# Patient Record
Sex: MALE | Race: WHITE | NOT HISPANIC OR LATINO | Employment: PART TIME | URBAN - METROPOLITAN AREA
[De-identification: names, ages, dates, MRNs, and addresses within clinical notes are randomized per-mention and may not be internally consistent; named-entity substitution may affect disease eponyms.]

---

## 2017-02-01 ENCOUNTER — HOSPITAL ENCOUNTER (OUTPATIENT)
Dept: CARDIOLOGY | Facility: HOSPITAL | Age: 32
Setting detail: RECURRING SERIES
Discharge: HOME | End: 2017-02-01

## 2017-02-01 PROCEDURE — 85610 PROTHROMBIN TIME: CPT | Performed by: INTERNAL MEDICINE

## 2017-02-01 PROCEDURE — 36416 COLLJ CAPILLARY BLOOD SPEC: CPT | Performed by: INTERNAL MEDICINE

## 2017-02-08 ENCOUNTER — HOSPITAL ENCOUNTER (OUTPATIENT)
Dept: CARDIOLOGY | Facility: HOSPITAL | Age: 32
Setting detail: RECURRING SERIES
Discharge: HOME | End: 2017-02-08

## 2017-02-08 PROCEDURE — 36416 COLLJ CAPILLARY BLOOD SPEC: CPT

## 2017-02-08 PROCEDURE — 85610 PROTHROMBIN TIME: CPT

## 2017-02-15 ENCOUNTER — HOSPITAL ENCOUNTER (OUTPATIENT)
Dept: CARDIOLOGY | Facility: HOSPITAL | Age: 32
Setting detail: RECURRING SERIES
Discharge: HOME | End: 2017-02-15

## 2017-02-15 PROCEDURE — 85610 PROTHROMBIN TIME: CPT

## 2017-02-15 PROCEDURE — 36416 COLLJ CAPILLARY BLOOD SPEC: CPT

## 2017-02-28 ENCOUNTER — HOSPITAL ENCOUNTER (OUTPATIENT)
Dept: CARDIOLOGY | Facility: HOSPITAL | Age: 32
Setting detail: RECURRING SERIES
Discharge: HOME | End: 2017-02-28

## 2017-02-28 PROCEDURE — 85610 PROTHROMBIN TIME: CPT

## 2017-02-28 PROCEDURE — 36416 COLLJ CAPILLARY BLOOD SPEC: CPT

## 2017-03-26 DIAGNOSIS — Z95.2 HX OF MECHANICAL AORTIC VALVE REPLACEMENT: ICD-10-CM

## 2017-03-27 RX ORDER — WARFARIN SODIUM 1 MG/1
TABLET ORAL
Qty: 30 TABLET | Refills: 2 | Status: SHIPPED | OUTPATIENT
Start: 2017-03-27 | End: 2017-07-21 | Stop reason: SDUPTHER

## 2017-03-27 RX ORDER — WARFARIN SODIUM 5 MG/1
TABLET ORAL
Qty: 45 TABLET | Refills: 2 | Status: SHIPPED | OUTPATIENT
Start: 2017-03-27 | End: 2017-07-21 | Stop reason: SDUPTHER

## 2017-04-26 ENCOUNTER — HOSPITAL ENCOUNTER (OUTPATIENT)
Dept: CARDIOLOGY | Facility: HOSPITAL | Age: 32
Setting detail: RECURRING SERIES
Discharge: HOME | End: 2017-04-26

## 2017-04-26 PROCEDURE — 36416 COLLJ CAPILLARY BLOOD SPEC: CPT

## 2017-04-26 PROCEDURE — 85610 PROTHROMBIN TIME: CPT

## 2017-05-01 ENCOUNTER — OFFICE VISIT (OUTPATIENT)
Dept: CARDIOLOGY | Facility: CLINIC | Age: 32
End: 2017-05-01

## 2017-05-01 VITALS
DIASTOLIC BLOOD PRESSURE: 92 MMHG | HEART RATE: 77 BPM | SYSTOLIC BLOOD PRESSURE: 150 MMHG | HEIGHT: 68 IN | BODY MASS INDEX: 29.25 KG/M2 | WEIGHT: 193 LBS

## 2017-05-01 DIAGNOSIS — I10 ESSENTIAL HYPERTENSION: ICD-10-CM

## 2017-05-01 DIAGNOSIS — Z95.2 S/P AVR: ICD-10-CM

## 2017-05-01 DIAGNOSIS — I20.8 STABLE ANGINA (HCC): Primary | ICD-10-CM

## 2017-05-01 DIAGNOSIS — I25.118 CORONARY ARTERY DISEASE OF NATIVE ARTERY OF NATIVE HEART WITH STABLE ANGINA PECTORIS (HCC): ICD-10-CM

## 2017-05-01 PROCEDURE — 99214 OFFICE O/P EST MOD 30 MIN: CPT | Performed by: INTERNAL MEDICINE

## 2017-05-01 PROCEDURE — 93000 ELECTROCARDIOGRAM COMPLETE: CPT | Performed by: INTERNAL MEDICINE

## 2017-05-01 RX ORDER — ISOSORBIDE MONONITRATE 30 MG/1
30 TABLET, EXTENDED RELEASE ORAL EVERY MORNING
Qty: 30 TABLET | Refills: 11 | Status: SHIPPED | OUTPATIENT
Start: 2017-05-01 | End: 2017-05-15 | Stop reason: SDUPTHER

## 2017-05-01 RX ORDER — LISINOPRIL 5 MG/1
5 TABLET ORAL DAILY
Qty: 30 TABLET | Refills: 11 | Status: SHIPPED | OUTPATIENT
Start: 2017-05-01 | End: 2017-08-24 | Stop reason: SDUPTHER

## 2017-05-08 ENCOUNTER — TELEPHONE (OUTPATIENT)
Dept: CARDIOLOGY | Facility: CLINIC | Age: 32
End: 2017-05-08

## 2017-05-10 ENCOUNTER — HOSPITAL ENCOUNTER (OUTPATIENT)
Dept: CARDIOLOGY | Facility: HOSPITAL | Age: 32
Setting detail: RECURRING SERIES
Discharge: HOME | End: 2017-05-10

## 2017-05-10 PROCEDURE — 85610 PROTHROMBIN TIME: CPT

## 2017-05-10 PROCEDURE — 36416 COLLJ CAPILLARY BLOOD SPEC: CPT

## 2017-05-11 RX ORDER — NITROGLYCERIN 0.4 MG/1
TABLET SUBLINGUAL
Qty: 75 TABLET | Refills: 1 | Status: SHIPPED | OUTPATIENT
Start: 2017-05-11 | End: 2017-07-14 | Stop reason: SDUPTHER

## 2017-05-15 RX ORDER — ISOSORBIDE MONONITRATE 30 MG/1
60 TABLET, EXTENDED RELEASE ORAL EVERY MORNING
Qty: 60 TABLET | Refills: 1 | Status: SHIPPED | OUTPATIENT
Start: 2017-05-15 | End: 2017-07-21 | Stop reason: SDUPTHER

## 2017-07-14 RX ORDER — NITROGLYCERIN 0.4 MG/1
TABLET SUBLINGUAL
Qty: 75 TABLET | Refills: 1 | Status: SHIPPED | OUTPATIENT
Start: 2017-07-14 | End: 2017-08-14 | Stop reason: SDUPTHER

## 2017-07-19 ENCOUNTER — HOSPITAL ENCOUNTER (OUTPATIENT)
Dept: CARDIOLOGY | Facility: HOSPITAL | Age: 32
Setting detail: RECURRING SERIES
Discharge: HOME | End: 2017-07-19

## 2017-07-19 PROCEDURE — 85610 PROTHROMBIN TIME: CPT

## 2017-07-19 PROCEDURE — 36416 COLLJ CAPILLARY BLOOD SPEC: CPT

## 2017-07-21 DIAGNOSIS — Z95.2 HX OF MECHANICAL AORTIC VALVE REPLACEMENT: ICD-10-CM

## 2017-07-21 RX ORDER — WARFARIN SODIUM 5 MG/1
TABLET ORAL
Qty: 45 TABLET | Refills: 2 | Status: SHIPPED | OUTPATIENT
Start: 2017-07-21 | End: 2017-11-02 | Stop reason: SDUPTHER

## 2017-07-21 RX ORDER — ISOSORBIDE MONONITRATE 30 MG/1
TABLET, EXTENDED RELEASE ORAL
Qty: 60 TABLET | Refills: 1 | Status: SHIPPED | OUTPATIENT
Start: 2017-07-21 | End: 2017-11-02 | Stop reason: SDUPTHER

## 2017-07-21 RX ORDER — WARFARIN SODIUM 1 MG/1
TABLET ORAL
Qty: 30 TABLET | Refills: 2 | Status: SHIPPED | OUTPATIENT
Start: 2017-07-21 | End: 2017-11-02 | Stop reason: SDUPTHER

## 2017-08-14 ENCOUNTER — TELEPHONE (OUTPATIENT)
Dept: CARDIOLOGY | Facility: CLINIC | Age: 32
End: 2017-08-14

## 2017-08-14 RX ORDER — NITROGLYCERIN 0.4 MG/1
0.4 TABLET SUBLINGUAL
Qty: 75 TABLET | Refills: 1 | Status: SHIPPED | OUTPATIENT
Start: 2017-08-14 | End: 2017-11-08 | Stop reason: SDUPTHER

## 2017-08-14 NOTE — TELEPHONE ENCOUNTER
I told her to take 20 mg of lisinopril a day: S in 4 days and let us know his blood pressure is if it's okay then we will send in a prescription

## 2017-08-14 NOTE — TELEPHONE ENCOUNTER
This is a CS pt and he is out this week, would you mind addressing this please? If you can't let me know and I will pass it on.    Pt called today to get a refill on his nitro. Also he has had increased BP recently.   8/2/17     178/115  8/10/17   200/112     At his last appt on 5/1/17, Dr. Banks had him d/c his carvedilol 6.25mg bid due to wheezing. So the only BP med he is on is the lisinopril 5mg qd. He wnts to know if there is anything additional he can take since his BP is so high. He can be reached at #[phone]. Please advise.    Thanks,  Michelle

## 2017-08-23 NOTE — TELEPHONE ENCOUNTER
See below. I haven't heard from him, but we will be seeing him in follow up on this Fri.    Michelle

## 2017-08-24 RX ORDER — LISINOPRIL 20 MG/1
20 TABLET ORAL DAILY
Qty: 30 TABLET | Refills: 1 | Status: SHIPPED | OUTPATIENT
Start: 2017-08-24 | End: 2017-11-06 | Stop reason: SDUPTHER

## 2017-08-24 NOTE — TELEPHONE ENCOUNTER
This patient calling said he is out of linisonpril   Wants to know if we can call this in for him  He is wanting lisinopril 20mg

## 2017-08-25 ENCOUNTER — OFFICE VISIT (OUTPATIENT)
Dept: CARDIOLOGY | Facility: CLINIC | Age: 32
End: 2017-08-25

## 2017-08-25 VITALS
SYSTOLIC BLOOD PRESSURE: 162 MMHG | WEIGHT: 190 LBS | HEART RATE: 89 BPM | DIASTOLIC BLOOD PRESSURE: 90 MMHG | HEIGHT: 68 IN | BODY MASS INDEX: 28.79 KG/M2

## 2017-08-25 DIAGNOSIS — Z95.2 S/P AVR: Primary | ICD-10-CM

## 2017-08-25 DIAGNOSIS — I20.8 STABLE ANGINA (HCC): ICD-10-CM

## 2017-08-25 DIAGNOSIS — I10 ESSENTIAL HYPERTENSION: ICD-10-CM

## 2017-08-25 PROCEDURE — 93000 ELECTROCARDIOGRAM COMPLETE: CPT | Performed by: INTERNAL MEDICINE

## 2017-08-25 PROCEDURE — 99214 OFFICE O/P EST MOD 30 MIN: CPT | Performed by: INTERNAL MEDICINE

## 2017-08-25 RX ORDER — CARVEDILOL 3.12 MG/1
3.12 TABLET ORAL 2 TIMES DAILY
Qty: 60 TABLET | Refills: 11 | Status: SHIPPED | OUTPATIENT
Start: 2017-08-25 | End: 2018-09-23 | Stop reason: SDUPTHER

## 2017-08-25 NOTE — PROGRESS NOTES
Galdino Dallas  1985  Date of Office Visit: 8/28/17  Encounter Provider: José Antonio Banks MD  Place of Service: [facility] CARDIOLOGY      CHIEF COMPLAINT:  Coronary artery disease.   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   Mechanical aortic valve replacement.      HISTORY OF PRESENT ILLNESS:  Dr. Merida,   I had the pleasure of seeing your patient in followup today. As you well know, he is a very pleasant 31-year-old gentleman with a medical history of bicuspid aortic valve with mechanical replacement, coronary artery disease, three stents (per my review) in RPL, OM and also a 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 He actually has very bad distal coronary artery disease.    Since our last visit, he states that his angina has significantly increased in frequency and in intensity. It is present with minimal levels of exertion, including walking 100 to 200 feet, requiring him to rest. This is much worse than it has been prior.  He denies any rest pain or pain with exertion less than that described above.  He was unclear what was leading to that and checked his blood pressure and stated his systolic was staying above 100 mmHg.  He called in and his lisinopril was increased to 20 mg daily. He states his blood pressure is improved, however it is still elevated and he continues to have discomfort. Nothing besides rest makes the discomfort better. He has no orthopnea PND or lower extremity edema.  No bleeding complications on the coumadin.             Review of Systems   Constitution: Negative for fever, weakness and malaise/fatigue.   HENT: Positive for headaches. Negative for nosebleeds and sore throat.    Eyes: Negative for blurred vision and double vision.   Cardiovascular: Positive for chest pain. Negative for claudication, dyspnea on exertion, palpitations and syncope.   Respiratory: Negative for cough, shortness of breath, snoring and wheezing.    Endocrine:  "Negative for cold intolerance, heat intolerance and polydipsia.   Skin: Negative for itching, poor wound healing and rash.   Musculoskeletal: Positive for myalgias. Negative for joint pain, joint swelling and muscle weakness.   Gastrointestinal: Negative for abdominal pain, melena, nausea and vomiting.   Neurological: Negative for light-headedness, loss of balance, seizures and vertigo.   Psychiatric/Behavioral: Positive for depression. Negative for altered mental status.       Past Medical History:   Diagnosis Date   • Angina pectoris    • CAD (coronary artery disease)    • Chest pain    • Hyperlipidemia    • Hypertension    • IBS (irritable bowel syndrome)    • DERRELL (obstructive sleep apnea)    • S/P AVR (aortic valve replacement)        The following portions of the patient's history were reviewed and updated as appropriate: Social history , Family history and Surgical history     Current Outpatient Prescriptions on File Prior to Visit   Medication Sig Dispense Refill   • isosorbide mononitrate (IMDUR) 30 MG 24 hr tablet TAKE 2 TABLETS BY MOUTH EVERY MORNING. 60 tablet 1   • lisinopril (PRINIVIL,ZESTRIL) 20 MG tablet Take 1 tablet by mouth Daily. 30 tablet 1   • nitroglycerin (NITROSTAT) 0.4 MG SL tablet Place 1 tablet under the tongue Every 5 (Five) Minutes As Needed for Chest Pain. MAY REPEAT FOR UP THREE DOSES 75 tablet 1   • warfarin (COUMADIN) 1 MG tablet TAKE 1 TAB FOUR DAYS A WEEK ALONG WITH A 5MG. TAB (TOTAL 6MG) AND 1 TAB THREE DAYS A WEEK. 30 tablet 2   • warfarin (COUMADIN) 5 MG tablet TAKE 1 TAB 4 DAYS A WEEK ALONG WITH 1MG TAB (TOTAL 6MG) AND 1 1/2 TABS 3 DAYS A WEEK. 45 tablet 2     No current facility-administered medications on file prior to visit.        Allergies   Allergen Reactions   • Penicillins        Vitals:    08/25/17 1522   BP: 162/90   Pulse: 89   Weight: 190 lb (86.2 kg)   Height: 68\" (172.7 cm)     Physical Exam   Constitutional: He is oriented to person, place, and time. He appears " well-developed and well-nourished.   HENT:   Head: Normocephalic and atraumatic.   Eyes: Conjunctivae and EOM are normal. No scleral icterus.   Neck: Normal range of motion. Neck supple. Normal carotid pulses, no hepatojugular reflux and no JVD present. Carotid bruit is not present. No tracheal deviation present. No thyromegaly present.   Cardiovascular: Normal rate and regular rhythm.  Exam reveals no gallop and no friction rub.    No murmur heard.  Pulses:       Carotid pulses are 2+ on the right side, and 2+ on the left side.       Radial pulses are 2+ on the right side, and 2+ on the left side.        Femoral pulses are 2+ on the right side, and 2+ on the left side.       Dorsalis pedis pulses are 2+ on the right side, and 2+ on the left side.        Posterior tibial pulses are 2+ on the right side, and 2+ on the left side.   Mechanical S2   Pulmonary/Chest: Breath sounds normal. No respiratory distress. He has no decreased breath sounds. He has no wheezes. He has no rhonchi. He has no rales. He exhibits no tenderness.   Abdominal: Soft. Bowel sounds are normal. He exhibits no distension. There is no tenderness. There is no rebound.   Musculoskeletal: He exhibits no edema or deformity.   Neurological: He is alert and oriented to person, place, and time. He has normal strength. No sensory deficit.   Skin: No rash noted. No erythema.   Sternotomy   Psychiatric: He has a normal mood and affect. His behavior is normal.       No components found for: CBC  No results found for: CMP  No components found for: LIPID  No results found for: BMP      ECG 12 Lead  Date/Time: 8/25/2017 3:55 PM  Performed by: KARINA RASHEED  Authorized by: KARINA RASHEED   Comparison: compared with previous ECG from 5/1/2017  Similar to previous ECG  Rhythm: sinus rhythm  Rate: normal  QRS axis: left  Clinical impression: abnormal ECG  Comments: LVH with repolarization abnormality               DISCUSSION/SUMMARY   31-year-old  gentleman with a medical history of bicuspid aortic valve with mechanical replacement, coronary artery disease, three stents (per my review) in RPL, OM and also a 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 He actually has very bad distal coronary artery disease.  He presents back for followup and his blood pressure is poorly controlled.  He feels that his angina has gotten worse and I think that is a possible secondary to his increase in blood pressure and discontinuation of his carvedilol which was DC'd on the last visit secondary to fatigue.     1. Aortic valve replacement.  He is to continue his current anticoagulant regimen.  Coumadin  2.   Hypertension.  Poorly controlled.   -He is agreeable to restarting a low-dose of carvedilol therapy.  I will restart that at carvedilol 3.125 mg by mouth twice a day.  He will check his blood pressure daily and call me in one week.   -If his blood pressure is still elevated, I will increase his lisinopril to 40 mg daily  3.  Stable angina: Appears to be slightly worse than prior.  May be driven by blood pressure.   -He is agreeable to restarting beta blockade.  I will start him on carvedilol 3.125 mg by mouth twice a day.  He will call back in one week with repeat blood pressure measurements.  He will also comment on the angina.    -If his blood pressure is still elevated I'll increase his lisinopril to 40 mg daily.  If he continues to have angina with a well-controlled blood pressure, I think coronary angiography will be our next step.  He is  aware.    Coronary Artery Disease  Assessment  • The patient has CCS class III - angina with activities of daily living  • The patient has stable angina     Plan  • Lifestyle modifications discussed include adhering to a heart healthy diet, avoidance of tobacco products, maintenance of a healthy weight, medication compliance, regular exercise and regular monitoring of cholesterol and blood pressure    Subjective - Objective  • Current  antiplatelet therapy includes aspirin 81 mg

## 2017-10-17 ENCOUNTER — HOSPITAL ENCOUNTER (OUTPATIENT)
Dept: CARDIOLOGY | Facility: HOSPITAL | Age: 32
Setting detail: RECURRING SERIES
Discharge: HOME | End: 2017-10-17

## 2017-10-17 PROCEDURE — 36416 COLLJ CAPILLARY BLOOD SPEC: CPT

## 2017-10-17 PROCEDURE — 85610 PROTHROMBIN TIME: CPT

## 2017-11-02 ENCOUNTER — APPOINTMENT (OUTPATIENT)
Dept: CT IMAGING | Facility: HOSPITAL | Age: 32
End: 2017-11-02

## 2017-11-02 ENCOUNTER — HOSPITAL ENCOUNTER (INPATIENT)
Facility: HOSPITAL | Age: 32
LOS: 2 days | Discharge: HOME | End: 2017-11-04
Attending: EMERGENCY MEDICINE | Admitting: INTERNAL MEDICINE

## 2017-11-02 DIAGNOSIS — D68.32 WARFARIN-INDUCED COAGULOPATHY (HCC): ICD-10-CM

## 2017-11-02 DIAGNOSIS — K57.92 DIVERTICULITIS OF INTESTINE WITHOUT PERFORATION OR ABSCESS WITHOUT BLEEDING, UNSPECIFIED PART OF INTESTINAL TRACT: Primary | ICD-10-CM

## 2017-11-02 DIAGNOSIS — T45.515A WARFARIN-INDUCED COAGULOPATHY (HCC): ICD-10-CM

## 2017-11-02 PROBLEM — Z79.01 ANTICOAGULANT LONG-TERM USE: Status: ACTIVE | Noted: 2017-11-02

## 2017-11-02 PROBLEM — G47.33 OSA (OBSTRUCTIVE SLEEP APNEA): Status: ACTIVE | Noted: 2017-11-02

## 2017-11-02 PROBLEM — T45.511A COUMADIN TOXICITY: Status: ACTIVE | Noted: 2017-11-02

## 2017-11-02 LAB
ABO GROUP BLD: NORMAL
ALBUMIN SERPL-MCNC: 4.6 G/DL (ref 3.5–5.2)
ALBUMIN/GLOB SERPL: 1.5 G/DL
ALP SERPL-CCNC: 87 U/L (ref 39–117)
ALT SERPL W P-5'-P-CCNC: 36 U/L (ref 1–41)
ANION GAP SERPL CALCULATED.3IONS-SCNC: 15.6 MMOL/L
AST SERPL-CCNC: 34 U/L (ref 1–40)
BASOPHILS # BLD AUTO: 0.02 10*3/MM3 (ref 0–0.2)
BASOPHILS NFR BLD AUTO: 0.1 % (ref 0–1.5)
BILIRUB SERPL-MCNC: 1.2 MG/DL (ref 0.1–1.2)
BILIRUB UR QL STRIP: NEGATIVE
BLD GP AB SCN SERPL QL: NEGATIVE
BUN BLD-MCNC: 15 MG/DL (ref 6–20)
BUN/CREAT SERPL: 17.6 (ref 7–25)
CALCIUM SPEC-SCNC: 9.2 MG/DL (ref 8.6–10.5)
CHLORIDE SERPL-SCNC: 99 MMOL/L (ref 98–107)
CLARITY UR: CLEAR
CO2 SERPL-SCNC: 20.4 MMOL/L (ref 22–29)
COLOR UR: ABNORMAL
CREAT BLD-MCNC: 0.85 MG/DL (ref 0.76–1.27)
DEPRECATED RDW RBC AUTO: 43.7 FL (ref 37–54)
EOSINOPHIL # BLD AUTO: 0.04 10*3/MM3 (ref 0–0.7)
EOSINOPHIL NFR BLD AUTO: 0.2 % (ref 0.3–6.2)
ERYTHROCYTE [DISTWIDTH] IN BLOOD BY AUTOMATED COUNT: 13.1 % (ref 11.5–14.5)
GFR SERPL CREATININE-BSD FRML MDRD: 104 ML/MIN/1.73
GLOBULIN UR ELPH-MCNC: 3 GM/DL
GLUCOSE BLD-MCNC: 84 MG/DL (ref 65–99)
GLUCOSE UR STRIP-MCNC: NEGATIVE MG/DL
HCT VFR BLD AUTO: 49.5 % (ref 40.4–52.2)
HGB BLD-MCNC: 17.2 G/DL (ref 13.7–17.6)
HGB UR QL STRIP.AUTO: NEGATIVE
HOLD SPECIMEN: NORMAL
HOLD SPECIMEN: NORMAL
IMM GRANULOCYTES # BLD: 0.06 10*3/MM3 (ref 0–0.03)
IMM GRANULOCYTES NFR BLD: 0.3 % (ref 0–0.5)
INR PPP: 3.3 (ref 0.9–1.1)
INR PPP: 7.5 (ref 0.9–1.1)
KETONES UR QL STRIP: ABNORMAL
LEUKOCYTE ESTERASE UR QL STRIP.AUTO: NEGATIVE
LIPASE SERPL-CCNC: 17 U/L (ref 13–60)
LYMPHOCYTES # BLD AUTO: 1.71 10*3/MM3 (ref 0.9–4.8)
LYMPHOCYTES NFR BLD AUTO: 9.4 % (ref 19.6–45.3)
MCH RBC QN AUTO: 32 PG (ref 27–32.7)
MCHC RBC AUTO-ENTMCNC: 34.7 G/DL (ref 32.6–36.4)
MCV RBC AUTO: 92.2 FL (ref 79.8–96.2)
MONOCYTES # BLD AUTO: 1.41 10*3/MM3 (ref 0.2–1.2)
MONOCYTES NFR BLD AUTO: 7.8 % (ref 5–12)
NEUTROPHILS # BLD AUTO: 14.9 10*3/MM3 (ref 1.9–8.1)
NEUTROPHILS NFR BLD AUTO: 82.2 % (ref 42.7–76)
NITRITE UR QL STRIP: NEGATIVE
PH UR STRIP.AUTO: 5.5 [PH] (ref 5–8)
PLATELET # BLD AUTO: 203 10*3/MM3 (ref 140–500)
PMV BLD AUTO: 10.4 FL (ref 6–12)
POTASSIUM BLD-SCNC: 4.6 MMOL/L (ref 3.5–5.2)
PROT SERPL-MCNC: 7.6 G/DL (ref 6–8.5)
PROT UR QL STRIP: ABNORMAL
PROTHROMBIN TIME: 32.5 SECONDS (ref 11.7–14.2)
PROTHROMBIN TIME: 61.8 SECONDS (ref 11.7–14.2)
RBC # BLD AUTO: 5.37 10*6/MM3 (ref 4.6–6)
RH BLD: POSITIVE
SODIUM BLD-SCNC: 135 MMOL/L (ref 136–145)
SP GR UR STRIP: >=1.03 (ref 1–1.03)
UROBILINOGEN UR QL STRIP: ABNORMAL
WBC NRBC COR # BLD: 18.14 10*3/MM3 (ref 4.5–10.7)
WHOLE BLOOD HOLD SPECIMEN: NORMAL
WHOLE BLOOD HOLD SPECIMEN: NORMAL

## 2017-11-02 PROCEDURE — 25010000002 HYDROMORPHONE PER 4 MG: Performed by: EMERGENCY MEDICINE

## 2017-11-02 PROCEDURE — 83690 ASSAY OF LIPASE: CPT | Performed by: EMERGENCY MEDICINE

## 2017-11-02 PROCEDURE — 74177 CT ABD & PELVIS W/CONTRAST: CPT

## 2017-11-02 PROCEDURE — 85610 PROTHROMBIN TIME: CPT | Performed by: EMERGENCY MEDICINE

## 2017-11-02 PROCEDURE — P9017 PLASMA 1 DONOR FRZ W/IN 8 HR: HCPCS

## 2017-11-02 PROCEDURE — 0 IOPAMIDOL 61 % SOLUTION: Performed by: EMERGENCY MEDICINE

## 2017-11-02 PROCEDURE — 99284 EMERGENCY DEPT VISIT MOD MDM: CPT

## 2017-11-02 PROCEDURE — 86850 RBC ANTIBODY SCREEN: CPT | Performed by: INTERNAL MEDICINE

## 2017-11-02 PROCEDURE — 86927 PLASMA FRESH FROZEN: CPT

## 2017-11-02 PROCEDURE — 86900 BLOOD TYPING SEROLOGIC ABO: CPT | Performed by: INTERNAL MEDICINE

## 2017-11-02 PROCEDURE — 36415 COLL VENOUS BLD VENIPUNCTURE: CPT | Performed by: EMERGENCY MEDICINE

## 2017-11-02 PROCEDURE — 86901 BLOOD TYPING SEROLOGIC RH(D): CPT | Performed by: INTERNAL MEDICINE

## 2017-11-02 PROCEDURE — 25010000002 ONDANSETRON PER 1 MG: Performed by: EMERGENCY MEDICINE

## 2017-11-02 PROCEDURE — 85025 COMPLETE CBC W/AUTO DIFF WBC: CPT | Performed by: EMERGENCY MEDICINE

## 2017-11-02 PROCEDURE — 25010000002 HYDROMORPHONE PER 4 MG: Performed by: INTERNAL MEDICINE

## 2017-11-02 PROCEDURE — 36430 TRANSFUSION BLD/BLD COMPNT: CPT

## 2017-11-02 PROCEDURE — 85610 PROTHROMBIN TIME: CPT | Performed by: INTERNAL MEDICINE

## 2017-11-02 PROCEDURE — 81003 URINALYSIS AUTO W/O SCOPE: CPT | Performed by: EMERGENCY MEDICINE

## 2017-11-02 PROCEDURE — 25010000002 CEFEPIME PER 500 MG: Performed by: INTERNAL MEDICINE

## 2017-11-02 PROCEDURE — 80053 COMPREHEN METABOLIC PANEL: CPT | Performed by: EMERGENCY MEDICINE

## 2017-11-02 PROCEDURE — 25010000002 PIPERACILLIN SOD-TAZOBACTAM PER 1 G: Performed by: EMERGENCY MEDICINE

## 2017-11-02 RX ORDER — SODIUM CHLORIDE 9 MG/ML
125 INJECTION, SOLUTION INTRAVENOUS CONTINUOUS
Status: DISCONTINUED | OUTPATIENT
Start: 2017-11-02 | End: 2017-11-04

## 2017-11-02 RX ORDER — HYDROMORPHONE HYDROCHLORIDE 1 MG/ML
0.5 INJECTION, SOLUTION INTRAMUSCULAR; INTRAVENOUS; SUBCUTANEOUS ONCE
Status: COMPLETED | OUTPATIENT
Start: 2017-11-02 | End: 2017-11-02

## 2017-11-02 RX ORDER — ONDANSETRON 2 MG/ML
4 INJECTION INTRAMUSCULAR; INTRAVENOUS EVERY 6 HOURS PRN
Status: DISCONTINUED | OUTPATIENT
Start: 2017-11-02 | End: 2017-11-04 | Stop reason: HOSPADM

## 2017-11-02 RX ORDER — ACETAMINOPHEN 325 MG/1
650 TABLET ORAL EVERY 4 HOURS PRN
Status: DISCONTINUED | OUTPATIENT
Start: 2017-11-02 | End: 2017-11-04 | Stop reason: HOSPADM

## 2017-11-02 RX ORDER — ONDANSETRON 4 MG/1
4 TABLET, ORALLY DISINTEGRATING ORAL EVERY 6 HOURS PRN
Status: DISCONTINUED | OUTPATIENT
Start: 2017-11-02 | End: 2017-11-04 | Stop reason: HOSPADM

## 2017-11-02 RX ORDER — SODIUM CHLORIDE 0.9 % (FLUSH) 0.9 %
10 SYRINGE (ML) INJECTION AS NEEDED
Status: DISCONTINUED | OUTPATIENT
Start: 2017-11-02 | End: 2017-11-04 | Stop reason: HOSPADM

## 2017-11-02 RX ORDER — WARFARIN SODIUM 6 MG/1
6 TABLET ORAL
COMMUNITY
End: 2017-11-04 | Stop reason: HOSPADM

## 2017-11-02 RX ORDER — HYDROMORPHONE HYDROCHLORIDE 1 MG/ML
0.5 INJECTION, SOLUTION INTRAMUSCULAR; INTRAVENOUS; SUBCUTANEOUS
Status: DISCONTINUED | OUTPATIENT
Start: 2017-11-02 | End: 2017-11-04 | Stop reason: HOSPADM

## 2017-11-02 RX ORDER — ONDANSETRON 2 MG/ML
4 INJECTION INTRAMUSCULAR; INTRAVENOUS ONCE
Status: COMPLETED | OUTPATIENT
Start: 2017-11-02 | End: 2017-11-02

## 2017-11-02 RX ORDER — HYDROCODONE BITARTRATE AND ACETAMINOPHEN 7.5; 325 MG/1; MG/1
1 TABLET ORAL EVERY 4 HOURS PRN
Status: DISCONTINUED | OUTPATIENT
Start: 2017-11-02 | End: 2017-11-03

## 2017-11-02 RX ORDER — ONDANSETRON 4 MG/1
4 TABLET, FILM COATED ORAL EVERY 6 HOURS PRN
Status: DISCONTINUED | OUTPATIENT
Start: 2017-11-02 | End: 2017-11-04 | Stop reason: HOSPADM

## 2017-11-02 RX ADMIN — SODIUM CHLORIDE 125 ML/HR: 9 INJECTION, SOLUTION INTRAVENOUS at 14:29

## 2017-11-02 RX ADMIN — HYDROMORPHONE HYDROCHLORIDE 0.5 MG: 1 INJECTION, SOLUTION INTRAMUSCULAR; INTRAVENOUS; SUBCUTANEOUS at 20:00

## 2017-11-02 RX ADMIN — ONDANSETRON 4 MG: 2 INJECTION INTRAMUSCULAR; INTRAVENOUS at 14:30

## 2017-11-02 RX ADMIN — IOPAMIDOL 85 ML: 612 INJECTION, SOLUTION INTRAVENOUS at 14:42

## 2017-11-02 RX ADMIN — TAZOBACTAM SODIUM AND PIPERACILLIN SODIUM 4.5 G: 500; 4 INJECTION, SOLUTION INTRAVENOUS at 15:18

## 2017-11-02 RX ADMIN — HYDROMORPHONE HYDROCHLORIDE 0.5 MG: 1 INJECTION, SOLUTION INTRAMUSCULAR; INTRAVENOUS; SUBCUTANEOUS at 17:45

## 2017-11-02 RX ADMIN — CEFEPIME HYDROCHLORIDE 2 G: 2 INJECTION, POWDER, FOR SOLUTION INTRAVENOUS at 19:22

## 2017-11-02 RX ADMIN — HYDROMORPHONE HYDROCHLORIDE 0.5 MG: 1 INJECTION, SOLUTION INTRAMUSCULAR; INTRAVENOUS; SUBCUTANEOUS at 23:12

## 2017-11-02 RX ADMIN — SODIUM CHLORIDE 500 ML: 9 INJECTION, SOLUTION INTRAVENOUS at 14:29

## 2017-11-02 RX ADMIN — HYDROMORPHONE HYDROCHLORIDE 0.5 MG: 1 INJECTION, SOLUTION INTRAMUSCULAR; INTRAVENOUS; SUBCUTANEOUS at 14:31

## 2017-11-02 RX ADMIN — SODIUM CHLORIDE 125 ML/HR: 9 INJECTION, SOLUTION INTRAVENOUS at 19:15

## 2017-11-02 NOTE — PROGRESS NOTES
Clinical Pharmacy Services: Medication History    Galdino Dallas is a 31 y.o. male presenting to [facility] Emergency Department with chief complaint of:       Chief Complaint   Patient presents with   • Abdominal Pain     He  has a past medical history of Angina pectoris; CAD (coronary artery disease); Chest pain; Hyperlipidemia; Hypertension; IBS (irritable bowel syndrome); DERRELL (obstructive sleep apnea); and S/P AVR (aortic valve replacement).    Allergies as of 11/02/2017 - Jose as Reviewed 11/02/2017   Allergen Reaction Noted   • Penicillins Rash 01/27/2016       Medication information was obtained from: patient  Pharmacy and Phone Number: [id number] - [place], [place] - [address] [facility] - [phone] [facility] [phone]     Prior to Admission Medications       Prescriptions Last Dose Informant Patient Reported? Taking?      carvedilol (COREG) 3.125 MG tablet 11/1/2017 Self No Yes    Take 1 tablet by mouth 2 (Two) Times a Day.    lisinopril (PRINIVIL,ZESTRIL) 20 MG tablet 11/1/2017 Self No Yes    Take 1 tablet by mouth Daily.    nitroglycerin (NITROSTAT) 0.4 MG SL tablet  Self No No    Place 1 tablet under the tongue Every 5 (Five) Minutes As Needed for Chest Pain. MAY REPEAT FOR UP THREE DOSES    warfarin (COUMADIN) 6 MG tablet  Self Yes Yes    Take 6 mg by mouth Daily.          Medication notes: Patient's Warfarin dosing regimen recently changed to 6 mg daily.     This medication list is complete to the best of my knowledge as of 11/2/2017    Please call pharmacy with questions.    Mayte Borja  Pharmacy Intern  11/2/2017 3:30 PM

## 2017-11-02 NOTE — ED NOTES
Pt c/o lower abdominal pain with nausea since Sunday but worse since today.Pt went to an urgent care today and was told to go to an ED for further evaluation     Alejandra Montenegro RN  11/02/17 9944

## 2017-11-02 NOTE — ED PROVIDER NOTES
" EMERGENCY DEPARTMENT ENCOUNTER    CHIEF COMPLAINT  Chief Complaint: Abdominal Pain  History given by: Patient, Mother  History limited by: N/A  Room Number: 15/15  PMD: Antonino Merida MD      HPI:  Pt presents to the ED c/o intermittent episodes of suprapubic abdominal pain onset about 4 days ago. Pt has also had nausea, dark urine, and decreased appetite, but denies vomiting, diarrhea, dyspnea, testicular pain, and chest pain. Pt [facility] earlier today and was referred to the ER for further evaluation. There are no other complaints at this time.     Pain Location: Suprapubic region   Radiation: None  Quality: \"aching\"  Intensity/Severity: Moderate  Duration: Onset about 4 days ago  Onset quality: Gradual  Timing: Intermittent  Progression: Waxing and waning  Aggravating Factors: Nothing  Alleviating Factors: Nothing  Previous Episodes: None  Treatment before arrival: None  Associated Symptoms: Nausea, decreased appetite, dark urine       PAST MEDICAL HISTORY  Active Ambulatory Problems     Diagnosis Date Noted   • Stable angina 05/01/2017   • Coronary artery disease of native artery of native heart with stable angina pectoris 05/01/2017   • Essential hypertension 05/01/2017   • S/P AVR 05/01/2017     Resolved Ambulatory Problems     Diagnosis Date Noted   • No Resolved Ambulatory Problems     Past Medical History:   Diagnosis Date   • Angina pectoris    • CAD (coronary artery disease)    • Chest pain    • Hyperlipidemia    • Hypertension    • IBS (irritable bowel syndrome)    • DERRELL (obstructive sleep apnea)    • S/P AVR (aortic valve replacement)          PAST SURGICAL HISTORY  Past Surgical History:   Procedure Laterality Date   • AORTIC VALVE REPAIR/REPLACEMENT     • CORONARY ANGIOPLASTY WITH STENT PLACEMENT  2010    x3   • TONSILLECTOMY           FAMILY HISTORY  Family History   Problem Relation Age of Onset   • Heart disease Mother    • Heart attack Paternal " Grandfather    • No Known Problems Father          SOCIAL HISTORY  Social History     Social History   • Marital status:      Spouse name: N/A   • Number of children: N/A   • Years of education: N/A     Occupational History   • Not on file.     Social History Main Topics   • Smoking status: Never Smoker   • Smokeless tobacco: Not on file   • Alcohol use Yes      Comment: occasional   • Drug use: No   • Sexual activity: Not on file     Other Topics Concern   • Not on file     Social History Narrative         ALLERGIES  Penicillins        REVIEW OF SYSTEMS  Review of Systems   Constitutional: Positive for appetite change (decreased appetite). Negative for chills.   HENT: Negative for congestion, rhinorrhea and sore throat.    Eyes: Negative for pain.   Respiratory: Negative for cough and shortness of breath.    Cardiovascular: Negative for chest pain, palpitations and leg swelling.   Gastrointestinal: Positive for abdominal pain and nausea. Negative for diarrhea and vomiting.   Genitourinary: Negative for flank pain and testicular pain.        Dark urine   Musculoskeletal: Negative for myalgias, neck pain and neck stiffness.   Skin: Negative for rash.   Neurological: Negative for dizziness, speech difficulty, weakness, light-headedness, numbness and headaches.   Psychiatric/Behavioral: Negative.    All other systems reviewed and are negative.           PHYSICAL EXAM  ED Triage Vitals   Temp Heart Rate Resp BP SpO2   11/02/17 1120 11/02/17 1120 11/02/17 1143 11/02/17 1144 11/02/17 1120   98.3 °F (36.8 °C) 101 16 158/98 99 % WNL      Temp src Heart Rate Source Patient Position BP Location FiO2 (%)   -- 11/02/17 1330 11/02/17 1330 11/02/17 1330 --    Monitor Sitting Left arm        Physical Exam   Constitutional: He is oriented to person, place, and time. He appears distressed (pt appears uncomfortable).   HENT:   Head: Normocephalic.   Mouth/Throat: Mucous membranes are normal.   Eyes: EOM are normal. Pupils are  equal, round, and reactive to light.   Neck: Normal range of motion. Neck supple.   Cardiovascular: Normal rate and regular rhythm.    Pt has prosthetic valve sounds.    Pulmonary/Chest: Effort normal and breath sounds normal. No respiratory distress. He has no decreased breath sounds. He has no wheezes. He has no rhonchi. He has no rales.   Abdominal: Soft. There is tenderness (right > left) in the suprapubic area. There is no rebound and no guarding.   Musculoskeletal: Normal range of motion.   Neurological: He is alert and oriented to person, place, and time. He has normal sensation.   Skin: Skin is warm and dry.   Psychiatric: Mood and affect normal.   Nursing note and vitals reviewed.          LAB RESULTS  Recent Results (from the past 24 hour(s))   Comprehensive Metabolic Panel    Collection Time: 11/02/17 11:58 AM   Result Value Ref Range    Glucose 84 65 - 99 mg/dL    BUN 15 6 - 20 mg/dL    Creatinine 0.85 0.76 - 1.27 mg/dL    Sodium 135 (L) 136 - 145 mmol/L    Potassium 4.6 3.5 - 5.2 mmol/L    Chloride 99 98 - 107 mmol/L    CO2 20.4 (L) 22.0 - 29.0 mmol/L    Calcium 9.2 8.6 - 10.5 mg/dL    Total Protein 7.6 6.0 - 8.5 g/dL    Albumin 4.60 3.50 - 5.20 g/dL    ALT (SGPT) 36 1 - 41 U/L    AST (SGOT) 34 1 - 40 U/L    Alkaline Phosphatase 87 39 - 117 U/L    Total Bilirubin 1.2 0.1 - 1.2 mg/dL    eGFR Non African Amer 104 >60 mL/min/1.73    Globulin 3.0 gm/dL    A/G Ratio 1.5 g/dL    BUN/Creatinine Ratio 17.6 7.0 - 25.0    Anion Gap 15.6 mmol/L   Lipase    Collection Time: 11/02/17 11:58 AM   Result Value Ref Range    Lipase 17 13 - 60 U/L   Protime-INR    Collection Time: 11/02/17 11:58 AM   Result Value Ref Range    Protime 61.8 (C) 11.7 - 14.2 Seconds    INR 7.50 (C) 0.90 - 1.10   Light Blue Top    Collection Time: 11/02/17 11:58 AM   Result Value Ref Range    Extra Tube hold for add-on    Green Top (Gel)    Collection Time: 11/02/17 11:58 AM   Result Value Ref Range    Extra Tube Hold for add-ons.    Lavender  Top    Collection Time: 11/02/17 11:58 AM   Result Value Ref Range    Extra Tube hold for add-on    Gold Top - SST    Collection Time: 11/02/17 11:58 AM   Result Value Ref Range    Extra Tube Hold for add-ons.    CBC Auto Differential    Collection Time: 11/02/17 11:58 AM   Result Value Ref Range    WBC 18.14 (H) 4.50 - 10.70 10*3/mm3    RBC 5.37 4.60 - 6.00 10*6/mm3    Hemoglobin 17.2 13.7 - 17.6 g/dL    Hematocrit 49.5 40.4 - 52.2 %    MCV 92.2 79.8 - 96.2 fL    MCH 32.0 27.0 - 32.7 pg    MCHC 34.7 32.6 - 36.4 g/dL    RDW 13.1 11.5 - 14.5 %    RDW-SD 43.7 37.0 - 54.0 fl    MPV 10.4 6.0 - 12.0 fL    Platelets 203 140 - 500 10*3/mm3    Neutrophil % 82.2 (H) 42.7 - 76.0 %    Lymphocyte % 9.4 (L) 19.6 - 45.3 %    Monocyte % 7.8 5.0 - 12.0 %    Eosinophil % 0.2 (L) 0.3 - 6.2 %    Basophil % 0.1 0.0 - 1.5 %    Immature Grans % 0.3 0.0 - 0.5 %    Neutrophils, Absolute 14.90 (H) 1.90 - 8.10 10*3/mm3    Lymphocytes, Absolute 1.71 0.90 - 4.80 10*3/mm3    Monocytes, Absolute 1.41 (H) 0.20 - 1.20 10*3/mm3    Eosinophils, Absolute 0.04 0.00 - 0.70 10*3/mm3    Basophils, Absolute 0.02 0.00 - 0.20 10*3/mm3    Immature Grans, Absolute 0.06 (H) 0.00 - 0.03 10*3/mm3   Urinalysis With / Culture If Indicated - Urine, Clean Catch    Collection Time: 11/02/17 11:59 AM   Result Value Ref Range    Color, UA Dark Yellow (A) Yellow, Straw    Appearance, UA Clear Clear    pH, UA 5.5 5.0 - 8.0    Specific Gravity, UA >=1.030 1.005 - 1.030    Glucose, UA Negative Negative    Ketones, UA 80 mg/dL (3+) (A) Negative    Bilirubin, UA Negative Negative    Blood, UA Negative Negative    Protein, UA Trace (A) Negative    Leuk Esterase, UA Negative Negative    Nitrite, UA Negative Negative    Urobilinogen, UA 0.2 E.U./dL 0.2 - 1.0 E.U./dL       Ordered the above labs and reviewed the results.        RADIOLOGY  CT Abdomen Pelvis With Contrast - Extensive diverticulitis of the sigmoid colon. No perforation or abscess. Interpreted by radiologist.  Discussed with radiologist. Independently viewed by me.             Ordered the above noted radiological studies. Reviewed by me in PACS.       PROCEDURES  Procedures              PROGRESS AND CONSULTS  ED Course     2:03 PM:  CT Abd ordered for further evaluation. Dilaudid and zofran ordered to treat for abd pain and nausea. IV fluids ordered to hydrate pt.     2:56 PM:  Rechecked pt. Pt is resting comfortably and appears in no acute distress. Informed pt that his WBC is 18.14. CT Abd shows sigmoid diverticulitis. Pt's INR is 7.50. Need for admission for further treatment and management of diverticulitis and coumadin coagulopathy. Pt agrees with plan.    Pt reports that his allergy to penicillins is that as an infant, pt developed a rash around the ankles after taking a penicillin. Pt states that he has since taken Amoxicillin without any significant complications.     2:57 PM:  Zosyn ordered to treat for diverticulitis. Placed call to A for admission. Decision time to admit: Now.     3:15 PM:  Discussed case with Dr. Thorpe, hospitalist. She will admit pt to a telemetry bed.             MEDICAL DECISION MAKING      MDM  Number of Diagnoses or Management Options  Diverticulitis of intestine without perforation or abscess without bleeding, unspecified part of intestinal tract:   Warfarin-induced coagulopathy:      Amount and/or Complexity of Data Reviewed  Clinical lab tests: reviewed and ordered (WBC is 18.14. INR is 7.50.)  Tests in the radiology section of CPT®: ordered and reviewed (CT Abd: Extensive sigmoid diverticulitis.   )  Discussion of test results with the performing providers: yes (CT Abd results d/w radiologist.   )  Discuss the patient with other providers: yes (Case d/w Dr. Thorpe, hospitalist, who will admit pt to a telemetry bed.   )    Patient Progress  Patient progress: stable             DIAGNOSIS  Final diagnoses:   Diverticulitis of intestine without perforation or abscess without bleeding,  unspecified part of intestinal tract   Warfarin-induced coagulopathy         DISPOSITION  Pt admitted to telemetry.      ADMISSION    Discussed treatment plan and reason for admission with pt/family and admitting physician.  Pt/family voiced understanding of the plan for admission for further testing/treatment as needed.           --  Documentation assistance provided by parveen Velazquez for Dr. Celso MD.  Information recorded by the reshmaibmichelle was done at my direction and has been verified and validated by me.              Paige Velazquez  11/02/17 1520       Bimal Houston MD  11/02/17 1526

## 2017-11-02 NOTE — H&P
Name: Galdino Dallas ADMIT: 2017   : 1985  PCP: Antonino Merida MD    MRN: [id number] LOS: 0 days   AGE/SEX: 31 y.o. male  ROOM: [facility]/     Chief Complaint   Patient presents with   • Abdominal Pain     History of Present Illness  30 yo WM who presented with lower abd pain that started at night on 10/29. He felt like he had held his urine for a very long time & when he urinated, it made the pain worse. The pain worsened last night to the point that he sought medical care.  He has been auseated but hasn't had any emesis. No fever or chills. No blood noted. Appetite has not been good. He has been having frequent small BMs. He has a h/o IBS since he was 10 yo & wasn't sure if some of this was related to that.      Past Medical History:   Diagnosis Date   • Angina pectoris    • CAD (coronary artery disease)    • Chest pain    • Hyperlipidemia    • Hypertension    • IBS (irritable bowel syndrome)    • DERRELL (obstructive sleep apnea)    • S/P AVR (aortic valve replacement)      Past Surgical History:   Procedure Laterality Date   • AORTIC VALVE REPAIR/REPLACEMENT     • CORONARY ANGIOPLASTY WITH STENT PLACEMENT  2010    x3   • YUE     • TONSILLECTOMY     • WISDOM TOOTH EXTRACTION         Family History   Problem Relation Age of Onset   • Heart disease Mother      mitral valve   • Colon polyps Mother    • Heart attack Paternal Grandfather    • Multiple sclerosis Father        Social History   Substance Use Topics   • Smoking status: Never Smoker   • Smokeless tobacco: None   • Alcohol use Yes      Comment: occasional       Prescriptions Prior to Admission   Medication Sig Dispense Refill Last Dose   • carvedilol (COREG) 3.125 MG tablet Take 1 tablet by mouth 2 (Two) Times a Day. 60 tablet 11 2017 at Unknown time   • lisinopril (PRINIVIL,ZESTRIL) 20 MG tablet Take 1 tablet by mouth Daily. 30 tablet 1 2017 at Unknown time   • warfarin (COUMADIN) 6 MG tablet Take 6 mg by mouth Daily.    11/1/2017 at Unknown time   • nitroglycerin (NITROSTAT) 0.4 MG SL tablet Place 1 tablet under the tongue Every 5 (Five) Minutes As Needed for Chest Pain. MAY REPEAT FOR UP THREE DOSES 75 tablet 1 Taking     Allergies:  Penicillins    Review of Systems   In addition to that mentioned in HPI:  Was having a lot of angina but has improved with addition of coreg. Still having to take some NTG occasionally. Worsens if out in cold.  No palpitations.  Gets migraines & has had a couple in last few months.  Some heartburn at times  BMs pretty regular. Has never had blood in stool or melena.  No dysuria or difficulty urinating.  No edema  Weight stable.   No h/o diabetes or DVTs.  No paresthesias.      Objective    Vital Signs  Temp:  [98.3 °F (36.8 °C)-98.5 °F (36.9 °C)] 98.3 °F (36.8 °C)  Heart Rate:  [] 91  Resp:  [16] 16  BP: (120-168)/(78-99) 137/86  SpO2:  [94 %-99 %] 99 %  on   ;   O2 Device: room air  Body mass index is 28.16 kg/(m^2).    Physical Exam  WDWN WM in NAD  PERRL, EOMI, sclera nonicteric. Oropharynx benign, tongue midline, palate elevates symmetrically. Neck supple without adenopathy or thyromegaly. No supraclavicular adenopathy  Lungs clear with equal BS bilaterally. No wheezes, rales or rhonchi.  Heart RRR with click of valve noted & 2/6 KURT  Abd - soft, BS hyperactive. Tender in LLQ & suprapubic area with voluntary guarding & some rebound tenderness.  Ext no edema. DP pulses palpable bilaterally. Feet in good repair.  Neuro - no gross motor deficits. Alert, oriented. Speech fluent.    Results Review:   I reviewed the patient's new clinical results.    Results from last 7 days  Lab Units 11/02/17  1158   WBC 10*3/mm3 18.14*   HEMOGLOBIN g/dL 17.2   PLATELETS 10*3/mm3 203       Results from last 7 days  Lab Units 11/02/17  1158   SODIUM mmol/L 135*   POTASSIUM mmol/L 4.6   CHLORIDE mmol/L 99   CO2 mmol/L 20.4*   BUN mg/dL 15   CREATININE mg/dL 0.85   GLUCOSE mg/dL 84   ALBUMIN g/dL 4.60   BILIRUBIN  mg/dL 1.2   ALK PHOS U/L 87   AST (SGOT) U/L 34   ALT (SGPT) U/L 36   Estimated Creatinine Clearance: 131.6 mL/min (by C-G formula based on Cr of 0.85).    Results from last 7 days  Lab Units 11/02/17  1158   INR  7.50*         Results from last 7 days  Lab Units 11/02/17  1159   NITRITE UA  Negative       CT Abdomen Pelvis With Contrast   Preliminary Result   Fairly extensive acute sigmoid diverticulitis. There is a   tiny amount of free fluid, but there is no fluid collection or evidence   for abscess.       Discussed with Dr. Houston.            Assessment/Plan   Assessment:     Active Hospital Problems (** Indicates Principal Problem)    Diagnosis Date Noted   • **Diverticulitis of intestine without perforation or abscess without bleeding [K57.92] 11/02/2017   • DERRELL (obstructive sleep apnea) [G47.33] 11/02/2017   • Anticoagulant long-term use [Z79.01] 11/02/2017   • Coumadin toxicity [T45.511A] 11/02/2017   • S/P AVR [Z95.2] 05/01/2017   • Coronary artery disease of native artery of native heart with stable angina pectoris [I25.118] 05/01/2017   • Essential hypertension [I10] 05/01/2017      Resolved Hospital Problems    Diagnosis Date Noted Date Resolved   No resolved problems to display.       Plan:     He has an allergy to penicillin is listed but said that he had a rash when he was an infant and that it was only around his ankles from what he was told.  He did receive IV Zosyn in the emergency room, but to be on the safe side, putting on IV cefepime.  There isn't any IV metronidazole available but the po form is available.  He has been nauseated and I don't know that he'll be able to keep it down.  For now I'll just go with the cefepime.  His INR is significantly elevated and with the diverticulitis being present, I think there is a definite risk of bleeding occurring.  Because of that I would go ahead and transfuse 1 unit of FFP.  If his INR drops too much then I can cover him with a heparin drip.  His  other home medications are being continued.  He is tolerating a clear liquid diet.  His INR will be monitored daily as well as his other blood work.  I discussed the patients findings and my recommendations with pt & wife.        Kelli Thorpe MD  [facility]  11/02/17  6:47 PM

## 2017-11-02 NOTE — ED TRIAGE NOTES
Sent from [facility] for eval of lower abd/groin pain, sharp, started Sunday morning, also this am was pale, decreased appetite, nausea

## 2017-11-02 NOTE — PLAN OF CARE
Problem: Patient Care Overview (Adult)  Goal: Plan of Care Review  Outcome: Ongoing (interventions implemented as appropriate)    11/02/17 0581   Coping/Psychosocial Response Interventions   Plan Of Care Reviewed With patient   Patient Care Overview   Progress no change   Outcome Evaluation   Outcome Summary/Follow up Plan Arrived from EMR. Dilaudid for pain control. clear liquids. no c/o nausea currently. Up ad gustavo.       Goal: Adult Individualization and Mutuality  Outcome: Ongoing (interventions implemented as appropriate)  Goal: Discharge Needs Assessment  Outcome: Ongoing (interventions implemented as appropriate)    Problem: Pain, Acute (Adult)  Goal: Identify Related Risk Factors and Signs and Symptoms  Outcome: Ongoing (interventions implemented as appropriate)  Goal: Acceptable Pain Control/Comfort Level  Outcome: Ongoing (interventions implemented as appropriate)

## 2017-11-03 LAB
ABO + RH BLD: NORMAL
ANION GAP SERPL CALCULATED.3IONS-SCNC: 12.5 MMOL/L
BASOPHILS # BLD AUTO: 0.02 10*3/MM3 (ref 0–0.2)
BASOPHILS NFR BLD AUTO: 0.2 % (ref 0–1.5)
BH BB BLOOD EXPIRATION DATE: NORMAL
BH BB BLOOD TYPE BARCODE: 6200
BH BB DISPENSE STATUS: NORMAL
BH BB PRODUCT CODE: NORMAL
BH BB UNIT NUMBER: NORMAL
BUN BLD-MCNC: 12 MG/DL (ref 6–20)
BUN/CREAT SERPL: 18.8 (ref 7–25)
CALCIUM SPEC-SCNC: 7.4 MG/DL (ref 8.6–10.5)
CHLORIDE SERPL-SCNC: 100 MMOL/L (ref 98–107)
CO2 SERPL-SCNC: 20.5 MMOL/L (ref 22–29)
CREAT BLD-MCNC: 0.64 MG/DL (ref 0.76–1.27)
DEPRECATED RDW RBC AUTO: 44.4 FL (ref 37–54)
EOSINOPHIL # BLD AUTO: 0.05 10*3/MM3 (ref 0–0.7)
EOSINOPHIL NFR BLD AUTO: 0.4 % (ref 0.3–6.2)
ERYTHROCYTE [DISTWIDTH] IN BLOOD BY AUTOMATED COUNT: 12.8 % (ref 11.5–14.5)
GFR SERPL CREATININE-BSD FRML MDRD: 145 ML/MIN/1.73
GLUCOSE BLD-MCNC: 74 MG/DL (ref 65–99)
HCT VFR BLD AUTO: 41.1 % (ref 40.4–52.2)
HGB BLD-MCNC: 13.6 G/DL (ref 13.7–17.6)
IMM GRANULOCYTES # BLD: 0.03 10*3/MM3 (ref 0–0.03)
IMM GRANULOCYTES NFR BLD: 0.2 % (ref 0–0.5)
INR PPP: 3.31 (ref 0.9–1.1)
LYMPHOCYTES # BLD AUTO: 1.32 10*3/MM3 (ref 0.9–4.8)
LYMPHOCYTES NFR BLD AUTO: 10.6 % (ref 19.6–45.3)
MCH RBC QN AUTO: 31.5 PG (ref 27–32.7)
MCHC RBC AUTO-ENTMCNC: 33.1 G/DL (ref 32.6–36.4)
MCV RBC AUTO: 95.1 FL (ref 79.8–96.2)
MONOCYTES # BLD AUTO: 1.18 10*3/MM3 (ref 0.2–1.2)
MONOCYTES NFR BLD AUTO: 9.4 % (ref 5–12)
NEUTROPHILS # BLD AUTO: 9.89 10*3/MM3 (ref 1.9–8.1)
NEUTROPHILS NFR BLD AUTO: 79.2 % (ref 42.7–76)
PLATELET # BLD AUTO: 149 10*3/MM3 (ref 140–500)
PMV BLD AUTO: 10.2 FL (ref 6–12)
POTASSIUM BLD-SCNC: 4 MMOL/L (ref 3.5–5.2)
PROTHROMBIN TIME: 32.6 SECONDS (ref 11.7–14.2)
RBC # BLD AUTO: 4.32 10*6/MM3 (ref 4.6–6)
SODIUM BLD-SCNC: 133 MMOL/L (ref 136–145)
UNIT  ABO: NORMAL
UNIT  RH: NORMAL
WBC NRBC COR # BLD: 12.49 10*3/MM3 (ref 4.5–10.7)

## 2017-11-03 PROCEDURE — 25010000002 CEFEPIME PER 500 MG: Performed by: INTERNAL MEDICINE

## 2017-11-03 PROCEDURE — 85610 PROTHROMBIN TIME: CPT | Performed by: INTERNAL MEDICINE

## 2017-11-03 PROCEDURE — 80048 BASIC METABOLIC PNL TOTAL CA: CPT | Performed by: INTERNAL MEDICINE

## 2017-11-03 PROCEDURE — 85025 COMPLETE CBC W/AUTO DIFF WBC: CPT | Performed by: INTERNAL MEDICINE

## 2017-11-03 PROCEDURE — 25010000002 HYDROMORPHONE PER 4 MG: Performed by: INTERNAL MEDICINE

## 2017-11-03 RX ORDER — DOCUSATE SODIUM 100 MG/1
100 CAPSULE, LIQUID FILLED ORAL 2 TIMES DAILY
Status: DISCONTINUED | OUTPATIENT
Start: 2017-11-03 | End: 2017-11-04 | Stop reason: HOSPADM

## 2017-11-03 RX ORDER — OXYCODONE AND ACETAMINOPHEN 7.5; 325 MG/1; MG/1
1 TABLET ORAL EVERY 4 HOURS PRN
Status: DISCONTINUED | OUTPATIENT
Start: 2017-11-03 | End: 2017-11-04 | Stop reason: HOSPADM

## 2017-11-03 RX ORDER — CEFDINIR 300 MG/1
300 CAPSULE ORAL EVERY 12 HOURS SCHEDULED
Status: DISCONTINUED | OUTPATIENT
Start: 2017-11-03 | End: 2017-11-04 | Stop reason: HOSPADM

## 2017-11-03 RX ORDER — METRONIDAZOLE 500 MG/1
500 TABLET ORAL EVERY 8 HOURS SCHEDULED
Status: DISCONTINUED | OUTPATIENT
Start: 2017-11-03 | End: 2017-11-04 | Stop reason: HOSPADM

## 2017-11-03 RX ADMIN — HYDROMORPHONE HYDROCHLORIDE 0.5 MG: 1 INJECTION, SOLUTION INTRAMUSCULAR; INTRAVENOUS; SUBCUTANEOUS at 14:28

## 2017-11-03 RX ADMIN — HYDROMORPHONE HYDROCHLORIDE 0.5 MG: 1 INJECTION, SOLUTION INTRAMUSCULAR; INTRAVENOUS; SUBCUTANEOUS at 19:13

## 2017-11-03 RX ADMIN — HYDROMORPHONE HYDROCHLORIDE 0.5 MG: 1 INJECTION, SOLUTION INTRAMUSCULAR; INTRAVENOUS; SUBCUTANEOUS at 12:10

## 2017-11-03 RX ADMIN — HYDROMORPHONE HYDROCHLORIDE 0.5 MG: 1 INJECTION, SOLUTION INTRAMUSCULAR; INTRAVENOUS; SUBCUTANEOUS at 05:07

## 2017-11-03 RX ADMIN — SODIUM CHLORIDE 125 ML/HR: 9 INJECTION, SOLUTION INTRAVENOUS at 05:07

## 2017-11-03 RX ADMIN — HYDROMORPHONE HYDROCHLORIDE 0.5 MG: 1 INJECTION, SOLUTION INTRAMUSCULAR; INTRAVENOUS; SUBCUTANEOUS at 09:52

## 2017-11-03 RX ADMIN — CEFDINIR 300 MG: 300 CAPSULE ORAL at 21:29

## 2017-11-03 RX ADMIN — SODIUM CHLORIDE 125 ML/HR: 9 INJECTION, SOLUTION INTRAVENOUS at 21:27

## 2017-11-03 RX ADMIN — WATER 2 G: 1 INJECTION INTRAMUSCULAR; INTRAVENOUS; SUBCUTANEOUS at 03:20

## 2017-11-03 RX ADMIN — HYDROMORPHONE HYDROCHLORIDE 0.5 MG: 1 INJECTION, SOLUTION INTRAMUSCULAR; INTRAVENOUS; SUBCUTANEOUS at 02:32

## 2017-11-03 RX ADMIN — HYDROMORPHONE HYDROCHLORIDE 0.5 MG: 1 INJECTION, SOLUTION INTRAMUSCULAR; INTRAVENOUS; SUBCUTANEOUS at 07:47

## 2017-11-03 RX ADMIN — SODIUM CHLORIDE 125 ML/HR: 9 INJECTION, SOLUTION INTRAVENOUS at 13:25

## 2017-11-03 RX ADMIN — DOCUSATE SODIUM 100 MG: 100 CAPSULE, LIQUID FILLED ORAL at 18:06

## 2017-11-03 RX ADMIN — HYDROMORPHONE HYDROCHLORIDE 0.5 MG: 1 INJECTION, SOLUTION INTRAMUSCULAR; INTRAVENOUS; SUBCUTANEOUS at 16:50

## 2017-11-03 RX ADMIN — METRONIDAZOLE 500 MG: 500 TABLET, FILM COATED ORAL at 21:29

## 2017-11-03 RX ADMIN — WATER 2 G: 1 INJECTION INTRAMUSCULAR; INTRAVENOUS; SUBCUTANEOUS at 11:00

## 2017-11-03 RX ADMIN — OXYCODONE HYDROCHLORIDE AND ACETAMINOPHEN 1 TABLET: 7.5; 325 TABLET ORAL at 21:27

## 2017-11-03 NOTE — PLAN OF CARE
Problem: Patient Care Overview (Adult)  Goal: Plan of Care Review  Outcome: Ongoing (interventions implemented as appropriate)    11/03/17 1710   Coping/Psychosocial Response Interventions   Plan Of Care Reviewed With patient   Patient Care Overview   Progress no change   Outcome Evaluation   Outcome Summary/Follow up Plan C/o abd. pain, dilaudid given PRN. Diet advanced to full liquids. Up ad gustavo. Abx changed to oral. Encouraged to ambulate and start trying oral pain meds.        Goal: Adult Individualization and Mutuality  Outcome: Ongoing (interventions implemented as appropriate)  Goal: Discharge Needs Assessment  Outcome: Ongoing (interventions implemented as appropriate)    Problem: Pain, Acute (Adult)  Goal: Identify Related Risk Factors and Signs and Symptoms  Outcome: Ongoing (interventions implemented as appropriate)  Goal: Acceptable Pain Control/Comfort Level  Outcome: Ongoing (interventions implemented as appropriate)

## 2017-11-03 NOTE — PROGRESS NOTES
Name: Galdino Dallas ADMIT: 2017   : 1985  PCP: Antonino Merida MD    MRN: [id number] LOS: 1 days   AGE/SEX: 31 y.o. male  ROOM: [facility]   Subjective   Subjective  CC/Reason for follow-up: Abdominal pain  No acute events.  Pain is reasonably controlled. No nausea or vomiting, tolerating clears well.  No BM.     Objective   Vital Signs  Temp:  [96.7 °F (35.9 °C)-99.3 °F (37.4 °C)] 98.6 °F (37 °C)  Heart Rate:  [84-94] 85  Resp:  [16-18] 18  BP: (110-137)/(66-86) 132/86  SpO2:  [94 %-99 %] 98 %  on   ;   O2 Device: room air  Body mass index is 28.16 kg/(m^2).    Physical Exam  General: Alert, no distress  Head: NCAT  Eyes: EOMI, conjunctivae normal  Mouth/Throat: oropharynx clear and moist  Neck: supple, no JVD  Cardiac: normal rate, regular rhythm, no murmur   Respiratory: Clear to ascultation, normal effort  Abdomen: soft,mild lower abdominal tenderness without guarding or rebound, bowel sounds are normoactive  MSK: no edema, no tenderness  Skin: warm, dry, no rashes noted  Neuro: oriented x3, no gross deficit  Psych: appropriate mood and affect  Results Review:       I reviewed the patient's new clinical results.    Results from last 7 days  Lab Units 17  0542 17  1158   WBC 10*3/mm3 12.49* 18.14*   HEMOGLOBIN g/dL 13.6* 17.2   PLATELETS 10*3/mm3 149 203     Results from last 7 days  Lab Units 17  0542 17  1158   SODIUM mmol/L 133* 135*   POTASSIUM mmol/L 4.0 4.6   CHLORIDE mmol/L 100 99   CO2 mmol/L 20.5* 20.4*   BUN mg/dL 12 15   CREATININE mg/dL 0.64* 0.85   GLUCOSE mg/dL 74 84   Estimated Creatinine Clearance: 174.8 mL/min (by C-G formula based on Cr of 0.64).  Results from last 7 days  Lab Units 17  0542 17  1158   CALCIUM mg/dL 7.4* 9.2   ALBUMIN g/dL  --  4.60         cefdinir 300 mg Oral Q12H   docusate sodium 100 mg Oral BID   metroNIDAZOLE 500 mg Oral Q8H       sodium chloride 125 mL/hr Last Rate: 125 mL/hr (17 1325)   Diet Full  Liquid      Assessment/Plan   Assessment:     Active Hospital Problems (** Indicates Principal Problem)    Diagnosis Date Noted   • **Diverticulitis of intestine without perforation or abscess without bleeding [K57.92] 11/02/2017   • DERRELL (obstructive sleep apnea) [G47.33] 11/02/2017   • Anticoagulant long-term use [Z79.01] 11/02/2017   • Coumadin toxicity [T45.511A] 11/02/2017   • S/P AVR [Z95.2] 05/01/2017   • Coronary artery disease of native artery of native heart with stable angina pectoris [I25.118] 05/01/2017   • Essential hypertension [I10] 05/01/2017      Resolved Hospital Problems    Diagnosis Date Noted Date Resolved   No resolved problems to display.       Plan:   Diverticulitis  -WBC improved, symptoms improving  -will switch to PO cefdinir and flagyl  -advance diet to full liquid  -add stool softener  -pain control    Coumadin toxicity  -s/p AVR  -INR better, will f/u in AM  -continue holding coumadin    DVT Prophylaxis  -AC as above    Disposition  Plan discharge to home 1-2days.      Bob Spann MD  [facility]  11/03/17  4:45 PM

## 2017-11-04 VITALS
HEIGHT: 68 IN | HEART RATE: 88 BPM | DIASTOLIC BLOOD PRESSURE: 77 MMHG | RESPIRATION RATE: 18 BRPM | WEIGHT: 185.2 LBS | SYSTOLIC BLOOD PRESSURE: 137 MMHG | TEMPERATURE: 98.2 F | OXYGEN SATURATION: 100 % | BODY MASS INDEX: 28.07 KG/M2

## 2017-11-04 PROBLEM — T45.511A COUMADIN TOXICITY: Status: RESOLVED | Noted: 2017-11-02 | Resolved: 2017-11-04

## 2017-11-04 LAB
ANION GAP SERPL CALCULATED.3IONS-SCNC: 12 MMOL/L
BASOPHILS # BLD AUTO: 0.02 10*3/MM3 (ref 0–0.2)
BASOPHILS NFR BLD AUTO: 0.2 % (ref 0–1.5)
BUN BLD-MCNC: 5 MG/DL (ref 6–20)
BUN/CREAT SERPL: 8.9 (ref 7–25)
CALCIUM SPEC-SCNC: 7.2 MG/DL (ref 8.6–10.5)
CHLORIDE SERPL-SCNC: 103 MMOL/L (ref 98–107)
CO2 SERPL-SCNC: 21 MMOL/L (ref 22–29)
CREAT BLD-MCNC: 0.56 MG/DL (ref 0.76–1.27)
DEPRECATED RDW RBC AUTO: 43.4 FL (ref 37–54)
EOSINOPHIL # BLD AUTO: 0.05 10*3/MM3 (ref 0–0.7)
EOSINOPHIL NFR BLD AUTO: 0.6 % (ref 0.3–6.2)
ERYTHROCYTE [DISTWIDTH] IN BLOOD BY AUTOMATED COUNT: 12.7 % (ref 11.5–14.5)
GFR SERPL CREATININE-BSD FRML MDRD: >150 ML/MIN/1.73
GLUCOSE BLD-MCNC: 142 MG/DL (ref 65–99)
HCT VFR BLD AUTO: 37.7 % (ref 40.4–52.2)
HGB BLD-MCNC: 12.5 G/DL (ref 13.7–17.6)
IMM GRANULOCYTES # BLD: 0.03 10*3/MM3 (ref 0–0.03)
IMM GRANULOCYTES NFR BLD: 0.3 % (ref 0–0.5)
INR PPP: 2.5 (ref 0.9–1.1)
LYMPHOCYTES # BLD AUTO: 1.03 10*3/MM3 (ref 0.9–4.8)
LYMPHOCYTES NFR BLD AUTO: 11.8 % (ref 19.6–45.3)
MCH RBC QN AUTO: 31.4 PG (ref 27–32.7)
MCHC RBC AUTO-ENTMCNC: 33.2 G/DL (ref 32.6–36.4)
MCV RBC AUTO: 94.7 FL (ref 79.8–96.2)
MONOCYTES # BLD AUTO: 0.83 10*3/MM3 (ref 0.2–1.2)
MONOCYTES NFR BLD AUTO: 9.5 % (ref 5–12)
NEUTROPHILS # BLD AUTO: 6.75 10*3/MM3 (ref 1.9–8.1)
NEUTROPHILS NFR BLD AUTO: 77.6 % (ref 42.7–76)
PLATELET # BLD AUTO: 127 10*3/MM3 (ref 140–500)
PMV BLD AUTO: 9.9 FL (ref 6–12)
POTASSIUM BLD-SCNC: 3.5 MMOL/L (ref 3.5–5.2)
PROTHROMBIN TIME: 26.2 SECONDS (ref 11.7–14.2)
RBC # BLD AUTO: 3.98 10*6/MM3 (ref 4.6–6)
SODIUM BLD-SCNC: 136 MMOL/L (ref 136–145)
WBC NRBC COR # BLD: 8.71 10*3/MM3 (ref 4.5–10.7)

## 2017-11-04 PROCEDURE — 85610 PROTHROMBIN TIME: CPT | Performed by: INTERNAL MEDICINE

## 2017-11-04 PROCEDURE — 85025 COMPLETE CBC W/AUTO DIFF WBC: CPT | Performed by: INTERNAL MEDICINE

## 2017-11-04 PROCEDURE — 80048 BASIC METABOLIC PNL TOTAL CA: CPT | Performed by: INTERNAL MEDICINE

## 2017-11-04 PROCEDURE — 25010000002 CALCIUM GLUCONATE PER 10 ML: Performed by: INTERNAL MEDICINE

## 2017-11-04 RX ORDER — WARFARIN SODIUM 5 MG/1
5 TABLET ORAL
Status: DISCONTINUED | OUTPATIENT
Start: 2017-11-04 | End: 2017-11-04 | Stop reason: HOSPADM

## 2017-11-04 RX ORDER — CALCIUM GLUCONATE 94 MG/ML
1 INJECTION, SOLUTION INTRAVENOUS ONCE
Status: DISCONTINUED | OUTPATIENT
Start: 2017-11-04 | End: 2017-11-04 | Stop reason: SDUPTHER

## 2017-11-04 RX ORDER — DICYCLOMINE HYDROCHLORIDE 10 MG/1
20 CAPSULE ORAL 4 TIMES DAILY PRN
Qty: 30 CAPSULE | Refills: 0 | Status: SHIPPED | OUTPATIENT
Start: 2017-11-04 | End: 2017-11-28

## 2017-11-04 RX ORDER — WARFARIN SODIUM 4 MG/1
4 TABLET ORAL
Qty: 10 TABLET | Refills: 0 | Status: ON HOLD | OUTPATIENT
Start: 2017-11-04 | End: 2017-12-19

## 2017-11-04 RX ORDER — PSEUDOEPHEDRINE HCL 30 MG
100 TABLET ORAL 2 TIMES DAILY
Qty: 60 CAPSULE | Refills: 0 | Status: SHIPPED | OUTPATIENT
Start: 2017-11-04 | End: 2017-11-28

## 2017-11-04 RX ORDER — CEFDINIR 300 MG/1
300 CAPSULE ORAL EVERY 12 HOURS SCHEDULED
Qty: 16 CAPSULE | Refills: 0 | Status: SHIPPED | OUTPATIENT
Start: 2017-11-04 | End: 2017-11-12

## 2017-11-04 RX ORDER — OXYCODONE AND ACETAMINOPHEN 10; 325 MG/1; MG/1
1-2 TABLET ORAL EVERY 4 HOURS PRN
Qty: 36 TABLET | Refills: 0 | Status: SHIPPED | OUTPATIENT
Start: 2017-11-04 | End: 2017-11-28

## 2017-11-04 RX ORDER — DICYCLOMINE HYDROCHLORIDE 10 MG/1
20 CAPSULE ORAL 4 TIMES DAILY PRN
Status: DISCONTINUED | OUTPATIENT
Start: 2017-11-04 | End: 2017-11-04 | Stop reason: HOSPADM

## 2017-11-04 RX ORDER — METRONIDAZOLE 500 MG/1
500 TABLET ORAL EVERY 8 HOURS SCHEDULED
Qty: 24 TABLET | Refills: 0 | Status: SHIPPED | OUTPATIENT
Start: 2017-11-04 | End: 2017-11-12

## 2017-11-04 RX ADMIN — OXYCODONE HYDROCHLORIDE AND ACETAMINOPHEN 1 TABLET: 7.5; 325 TABLET ORAL at 09:12

## 2017-11-04 RX ADMIN — SODIUM CHLORIDE 125 ML/HR: 9 INJECTION, SOLUTION INTRAVENOUS at 05:00

## 2017-11-04 RX ADMIN — METRONIDAZOLE 500 MG: 500 TABLET, FILM COATED ORAL at 13:27

## 2017-11-04 RX ADMIN — CALCIUM GLUCONATE 1 G: 94 INJECTION, SOLUTION INTRAVENOUS at 14:36

## 2017-11-04 RX ADMIN — CEFDINIR 300 MG: 300 CAPSULE ORAL at 08:25

## 2017-11-04 RX ADMIN — OXYCODONE HYDROCHLORIDE AND ACETAMINOPHEN 1 TABLET: 7.5; 325 TABLET ORAL at 13:28

## 2017-11-04 RX ADMIN — OXYCODONE HYDROCHLORIDE AND ACETAMINOPHEN 1 TABLET: 7.5; 325 TABLET ORAL at 00:50

## 2017-11-04 RX ADMIN — DICYCLOMINE HYDROCHLORIDE 20 MG: 10 CAPSULE ORAL at 14:36

## 2017-11-04 RX ADMIN — METRONIDAZOLE 500 MG: 500 TABLET, FILM COATED ORAL at 05:56

## 2017-11-04 RX ADMIN — OXYCODONE HYDROCHLORIDE AND ACETAMINOPHEN 1 TABLET: 7.5; 325 TABLET ORAL at 04:59

## 2017-11-04 NOTE — DISCHARGE SUMMARY
Date of Admission: 11/2/2017  Date of Discharge:  11/4/2017  Primary Care Physician: Antonino Merida MD     Discharge Diagnosis:  Active Hospital Problems (** Indicates Principal Problem)    Diagnosis Date Noted   • **Diverticulitis of intestine without perforation or abscess without bleeding [K57.92] 11/02/2017   • DERRELL (obstructive sleep apnea) [G47.33] 11/02/2017   • Anticoagulant long-term use [Z79.01] 11/02/2017   • S/P AVR [Z95.2] 05/01/2017   • Coronary artery disease of native artery of native heart with stable angina pectoris [I25.118] 05/01/2017   • Essential hypertension [I10] 05/01/2017      Resolved Hospital Problems    Diagnosis Date Noted Date Resolved   • Coumadin toxicity [T45.511A] 11/02/2017 11/04/2017       Presenting Problem/History of Present Illness:  Warfarin-induced coagulopathy [D68.9, T45.515A]  Diverticulitis of intestine without perforation or abscess without bleeding, unspecified part of intestinal tract [K57.92]     Hospital Course:  The patient is a 31 y.o. male who presented with lower abdominal pain.  Please see admission H&P from 11/2/17 for further details.  He was found to also have coumadin toxicity with an INR of 7.50 (he takes coumadin for a history of aortic valve replacement).  He was started on IV cefepime with prompt response.  He was transitioned to oral cefdinir and flagyl and monitored on this with continued improvement noted.  He tolerated a regular soft diet and his pain was adequately controlled with oral medications.  His INR was therapeutic at 2.50 at the time of discharge.  He was prescribed 4mg of coumadin daily down from his usual 6mg given his toxicity on admission and his continued antibiotic therapy. He will complete a 10 day course of cefdinir and flagyl.  He was instructed to follow up with Dr. Banks's office in 3 days for an INR.  He should keep close follow up with his PCP.           Exam Today:  Blood pressure 137/77, pulse 88, temperature 98.2 °F  "(36.8 °C), temperature source Oral, resp. rate 18, height 68\" (172.7 cm), weight 185 lb 3.2 oz (84 kg), SpO2 100 %.  General: Alert, no distress  Head: NCAT  Eyes: EOMI, conjunctivae normal  Mouth/Throat: oropharynx clear and moist  Neck: supple, no JVD  Cardiac: normal rate, regular rhythm, no murmur   Respiratory: Clear to ascultation, normal effort  Abdomen: soft,mild lower abdominal tenderness without guarding or rebound, bowel sounds are normoactive  MSK: no edema, no tenderness  Skin: warm, dry, no rashes noted  Neuro: oriented x3, no gross deficit  Psych: appropriate mood and affect    Procedures Performed:  CT ABDOMEN AND PELVIS WITH IV CONTRAST-11/2/17  The liver appears unremarkable other than 2 subcentimeter  low-attenuation foci which likely represent tiny cysts. The gallbladder  appears unremarkable and there is no biliary dilatation. Splenic size is  normal. The pancreas, adrenals, and kidneys appear unremarkable. There  is a small proximal duodenal diverticulum. There is fairly extensive  acute diverticulitis involving the mid to distal sigmoid colon. There is  extensive inflammatory change and a tiny amount of free fluid, but there  is no fluid collection. There is overall mild sigmoid diverticulosis.  The appendix appears normal.      IMPRESSION:  Fairly extensive acute sigmoid diverticulitis. There is a  tiny amount of free fluid, but there is no fluid collection or evidence  for abscess.    Consults:   Consults     Date and Time Order Name Status Description    11/2/2017 2695 [facility] (on-call MD unless specified) Completed            Discharge Disposition:  Home or Self Care    Discharge Medications:   Galdino Dallas   Home Medication Instructions CHENTE:[id number]    Printed on:11/04/17 0524   Medication Information                      carvedilol (COREG) 3.125 MG tablet  Take 1 tablet by mouth 2 (Two) Times a Day.             cefdinir (OMNICEF) 300 MG capsule  Take 1 capsule by mouth Every 12 " (Twelve) Hours for 16 doses. Indications: Infection Within the Abdomen             dicyclomine (BENTYL) 10 MG capsule  Take 2 capsules by mouth 4 (Four) Times a Day As Needed (cramping).             docusate sodium 100 MG capsule  Take 100 mg by mouth 2 (Two) Times a Day.             lisinopril (PRINIVIL,ZESTRIL) 20 MG tablet  Take 1 tablet by mouth Daily.             metroNIDAZOLE (FLAGYL) 500 MG tablet  Take 1 tablet by mouth Every 8 (Eight) Hours for 24 doses. Indications: Infection Within the Abdomen             nitroglycerin (NITROSTAT) 0.4 MG SL tablet  Place 1 tablet under the tongue Every 5 (Five) Minutes As Needed for Chest Pain. MAY REPEAT FOR UP THREE DOSES             oxyCODONE-acetaminophen (PERCOCET)  MG per tablet  Take 1-2 tablets by mouth Every 4 (Four) Hours As Needed for Moderate Pain  or Severe Pain .             warfarin (COUMADIN) 4 MG tablet  Take 1 tablet by mouth Daily.                 Discharge Diet:   Diet Instructions     Diet: Regular; Thin       Discharge Diet:  Regular   Fluid Consistency:  Thin                 Activity at Discharge:   Activity Instructions     Activity as Tolerated                     Follow-up Appointments:  Future Appointments  Date Time Provider Department Center   11/28/2017 3:00 PM José Antonio Banks MD [facility] None     Additional Instructions for the Follow-ups that You Need to Schedule     Discharge Follow-Up With Specified Provider    As directed    To:  Dr. Banks (Cardiology)   Follow Up Details:  INR check in 3 days       Discharge Follow-up with PCP    As directed    Follow Up Details:  1-2 weeks                 Test Results Pending at Discharge:       Bob Spann MD  11/04/17  3:45 PM    Time Spent on Discharge Activities: Greater than 30 minutes.

## 2017-11-06 RX ORDER — LISINOPRIL 20 MG/1
TABLET ORAL
Qty: 30 TABLET | Refills: 5 | Status: SHIPPED | OUTPATIENT
Start: 2017-11-06 | End: 2018-01-12 | Stop reason: SDUPTHER

## 2017-11-07 ENCOUNTER — HOSPITAL ENCOUNTER (OUTPATIENT)
Dept: CARDIOLOGY | Facility: HOSPITAL | Age: 32
Setting detail: RECURRING SERIES
Discharge: HOME | End: 2017-11-07

## 2017-11-07 PROCEDURE — 36416 COLLJ CAPILLARY BLOOD SPEC: CPT

## 2017-11-07 PROCEDURE — 85610 PROTHROMBIN TIME: CPT

## 2017-11-08 RX ORDER — NITROGLYCERIN 0.4 MG/1
TABLET SUBLINGUAL
Qty: 25 TABLET | Refills: 0 | Status: SHIPPED | OUTPATIENT
Start: 2017-11-08 | End: 2017-11-27 | Stop reason: SDUPTHER

## 2017-11-13 ENCOUNTER — HOSPITAL ENCOUNTER (OUTPATIENT)
Dept: CARDIOLOGY | Facility: HOSPITAL | Age: 32
Setting detail: RECURRING SERIES
Discharge: HOME | End: 2017-11-13

## 2017-11-13 PROCEDURE — 36416 COLLJ CAPILLARY BLOOD SPEC: CPT

## 2017-11-13 PROCEDURE — 85610 PROTHROMBIN TIME: CPT

## 2017-11-15 ENCOUNTER — OFFICE VISIT (OUTPATIENT)
Dept: FAMILY MEDICINE CLINIC | Facility: CLINIC | Age: 32
End: 2017-11-15

## 2017-11-15 VITALS
BODY MASS INDEX: 28.22 KG/M2 | HEIGHT: 68 IN | OXYGEN SATURATION: 97 % | SYSTOLIC BLOOD PRESSURE: 134 MMHG | HEART RATE: 90 BPM | WEIGHT: 186.2 LBS | DIASTOLIC BLOOD PRESSURE: 70 MMHG

## 2017-11-15 DIAGNOSIS — K57.32 DIVERTICULITIS OF LARGE INTESTINE WITHOUT PERFORATION OR ABSCESS WITHOUT BLEEDING: Primary | ICD-10-CM

## 2017-11-15 PROCEDURE — 99214 OFFICE O/P EST MOD 30 MIN: CPT | Performed by: INTERNAL MEDICINE

## 2017-11-15 NOTE — PROGRESS NOTES
Subjective   Galdino YRN Dallas is a 31 y.o. male who presents today for:    Diverticulitis ([facility] f/u)    History of Present Illness   See H&P and D/C summary regarding the details of his admission for acute sigmoid diverticulitis treated with ABX until this week.    Still having constant lower midline pain and LLQ pain with cramping.  No better with BM (tid BMs).  Less tender now, but dull, 4/10 ache with moderate tenderness persists.    Mr. Dallas  reports that he has never smoked. He has never used smokeless tobacco. He reports that he drinks alcohol. He reports that he does not use illicit drugs.     Allergies   Allergen Reactions   • Penicillins Rash     Rash on legs when he was an infant       Current Outpatient Prescriptions:   •  carvedilol (COREG) 3.125 MG tablet, Take 1 tablet by mouth 2 (Two) Times a Day., Disp: 60 tablet, Rfl: 11  •  dicyclomine (BENTYL) 10 MG capsule, Take 2 capsules by mouth 4 (Four) Times a Day As Needed (cramping)., Disp: 30 capsule, Rfl: 0  •  docusate sodium 100 MG capsule, Take 100 mg by mouth 2 (Two) Times a Day., Disp: 60 capsule, Rfl: 0  •  lisinopril (PRINIVIL,ZESTRIL) 20 MG tablet, TAKE 1 TABLET BY MOUTH DAILY., Disp: 30 tablet, Rfl: 5  •  nitroglycerin (NITROSTAT) 0.4 MG SL tablet, PLACE 1 TABLET UNDER TONGUE IF NEEDED FOR CHEST PAIN. MAY REPEAT EVERY 5 MIN UP TO 3 DOSES, Disp: 25 tablet, Rfl: 0  •  oxyCODONE-acetaminophen (PERCOCET)  MG per tablet, Take 1-2 tablets by mouth Every 4 (Four) Hours As Needed for Moderate Pain  or Severe Pain ., Disp: 36 tablet, Rfl: 0  •  warfarin (COUMADIN) 4 MG tablet, Take 1 tablet by mouth Daily., Disp: 10 tablet, Rfl: 0      Review of Systems   Constitutional: Positive for unexpected weight change (loss).   Cardiovascular: Positive for chest pain.   Gastrointestinal: Positive for nausea (mild). Negative for blood in stool, constipation and vomiting.   Neurological: Positive for headaches.       Objective   Vitals:    11/15/17 1031  "  BP: 134/70   BP Location: Left arm   Patient Position: Sitting   Cuff Size: Large Adult   Pulse: 90   SpO2: 97%   Weight: 186 lb 3.2 oz (84.5 kg)   Height: 68\" (172.7 cm)     Physical Exam   Constitutional: He appears well-developed and well-nourished. No distress.   Eyes: No scleral icterus.   Cardiovascular: Normal rate, regular rhythm and intact distal pulses.    Abdominal: Soft. Bowel sounds are normal. There is no hepatosplenomegaly. There is tenderness in the epigastric area, suprapubic area and left lower quadrant. There is no guarding.       Assessment/Plan   Galdino was seen today for diverticulitis.    Diagnoses and all orders for this visit:    Diverticulitis of large intestine without perforation or abscess without bleeding - new  -     CT Abdomen Pelvis With Contrast  -     Comprehensive Metabolic Panel; Future  -     CBC & Differential; Future    He'll remain off the antibiotics.  He will use Tylenol for the discomfort, avoiding narcotics to avoid the constipation to go with them.  Similarly, we do not wish to mask any pain that escalates, signaling a worsening or complication.  If his symptoms do not improve through this weekend, we will use obtain a CT scan next week.  We will get labs at that time as well, if needed.  He knows to contact our office if his symptoms escalate or new symptoms develop between now and then.    H&P and D/C summary reviewed, as per HPI. CT reviewed and extensive sigmoid diverticulitis noted. No abscess or perforation detected at that time.  WBC was elevated initially but was normal at D/C- labs reviewed.      "

## 2017-11-20 RX ORDER — WARFARIN SODIUM 1 MG/1
1 TABLET ORAL
Qty: 90 TABLET | Refills: 0 | Status: SHIPPED | OUTPATIENT
Start: 2017-11-20 | End: 2017-12-17 | Stop reason: SDUPTHER

## 2017-11-27 ENCOUNTER — TELEPHONE (OUTPATIENT)
Dept: FAMILY MEDICINE CLINIC | Facility: CLINIC | Age: 32
End: 2017-11-27

## 2017-11-27 RX ORDER — NITROGLYCERIN 0.4 MG/1
TABLET SUBLINGUAL
Qty: 75 TABLET | Refills: 1 | Status: SHIPPED | OUTPATIENT
Start: 2017-11-27 | End: 2018-01-15 | Stop reason: SDUPTHER

## 2017-11-27 NOTE — TELEPHONE ENCOUNTER
----- Message from Nadia Ko sent at 11/27/2017  1:12 PM EST -----  Regarding: FW: Pt information       ----- Message -----     From: Argentina Camp     Sent: 11/27/2017   1:04 PM       To: Nadia Ko  Subject: Pt information                                   I called patient to ask him if his symptoms have improved from his diverticulitis. Patient stated they have improved and he felt the CT was not necessary at this time. I have cancelled this referral please let Dr. Merida know.

## 2017-11-28 ENCOUNTER — HOSPITAL ENCOUNTER (OUTPATIENT)
Dept: CARDIOLOGY | Facility: HOSPITAL | Age: 32
Setting detail: RECURRING SERIES
Discharge: HOME | End: 2017-11-28

## 2017-11-28 ENCOUNTER — OFFICE VISIT (OUTPATIENT)
Dept: CARDIOLOGY | Facility: CLINIC | Age: 32
End: 2017-11-28

## 2017-11-28 VITALS
WEIGHT: 180 LBS | HEART RATE: 84 BPM | HEIGHT: 68 IN | DIASTOLIC BLOOD PRESSURE: 90 MMHG | SYSTOLIC BLOOD PRESSURE: 130 MMHG | BODY MASS INDEX: 27.28 KG/M2

## 2017-11-28 DIAGNOSIS — Z79.01 ANTICOAGULANT LONG-TERM USE: ICD-10-CM

## 2017-11-28 DIAGNOSIS — I20.0 UNSTABLE ANGINA (HCC): ICD-10-CM

## 2017-11-28 DIAGNOSIS — I10 ESSENTIAL HYPERTENSION: Primary | ICD-10-CM

## 2017-11-28 DIAGNOSIS — Z95.2 S/P AVR: ICD-10-CM

## 2017-11-28 PROCEDURE — 99214 OFFICE O/P EST MOD 30 MIN: CPT | Performed by: INTERNAL MEDICINE

## 2017-11-28 PROCEDURE — 36416 COLLJ CAPILLARY BLOOD SPEC: CPT

## 2017-11-28 PROCEDURE — 93000 ELECTROCARDIOGRAM COMPLETE: CPT | Performed by: INTERNAL MEDICINE

## 2017-11-28 PROCEDURE — 85610 PROTHROMBIN TIME: CPT

## 2017-11-28 RX ORDER — WARFARIN SODIUM 5 MG/1
TABLET ORAL
Qty: 45 TABLET | Refills: 0 | Status: SHIPPED | OUTPATIENT
Start: 2017-11-28 | End: 2017-12-17 | Stop reason: SDUPTHER

## 2017-11-28 NOTE — PROGRESS NOTES
Galdino Dallas  1985  Date of Office Visit:11/28/17  Encounter Provider: José Antonio Banks MD  Place of Service: [facility] CARDIOLOGY      CHIEF COMPLAINT:  Coronary artery disease.   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   Mechanical aortic valve replacement.      HISTORY OF PRESENT ILLNESS:  Dr. Merida,   I had the pleasure of seeing your patient in followup today. As you well know, he is a very pleasant 31-year-old gentleman with a medical history of bicuspid aortic valve with mechanical replacement, coronary artery disease, three stents (per my review) in [facility], [place] and also a 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 He actually has very bad distal coronary artery disease.      On our last visit, he was noted to have increasing intensity and frequency of angina.  He also had hypertension that was poorly controlled.  He was placed back on beta-blockade and has been tolerating his medication with just mild fatigue.  He still states that, when he walks greater than two blocks, he has angina but he has not been using the sublingual nitroglycerin as much as previously.  He occasionally will have discomfort at rest; however, this is not common.  It is moderate in intensity, central, and goes away typically with one sublingual nitroglycerin.  He denies any orthopnea, paroxysmal nocturnal dyspnea, or lower extremity edema.  He has previously been dealing with issues with diverticulitis and still has mild abdominal pain secondary to that.         Review of Systems   Constitution: Negative for fever, weakness and malaise/fatigue.   HENT: Negative for nosebleeds and sore throat.    Eyes: Negative for blurred vision and double vision.   Cardiovascular: Positive for chest pain. Negative for claudication, dyspnea on exertion, palpitations and syncope.   Respiratory: Negative for cough, shortness of breath, snoring and wheezing.    Endocrine: Negative for cold intolerance,  heat intolerance and polydipsia.   Skin: Negative for itching, poor wound healing and rash.   Musculoskeletal: Positive for myalgias. Negative for joint pain, joint swelling and muscle weakness.   Gastrointestinal: Negative for abdominal pain, melena, nausea and vomiting.   Neurological: Positive for headaches. Negative for light-headedness, loss of balance, seizures and vertigo.   Psychiatric/Behavioral: Positive for depression. Negative for altered mental status.       Past Medical History:   Diagnosis Date   • Angina pectoris    • CAD (coronary artery disease)    • Chest pain    • Diverticulitis large intestine w/o perforation or abscess w/bleeding    • Hyperlipidemia    • Hypertension    • IBS (irritable bowel syndrome)    • DERRELL (obstructive sleep apnea)    • S/P AVR (aortic valve replacement)        The following portions of the patient's history were reviewed and updated as appropriate: Social history , Family history and Surgical history     Current Outpatient Prescriptions on File Prior to Visit   Medication Sig Dispense Refill   • carvedilol (COREG) 3.125 MG tablet Take 1 tablet by mouth 2 (Two) Times a Day. 60 tablet 11   • lisinopril (PRINIVIL,ZESTRIL) 20 MG tablet TAKE 1 TABLET BY MOUTH DAILY. 30 tablet 5   • nitroglycerin (NITROSTAT) 0.4 MG SL tablet PLACE 1 TABLET UNDER TONGUE IF NEEDED FOR CHEST PAIN. MAY REPEAT EVERY 5 MIN UP TO 3 DOSES 75 tablet 1   • warfarin (COUMADIN) 1 MG tablet Take 1 tablet by mouth Daily. With the 5mg tablet or as directed 90 tablet 0   • warfarin (COUMADIN) 4 MG tablet Take 1 tablet by mouth Daily. 10 tablet 0   • [DISCONTINUED] dicyclomine (BENTYL) 10 MG capsule Take 2 capsules by mouth 4 (Four) Times a Day As Needed (cramping). 30 capsule 0   • [DISCONTINUED] docusate sodium 100 MG capsule Take 100 mg by mouth 2 (Two) Times a Day. 60 capsule 0   • [DISCONTINUED] oxyCODONE-acetaminophen (PERCOCET)  MG per tablet Take 1-2 tablets by mouth Every 4 (Four) Hours As Needed  "for Moderate Pain  or Severe Pain . 36 tablet 0     No current facility-administered medications on file prior to visit.        Allergies   Allergen Reactions   • Penicillins Rash     Rash on legs when he was an infant       Vitals:    11/28/17 1504   BP: 130/90   Pulse: 84   Weight: 180 lb (81.6 kg)   Height: 68\" (172.7 cm)     Physical Exam   Constitutional: He is oriented to person, place, and time. He appears well-developed and well-nourished.   HENT:   Head: Normocephalic and atraumatic.   Eyes: Conjunctivae and EOM are normal. No scleral icterus.   Neck: Normal range of motion. Neck supple. Normal carotid pulses, no hepatojugular reflux and no JVD present. Carotid bruit is not present. No tracheal deviation present. No thyromegaly present.   Cardiovascular: Normal rate and regular rhythm.  Exam reveals no gallop and no friction rub.    No murmur heard.  Pulses:       Carotid pulses are 2+ on the right side, and 2+ on the left side.       Radial pulses are 2+ on the right side, and 2+ on the left side.        Femoral pulses are 2+ on the right side, and 2+ on the left side.       Dorsalis pedis pulses are 2+ on the right side, and 2+ on the left side.        Posterior tibial pulses are 2+ on the right side, and 2+ on the left side.   Mechanical S2   Pulmonary/Chest: Breath sounds normal. No respiratory distress. He has no decreased breath sounds. He has no wheezes. He has no rhonchi. He has no rales. He exhibits no tenderness.   Abdominal: Soft. Bowel sounds are normal. He exhibits no distension. There is no tenderness. There is no rebound.   Musculoskeletal: He exhibits no edema or deformity.   Neurological: He is alert and oriented to person, place, and time. He has normal strength. No sensory deficit.   Skin: No rash noted. No erythema.   Sternotomy   Psychiatric: He has a normal mood and affect. His behavior is normal.       No components found for: CBC  No results found for: CMP  No components found for: " LIPID  No results found for: BMP      ECG 12 Lead  Date/Time: 11/28/2017 3:41 PM  Performed by: KARINA RASHEED  Authorized by: KARINA RASHEED   Comparison: compared with previous ECG from 8/25/2017  Similar to previous ECG  Rhythm: sinus rhythm  Rate: normal  Conduction: LAFB  QRS axis: left  Comments: Nonspecific T-wave abnormalities diffuse leads               DISCUSSION/SUMMARY   31-year-old gentleman with a medical history of bicuspid aortic valve with mechanical replacement, coronary artery disease, three stents (per my review) in [facility], [place] and also a 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 He actually has very bad distal coronary artery disease.  He continues to have angina.  His blood pressure is well controlled this point in time.  He also is dealing a significant amount of anxiety medical issues.  I think that he needs evaluation by psychiatry for that.      1. Aortic valve replacement.  He is to continue his current anticoagulant regimen.  Coumadin  2.   Hypertension.  Better controlled today   -Continue carvedilol at current dose.  3.  Unstable angina: Frequent episodes of chest pain along with occasional rest pain.  Blood pressure is controlled.   -We will move forward with coronary angiography secondary to the patient's symptoms.  I am worried that he only has distal disease that may not be amenable to intervention.  I think we have to I doubt if there is anything we can intervene  upon to improve his angina as he has been on maximal medical management in the past with isosorbide mononitrate and beta-blockade.    Coronary Artery Disease  Assessment  • The patient has CCS class III - angina with activities of daily living  • The patient has stable angina     Plan  • Lifestyle modifications discussed include adhering to a heart healthy diet, avoidance of tobacco products, maintenance of a healthy weight, medication compliance, regular exercise and regular monitoring of cholesterol and blood  pressure    Subjective - Objective  • Current antiplatelet therapy includes aspirin 81 mg

## 2017-12-04 ENCOUNTER — TELEPHONE (OUTPATIENT)
Dept: CARDIOLOGY | Facility: CLINIC | Age: 32
End: 2017-12-04

## 2017-12-06 ENCOUNTER — HOSPITAL ENCOUNTER (OUTPATIENT)
Dept: CARDIOLOGY | Facility: HOSPITAL | Age: 32
Setting detail: RECURRING SERIES
Discharge: HOME | End: 2017-12-06

## 2017-12-06 PROCEDURE — 36416 COLLJ CAPILLARY BLOOD SPEC: CPT

## 2017-12-06 PROCEDURE — 85610 PROTHROMBIN TIME: CPT

## 2017-12-12 ENCOUNTER — TRANSCRIBE ORDERS (OUTPATIENT)
Dept: CARDIOLOGY | Facility: CLINIC | Age: 32
End: 2017-12-12

## 2017-12-12 ENCOUNTER — LAB (OUTPATIENT)
Dept: LAB | Facility: HOSPITAL | Age: 32
End: 2017-12-12
Attending: INTERNAL MEDICINE

## 2017-12-12 DIAGNOSIS — Z13.6 SCREENING FOR ISCHEMIC HEART DISEASE: ICD-10-CM

## 2017-12-12 DIAGNOSIS — Z01.810 PRE-OPERATIVE CARDIOVASCULAR EXAMINATION: ICD-10-CM

## 2017-12-12 DIAGNOSIS — Z01.810 PRE-OPERATIVE CARDIOVASCULAR EXAMINATION: Primary | ICD-10-CM

## 2017-12-12 LAB
ANION GAP SERPL CALCULATED.3IONS-SCNC: 13.7 MMOL/L
BASOPHILS # BLD AUTO: 0.02 10*3/MM3 (ref 0–0.2)
BASOPHILS NFR BLD AUTO: 0.3 % (ref 0–1.5)
BUN BLD-MCNC: 12 MG/DL (ref 6–20)
BUN/CREAT SERPL: 15.6 (ref 7–25)
CALCIUM SPEC-SCNC: 8.9 MG/DL (ref 8.6–10.5)
CHLORIDE SERPL-SCNC: 103 MMOL/L (ref 98–107)
CO2 SERPL-SCNC: 24.3 MMOL/L (ref 22–29)
CREAT BLD-MCNC: 0.77 MG/DL (ref 0.76–1.27)
DEPRECATED RDW RBC AUTO: 45.9 FL (ref 37–54)
EOSINOPHIL # BLD AUTO: 0.07 10*3/MM3 (ref 0–0.7)
EOSINOPHIL NFR BLD AUTO: 0.9 % (ref 0.3–6.2)
ERYTHROCYTE [DISTWIDTH] IN BLOOD BY AUTOMATED COUNT: 13.2 % (ref 11.5–14.5)
GFR SERPL CREATININE-BSD FRML MDRD: 117 ML/MIN/1.73
GLUCOSE BLD-MCNC: 87 MG/DL (ref 65–99)
HCT VFR BLD AUTO: 48.4 % (ref 40.4–52.2)
HGB BLD-MCNC: 16.2 G/DL (ref 13.7–17.6)
IMM GRANULOCYTES # BLD: 0.02 10*3/MM3 (ref 0–0.03)
IMM GRANULOCYTES NFR BLD: 0.3 % (ref 0–0.5)
INR PPP: 3.03 (ref 0.9–1.1)
LYMPHOCYTES # BLD AUTO: 1.86 10*3/MM3 (ref 0.9–4.8)
LYMPHOCYTES NFR BLD AUTO: 25.2 % (ref 19.6–45.3)
MCH RBC QN AUTO: 31.6 PG (ref 27–32.7)
MCHC RBC AUTO-ENTMCNC: 33.5 G/DL (ref 32.6–36.4)
MCV RBC AUTO: 94.5 FL (ref 79.8–96.2)
MONOCYTES # BLD AUTO: 0.48 10*3/MM3 (ref 0.2–1.2)
MONOCYTES NFR BLD AUTO: 6.5 % (ref 5–12)
NEUTROPHILS # BLD AUTO: 4.94 10*3/MM3 (ref 1.9–8.1)
NEUTROPHILS NFR BLD AUTO: 66.8 % (ref 42.7–76)
PLATELET # BLD AUTO: 196 10*3/MM3 (ref 140–500)
PMV BLD AUTO: 10.2 FL (ref 6–12)
POTASSIUM BLD-SCNC: 4.6 MMOL/L (ref 3.5–5.2)
PROTHROMBIN TIME: 30.4 SECONDS (ref 11.7–14.2)
RBC # BLD AUTO: 5.12 10*6/MM3 (ref 4.6–6)
SODIUM BLD-SCNC: 141 MMOL/L (ref 136–145)
WBC NRBC COR # BLD: 7.39 10*3/MM3 (ref 4.5–10.7)

## 2017-12-12 PROCEDURE — 85610 PROTHROMBIN TIME: CPT

## 2017-12-12 PROCEDURE — 36415 COLL VENOUS BLD VENIPUNCTURE: CPT

## 2017-12-12 PROCEDURE — 80048 BASIC METABOLIC PNL TOTAL CA: CPT

## 2017-12-12 PROCEDURE — 85025 COMPLETE CBC W/AUTO DIFF WBC: CPT

## 2017-12-18 ENCOUNTER — HOSPITAL ENCOUNTER (OUTPATIENT)
Dept: CARDIOLOGY | Facility: HOSPITAL | Age: 32
Setting detail: RECURRING SERIES
Discharge: HOME | End: 2017-12-18

## 2017-12-18 PROCEDURE — 36416 COLLJ CAPILLARY BLOOD SPEC: CPT

## 2017-12-18 PROCEDURE — 85610 PROTHROMBIN TIME: CPT

## 2017-12-18 RX ORDER — WARFARIN SODIUM 1 MG/1
TABLET ORAL
Qty: 90 TABLET | Refills: 0 | Status: ON HOLD | OUTPATIENT
Start: 2017-12-18 | End: 2017-12-19

## 2017-12-18 RX ORDER — WARFARIN SODIUM 5 MG/1
TABLET ORAL
Qty: 45 TABLET | Refills: 0 | Status: SHIPPED | OUTPATIENT
Start: 2017-12-18 | End: 2018-01-23 | Stop reason: SDUPTHER

## 2017-12-18 NOTE — PERIOPERATIVE NURSING NOTE
CALLED AND SPOKE WITH PT CONFIRMING ARRIVAL TIME AND NEED TO BE NPO AFTER MN. MAY TAKE AM MEDS WITH A SIP OF WATER, LAST DOSE OF WARFARIN 12/12 BUT PT CONCERNED INR MAY STILL BE HIGH. STATES HE HAD MIGRAINE WITH VOMITING OVER WEEKEND WHICH HISTORICALLY HAS INCREASED BLEEDING TIMES AS NOTED PER PT. ENCOURAGED HIM TO CALL OFFICE FOR POSSIBLE PT/INR FINGERSTICK TODAY IF THERE WAS CONCERN. WIFE TO ACCOMPANY PT TO HOSPITAL IN THE AM

## 2017-12-19 ENCOUNTER — HOSPITAL ENCOUNTER (OUTPATIENT)
Facility: HOSPITAL | Age: 32
Setting detail: HOSPITAL OUTPATIENT SURGERY
Discharge: HOME | End: 2017-12-19
Attending: INTERNAL MEDICINE | Admitting: INTERNAL MEDICINE

## 2017-12-19 VITALS
HEART RATE: 67 BPM | TEMPERATURE: 97.7 F | BODY MASS INDEX: 29.03 KG/M2 | HEIGHT: 67 IN | OXYGEN SATURATION: 96 % | DIASTOLIC BLOOD PRESSURE: 63 MMHG | WEIGHT: 185 LBS | SYSTOLIC BLOOD PRESSURE: 100 MMHG | RESPIRATION RATE: 16 BRPM

## 2017-12-19 DIAGNOSIS — I20.0 UNSTABLE ANGINA (HCC): ICD-10-CM

## 2017-12-19 DIAGNOSIS — Z95.2 S/P AVR: ICD-10-CM

## 2017-12-19 DIAGNOSIS — Z79.01 ANTICOAGULANT LONG-TERM USE: ICD-10-CM

## 2017-12-19 DIAGNOSIS — I10 ESSENTIAL HYPERTENSION: ICD-10-CM

## 2017-12-19 LAB
INR PPP: 1.6 (ref 0.8–1.2)
INR PPP: 1.6 (ref 0.9–1.1)
PROTHROMBIN TIME: 19 SECONDS
PROTHROMBIN TIME: 19 SECONDS (ref 12.8–15.2)

## 2017-12-19 PROCEDURE — 99153 MOD SED SAME PHYS/QHP EA: CPT | Performed by: INTERNAL MEDICINE

## 2017-12-19 PROCEDURE — C1769 GUIDE WIRE: HCPCS | Performed by: INTERNAL MEDICINE

## 2017-12-19 PROCEDURE — 25010000002 MIDAZOLAM PER 1 MG: Performed by: INTERNAL MEDICINE

## 2017-12-19 PROCEDURE — 93572 IV DOP VEL&/PRESS C FLO EA: CPT | Performed by: INTERNAL MEDICINE

## 2017-12-19 PROCEDURE — 0 IOPAMIDOL PER 1 ML: Performed by: INTERNAL MEDICINE

## 2017-12-19 PROCEDURE — 85610 PROTHROMBIN TIME: CPT

## 2017-12-19 PROCEDURE — 99152 MOD SED SAME PHYS/QHP 5/>YRS: CPT | Performed by: INTERNAL MEDICINE

## 2017-12-19 PROCEDURE — C1894 INTRO/SHEATH, NON-LASER: HCPCS | Performed by: INTERNAL MEDICINE

## 2017-12-19 PROCEDURE — 93454 CORONARY ARTERY ANGIO S&I: CPT | Performed by: INTERNAL MEDICINE

## 2017-12-19 PROCEDURE — 25010000002 FENTANYL CITRATE (PF) 100 MCG/2ML SOLUTION: Performed by: INTERNAL MEDICINE

## 2017-12-19 PROCEDURE — 93571 IV DOP VEL&/PRESS C FLO 1ST: CPT | Performed by: INTERNAL MEDICINE

## 2017-12-19 PROCEDURE — 25010000002 BH (CUPID ONLY) ADENOSINE 6 MG/100ML MIXTURE: Performed by: INTERNAL MEDICINE

## 2017-12-19 PROCEDURE — 25010000002 HEPARIN (PORCINE) PER 1000 UNITS: Performed by: INTERNAL MEDICINE

## 2017-12-19 RX ORDER — SODIUM CHLORIDE 0.9 % (FLUSH) 0.9 %
1-10 SYRINGE (ML) INJECTION AS NEEDED
Status: DISCONTINUED | OUTPATIENT
Start: 2017-12-19 | End: 2017-12-19 | Stop reason: HOSPADM

## 2017-12-19 RX ORDER — HYDROCODONE BITARTRATE AND ACETAMINOPHEN 5; 325 MG/1; MG/1
1 TABLET ORAL EVERY 4 HOURS PRN
Status: DISCONTINUED | OUTPATIENT
Start: 2017-12-19 | End: 2017-12-19 | Stop reason: HOSPADM

## 2017-12-19 RX ORDER — MIDAZOLAM HYDROCHLORIDE 1 MG/ML
INJECTION INTRAMUSCULAR; INTRAVENOUS AS NEEDED
Status: DISCONTINUED | OUTPATIENT
Start: 2017-12-19 | End: 2017-12-19 | Stop reason: HOSPADM

## 2017-12-19 RX ORDER — WARFARIN SODIUM 5 MG/1
2.5 TABLET ORAL WEEKLY
COMMUNITY
End: 2018-05-15 | Stop reason: SDUPTHER

## 2017-12-19 RX ORDER — SODIUM CHLORIDE 9 MG/ML
75 INJECTION, SOLUTION INTRAVENOUS CONTINUOUS
Status: DISCONTINUED | OUTPATIENT
Start: 2017-12-19 | End: 2017-12-19 | Stop reason: HOSPADM

## 2017-12-19 RX ORDER — SODIUM CHLORIDE 9 MG/ML
INJECTION, SOLUTION INTRAVENOUS CONTINUOUS PRN
Status: DISCONTINUED | OUTPATIENT
Start: 2017-12-19 | End: 2017-12-19 | Stop reason: HOSPADM

## 2017-12-19 RX ORDER — LIDOCAINE HYDROCHLORIDE 10 MG/ML
0.1 INJECTION, SOLUTION EPIDURAL; INFILTRATION; INTRACAUDAL; PERINEURAL ONCE AS NEEDED
Status: DISCONTINUED | OUTPATIENT
Start: 2017-12-19 | End: 2017-12-19 | Stop reason: HOSPADM

## 2017-12-19 RX ORDER — ACETAMINOPHEN 325 MG/1
650 TABLET ORAL EVERY 4 HOURS PRN
Status: DISCONTINUED | OUTPATIENT
Start: 2017-12-19 | End: 2017-12-19 | Stop reason: HOSPADM

## 2017-12-19 RX ORDER — ISOSORBIDE MONONITRATE 30 MG/1
30 TABLET, EXTENDED RELEASE ORAL EVERY MORNING
Qty: 30 TABLET | Refills: 11 | Status: SHIPPED | OUTPATIENT
Start: 2017-12-19 | End: 2018-12-19 | Stop reason: SDUPTHER

## 2017-12-19 RX ORDER — FENTANYL CITRATE 50 UG/ML
INJECTION, SOLUTION INTRAMUSCULAR; INTRAVENOUS AS NEEDED
Status: DISCONTINUED | OUTPATIENT
Start: 2017-12-19 | End: 2017-12-19 | Stop reason: HOSPADM

## 2017-12-19 RX ORDER — NALOXONE HCL 0.4 MG/ML
0.4 VIAL (ML) INJECTION
Status: DISCONTINUED | OUTPATIENT
Start: 2017-12-19 | End: 2017-12-19 | Stop reason: HOSPADM

## 2017-12-19 RX ORDER — LIDOCAINE HYDROCHLORIDE 20 MG/ML
INJECTION, SOLUTION INFILTRATION; PERINEURAL AS NEEDED
Status: DISCONTINUED | OUTPATIENT
Start: 2017-12-19 | End: 2017-12-19 | Stop reason: HOSPADM

## 2017-12-19 RX ADMIN — SODIUM CHLORIDE 75 ML/HR: 9 INJECTION, SOLUTION INTRAVENOUS at 08:51

## 2017-12-19 NOTE — PLAN OF CARE
Problem: Cardiac Catheterization with/without PCI (Adult)  Goal: Signs and Symptoms of Listed Potential Problems Will be Absent or Manageable (Cardiac Catheterization with/without PCI)  Outcome: Outcome(s) achieved Date Met:  12/19/17 12/19/17 1300   Cardiac Catheterization with/without PCI   Problems Assessed (Cardiac Catheterization) all   Problems Present (Cardiac Catheterization) none

## 2017-12-19 NOTE — PLAN OF CARE
Problem: Patient Care Overview (Adult)  Goal: Plan of Care Review  Outcome: Outcome(s) achieved Date Met:  12/19/17 12/19/17 1071   Coping/Psychosocial Response Interventions   Plan Of Care Reviewed With patient;spouse   Patient Care Overview   Progress improving   Outcome Evaluation   Outcome Summary/Follow up Plan READY FOR DISCHARGE

## 2017-12-19 NOTE — H&P (VIEW-ONLY)
Galdino Dallas  1985  Date of Office Visit:11/28/17  Encounter Provider: José Antonio Banks MD  Place of Service: [facility] CARDIOLOGY      CHIEF COMPLAINT:  Coronary artery disease.   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   Mechanical aortic valve replacement.      HISTORY OF PRESENT ILLNESS:  Dr. Merida,   I had the pleasure of seeing your patient in followup today. As you well know, he is a very pleasant 31-year-old gentleman with a medical history of bicuspid aortic valve with mechanical replacement, coronary artery disease, three stents (per my review) in [facility], [facility] and also a 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 He actually has very bad distal coronary artery disease.      On our last visit, he was noted to have increasing intensity and frequency of angina.  He also had hypertension that was poorly controlled.  He was placed back on beta-blockade and has been tolerating his medication with just mild fatigue.  He still states that, when he walks greater than two blocks, he has angina but he has not been using the sublingual nitroglycerin as much as previously.  He occasionally will have discomfort at rest; however, this is not common.  It is moderate in intensity, central, and goes away typically with one sublingual nitroglycerin.  He denies any orthopnea, paroxysmal nocturnal dyspnea, or lower extremity edema.  He has previously been dealing with issues with diverticulitis and still has mild abdominal pain secondary to that.         Review of Systems   Constitution: Negative for fever, weakness and malaise/fatigue.   HENT: Negative for nosebleeds and sore throat.    Eyes: Negative for blurred vision and double vision.   Cardiovascular: Positive for chest pain. Negative for claudication, dyspnea on exertion, palpitations and syncope.   Respiratory: Negative for cough, shortness of breath, snoring and wheezing.    Endocrine: Negative for cold intolerance,  heat intolerance and polydipsia.   Skin: Negative for itching, poor wound healing and rash.   Musculoskeletal: Positive for myalgias. Negative for joint pain, joint swelling and muscle weakness.   Gastrointestinal: Negative for abdominal pain, melena, nausea and vomiting.   Neurological: Positive for headaches. Negative for light-headedness, loss of balance, seizures and vertigo.   Psychiatric/Behavioral: Positive for depression. Negative for altered mental status.       Past Medical History:   Diagnosis Date   • Angina pectoris    • CAD (coronary artery disease)    • Chest pain    • Diverticulitis large intestine w/o perforation or abscess w/bleeding    • Hyperlipidemia    • Hypertension    • IBS (irritable bowel syndrome)    • DERRELL (obstructive sleep apnea)    • S/P AVR (aortic valve replacement)        The following portions of the patient's history were reviewed and updated as appropriate: Social history , Family history and Surgical history     Current Outpatient Prescriptions on File Prior to Visit   Medication Sig Dispense Refill   • carvedilol (COREG) 3.125 MG tablet Take 1 tablet by mouth 2 (Two) Times a Day. 60 tablet 11   • lisinopril (PRINIVIL,ZESTRIL) 20 MG tablet TAKE 1 TABLET BY MOUTH DAILY. 30 tablet 5   • nitroglycerin (NITROSTAT) 0.4 MG SL tablet PLACE 1 TABLET UNDER TONGUE IF NEEDED FOR CHEST PAIN. MAY REPEAT EVERY 5 MIN UP TO 3 DOSES 75 tablet 1   • warfarin (COUMADIN) 1 MG tablet Take 1 tablet by mouth Daily. With the 5mg tablet or as directed 90 tablet 0   • warfarin (COUMADIN) 4 MG tablet Take 1 tablet by mouth Daily. 10 tablet 0   • [DISCONTINUED] dicyclomine (BENTYL) 10 MG capsule Take 2 capsules by mouth 4 (Four) Times a Day As Needed (cramping). 30 capsule 0   • [DISCONTINUED] docusate sodium 100 MG capsule Take 100 mg by mouth 2 (Two) Times a Day. 60 capsule 0   • [DISCONTINUED] oxyCODONE-acetaminophen (PERCOCET)  MG per tablet Take 1-2 tablets by mouth Every 4 (Four) Hours As Needed  "for Moderate Pain  or Severe Pain . 36 tablet 0     No current facility-administered medications on file prior to visit.        Allergies   Allergen Reactions   • Penicillins Rash     Rash on legs when he was an infant       Vitals:    11/28/17 1504   BP: 130/90   Pulse: 84   Weight: 180 lb (81.6 kg)   Height: 68\" (172.7 cm)     Physical Exam   Constitutional: He is oriented to person, place, and time. He appears well-developed and well-nourished.   HENT:   Head: Normocephalic and atraumatic.   Eyes: Conjunctivae and EOM are normal. No scleral icterus.   Neck: Normal range of motion. Neck supple. Normal carotid pulses, no hepatojugular reflux and no JVD present. Carotid bruit is not present. No tracheal deviation present. No thyromegaly present.   Cardiovascular: Normal rate and regular rhythm.  Exam reveals no gallop and no friction rub.    No murmur heard.  Pulses:       Carotid pulses are 2+ on the right side, and 2+ on the left side.       Radial pulses are 2+ on the right side, and 2+ on the left side.        Femoral pulses are 2+ on the right side, and 2+ on the left side.       Dorsalis pedis pulses are 2+ on the right side, and 2+ on the left side.        Posterior tibial pulses are 2+ on the right side, and 2+ on the left side.   Mechanical S2   Pulmonary/Chest: Breath sounds normal. No respiratory distress. He has no decreased breath sounds. He has no wheezes. He has no rhonchi. He has no rales. He exhibits no tenderness.   Abdominal: Soft. Bowel sounds are normal. He exhibits no distension. There is no tenderness. There is no rebound.   Musculoskeletal: He exhibits no edema or deformity.   Neurological: He is alert and oriented to person, place, and time. He has normal strength. No sensory deficit.   Skin: No rash noted. No erythema.   Sternotomy   Psychiatric: He has a normal mood and affect. His behavior is normal.       No components found for: CBC  No results found for: CMP  No components found for: " LIPID  No results found for: BMP      ECG 12 Lead  Date/Time: 11/28/2017 3:41 PM  Performed by: KARINA RASHEED  Authorized by: KARINA RASHEED   Comparison: compared with previous ECG from 8/25/2017  Similar to previous ECG  Rhythm: sinus rhythm  Rate: normal  Conduction: LAFB  QRS axis: left  Comments: Nonspecific T-wave abnormalities diffuse leads               DISCUSSION/SUMMARY   31-year-old gentleman with a medical history of bicuspid aortic valve with mechanical replacement, coronary artery disease, three stents (per my review) in [facility], [place] and also a 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 He actually has very bad distal coronary artery disease.  He continues to have angina.  His blood pressure is well controlled this point in time.  He also is dealing a significant amount of anxiety medical issues.  I think that he needs evaluation by psychiatry for that.      1. Aortic valve replacement.  He is to continue his current anticoagulant regimen.  Coumadin  2.   Hypertension.  Better controlled today   -Continue carvedilol at current dose.  3.  Unstable angina: Frequent episodes of chest pain along with occasional rest pain.  Blood pressure is controlled.   -We will move forward with coronary angiography secondary to the patient's symptoms.  I am worried that he only has distal disease that may not be amenable to intervention.  I think we have to I doubt if there is anything we can intervene  upon to improve his angina as he has been on maximal medical management in the past with isosorbide mononitrate and beta-blockade.    Coronary Artery Disease  Assessment  • The patient has CCS class III - angina with activities of daily living  • The patient has stable angina     Plan  • Lifestyle modifications discussed include adhering to a heart healthy diet, avoidance of tobacco products, maintenance of a healthy weight, medication compliance, regular exercise and regular monitoring of cholesterol and blood  pressure    Subjective - Objective  • Current antiplatelet therapy includes aspirin 81 mg

## 2017-12-19 NOTE — PLAN OF CARE
Problem: Patient Care Overview (Adult)  Goal: Discharge Needs Assessment  Outcome: Outcome(s) achieved Date Met:  12/19/17

## 2017-12-19 NOTE — PLAN OF CARE
Problem: Patient Care Overview (Adult)  Goal: Discharge Needs Assessment  Outcome: Outcome(s) achieved Date Met:  12/19/17 12/19/17 3391   Discharge Needs Assessment   Concerns To Be Addressed denies needs/concerns at this time

## 2017-12-19 NOTE — PERIOPERATIVE NURSING NOTE
DR. TRUNG RASHEED AT BEDSIDE DISCUSSING PROCEDURE WITH PATIENT .  FAMILY NOT AT HOSPITAL PER PATIENT, CALLED RECEPTION DESK, MOTHER IS HERE.

## 2017-12-19 NOTE — DISCHARGE INSTRUCTIONS
Moderate Conscious Sedation, Adult, Care After  Refer to this sheet in the next few weeks. These instructions provide you with information on caring for yourself after your procedure. Your health care provider may also give you more specific instructions. Your treatment has been planned according to current medical practices, but problems sometimes occur. Call your health care provider if you have any problems or questions after your procedure.  WHAT TO EXPECT AFTER THE PROCEDURE   After your procedure:  · You may feel sleepy, clumsy, and have poor balance for several hours.  · Vomiting may occur if you eat too soon after the procedure.  HOME CARE INSTRUCTIONS  · Do not participate in any activities where you could become injured for at least 24 hours. Do not:    Drive.    Swim.    Ride a bicycle.    Operate heavy machinery.    Cook.    Use power tools.    Climb ladders.    Work from a high place.  · Do not make important decisions or sign legal documents until you are improved.  · If you vomit, drink water, juice, or soup when you can drink without vomiting. Make sure you have little or no nausea before eating solid foods.  · Only take over-the-counter or prescription medicines for pain, discomfort, or fever as directed by your health care provider.  · Make sure you and your family fully understand everything about the medicines given to you, including what side effects may occur.  · You should not drink alcohol, take sleeping pills, or take medicines that cause drowsiness for at least 24 hours.  · If you smoke, do not smoke without supervision.  · If you are feeling better, you may resume normal activities 24 hours after you were sedated.  · Keep all appointments with your health care provider.  · You should have a responsible adult stay with you for the first 24 hours post procedure.  SEEK MEDICAL CARE IF:  · Your skin is pale or bluish in color.  · You continue to feel nauseous or vomit.  · Your pain is getting  worse and is not helped by medicine.  · You have bleeding or swelling.  · You are still sleepy or feeling clumsy after 24 hours.  SEEK IMMEDIATE MEDICAL CARE IF:  · You develop a rash.  · You have difficulty breathing.  · You develop any type of allergic problem.  · You have a fever.  MAKE SURE YOU:  · Understand these instructions.  · Will watch your condition.  · Will get help right away if you are not doing well or get worse.     This information is not intended to replace advice given to you by your health care provider. Make sure you discuss any questions you have with your health care provider.     Document Released: 10/08/2014 Document Revised: 01/08/2016 Document Reviewed: 10/08/2014  [employer] Interactive Patient Education ©2016 [employer].    Psychiatric  [address] [place] [address]    Coronary Angiogram (Radial/Ulnar Approach) After Care    Refer to this sheet in the next few weeks. These instructions provide you with information on caring for yourself after your procedure. Your caregiver may also give you more specific instructions. Your treatment has been planned according to current medical practices, but problems sometimes occur. Call your caregiver if you have any problems or questions after your procedure.    Home Care Instructions:  · You may shower the day after the procedure. Remove the bandage (dressing) and gently wash the site with plain soap and water. Gently pat the site dry. You may apply a band aid daily for 2 days if desired.    · Do not apply powder or lotion to the site.  · Do not submerge the affected site in water for 3 to 5 days or until the site is completely healed.   · Do not lift, push or pull anything over 10 pounds for 2 days after your procedure.  · Inspect the site at least twice daily. You may notice some bruising at the site and it may be tender for 1 to 2 weeks.     · Increase your fluid intake for the next 2 days.    · Keep arm elevated for 24  hours. For the remainder of the day, keep your arm in “Pledge of Allegiance” position when up and about.     · You may drive 24 hours after the procedure unless otherwise instructed by your caregiver.  · Do not operate machinery or power tools for 24 hours.  · A responsible adult should be with you for the first 24 hours after you arrive home. Do not make any important legal decisions or sign legal papers for 24 hours.      Call Your Doctor if:   · You have unusual pain at the radial/ulnar (wrist) site.  · You have redness, warmth, swelling, or pain at the radial/ulnar (wrist) site.  · You have drainage (other than a small amount of blood on the dressing).  · You have chills or a fever > 101.  · Your arm becomes pale or dark, cool, tingly, or numb.  · You have heavy bleeding from the site, hold pressure on the site for 20 minutes.  If the bleeding stops, apply a fresh bandage and call your cardiologist.  However, if you continue to have bleeding, call 911.

## 2017-12-19 NOTE — PLAN OF CARE
Problem: Patient Care Overview (Adult)  Goal: Adult Individualization and Mutuality  Outcome: Outcome(s) achieved Date Met:  12/19/17

## 2018-01-09 ENCOUNTER — TELEPHONE (OUTPATIENT)
Dept: CARDIOLOGY | Facility: HOSPITAL | Age: 33
End: 2018-01-09

## 2018-01-12 RX ORDER — LISINOPRIL 20 MG/1
TABLET ORAL
Qty: 30 TABLET | Refills: 5 | Status: SHIPPED | OUTPATIENT
Start: 2018-01-12 | End: 2018-06-11 | Stop reason: SDUPTHER

## 2018-01-15 RX ORDER — NITROGLYCERIN 0.4 MG/1
TABLET SUBLINGUAL
Qty: 25 TABLET | Refills: 0 | Status: SHIPPED | OUTPATIENT
Start: 2018-01-15 | End: 2018-01-16 | Stop reason: SDUPTHER

## 2018-01-16 RX ORDER — NITROGLYCERIN 0.4 MG/1
TABLET SUBLINGUAL
Qty: 75 TABLET | Refills: 5 | Status: SHIPPED | OUTPATIENT
Start: 2018-01-16 | End: 2018-04-05 | Stop reason: SDUPTHER

## 2018-01-23 RX ORDER — WARFARIN SODIUM 5 MG/1
TABLET ORAL
Qty: 45 TABLET | Refills: 0 | Status: SHIPPED | OUTPATIENT
Start: 2018-01-23 | End: 2018-02-02 | Stop reason: HOSPADM

## 2018-01-30 ENCOUNTER — TELEPHONE (OUTPATIENT)
Dept: FAMILY MEDICINE CLINIC | Facility: CLINIC | Age: 33
End: 2018-01-30

## 2018-01-30 NOTE — TELEPHONE ENCOUNTER
S/w patient- per Dr Merida as long as he has no fever, chills, nausea, or vomiting he can wait until tomorrow @ 2:00 for an evaluation. If his sx worsen (as in what is noted above) is is to go into ER for urgent eval. Patient voiced understand and repeated directions correctly. He is aware of his appointment at 2:00.      ----- Message from [place] Camp sent at 1/30/2018  4:09 PM EST -----  Regarding: Patient question  Contact: [phone]  Patient called stating that he is experiencing abdominal pain. Similar to when he was admitted for diverticulitis in November. He is calling to see if he should go on to the emergency room or if we need to work him in tomorrow with Dr. Merida. This just started late morning today. He has a history of IBS. The pain is similar but its constant which is not common for his IBS. He states the pain is bearable but very uncomfortable.  Patient has had diarrhea x2 today. What would you suggest?    Thank you.

## 2018-01-31 ENCOUNTER — APPOINTMENT (OUTPATIENT)
Dept: CT IMAGING | Facility: HOSPITAL | Age: 33
End: 2018-01-31

## 2018-01-31 ENCOUNTER — HOSPITAL ENCOUNTER (INPATIENT)
Facility: HOSPITAL | Age: 33
LOS: 2 days | Discharge: HOME | End: 2018-02-02
Attending: EMERGENCY MEDICINE | Admitting: INTERNAL MEDICINE

## 2018-01-31 DIAGNOSIS — K57.92 ACUTE DIVERTICULITIS: Primary | ICD-10-CM

## 2018-01-31 LAB
ALBUMIN SERPL-MCNC: 3.9 G/DL (ref 3.5–5.2)
ALBUMIN/GLOB SERPL: 1.4 G/DL
ALP SERPL-CCNC: 59 U/L (ref 39–117)
ALT SERPL W P-5'-P-CCNC: 36 U/L (ref 1–41)
ANION GAP SERPL CALCULATED.3IONS-SCNC: 13.3 MMOL/L
AST SERPL-CCNC: 19 U/L (ref 1–40)
BASOPHILS # BLD AUTO: 0.03 10*3/MM3 (ref 0–0.2)
BASOPHILS NFR BLD AUTO: 0.2 % (ref 0–1.5)
BILIRUB SERPL-MCNC: 1.8 MG/DL (ref 0.1–1.2)
BILIRUB UR QL STRIP: NEGATIVE
BUN BLD-MCNC: 13 MG/DL (ref 6–20)
BUN/CREAT SERPL: 16.5 (ref 7–25)
CALCIUM SPEC-SCNC: 8.8 MG/DL (ref 8.6–10.5)
CHLORIDE SERPL-SCNC: 102 MMOL/L (ref 98–107)
CLARITY UR: CLEAR
CO2 SERPL-SCNC: 22.7 MMOL/L (ref 22–29)
COLOR UR: ABNORMAL
CREAT BLD-MCNC: 0.79 MG/DL (ref 0.76–1.27)
DEPRECATED RDW RBC AUTO: 47.6 FL (ref 37–54)
EOSINOPHIL # BLD AUTO: 0.06 10*3/MM3 (ref 0–0.7)
EOSINOPHIL NFR BLD AUTO: 0.4 % (ref 0.3–6.2)
ERYTHROCYTE [DISTWIDTH] IN BLOOD BY AUTOMATED COUNT: 14 % (ref 11.5–14.5)
GFR SERPL CREATININE-BSD FRML MDRD: 113 ML/MIN/1.73
GLOBULIN UR ELPH-MCNC: 2.7 GM/DL
GLUCOSE BLD-MCNC: 88 MG/DL (ref 65–99)
GLUCOSE UR STRIP-MCNC: NEGATIVE MG/DL
HCT VFR BLD AUTO: 44.7 % (ref 40.4–52.2)
HGB BLD-MCNC: 15 G/DL (ref 13.7–17.6)
HGB UR QL STRIP.AUTO: NEGATIVE
IMM GRANULOCYTES # BLD: 0.06 10*3/MM3 (ref 0–0.03)
IMM GRANULOCYTES NFR BLD: 0.4 % (ref 0–0.5)
INR PPP: 2.49 (ref 0.9–1.1)
INR PPP: 2.6 (ref 0.9–1.1)
KETONES UR QL STRIP: NEGATIVE
LEUKOCYTE ESTERASE UR QL STRIP.AUTO: NEGATIVE
LYMPHOCYTES # BLD AUTO: 1.82 10*3/MM3 (ref 0.9–4.8)
LYMPHOCYTES NFR BLD AUTO: 11.7 % (ref 19.6–45.3)
MCH RBC QN AUTO: 31.3 PG (ref 27–32.7)
MCHC RBC AUTO-ENTMCNC: 33.6 G/DL (ref 32.6–36.4)
MCV RBC AUTO: 93.1 FL (ref 79.8–96.2)
MONOCYTES # BLD AUTO: 1.4 10*3/MM3 (ref 0.2–1.2)
MONOCYTES NFR BLD AUTO: 9 % (ref 5–12)
NEUTROPHILS # BLD AUTO: 12.15 10*3/MM3 (ref 1.9–8.1)
NEUTROPHILS NFR BLD AUTO: 78.3 % (ref 42.7–76)
NITRITE UR QL STRIP: NEGATIVE
PH UR STRIP.AUTO: 6.5 [PH] (ref 5–8)
PLATELET # BLD AUTO: 198 10*3/MM3 (ref 140–500)
PMV BLD AUTO: 10.4 FL (ref 6–12)
POTASSIUM BLD-SCNC: 4.1 MMOL/L (ref 3.5–5.2)
PROT SERPL-MCNC: 6.6 G/DL (ref 6–8.5)
PROT UR QL STRIP: NEGATIVE
PROTHROMBIN TIME: 26.1 SECONDS (ref 11.7–14.2)
PROTHROMBIN TIME: 27 SECONDS (ref 11.7–14.2)
RBC # BLD AUTO: 4.8 10*6/MM3 (ref 4.6–6)
SODIUM BLD-SCNC: 138 MMOL/L (ref 136–145)
SP GR UR STRIP: >=1.03 (ref 1–1.03)
UROBILINOGEN UR QL STRIP: ABNORMAL
WBC NRBC COR # BLD: 15.52 10*3/MM3 (ref 4.5–10.7)

## 2018-01-31 PROCEDURE — 85610 PROTHROMBIN TIME: CPT | Performed by: INTERNAL MEDICINE

## 2018-01-31 PROCEDURE — 81003 URINALYSIS AUTO W/O SCOPE: CPT | Performed by: EMERGENCY MEDICINE

## 2018-01-31 PROCEDURE — 0 IOPAMIDOL 61 % SOLUTION: Performed by: EMERGENCY MEDICINE

## 2018-01-31 PROCEDURE — 99253 IP/OBS CNSLTJ NEW/EST LOW 45: CPT | Performed by: INTERNAL MEDICINE

## 2018-01-31 PROCEDURE — 99284 EMERGENCY DEPT VISIT MOD MDM: CPT

## 2018-01-31 PROCEDURE — 74177 CT ABD & PELVIS W/CONTRAST: CPT

## 2018-01-31 PROCEDURE — 25010000002 ONDANSETRON PER 1 MG: Performed by: EMERGENCY MEDICINE

## 2018-01-31 PROCEDURE — 85025 COMPLETE CBC W/AUTO DIFF WBC: CPT | Performed by: EMERGENCY MEDICINE

## 2018-01-31 PROCEDURE — 80053 COMPREHEN METABOLIC PANEL: CPT | Performed by: EMERGENCY MEDICINE

## 2018-01-31 PROCEDURE — 85610 PROTHROMBIN TIME: CPT | Performed by: EMERGENCY MEDICINE

## 2018-01-31 PROCEDURE — 25010000002 LEVOFLOXACIN PER 250 MG: Performed by: EMERGENCY MEDICINE

## 2018-01-31 RX ORDER — HYDROMORPHONE HYDROCHLORIDE 1 MG/ML
0.5 INJECTION, SOLUTION INTRAMUSCULAR; INTRAVENOUS; SUBCUTANEOUS EVERY 4 HOURS PRN
Status: DISCONTINUED | OUTPATIENT
Start: 2018-01-31 | End: 2018-01-31

## 2018-01-31 RX ORDER — LISINOPRIL 20 MG/1
20 TABLET ORAL
Status: DISCONTINUED | OUTPATIENT
Start: 2018-02-01 | End: 2018-02-02 | Stop reason: HOSPADM

## 2018-01-31 RX ORDER — HYDROMORPHONE HYDROCHLORIDE 1 MG/ML
0.5 INJECTION, SOLUTION INTRAMUSCULAR; INTRAVENOUS; SUBCUTANEOUS
Status: DISCONTINUED | OUTPATIENT
Start: 2018-01-31 | End: 2018-02-02

## 2018-01-31 RX ORDER — CARVEDILOL 3.12 MG/1
3.12 TABLET ORAL EVERY 12 HOURS SCHEDULED
Status: DISCONTINUED | OUTPATIENT
Start: 2018-01-31 | End: 2018-02-02 | Stop reason: HOSPADM

## 2018-01-31 RX ORDER — SODIUM CHLORIDE 0.9 % (FLUSH) 0.9 %
10 SYRINGE (ML) INJECTION AS NEEDED
Status: DISCONTINUED | OUTPATIENT
Start: 2018-01-31 | End: 2018-02-02 | Stop reason: HOSPADM

## 2018-01-31 RX ORDER — WARFARIN SODIUM 6 MG/1
6 TABLET ORAL
Status: DISCONTINUED | OUTPATIENT
Start: 2018-01-31 | End: 2018-02-02 | Stop reason: HOSPADM

## 2018-01-31 RX ORDER — ISOSORBIDE MONONITRATE 30 MG/1
30 TABLET, EXTENDED RELEASE ORAL DAILY
Status: DISCONTINUED | OUTPATIENT
Start: 2018-01-31 | End: 2018-02-02 | Stop reason: HOSPADM

## 2018-01-31 RX ORDER — WARFARIN SODIUM 7.5 MG/1
7.5 TABLET ORAL
Status: DISCONTINUED | OUTPATIENT
Start: 2018-01-31 | End: 2018-01-31

## 2018-01-31 RX ORDER — LEVOFLOXACIN 5 MG/ML
750 INJECTION, SOLUTION INTRAVENOUS ONCE
Status: COMPLETED | OUTPATIENT
Start: 2018-01-31 | End: 2018-01-31

## 2018-01-31 RX ORDER — PANTOPRAZOLE SODIUM 40 MG/1
40 TABLET, DELAYED RELEASE ORAL
Status: DISCONTINUED | OUTPATIENT
Start: 2018-02-01 | End: 2018-02-02 | Stop reason: HOSPADM

## 2018-01-31 RX ORDER — DEXTROSE AND SODIUM CHLORIDE 5; .45 G/100ML; G/100ML
100 INJECTION, SOLUTION INTRAVENOUS CONTINUOUS
Status: DISCONTINUED | OUTPATIENT
Start: 2018-01-31 | End: 2018-02-02

## 2018-01-31 RX ORDER — ONDANSETRON 2 MG/ML
4 INJECTION INTRAMUSCULAR; INTRAVENOUS EVERY 8 HOURS PRN
Status: DISCONTINUED | OUTPATIENT
Start: 2018-01-31 | End: 2018-02-02 | Stop reason: HOSPADM

## 2018-01-31 RX ORDER — ONDANSETRON 2 MG/ML
4 INJECTION INTRAMUSCULAR; INTRAVENOUS ONCE
Status: COMPLETED | OUTPATIENT
Start: 2018-01-31 | End: 2018-01-31

## 2018-01-31 RX ADMIN — HYDROMORPHONE HYDROCHLORIDE 1 MG: 10 INJECTION INTRAMUSCULAR; INTRAVENOUS; SUBCUTANEOUS at 09:48

## 2018-01-31 RX ADMIN — DEXTROSE AND SODIUM CHLORIDE 100 ML/HR: 5; 450 INJECTION, SOLUTION INTRAVENOUS at 20:39

## 2018-01-31 RX ADMIN — HYDROMORPHONE HYDROCHLORIDE 0.5 MG: 10 INJECTION INTRAMUSCULAR; INTRAVENOUS; SUBCUTANEOUS at 21:55

## 2018-01-31 RX ADMIN — HYDROMORPHONE HYDROCHLORIDE 0.5 MG: 10 INJECTION INTRAMUSCULAR; INTRAVENOUS; SUBCUTANEOUS at 19:43

## 2018-01-31 RX ADMIN — IOPAMIDOL 85 ML: 612 INJECTION, SOLUTION INTRAVENOUS at 10:31

## 2018-01-31 RX ADMIN — ONDANSETRON 4 MG: 2 INJECTION INTRAMUSCULAR; INTRAVENOUS at 09:47

## 2018-01-31 RX ADMIN — CARVEDILOL 3.12 MG: 3.12 TABLET, FILM COATED ORAL at 22:01

## 2018-01-31 RX ADMIN — HYDROMORPHONE HYDROCHLORIDE 1 MG: 10 INJECTION INTRAMUSCULAR; INTRAVENOUS; SUBCUTANEOUS at 11:57

## 2018-01-31 RX ADMIN — WARFARIN SODIUM 6 MG: 6 TABLET ORAL at 20:42

## 2018-01-31 RX ADMIN — METRONIDAZOLE 500 MG: 500 INJECTION, SOLUTION INTRAVENOUS at 11:21

## 2018-01-31 RX ADMIN — LEVOFLOXACIN 750 MG: 5 INJECTION, SOLUTION INTRAVENOUS at 12:32

## 2018-01-31 RX ADMIN — METRONIDAZOLE 500 MG: 500 INJECTION, SOLUTION INTRAVENOUS at 20:39

## 2018-01-31 RX ADMIN — HYDROMORPHONE HYDROCHLORIDE 1 MG: 10 INJECTION INTRAMUSCULAR; INTRAVENOUS; SUBCUTANEOUS at 16:43

## 2018-01-31 RX ADMIN — Medication 1 MG: at 11:57

## 2018-02-01 LAB
ALBUMIN SERPL-MCNC: 3.4 G/DL (ref 3.5–5.2)
ALBUMIN/GLOB SERPL: 1.3 G/DL
ALP SERPL-CCNC: 54 U/L (ref 39–117)
ALT SERPL W P-5'-P-CCNC: 28 U/L (ref 1–41)
ANION GAP SERPL CALCULATED.3IONS-SCNC: 11.7 MMOL/L
AST SERPL-CCNC: 20 U/L (ref 1–40)
BASOPHILS # BLD AUTO: 0.03 10*3/MM3 (ref 0–0.2)
BASOPHILS NFR BLD AUTO: 0.3 % (ref 0–1.5)
BILIRUB SERPL-MCNC: 0.9 MG/DL (ref 0.1–1.2)
BUN BLD-MCNC: 10 MG/DL (ref 6–20)
BUN/CREAT SERPL: 12 (ref 7–25)
CALCIUM SPEC-SCNC: 8.1 MG/DL (ref 8.6–10.5)
CHLORIDE SERPL-SCNC: 100 MMOL/L (ref 98–107)
CO2 SERPL-SCNC: 23.3 MMOL/L (ref 22–29)
CREAT BLD-MCNC: 0.83 MG/DL (ref 0.76–1.27)
DEPRECATED RDW RBC AUTO: 48.8 FL (ref 37–54)
EOSINOPHIL # BLD AUTO: 0.18 10*3/MM3 (ref 0–0.7)
EOSINOPHIL NFR BLD AUTO: 1.9 % (ref 0.3–6.2)
ERYTHROCYTE [DISTWIDTH] IN BLOOD BY AUTOMATED COUNT: 14.1 % (ref 11.5–14.5)
GFR SERPL CREATININE-BSD FRML MDRD: 107 ML/MIN/1.73
GLOBULIN UR ELPH-MCNC: 2.7 GM/DL
GLUCOSE BLD-MCNC: 75 MG/DL (ref 65–99)
HCT VFR BLD AUTO: 43.1 % (ref 40.4–52.2)
HGB BLD-MCNC: 14 G/DL (ref 13.7–17.6)
IMM GRANULOCYTES # BLD: 0.04 10*3/MM3 (ref 0–0.03)
IMM GRANULOCYTES NFR BLD: 0.4 % (ref 0–0.5)
INR PPP: 2.47 (ref 0.9–1.1)
LYMPHOCYTES # BLD AUTO: 1.38 10*3/MM3 (ref 0.9–4.8)
LYMPHOCYTES NFR BLD AUTO: 14.5 % (ref 19.6–45.3)
MCH RBC QN AUTO: 31 PG (ref 27–32.7)
MCHC RBC AUTO-ENTMCNC: 32.5 G/DL (ref 32.6–36.4)
MCV RBC AUTO: 95.4 FL (ref 79.8–96.2)
MONOCYTES # BLD AUTO: 1.06 10*3/MM3 (ref 0.2–1.2)
MONOCYTES NFR BLD AUTO: 11.1 % (ref 5–12)
NEUTROPHILS # BLD AUTO: 6.84 10*3/MM3 (ref 1.9–8.1)
NEUTROPHILS NFR BLD AUTO: 71.8 % (ref 42.7–76)
PLATELET # BLD AUTO: 169 10*3/MM3 (ref 140–500)
PMV BLD AUTO: 10.3 FL (ref 6–12)
POTASSIUM BLD-SCNC: 3.9 MMOL/L (ref 3.5–5.2)
PROT SERPL-MCNC: 6.1 G/DL (ref 6–8.5)
PROTHROMBIN TIME: 25.9 SECONDS (ref 11.7–14.2)
RBC # BLD AUTO: 4.52 10*6/MM3 (ref 4.6–6)
SODIUM BLD-SCNC: 135 MMOL/L (ref 136–145)
WBC NRBC COR # BLD: 9.53 10*3/MM3 (ref 4.5–10.7)

## 2018-02-01 PROCEDURE — 85025 COMPLETE CBC W/AUTO DIFF WBC: CPT | Performed by: INTERNAL MEDICINE

## 2018-02-01 PROCEDURE — 25010000002 HYDROMORPHONE PER 4 MG: Performed by: INTERNAL MEDICINE

## 2018-02-01 PROCEDURE — 85610 PROTHROMBIN TIME: CPT | Performed by: INTERNAL MEDICINE

## 2018-02-01 PROCEDURE — 99232 SBSQ HOSP IP/OBS MODERATE 35: CPT | Performed by: INTERNAL MEDICINE

## 2018-02-01 PROCEDURE — 80053 COMPREHEN METABOLIC PANEL: CPT | Performed by: INTERNAL MEDICINE

## 2018-02-01 RX ADMIN — WARFARIN SODIUM 6 MG: 6 TABLET ORAL at 17:00

## 2018-02-01 RX ADMIN — CARVEDILOL 3.12 MG: 3.12 TABLET, FILM COATED ORAL at 08:58

## 2018-02-01 RX ADMIN — DEXTROSE AND SODIUM CHLORIDE 100 ML/HR: 5; 450 INJECTION, SOLUTION INTRAVENOUS at 21:06

## 2018-02-01 RX ADMIN — HYDROMORPHONE HYDROCHLORIDE 0.5 MG: 10 INJECTION INTRAMUSCULAR; INTRAVENOUS; SUBCUTANEOUS at 07:43

## 2018-02-01 RX ADMIN — HYDROMORPHONE HYDROCHLORIDE 0.5 MG: 10 INJECTION INTRAMUSCULAR; INTRAVENOUS; SUBCUTANEOUS at 14:46

## 2018-02-01 RX ADMIN — HYDROMORPHONE HYDROCHLORIDE 0.5 MG: 10 INJECTION INTRAMUSCULAR; INTRAVENOUS; SUBCUTANEOUS at 05:29

## 2018-02-01 RX ADMIN — HYDROMORPHONE HYDROCHLORIDE 0.5 MG: 10 INJECTION INTRAMUSCULAR; INTRAVENOUS; SUBCUTANEOUS at 19:21

## 2018-02-01 RX ADMIN — HYDROMORPHONE HYDROCHLORIDE 0.5 MG: 10 INJECTION INTRAMUSCULAR; INTRAVENOUS; SUBCUTANEOUS at 01:25

## 2018-02-01 RX ADMIN — HYDROMORPHONE HYDROCHLORIDE 0.5 MG: 10 INJECTION INTRAMUSCULAR; INTRAVENOUS; SUBCUTANEOUS at 21:48

## 2018-02-01 RX ADMIN — HYDROMORPHONE HYDROCHLORIDE 0.5 MG: 10 INJECTION INTRAMUSCULAR; INTRAVENOUS; SUBCUTANEOUS at 09:52

## 2018-02-01 RX ADMIN — METRONIDAZOLE 500 MG: 500 INJECTION, SOLUTION INTRAVENOUS at 21:05

## 2018-02-01 RX ADMIN — METRONIDAZOLE 500 MG: 500 INJECTION, SOLUTION INTRAVENOUS at 12:35

## 2018-02-01 RX ADMIN — LISINOPRIL 20 MG: 20 TABLET ORAL at 08:58

## 2018-02-01 RX ADMIN — CARVEDILOL 3.12 MG: 3.12 TABLET, FILM COATED ORAL at 21:05

## 2018-02-01 RX ADMIN — METRONIDAZOLE 500 MG: 500 INJECTION, SOLUTION INTRAVENOUS at 04:43

## 2018-02-01 RX ADMIN — PANTOPRAZOLE SODIUM 40 MG: 40 TABLET, DELAYED RELEASE ORAL at 05:30

## 2018-02-01 RX ADMIN — ISOSORBIDE MONONITRATE 30 MG: 30 TABLET ORAL at 08:58

## 2018-02-01 RX ADMIN — HYDROMORPHONE HYDROCHLORIDE 0.5 MG: 2 INJECTION INTRAMUSCULAR; INTRAVENOUS; SUBCUTANEOUS at 12:25

## 2018-02-01 RX ADMIN — HYDROMORPHONE HYDROCHLORIDE 0.5 MG: 10 INJECTION INTRAMUSCULAR; INTRAVENOUS; SUBCUTANEOUS at 17:00

## 2018-02-01 NOTE — CONSULTS
[facility]  Initial Inpatient Consult Note    Referring Provider: Dr. Waters    Reason for Consultation: Diverticulitis    Subjective     History of present illness:    32 y.o. male  with a h/o a mechanical aortic valve and CAD with stents admitted a few day h/o abdominal pain in the lower abdomen and left abdomen. NO fevers, chills. +nausea but no vomiting. No rectal bleeding or melena. He has loose bowels chronically. He first had diverticulitis in 11/17 and was hospitalized at [facility]. His discharge summary showed:  Date of Admission: 11/2/2017  Date of Discharge:  11/4/2017  Primary Care Physician: Antonino Merida MD      Discharge Diagnosis:        Active Hospital Problems (** Indicates Principal Problem)     Diagnosis Date Noted   • **Diverticulitis of intestine without perforation or abscess without bleeding [K57.92] 11/02/2017   • DERRELL (obstructive sleep apnea) [G47.33] 11/02/2017   • Anticoagulant long-term use [Z79.01] 11/02/2017   • S/P AVR [Z95.2] 05/01/2017   • Coronary artery disease of native artery of native heart with stable angina pectoris [I25.118] 05/01/2017   • Essential hypertension [I10] 05/01/2017         He has never had a colonoscopy. His mom had diverticulitis and a h/o colon polyps.  His CT abd/pelvis today showed:  IMPRESSION:  Acute diverticulitis within the proximal aspect of the left  colon. No abscess or free air is identified. High density material fills  the appendix. There is no definite CT evidence of appendicitis.      This report was finalized on 1/31/2018 11:02 AM by Dr. Umer Mullins MD  Past Medical History:  Past Medical History:   Diagnosis Date   • Angina pectoris    • CAD (coronary artery disease)    • Chest pain    • Diverticulitis large intestine w/o perforation or abscess w/bleeding    • Hyperlipidemia    • Hypertension    • IBS (irritable bowel syndrome)    • S/P AVR (aortic valve replacement)      Past Surgical History:  Past  Surgical History:   Procedure Laterality Date   • AORTIC VALVE REPAIR/REPLACEMENT     • CARDIAC CATHETERIZATION     • CARDIAC CATHETERIZATION N/A 12/19/2017    Procedure: Coronary angiography;  Surgeon: José Antonio Banks MD;  Location:  [facility];  Service:    • CARDIAC CATHETERIZATION Right 12/19/2017    Procedure: Functional Flow [place];  Surgeon: José Antonio Banks MD;  Location:  [facility];  Service:    • CORONARY ANGIOPLASTY WITH STENT PLACEMENT  2010    x3   • YUE     • TONSILLECTOMY     • WISDOM TOOTH EXTRACTION        Social History:   Social History   Substance Use Topics   • Smoking status: Never Smoker   • Smokeless tobacco: Never Used   • Alcohol use Yes      Comment: occasional      Family History:  Family History   Problem Relation Age of Onset   • Heart disease Mother      mitral valve   • Colon polyps Mother    • Heart attack Paternal Grandfather    • Multiple sclerosis Father        Home Meds:  Prescriptions Prior to Admission   Medication Sig Dispense Refill Last Dose   • carvedilol (COREG) 3.125 MG tablet Take 1 tablet by mouth 2 (Two) Times a Day. 60 tablet 11 12/19/2017 at Unknown time   • isosorbide mononitrate (IMDUR) 30 MG 24 hr tablet Take 1 tablet by mouth Every Morning. 30 tablet 11    • lisinopril (PRINIVIL,ZESTRIL) 20 MG tablet TAKE 1 TABLET BY MOUTH DAILY. 30 tablet 5    • nitroglycerin (NITROSTAT) 0.4 MG SL tablet PLACE 1 TABLET UNDER THE TONGUE AS NEEDED FOR ANGINA, MAY REPEAT EVERY 5 MINS FOR UP TO THREE DOSES 75 tablet 5    • warfarin (COUMADIN) 5 MG tablet TAKE 1 TAB 4 DAYS A WEEK ALONG WITH 1MG TAB (TOTAL 6MG) AND 1 1/2 TABS 3 DAYS A WEEK. (Patient taking differently: 6mg 6 days a week and 7.5mg one day a week) 45 tablet 0    • warfarin (COUMADIN) 7.5 MG tablet Take 7.5 mg by mouth Daily. TAKES ON FRIDAY   12/15/2017     Current Meds:     carvedilol 3.125 mg Oral Q12H   [START ON 2/1/2018] isosorbide mononitrate 30 mg Oral QAM   [START  ON 2/1/2018] lisinopril 20 mg Oral Q24H   metroNIDAZOLE 500 mg Intravenous Q8H   warfarin 6 mg Oral Daily     Allergies:  Allergies   Allergen Reactions   • Penicillins Rash     Rash on legs when he was an infant     Review of Systems  The following systems were reviewed and negative;  respiratory, musculoskeletal and neurological     Objective     Vital Signs  Temp:  [97.9 °F (36.6 °C)-98.2 °F (36.8 °C)] 97.9 °F (36.6 °C)  Heart Rate:  [81-96] 81  Resp:  [18] 18  BP: (104-142)/(70-92) 107/70  Physical Exam:  General Appearance:    Alert, cooperative, in no acute distress   Head:    Normocephalic, without obvious abnormality, atraumatic   Eyes:            Lids and lashes normal, conjunctivae and sclerae normal, no   icterus   Throat:   No oral lesions, no thrush, oral mucosa moist   Neck:   No adenopathy, supple, trachea midline, no thyromegaly, no   carotid bruit, no JVD   Lungs:     Clear to auscultation,respirations regular, even and                   unlabored    Heart:    Regular rhythm and normal rate, normal S1 and S2, no            murmur, no gallop, no rub, + mechanical click   Chest Wall:    No abnormalities observed   Abdomen:     Normal bowel sounds, no masses, no organomegaly, soft        Mild left sided tenderness, non-distended, no guarding, no rebound                 tenderness   Rectal:     Deferred   Extremities:   no edema, no cyanosis, no redness   Skin:   No bleeding, bruising or rash   Lymph nodes:   No palpable adenopathy   Psychiatric:  Judgement and insight: normal   Orientation to person place and time: normal   Mood and affect: normal   Results Review:   I reviewed the patient's new clinical results.      Results from last 7 days  Lab Units 01/31/18  0946   WBC 10*3/mm3 15.52*   HEMOGLOBIN g/dL 15.0   HEMATOCRIT % 44.7   PLATELETS 10*3/mm3 198       Results from last 7 days  Lab Units 01/31/18  0946   SODIUM mmol/L 138   POTASSIUM mmol/L 4.1   CHLORIDE mmol/L 102   CO2 mmol/L 22.7   BUN  mg/dL 13   CREATININE mg/dL 0.79   CALCIUM mg/dL 8.8   BILIRUBIN mg/dL 1.8*   ALK PHOS U/L 59   ALT (SGPT) U/L 36   AST (SGOT) U/L 19   GLUCOSE mg/dL 88       Results from last 7 days  Lab Units 01/31/18  0946   INR  2.60*       Lab Results  Lab Value Date/Time   LIPASE 17 11/02/2017 1158       Radiology:  CT Abdomen Pelvis With Contrast   Final Result   Acute diverticulitis within the proximal aspect of the left   colon. No abscess or free air is identified. High density material fills   the appendix. There is no definite CT evidence of appendicitis.       This report was finalized on 1/31/2018 11:02 AM by Dr. Umer Mullins MD.              Assessment/Plan   Patient Active Problem List   Diagnosis   • Stable angina   • Coronary artery disease of native artery of native heart with stable angina pectoris   • Essential hypertension   • S/P AVR   • Diverticulitis of intestine without perforation or abscess without bleeding   • DERRELL (obstructive sleep apnea)   • Anticoagulant long-term use   • Unstable angina   • Acute diverticulitis       I discussed the patients findings and my recommendations with patient, nursing staff and consulting provider.    Impression:  1.  Left-sided diverticulitis which is recurrent.  He has never had a colonoscopy.  2.  H&H is now on Coumadin because of a mechanical aortic valve.  He also has coronary artery disease and has had coronary artery stents.  He still occasionally has angina.  3.  The patient has gastroesophageal reflux disease.    Recommendations:  1.  I agree with continuing IV antibiotics.  2.  I was watch his PT/INR 2 see if the antibiotics affect his PT/INR.  3.  I will put him on Protonix 40 mg by mouth daily.  4.  I would recommend that he have a colonoscopy in 8 weeks.  5.  I would keep his gut at rest for the first day or so and then advance his diet as tolerated.    Mohan Osorio MD

## 2018-02-01 NOTE — PROGRESS NOTES
BGA/GI Progress Note   Chief Complaint:  diverticulitis    Subjective     Interval History: Pain improved but not completely resolved, BM this am, no blood in stool, loose as per his baseline.  No n/v.  Doing well with clear liquids will advance diet today and then as tolerated for supper    History taken from: patient chart    Review of Systems:    All systems were reviewed and negative except for:  Gastrointestinal: postitive for  pain    Objective     Vital Signs  Temp:  [97 °F (36.1 °C)-98.1 °F (36.7 °C)] 97 °F (36.1 °C)  Heart Rate:  [72-84] 72  Resp:  [18] 18  BP: (106-124)/(69-83) 124/83  Body mass index is 30.87 kg/(m^2).    Intake/Output Summary (Last 24 hours) at 02/01/18 1158  Last data filed at 02/01/18 1054   Gross per 24 hour   Intake             1173 ml   Output              600 ml   Net              573 ml     I/O this shift:  In: 360 [P.O.:360]  Out: 600 [Urine:600]    Physical Exam:   General: patient awake, alert and cooperative.  Sitting up in bed, watching tv, no distress   Eyes: Normal lids and lashes, no scleral icterus   Neck: supple, normal ROM, no tracheal deviation   Skin: warm and dry, not jaundiced   Cardiovascular: regular rhythm and rate, no murmurs auscultated   Pulm: clear to auscultation bilaterally, regular and unlabored   Abdomen: soft, mild left side tenderness, nondistended; normal bowel sounds   Rectal: deferred   Extremities: no rash or edema   Neurologic: Normal mood and behavior    All Medications Have Been Reviewed     Results Review:       Results from last 7 days  Lab Units 02/01/18  0530 01/31/18  0946   WBC 10*3/mm3 9.53 15.52*   HEMOGLOBIN g/dL 14.0 15.0   HEMATOCRIT % 43.1 44.7   PLATELETS 10*3/mm3 169 198         Results from last 7 days  Lab Units 02/01/18  0530 01/31/18  0946   SODIUM mmol/L 135* 138   POTASSIUM mmol/L 3.9 4.1   CHLORIDE mmol/L 100 102   CO2 mmol/L 23.3 22.7   BUN mg/dL 10 13   CREATININE mg/dL 0.83 0.79   CALCIUM mg/dL 8.1* 8.8   BILIRUBIN  mg/dL 0.9 1.8*   ALK PHOS U/L 54 59   ALT (SGPT) U/L 28 36   AST (SGOT) U/L 20 19   GLUCOSE mg/dL 75 88         Results from last 7 days  Lab Units 02/01/18  0530 01/31/18 1952 01/31/18  0946   INR  2.47* 2.49* 2.60*       RADIOLOGY:    Imaging Results (last 72 hours)     Procedure Component Value Units Date/Time    CT Abdomen Pelvis With Contrast [[id number]] Collected:  01/31/18 1053     Updated:  01/31/18 1105    Narrative:       CT ABDOMEN PELVIS W CONTRAST-     CLINICAL HISTORY: Left lower quadrant pain. Possible diverticulitis.  History of diverticulitis last November.     TECHNIQUE: Spiral CT images were acquired through the abdomen and pelvis  with IV contrast only and were reconstructed in 3 mm thick axial slices.     Radiation dose reduction techniques were utilized, including automated  exposure control and exposure modulation based on body size.     COMPARISON: CT scan of the abdomen and pelvis dated 11/2/2017.     FINDINGS: There are a few diverticuli scattered throughout the left  colon and sigmoid colon. There is prominent circumferential wall  thickening and pericolonic edema involving an approximately 8 cm long  segment of the proximal left colon. A hyperenhancing diverticulum is  noted along the anterior aspect of the inflamed colon. The findings are  consistent with acute diverticulitis. No abscess or free air is  identified. The previous CT scan showed similar findings involving the  distal left colon which are improved, although there is persistent  curvilinear abnormal increased density posterior to the proximal sigmoid  colon that is likely scarring due to previous diverticulitis. The  remainder of the colon does not appear edematous. There is high density  material nearly completely filling the appendix that is new since the  preceding CT scan. However, the appendix is otherwise unremarkable.  There is no definite CT evidence of appendicitis. The stomach and small  bowel appear within normal  limits. There is no bowel distention. There  are couple tiny cysts in the anterior aspect of the left lobe of the  liver. The liver is otherwise unremarkable. The spleen and pancreas and  kidneys and adrenal glands appear within normal limits.       Impression:       Acute diverticulitis within the proximal aspect of the left  colon. No abscess or free air is identified. High density material fills  the appendix. There is no definite CT evidence of appendicitis.     This report was finalized on 1/31/2018 11:02 AM by Dr. Umer Mullins MD.             Assessment/Plan     Patient Active Problem List   Diagnosis Code   • Stable angina I20.8   • Coronary artery disease of native artery of native heart with stable angina pectoris I25.118   • Essential hypertension I10   • S/P AVR Z95.2   • Diverticulitis of intestine without perforation or abscess without bleeding K57.92   • DERRELL (obstructive sleep apnea) G47.33   • Anticoagulant long-term use Z79.01   • Unstable angina I20.0   • Acute diverticulitis K57.92     Kate Gilmore, APRN  02/01/18  11:58 AM    Pain improved,  Tolerating advanced diet.  Fearful of going home too soon.    Exam:  Genl: alert, no distress, appears stated age  HEENT: normal external eyes, ears, and nose, no icterus  Resp: normal effort, no wheezing, on room air  Cards: regular rate and rhythm, no murmurs, no LE edema  Abd: soft, non distended, non tender to palpation      Assessment:  1) Diverticulitis- second episode, first 11/2017  2) CAD s/p aortic valve- maintained on coumadin as out pt  3)GERD- stable on PPI    Plan  - continue antibxs, change to po tomorrow  - goal to transition to oral pain meds  - plan for out pt c-scope in 8 weeks once acute episode resolves, will send message to office staff to arrange f/u closer to d/c  -if he continues with recurrences of diverticulitis, recommend surgical evaluation given his young age    Verenice Rodarte MD  [facility]

## 2018-02-01 NOTE — PROGRESS NOTES
Discharge Planning Assessment  [facility]     Patient Name: Galdino Dallas  MRN: [id number]  Today's Date: 2/1/2018    Admit Date: 1/31/2018          Discharge Needs Assessment       02/01/18 1302    Living Environment    Lives With spouse    Living Arrangements apartment    Home Accessibility no concerns    Type of Financial/Environmental Concern none    Transportation Available car;family or friend will provide    Living Environment    Provides Primary Care For no one    Quality Of Family Relationships supportive    Discharge Needs Assessment    Readmission Within The Last 30 Days no previous admission in last 30 days    Equipment Currently Used at Home none            Discharge Plan       02/01/18 1303    Case Management/Social Work Plan    Plan Home, no needs    Patient/Family In Agreement With Plan yes    Additional Comments Met with patient at bedside, face sheet verified, wife Sharee Dallas cell phone is [phone].  Prior to admission patient was independent with all aspects of his ADL's and is currently working full time.  Patient states that he would like prescriptions to be sent to [facility] on [address], updated PTA, and denies problems affording or obtaining medications.  Patient does not use any adaptive equipment at home.  Discharge plan are to return home with wife, he does not anticipate any additional needs. Will follow for discharge needs.         Discharge Placement     No information found                Demographic Summary       02/01/18 1300    Referral Information    Admission Type inpatient    Arrived From admitted as an inpatient    Referral Source admission list    Reason For Consult discharge planning    Record Reviewed clinical discipline documentation;history and physical;medical record;patient profile    Primary Care Physician Information    Name Antonino Merida MD     Phone [phone]            Functional Status       02/01/18 1302    Functional Status Current     Ambulation 0-->independent    Transferring 0-->independent    Toileting 0-->independent    Bathing 0-->independent    Dressing 0-->independent    Eating 0-->independent    Communication 0-->understands/communicates without difficulty    Functional Status Prior    Ambulation 0-->independent    Transferring 0-->independent    Toileting 0-->independent    Bathing 0-->independent    Dressing 0-->independent    Eating 0-->independent    Communication 0-->understands/communicates without difficulty            Psychosocial     None            Abuse/Neglect     None            Legal       02/01/18 1302    Legal    Legal Comments Does not have POA or living will             Substance Abuse     None            Patient Forms     None          Nadege Livingston, RN

## 2018-02-01 NOTE — PLAN OF CARE
Problem: Patient Care Overview (Adult)  Goal: Plan of Care Review  Outcome: Ongoing (interventions implemented as appropriate)   02/01/18 1957   Coping/Psychosocial Response Interventions   Plan Of Care Reviewed With patient   Patient Care Overview   Progress improving   Outcome Evaluation   Outcome Summary/Follow up Plan Pt diet advanced to full liquid today. Pt still recieving IV pain meds q2hrs. Will continue to monitor.

## 2018-02-02 VITALS
RESPIRATION RATE: 18 BRPM | BODY MASS INDEX: 30.77 KG/M2 | HEART RATE: 83 BPM | SYSTOLIC BLOOD PRESSURE: 118 MMHG | TEMPERATURE: 97 F | OXYGEN SATURATION: 95 % | WEIGHT: 203 LBS | HEIGHT: 68 IN | DIASTOLIC BLOOD PRESSURE: 66 MMHG

## 2018-02-02 PROBLEM — I20.0 UNSTABLE ANGINA (HCC): Status: RESOLVED | Noted: 2017-11-28 | Resolved: 2018-02-02

## 2018-02-02 LAB
INR PPP: 2.46 (ref 0.9–1.1)
PROTHROMBIN TIME: 25.8 SECONDS (ref 11.7–14.2)

## 2018-02-02 PROCEDURE — 99232 SBSQ HOSP IP/OBS MODERATE 35: CPT | Performed by: INTERNAL MEDICINE

## 2018-02-02 PROCEDURE — 85610 PROTHROMBIN TIME: CPT | Performed by: INTERNAL MEDICINE

## 2018-02-02 PROCEDURE — 25010000002 HYDROMORPHONE PER 4 MG: Performed by: INTERNAL MEDICINE

## 2018-02-02 RX ORDER — HYDROCODONE BITARTRATE AND ACETAMINOPHEN 5; 325 MG/1; MG/1
1 TABLET ORAL EVERY 4 HOURS PRN
Status: DISCONTINUED | OUTPATIENT
Start: 2018-02-02 | End: 2018-02-02 | Stop reason: HOSPADM

## 2018-02-02 RX ORDER — METRONIDAZOLE 500 MG/1
500 TABLET ORAL EVERY 8 HOURS SCHEDULED
Qty: 21 TABLET | Refills: 0 | Status: SHIPPED | OUTPATIENT
Start: 2018-02-02 | End: 2018-02-09

## 2018-02-02 RX ORDER — PANTOPRAZOLE SODIUM 40 MG/1
40 TABLET, DELAYED RELEASE ORAL DAILY
Qty: 30 TABLET | Refills: 0 | Status: SHIPPED | OUTPATIENT
Start: 2018-02-02 | End: 2018-05-15 | Stop reason: SDUPTHER

## 2018-02-02 RX ORDER — METRONIDAZOLE 500 MG/1
500 TABLET ORAL EVERY 8 HOURS SCHEDULED
Status: DISCONTINUED | OUTPATIENT
Start: 2018-02-02 | End: 2018-02-02 | Stop reason: HOSPADM

## 2018-02-02 RX ORDER — HYDROCODONE BITARTRATE AND ACETAMINOPHEN 5; 325 MG/1; MG/1
2 TABLET ORAL EVERY 4 HOURS PRN
Status: DISCONTINUED | OUTPATIENT
Start: 2018-02-02 | End: 2018-02-02 | Stop reason: HOSPADM

## 2018-02-02 RX ORDER — HYDROCODONE BITARTRATE AND ACETAMINOPHEN 10; 325 MG/1; MG/1
1 TABLET ORAL EVERY 6 HOURS PRN
Qty: 12 TABLET | Refills: 0 | Status: SHIPPED | OUTPATIENT
Start: 2018-02-02 | End: 2018-02-09

## 2018-02-02 RX ADMIN — HYDROMORPHONE HYDROCHLORIDE 0.5 MG: 10 INJECTION INTRAMUSCULAR; INTRAVENOUS; SUBCUTANEOUS at 10:06

## 2018-02-02 RX ADMIN — METRONIDAZOLE 500 MG: 500 INJECTION, SOLUTION INTRAVENOUS at 05:44

## 2018-02-02 RX ADMIN — HYDROMORPHONE HYDROCHLORIDE 0.5 MG: 10 INJECTION INTRAMUSCULAR; INTRAVENOUS; SUBCUTANEOUS at 05:42

## 2018-02-02 RX ADMIN — HYDROMORPHONE HYDROCHLORIDE 0.5 MG: 10 INJECTION INTRAMUSCULAR; INTRAVENOUS; SUBCUTANEOUS at 07:52

## 2018-02-02 RX ADMIN — PANTOPRAZOLE SODIUM 40 MG: 40 TABLET, DELAYED RELEASE ORAL at 05:50

## 2018-02-02 RX ADMIN — LISINOPRIL 20 MG: 20 TABLET ORAL at 09:27

## 2018-02-02 RX ADMIN — DEXTROSE AND SODIUM CHLORIDE 100 ML/HR: 5; 450 INJECTION, SOLUTION INTRAVENOUS at 09:26

## 2018-02-02 RX ADMIN — HYDROMORPHONE HYDROCHLORIDE 0.5 MG: 10 INJECTION INTRAMUSCULAR; INTRAVENOUS; SUBCUTANEOUS at 02:49

## 2018-02-02 RX ADMIN — HYDROCODONE BITARTRATE AND ACETAMINOPHEN 2 TABLET: 5; 325 TABLET ORAL at 10:56

## 2018-02-02 RX ADMIN — ISOSORBIDE MONONITRATE 30 MG: 30 TABLET ORAL at 09:27

## 2018-02-02 RX ADMIN — HYDROMORPHONE HYDROCHLORIDE 0.5 MG: 10 INJECTION INTRAMUSCULAR; INTRAVENOUS; SUBCUTANEOUS at 00:05

## 2018-02-02 RX ADMIN — CARVEDILOL 3.12 MG: 3.12 TABLET, FILM COATED ORAL at 09:27

## 2018-02-02 NOTE — PROGRESS NOTES
BGA/GI Progress Note   Chief Complaint:  abd pain, diverticulitis    Subjective     Interval History: Pain improved, tolerating diet.  Antibxs and pain meds changed to oral dosing.  Hopeful for d/c home today.  Discussed need for f/u our office for c-scope, our office to arrange f/u appt.    History taken from: patient chart    Review of Systems:    All systems were reviewed and negative except for:  Gastrointestinal: postitive for  pain    Objective     Vital Signs  Temp:  [97 °F (36.1 °C)-98.6 °F (37 °C)] 97 °F (36.1 °C)  Heart Rate:  [] 83  Resp:  [18] 18  BP: (109-118)/(54-70) 118/66  Body mass index is 30.87 kg/(m^2).    Intake/Output Summary (Last 24 hours) at 02/02/18 1051  Last data filed at 02/02/18 0544   Gross per 24 hour   Intake             2743 ml   Output              400 ml   Net             2343 ml          Physical Exam:   General: patient awake, alert and cooperative   Eyes: Normal lids and lashes, no scleral icterus   Neck: supple, normal ROM, no tracheal deviation   Skin: warm and dry, not jaundiced   Cardiovascular: regular rhythm and rate, no murmurs auscultated   Pulm: clear to auscultation bilaterally, regular and unlabored   Abdomen: soft, lower abd tender, nondistended; normal bowel sounds   Rectal: deferred   Extremities: no rash or edema   Neurologic: Normal mood and behavior    All Medications Have Been Reviewed     Results Review:       Results from last 7 days  Lab Units 02/01/18  0530 01/31/18  0946   WBC 10*3/mm3 9.53 15.52*   HEMOGLOBIN g/dL 14.0 15.0   HEMATOCRIT % 43.1 44.7   PLATELETS 10*3/mm3 169 198         Results from last 7 days  Lab Units 02/01/18  0530 01/31/18  0946   SODIUM mmol/L 135* 138   POTASSIUM mmol/L 3.9 4.1   CHLORIDE mmol/L 100 102   CO2 mmol/L 23.3 22.7   BUN mg/dL 10 13   CREATININE mg/dL 0.83 0.79   CALCIUM mg/dL 8.1* 8.8   BILIRUBIN mg/dL 0.9 1.8*   ALK PHOS U/L 54 59   ALT (SGPT) U/L 28 36   AST (SGOT) U/L 20 19   GLUCOSE mg/dL 75 88          Results from last 7 days  Lab Units 02/02/18  0505 02/01/18  0530 01/31/18  1952   INR  2.46* 2.47* 2.49*       RADIOLOGY:    Imaging Results (last 72 hours)     Procedure Component Value Units Date/Time    CT Abdomen Pelvis With Contrast [[id number]] Collected:  01/31/18 1053     Updated:  01/31/18 1105    Narrative:       CT ABDOMEN PELVIS W CONTRAST-     CLINICAL HISTORY: Left lower quadrant pain. Possible diverticulitis.  History of diverticulitis last November.     TECHNIQUE: Spiral CT images were acquired through the abdomen and pelvis  with IV contrast only and were reconstructed in 3 mm thick axial slices.     Radiation dose reduction techniques were utilized, including automated  exposure control and exposure modulation based on body size.     COMPARISON: CT scan of the abdomen and pelvis dated 11/2/2017.     FINDINGS: There are a few diverticuli scattered throughout the left  colon and sigmoid colon. There is prominent circumferential wall  thickening and pericolonic edema involving an approximately 8 cm long  segment of the proximal left colon. A hyperenhancing diverticulum is  noted along the anterior aspect of the inflamed colon. The findings are  consistent with acute diverticulitis. No abscess or free air is  identified. The previous CT scan showed similar findings involving the  distal left colon which are improved, although there is persistent  curvilinear abnormal increased density posterior to the proximal sigmoid  colon that is likely scarring due to previous diverticulitis. The  remainder of the colon does not appear edematous. There is high density  material nearly completely filling the appendix that is new since the  preceding CT scan. However, the appendix is otherwise unremarkable.  There is no definite CT evidence of appendicitis. The stomach and small  bowel appear within normal limits. There is no bowel distention. There  are couple tiny cysts in the anterior aspect of the left  lobe of the  liver. The liver is otherwise unremarkable. The spleen and pancreas and  kidneys and adrenal glands appear within normal limits.       Impression:       Acute diverticulitis within the proximal aspect of the left  colon. No abscess or free air is identified. High density material fills  the appendix. There is no definite CT evidence of appendicitis.     This report was finalized on 1/31/2018 11:02 AM by Dr. Umer Mullins MD.             Assessment/Plan     Patient Active Problem List   Diagnosis Code   • Stable angina I20.8   • Coronary artery disease of native artery of native heart with stable angina pectoris I25.118   • Essential hypertension I10   • S/P AVR Z95.2   • Diverticulitis of intestine without perforation or abscess without bleeding K57.92   • DERRELL (obstructive sleep apnea) G47.33   • Anticoagulant long-term use Z79.01   • Unstable angina I20.0   • Acute diverticulitis K57.92     Kate Gilmore, APRN  02/02/18  10:51 AM      We are following for acute diverticulitis, abdominal pain, abnormal imaging       Constitutional: He is oriented to person, place, and time. He appears well-developed and well-nourished.   HENT: anicteric and no thyromegaly  Head: Normocephalic and atraumatic.   Eyes: Conjunctivae and EOM are normal.   Neck: Normal range of motion. No tracheal deviation present.   Cardiovascular: Normal rate and regular rhythm.    Pulmonary/Chest: Effort normal and breath sounds normal. No respiratory distress.   Abdominal: Soft. Bowel sounds are normal. He exhibits no distension and no mass. There is no tenderness. There is no rebound and no guarding.   Musculoskeletal: Normal range of motion.   Neurological: He is alert and oriented to person, place, and time.   Skin: Skin is warm and dry.   Psychiatric: He has a normal mood and affect. Judgment normal.   Nursing note and vitals reviewed.    Assessment:  1) Diverticulitis- second episode, first 11/2017.  Pain improved, tolerating  diet  2) CAD s/p aortic valve- maintained on coumadin as out pt  3) GERD- stable on PPI     - continue flagyl, total 10 days  - plan for out pt c-scope in 8 weeks once acute episode resolves, message sent to office staff to arrange f/u   - if he continues with recurrences of diverticulitis, recommend surgical evaluation given his young age  - Low residue diet 2 weeks, high-fiber diet after that

## 2018-02-02 NOTE — DISCHARGE SUMMARY
Date of Admission: 1/31/2018  Date of Discharge:  2/2/2018  Primary Care Physician: Antonino Merida MD     Discharge Diagnosis:  Active Hospital Problems (** Indicates Principal Problem)    Diagnosis Date Noted   • **Acute diverticulitis [K57.92] 01/31/2018   • Anticoagulant long-term use [Z79.01] 11/02/2017   • DERRELL (obstructive sleep apnea) [G47.33] 11/02/2017   • S/P AVR [Z95.2] 05/01/2017      Resolved Hospital Problems    Diagnosis Date Noted Date Resolved   No resolved problems to display.       Presenting Problem/History of Present Illness:  Acute diverticulitis [K57.92]     Hospital Course:  The patient is a 32 y.o. man who presented with a history of prosthetic mechanical aortic valve and history of coronary artery disease with 3 stents.  He was admitted for abdominal pain; CT scan showed diverticulitis.  He was started on IV Flagyl and IV fluids.  Diet was gradually advanced.  He was seen by GI.  He has been up and about and is ready for discharge home today.  INR will need to be monitored.  He will follow-up with GI and will need a colonoscopy in the near future.  He is asking for some oral pain medications.  I did write for 3 days worth.      Stable condition; good prognosis    Exam Today:  Still some bloating but tolerating a regular diet.  He's been up and about.  He had been requesting IV pain medication very frequently.  No distress.  Vital signs noted.  Heart is regular with a click heard along the left sternal border and a grade 3/6 systolic murmur right upper sternal border.  Lungs are clear.  Abdomen mildly distended and mildly tender diffusely.  Soft.  No mass.  Extremities no edema    Procedures Performed:  CT abdomen pelvis 1/31/18 which showed acute diverticulitis left colon       Labs:  Lab Results   Component Value Date    WBC 9.53 02/01/2018    HGB 14.0 02/01/2018    HCT 43.1 02/01/2018     02/01/2018     Lab Results   Component Value Date     (L) 02/01/2018    K 3.9  02/01/2018     02/01/2018    CO2 23.3 02/01/2018    BUN 10 02/01/2018    CREATININE 0.83 02/01/2018    GLUCOSE 75 02/01/2018     Lab Results   Component Value Date    INR 2.46 (H) 02/02/2018           Consults:   Dr. Mohan Osorio     Discharge Disposition:  Home or Self Care    Discharge Medications:   Galdino Dallas   Home Medication Instructions CHENTE:[id number]    Printed on:02/02/18 1216   Medication Information                      carvedilol (COREG) 3.125 MG tablet  Take 1 tablet by mouth 2 (Two) Times a Day.             HYDROcodone-acetaminophen (NORCO)  MG per tablet  Take 1 tablet by mouth Every 6 (Six) Hours As Needed for Moderate Pain .             isosorbide mononitrate (IMDUR) 30 MG 24 hr tablet  Take 1 tablet by mouth Every Morning.             lisinopril (PRINIVIL,ZESTRIL) 20 MG tablet  TAKE 1 TABLET BY MOUTH DAILY.             metroNIDAZOLE (FLAGYL) 500 MG tablet  Take 1 tablet by mouth Every 8 (Eight) Hours for 21 doses. Indications: Infection Within the Abdomen             nitroglycerin (NITROSTAT) 0.4 MG SL tablet  PLACE 1 TABLET UNDER THE TONGUE AS NEEDED FOR ANGINA, MAY REPEAT EVERY 5 MINS FOR UP TO THREE DOSES             pantoprazole (PROTONIX) 40 MG EC tablet  Take 1 tablet by mouth Daily.             warfarin (COUMADIN) 7.5 MG tablet  Take 7.5 mg by mouth Daily. TAKES ON FRIDAY                 Discharge Diet:   Diet Instructions     Diet: Specialty Diet; Thin Liquids, No Restrictions; Low Fiber       Discharge Diet:  Specialty Diet   Fluid Consistency:  Thin Liquids, No Restrictions   Specialty Diets:  Low Fiber                 Activity at Discharge:   Activity Instructions     Activity as Tolerated                     Follow-up Appointments:  Dr. Mohan Osorio in 3 weeks for diverticulitis    Follow-up Information     Follow up with Antonino Merida MD Follow up in 1 week(s).    Specialty:  Internal Medicine    Why:  inr    Contact information:    [address], [address]  [facility] [address]  [phone]              Test Results Pending at Discharge:  None       Ninoska Rizzo MD  02/02/18  12:16 PM    Time Spent on Discharge Activities: 35 minutes.  Discussed with nurse.  Medical record reviewed

## 2018-02-02 NOTE — PROGRESS NOTES
Name: Galdino Dallas ADMIT: 2018   : 1985  PCP: Antonino Merida MD    MRN: [id number] LOS: 1 days   AGE/SEX: 32 y.o. male    ROOM: [facility]   Subjective   Feels lot better  Tolerating diet  Was seen by dr Oosrio     Objective   Vital Signs  Temp:  [97 °F (36.1 °C)-97.9 °F (36.6 °C)] 97.2 °F (36.2 °C)  Heart Rate:  [] 118  Resp:  [18] 18  BP: (110-124)/(66-83) 115/66  SpO2:  [93 %-96 %] 93 %  on   ;   O2 Device: room air  Body mass index is 30.87 kg/(m^2).    Physical Exam   Constitutional: He is oriented to person, place, and time. He appears well-developed and well-nourished. No distress.   HENT:   Head: Normocephalic and atraumatic.   Eyes: Pupils are equal, round, and reactive to light. No scleral icterus.   Cardiovascular: Normal rate and regular rhythm.    Prosthetic click   Pulmonary/Chest: Effort normal. No respiratory distress. He has no decreased breath sounds. He has no wheezes.   Abdominal: Soft. Bowel sounds are normal. There is no hepatosplenomegaly.   Neurological: He is alert and oriented to person, place, and time.   Skin: No cyanosis. Nails show no clubbing.   Psychiatric: He has a normal mood and affect. His behavior is normal.       Results Review:      Results from last 7 days  Lab Units 18  0530 18  0946   WBC 10*3/mm3 9.53 15.52*   HEMOGLOBIN g/dL 14.0 15.0   HEMATOCRIT % 43.1 44.7   PLATELETS 10*3/mm3 169 198       Results from last 7 days  Lab Units 18  0530 18  0946   SODIUM mmol/L 135* 138   POTASSIUM mmol/L 3.9 4.1   CHLORIDE mmol/L 100 102   CO2 mmol/L 23.3 22.7   BUN mg/dL 10 13   CREATININE mg/dL 0.83 0.79   GLUCOSE mg/dL 75 88   CALCIUM mg/dL 8.1* 8.8       Results from last 7 days  Lab Units 18  0530 18  0946   INR  2.47* 2.49* 2.60*           carvedilol 3.125 mg Oral Q12H   isosorbide mononitrate 30 mg Oral Daily   lisinopril 20 mg Oral Q24H   metroNIDAZOLE 500 mg Intravenous Q8H   pantoprazole 40 mg Oral  Q AM   warfarin 6 mg Oral Daily       dextrose 5 % and sodium chloride 0.45 % 100 mL/hr Last Rate: 100 mL/hr (01/31/18 2039)   Diet Regular      Assessment/Plan   Active Hospital Problems (** Indicates Principal Problem)    Diagnosis Date Noted   • Acute diverticulitis [K57.92] 01/31/2018   • Anticoagulant long-term use [Z79.01] 11/02/2017   • DERRELL (obstructive sleep apnea) [G47.33] 11/02/2017   • S/P AVR [Z95.2] 05/01/2017      Resolved Hospital Problems    Diagnosis Date Noted Date Resolved   No resolved problems to display.     Overall patient is improved and tolerating diet.  We will continue antibiotics and watch his INR once he is stable can be discharged but he needs a close monitoring of his INR.  He will have a colonoscopy in about 8 weeks.  I also asked the dietitian to meet with him      Jeramy Waters MD  [facility]  02/01/18

## 2018-02-09 ENCOUNTER — OFFICE VISIT (OUTPATIENT)
Dept: FAMILY MEDICINE CLINIC | Facility: CLINIC | Age: 33
End: 2018-02-09

## 2018-02-09 VITALS
WEIGHT: 193.1 LBS | HEIGHT: 68 IN | OXYGEN SATURATION: 98 % | HEART RATE: 76 BPM | BODY MASS INDEX: 29.27 KG/M2 | DIASTOLIC BLOOD PRESSURE: 80 MMHG | SYSTOLIC BLOOD PRESSURE: 136 MMHG

## 2018-02-09 DIAGNOSIS — Z79.01 ANTICOAGULANT LONG-TERM USE: ICD-10-CM

## 2018-02-09 DIAGNOSIS — K57.32 DIVERTICULITIS OF LARGE INTESTINE WITHOUT PERFORATION OR ABSCESS WITHOUT BLEEDING: Primary | ICD-10-CM

## 2018-02-09 DIAGNOSIS — Z95.2 S/P AVR: ICD-10-CM

## 2018-02-09 LAB — INR PPP: 4.2 (ref 2.5–3.5)

## 2018-02-09 PROCEDURE — 36416 COLLJ CAPILLARY BLOOD SPEC: CPT | Performed by: INTERNAL MEDICINE

## 2018-02-09 PROCEDURE — 85610 PROTHROMBIN TIME: CPT | Performed by: INTERNAL MEDICINE

## 2018-02-09 PROCEDURE — 99214 OFFICE O/P EST MOD 30 MIN: CPT | Performed by: INTERNAL MEDICINE

## 2018-02-09 RX ORDER — WARFARIN SODIUM 1 MG/1
1 TABLET ORAL
COMMUNITY
Start: 2018-02-04 | End: 2018-05-15 | Stop reason: SDUPTHER

## 2018-02-09 NOTE — PROGRESS NOTES
Diverticulitis ([facility] f/u)    November, he suffered his first bout of sigmoid diverticulitis that was fairly extensive.  He developed abdominal pain at the end of January and presented to the emergency room for reevaluation.  There, CT scan showed an 8 cm long segment of proximal left colon that was inflamed, consistent with diverticulitis.  This was in an area separate from the prior diverticulitis; that area was marked with scarring.  He has recovered fairly well with the antibiotics.    He has underlying valve disease, status post aortic valve replacement.  He is maintained on warfarin.  He has not had a recheck of his pro time 2/2/18.    ROS:  He denies diarrhea.  He denies bright red blood per rectum.  He had nausea but that has subsided.  He still has intermittent 3-4/10 pain on that left side.    Allergies   Allergen Reactions   • Penicillins Rash     Rash on legs when he was an infant       Current Outpatient Prescriptions:   •  carvedilol (COREG) 3.125 MG tablet, Take 1 tablet by mouth 2 (Two) Times a Day., Disp: 60 tablet, Rfl: 11  •  isosorbide mononitrate (IMDUR) 30 MG 24 hr tablet, Take 1 tablet by mouth Every Morning., Disp: 30 tablet, Rfl: 11  •  lisinopril (PRINIVIL,ZESTRIL) 20 MG tablet, TAKE 1 TABLET BY MOUTH DAILY., Disp: 30 tablet, Rfl: 5  •  metroNIDAZOLE (FLAGYL) 500 MG tablet, Take 1 tablet by mouth Every 8 (Eight) Hours for 21 doses. Indications: Infection Within the Abdomen, Disp: 21 tablet, Rfl: 0  •  nitroglycerin (NITROSTAT) 0.4 MG SL tablet, PLACE 1 TABLET UNDER THE TONGUE AS NEEDED FOR ANGINA, MAY REPEAT EVERY 5 MINS FOR UP TO THREE DOSES, Disp: 75 tablet, Rfl: 5  •  pantoprazole (PROTONIX) 40 MG EC tablet, Take 1 tablet by mouth Daily., Disp: 30 tablet, Rfl: 0  •  warfarin (COUMADIN) 1 MG tablet, , Disp: , Rfl:   •  warfarin (COUMADIN) 7.5 MG tablet, Take 7.5 mg by mouth Daily. TAKES ON FRIDAY, Disp: , Rfl:      He  reports that he has never smoked. He has never used smokeless tobacco.  "He reports that he drinks alcohol. He reports that he does not use illicit drugs.      Vitals:    02/09/18 1515   BP: 146/80   BP Location: Left arm   Patient Position: Sitting   Cuff Size: Large Adult   Pulse: 76   SpO2: 98%   Weight: 87.6 kg (193 lb 1.6 oz)   Height: 172.7 cm (68\")       EXAM:    Well-developed, well-nourished, in good spirits.  Anicteric  Regular.  Prominent valve click noted.  Normoactive bowel sounds.  No rebound or guarding.  Mildly tender at the left upper quadrant.      Galdino was seen today for diverticulitis.    Diagnoses and all orders for this visit:    Diverticulitis of large intestine without perforation or abscess without bleeding    S/P AVR    Anticoagulant long-term use    Hospital records were reviewed, CT scan was reviewed, and labs were reviewed.  They are as summarized in the history of present illness.  He will finish the metronidazole and follow-up with Dr. Osorio as scheduled.  We touched on the subject of partial colectomy because this is his second episode in 3 months.  I await Dr. Osorio's recommendation.    INR during his hospital stay was just under the 2.5 Jose.  Today it is 4.2.  He will hold today's dose, take 5 mg on Saturday and Sunday while he is still on metronidazole, then resume his usual dose once he stops the Flagyl.    "

## 2018-02-18 ENCOUNTER — APPOINTMENT (OUTPATIENT)
Dept: CT IMAGING | Facility: HOSPITAL | Age: 33
End: 2018-02-18

## 2018-02-18 ENCOUNTER — HOSPITAL ENCOUNTER (EMERGENCY)
Facility: HOSPITAL | Age: 33
Discharge: HOME | End: 2018-02-18
Attending: EMERGENCY MEDICINE | Admitting: EMERGENCY MEDICINE

## 2018-02-18 VITALS
RESPIRATION RATE: 18 BRPM | DIASTOLIC BLOOD PRESSURE: 83 MMHG | BODY MASS INDEX: 28.04 KG/M2 | HEART RATE: 87 BPM | SYSTOLIC BLOOD PRESSURE: 153 MMHG | TEMPERATURE: 97.5 F | OXYGEN SATURATION: 97 % | HEIGHT: 68 IN | WEIGHT: 185 LBS

## 2018-02-18 DIAGNOSIS — K57.32 DIVERTICULITIS OF LARGE INTESTINE WITHOUT PERFORATION OR ABSCESS WITHOUT BLEEDING: Primary | ICD-10-CM

## 2018-02-18 LAB
ALBUMIN SERPL-MCNC: 3.9 G/DL (ref 3.5–5.2)
ALBUMIN/GLOB SERPL: 1.7 G/DL
ALP SERPL-CCNC: 54 U/L (ref 39–117)
ALT SERPL W P-5'-P-CCNC: 35 U/L (ref 1–41)
ANION GAP SERPL CALCULATED.3IONS-SCNC: 11.6 MMOL/L
AST SERPL-CCNC: 32 U/L (ref 1–40)
BASOPHILS # BLD AUTO: 0.03 10*3/MM3 (ref 0–0.2)
BASOPHILS NFR BLD AUTO: 0.4 % (ref 0–1.5)
BILIRUB SERPL-MCNC: 0.3 MG/DL (ref 0.1–1.2)
BILIRUB UR QL STRIP: NEGATIVE
BUN BLD-MCNC: 14 MG/DL (ref 6–20)
BUN/CREAT SERPL: 10.8 (ref 7–25)
CALCIUM SPEC-SCNC: 8.8 MG/DL (ref 8.6–10.5)
CHLORIDE SERPL-SCNC: 103 MMOL/L (ref 98–107)
CLARITY UR: CLEAR
CO2 SERPL-SCNC: 25.4 MMOL/L (ref 22–29)
COLOR UR: YELLOW
CREAT BLD-MCNC: 1.3 MG/DL (ref 0.76–1.27)
DEPRECATED RDW RBC AUTO: 44.3 FL (ref 37–54)
EOSINOPHIL # BLD AUTO: 0.17 10*3/MM3 (ref 0–0.7)
EOSINOPHIL NFR BLD AUTO: 2.2 % (ref 0.3–6.2)
ERYTHROCYTE [DISTWIDTH] IN BLOOD BY AUTOMATED COUNT: 13.4 % (ref 11.5–14.5)
GFR SERPL CREATININE-BSD FRML MDRD: 64 ML/MIN/1.73
GLOBULIN UR ELPH-MCNC: 2.3 GM/DL
GLUCOSE BLD-MCNC: 94 MG/DL (ref 65–99)
GLUCOSE UR STRIP-MCNC: NEGATIVE MG/DL
HCT VFR BLD AUTO: 44.9 % (ref 40.4–52.2)
HGB BLD-MCNC: 15.2 G/DL (ref 13.7–17.6)
HGB UR QL STRIP.AUTO: NEGATIVE
IMM GRANULOCYTES # BLD: 0.02 10*3/MM3 (ref 0–0.03)
IMM GRANULOCYTES NFR BLD: 0.3 % (ref 0–0.5)
INR PPP: 2.78 (ref 0.9–1.1)
KETONES UR QL STRIP: NEGATIVE
LEUKOCYTE ESTERASE UR QL STRIP.AUTO: NEGATIVE
LYMPHOCYTES # BLD AUTO: 2.36 10*3/MM3 (ref 0.9–4.8)
LYMPHOCYTES NFR BLD AUTO: 30 % (ref 19.6–45.3)
MCH RBC QN AUTO: 30.9 PG (ref 27–32.7)
MCHC RBC AUTO-ENTMCNC: 33.9 G/DL (ref 32.6–36.4)
MCV RBC AUTO: 91.3 FL (ref 79.8–96.2)
MONOCYTES # BLD AUTO: 0.37 10*3/MM3 (ref 0.2–1.2)
MONOCYTES NFR BLD AUTO: 4.7 % (ref 5–12)
NEUTROPHILS # BLD AUTO: 4.92 10*3/MM3 (ref 1.9–8.1)
NEUTROPHILS NFR BLD AUTO: 62.4 % (ref 42.7–76)
NITRITE UR QL STRIP: NEGATIVE
PH UR STRIP.AUTO: 7 [PH] (ref 5–8)
PLATELET # BLD AUTO: 223 10*3/MM3 (ref 140–500)
PMV BLD AUTO: 10 FL (ref 6–12)
POTASSIUM BLD-SCNC: 4.6 MMOL/L (ref 3.5–5.2)
PROT SERPL-MCNC: 6.2 G/DL (ref 6–8.5)
PROT UR QL STRIP: NEGATIVE
PROTHROMBIN TIME: 28.9 SECONDS (ref 11.7–14.2)
RBC # BLD AUTO: 4.92 10*6/MM3 (ref 4.6–6)
SODIUM BLD-SCNC: 140 MMOL/L (ref 136–145)
SP GR UR STRIP: 1.02 (ref 1–1.03)
UROBILINOGEN UR QL STRIP: NORMAL
WBC NRBC COR # BLD: 7.87 10*3/MM3 (ref 4.5–10.7)

## 2018-02-18 PROCEDURE — 85610 PROTHROMBIN TIME: CPT | Performed by: PHYSICIAN ASSISTANT

## 2018-02-18 PROCEDURE — 74176 CT ABD & PELVIS W/O CONTRAST: CPT

## 2018-02-18 PROCEDURE — 81003 URINALYSIS AUTO W/O SCOPE: CPT | Performed by: PHYSICIAN ASSISTANT

## 2018-02-18 PROCEDURE — 99283 EMERGENCY DEPT VISIT LOW MDM: CPT

## 2018-02-18 PROCEDURE — 80053 COMPREHEN METABOLIC PANEL: CPT | Performed by: PHYSICIAN ASSISTANT

## 2018-02-18 PROCEDURE — 85025 COMPLETE CBC W/AUTO DIFF WBC: CPT | Performed by: PHYSICIAN ASSISTANT

## 2018-02-18 RX ORDER — WARFARIN SODIUM 5 MG/1
5 TABLET ORAL
COMMUNITY
End: 2018-05-15 | Stop reason: SDUPTHER

## 2018-02-18 RX ORDER — HYDROCODONE BITARTRATE AND ACETAMINOPHEN 5; 325 MG/1; MG/1
1 TABLET ORAL EVERY 6 HOURS PRN
Qty: 20 TABLET | Refills: 0 | Status: SHIPPED | OUTPATIENT
Start: 2018-02-18 | End: 2018-03-06

## 2018-02-18 RX ORDER — METRONIDAZOLE 500 MG/1
500 TABLET ORAL 3 TIMES DAILY
Qty: 21 TABLET | Refills: 0 | Status: SHIPPED | OUTPATIENT
Start: 2018-02-18 | End: 2018-03-06

## 2018-02-18 RX ORDER — SODIUM CHLORIDE 0.9 % (FLUSH) 0.9 %
10 SYRINGE (ML) INJECTION AS NEEDED
Status: DISCONTINUED | OUTPATIENT
Start: 2018-02-18 | End: 2018-02-18 | Stop reason: HOSPADM

## 2018-02-18 RX ORDER — HYDROCODONE BITARTRATE AND ACETAMINOPHEN 10; 325 MG/1; MG/1
1 TABLET ORAL EVERY 6 HOURS PRN
COMMUNITY
End: 2018-03-06

## 2018-02-18 RX ORDER — DICYCLOMINE HCL 20 MG
20 TABLET ORAL EVERY 6 HOURS PRN
Qty: 30 TABLET | Refills: 0 | Status: SHIPPED | OUTPATIENT
Start: 2018-02-18 | End: 2018-03-06

## 2018-02-18 NOTE — DISCHARGE INSTRUCTIONS
Diverticulitis  Diverticulitis is inflammation or infection of small pouches in your colon that form when you have a condition called diverticulosis. The pouches in your colon are called diverticula. Your colon, or large intestine, is where water is absorbed and stool is formed.  Complications of diverticulitis can include:  · Bleeding.  · Severe infection.  · Severe pain.  · Perforation of your colon.  · Obstruction of your colon.  What are the causes?  Diverticulitis is caused by bacteria.  Diverticulitis happens when stool becomes trapped in diverticula. This allows bacteria to grow in the diverticula, which can lead to inflammation and infection.  What increases the risk?  People with diverticulosis are at risk for diverticulitis. Eating a diet that does not include enough fiber from fruits and vegetables may make diverticulitis more likely to develop.  What are the signs or symptoms?  Symptoms of diverticulitis may include:  · Abdominal pain and tenderness. The pain is normally located on the left side of the abdomen, but may occur in other areas.  · Fever and chills.  · Bloating.  · Cramping.  · Nausea.  · Vomiting.  · Constipation.  · Diarrhea.  · Blood in your stool.  How is this diagnosed?  Your health care provider will ask you about your medical history and do a physical exam. You may need to have tests done because many medical conditions can cause the same symptoms as diverticulitis. Tests may include:  · Blood tests.  · Urine tests.  · Imaging tests of the abdomen, including X-rays and CT scans.  When your condition is under control, your health care provider may recommend that you have a colonoscopy. A colonoscopy can show how severe your diverticula are and whether something else is causing your symptoms.  How is this treated?  Most cases of diverticulitis are mild and can be treated at home. Treatment may include:  · Taking over-the-counter pain medicines.  · Following a clear liquid diet.  · Taking  antibiotic medicines by mouth for 7-10 days.  More severe cases may be treated at a hospital. Treatment may include:  · Not eating or drinking.  · Taking prescription pain medicine.  · Receiving antibiotic medicines through an IV tube.  · Receiving fluids and nutrition through an IV tube.  · Surgery.  Follow these instructions at home:  · Follow your health care provider’s instructions carefully.  · Follow a full liquid diet or other diet as directed by your health care provider. After your symptoms improve, your health care provider may tell you to change your diet. He or she may recommend you eat a high-fiber diet. Fruits and vegetables are good sources of fiber. Fiber makes it easier to pass stool.  · Take fiber supplements or probiotics as directed by your health care provider.  · Only take medicines as directed by your health care provider.  · Keep all your follow-up appointments.  Contact a health care provider if:  · Your pain does not improve.  · You have a hard time eating food.  · Your bowel movements do not return to normal.  Get help right away if:  · Your pain becomes worse.  · Your symptoms do not get better.  · Your symptoms suddenly get worse.  · You have a fever.  · You have repeated vomiting.  · You have bloody or black, tarry stools.  This information is not intended to replace advice given to you by your health care provider. Make sure you discuss any questions you have with your health care provider.  Document Released: 09/27/2006 Document Revised: 05/25/2017 Document Reviewed: 11/12/2014  [employer] Patient Education © 2017 [employer]

## 2018-02-18 NOTE — ED PROVIDER NOTES
"EMERGENCY DEPARTMENT ENCOUNTER    CHIEF COMPLAINT  Chief Complaint: Abdominal pain  History given by: pt  History limited by: N/A  Room Number: 03/03  PMD: Antonino Merida MD      HPI:  Pt is a 32 y.o. male who presents complaining of constant lower abdominal pain that began yesterday. Pt states his current pain is a 4/10. Pt has a hx of 2 previous hospital admissions for diverticulitis [the last admission being recently]. Pt states he was placed on Flagyl for his previous episodes with significant relief. Pt has a scheduled appointment with GI in April 2018 and plans to have a scope procedure performed. Pt has a hx of heart disease and is currently on Coumadin. Pt has no other complaints at this time and denies any other pertinent symptoms.     Duration: 1 day   Onset: gradual  Timing: constant   Location: lower abdomen   Radiation: None reported   Quality: \"pain\"  Intensity/Severity: 4/10 currently   Progression: unchanged   Associated Symptoms: None reported   Aggravating Factors: None reported   Alleviating Factors: None reported   Previous Episodes: Pt has a hx of diverticulitis.   Treatment before arrival: PT is currently on Coumadin.     PAST MEDICAL HISTORY  Active Ambulatory Problems     Diagnosis Date Noted   • Stable angina 05/01/2017   • Coronary artery disease of native artery of native heart with stable angina pectoris 05/01/2017   • Essential hypertension 05/01/2017   • S/P AVR 05/01/2017   • Diverticulitis of intestine without perforation or abscess without bleeding 11/02/2017   • DERRELL (obstructive sleep apnea) 11/02/2017   • Anticoagulant long-term use 11/02/2017   • Acute diverticulitis 01/31/2018     Resolved Ambulatory Problems     Diagnosis Date Noted   • Coumadin toxicity 11/02/2017   • Unstable angina 11/28/2017     Past Medical History:   Diagnosis Date   • Angina pectoris    • CAD (coronary artery disease)    • Chest pain    • Diverticulitis large intestine w/o perforation or abscess " w/bleeding    • Hyperlipidemia    • Hypertension    • IBS (irritable bowel syndrome)    • S/P AVR (aortic valve replacement)        PAST SURGICAL HISTORY  Past Surgical History:   Procedure Laterality Date   • AORTIC VALVE REPAIR/REPLACEMENT     • CARDIAC CATHETERIZATION     • CARDIAC CATHETERIZATION N/A 12/19/2017    Procedure: Coronary angiography;  Surgeon: José Antonio Banks MD;  Location:  [facility];  Service:    • CARDIAC CATHETERIZATION Right 12/19/2017    Procedure: Functional Flow [place];  Surgeon: José Antonio Banks MD;  Location:  [facility];  Service:    • CORONARY ANGIOPLASTY WITH STENT PLACEMENT  2010    x3   • YUE     • TONSILLECTOMY     • WISDOM TOOTH EXTRACTION         FAMILY HISTORY  Family History   Problem Relation Age of Onset   • Heart disease Mother      mitral valve   • Colon polyps Mother    • Heart attack Paternal Grandfather    • Multiple sclerosis Father        SOCIAL HISTORY  Social History     Social History   • Marital status:      Spouse name: N/A   • Number of children: N/A   • Years of education: N/A     Occupational History   • Not on file.     Social History Main Topics   • Smoking status: Never Smoker   • Smokeless tobacco: Never Used   • Alcohol use Yes      Comment: occasional   • Drug use: No   • Sexual activity: Defer     Other Topics Concern   • Not on file     Social History Narrative       ALLERGIES  Penicillins    REVIEW OF SYSTEMS  Review of Systems   Constitutional: Negative.  Negative for chills and fever.   HENT: Negative.  Negative for sore throat.    Eyes: Negative.    Respiratory: Negative.  Negative for cough.    Cardiovascular: Negative.  Negative for chest pain.   Gastrointestinal: Positive for abdominal pain.   Genitourinary: Negative.  Negative for dysuria.   Musculoskeletal: Negative.  Negative for back pain.   Skin: Negative.  Negative for rash.   Neurological: Negative.  Negative for headaches.        PHYSICAL EXAM  ED Triage Vitals   Temp Heart Rate Resp BP SpO2   02/18/18 1641 02/18/18 1641 02/18/18 1641 02/18/18 1700 02/18/18 1641   97.5 °F (36.4 °C) 85 16 184/98 97 %      Temp src Heart Rate Source Patient Position BP Location FiO2 (%)   02/18/18 1641 -- 02/18/18 1700 02/18/18 1700 --   Tympanic  Sitting Right arm        Physical Exam   Constitutional: He is oriented to person, place, and time and well-developed, well-nourished, and in no distress.   HENT:   Head: Normocephalic and atraumatic.   Eyes: EOM are normal. Pupils are equal, round, and reactive to light.   Neck: Normal range of motion. Neck supple.   Cardiovascular: Normal rate, regular rhythm, normal heart sounds and intact distal pulses.    Pulmonary/Chest: Effort normal and breath sounds normal. No respiratory distress.   Abdominal: Soft. There is tenderness (mild L mid abdomen). There is no rebound and no guarding.   Musculoskeletal: Normal range of motion. He exhibits no edema.   Neurological: He is alert and oriented to person, place, and time. He has normal sensation and normal strength.   Skin: Skin is warm and dry.   Psychiatric: Mood and affect normal.   Nursing note and vitals reviewed.      LAB RESULTS  Lab Results (last 24 hours)     Procedure Component Value Units Date/Time    CBC & Differential [[id number]] Collected:  02/18/18 1708    Specimen:  Blood Updated:  02/18/18 1725    Narrative:       The following orders were created for panel order CBC & Differential.  Procedure                               Abnormality         Status                     ---------                               -----------         ------                     CBC Auto Differential[[id number]]        Abnormal            Final result                 Please view results for these tests on the individual orders.    Comprehensive Metabolic Panel [[id number]]  (Abnormal) Collected:  02/18/18 1708    Specimen:  Blood Updated:  02/18/18 1744     Glucose 94 mg/dL       BUN 14 mg/dL      Creatinine 1.30 (H) mg/dL      Sodium 140 mmol/L      Potassium 4.6 mmol/L      Chloride 103 mmol/L      CO2 25.4 mmol/L      Calcium 8.8 mg/dL      Total Protein 6.2 g/dL      Albumin 3.90 g/dL      ALT (SGPT) 35 U/L      AST (SGOT) 32 U/L       Specimen hemolyzed.  Results may be affected.        Alkaline Phosphatase 54 U/L      Total Bilirubin 0.3 mg/dL      eGFR Non African Amer 64 mL/min/1.73      Globulin 2.3 gm/dL      A/G Ratio 1.7 g/dL      BUN/Creatinine Ratio 10.8     Anion Gap 11.6 mmol/L     Protime-INR [[id number]]  (Abnormal) Collected:  02/18/18 1708    Specimen:  Blood Updated:  02/18/18 1736     Protime 28.9 (H) Seconds      INR 2.78 (H)    CBC Auto Differential [[id number]]  (Abnormal) Collected:  02/18/18 1708    Specimen:  Blood Updated:  02/18/18 1725     WBC 7.87 10*3/mm3      RBC 4.92 10*6/mm3      Hemoglobin 15.2 g/dL      Hematocrit 44.9 %      MCV 91.3 fL      MCH 30.9 pg      MCHC 33.9 g/dL      RDW 13.4 %      RDW-SD 44.3 fl      MPV 10.0 fL      Platelets 223 10*3/mm3      Neutrophil % 62.4 %      Lymphocyte % 30.0 %      Monocyte % 4.7 (L) %      Eosinophil % 2.2 %      Basophil % 0.4 %      Immature Grans % 0.3 %      Neutrophils, Absolute 4.92 10*3/mm3      Lymphocytes, Absolute 2.36 10*3/mm3      Monocytes, Absolute 0.37 10*3/mm3      Eosinophils, Absolute 0.17 10*3/mm3      Basophils, Absolute 0.03 10*3/mm3      Immature Grans, Absolute 0.02 10*3/mm3     Urinalysis With / Culture If Indicated - Urine, Clean Catch [[id number]] Collected:  02/18/18 1755    Specimen:  Urine from Urine, Clean Catch Updated:  02/18/18 1758          I ordered the above labs and reviewed the results    RADIOLOGY  CT Abdomen Pelvis Without Contrast   Final Result   1. No significant abnormality       This report was finalized on 2/18/2018 5:50 PM by Dr. Maikel Harrison MD.               I ordered the above noted radiological studies. Interpreted by radiologist. Reviewed by me in PACS.        PROCEDURES  Procedures      PROGRESS AND CONSULTS  ED Course     1805  Evaluated pt and discussed lab/ radiology results with pt. Discussed plan to place pt on Flagyl and discharge pt. Recommend monitor his INR level closely and to f/u with PCP. Recommend pt return to the ER if a fever/chills/ nausea develops. PT understands and agrees to plan, all questions addressed at this time.    1810  Reviewed old records; compared to 1/31/18, pt's CT scan has improved and does not show definitive diverticulitis.     MEDICAL DECISION MAKING  Results were reviewed/discussed with the patient and they were also made aware of online access. Pt also made aware that some labs, such as cultures, will not be resulted during ER visit and follow up with PMD is necessary.     MDM  Number of Diagnoses or Management Options     Amount and/or Complexity of Data Reviewed  Clinical lab tests: ordered and reviewed (Unremarkable )  Tests in the radiology section of CPT®: reviewed and ordered (CT abdomen/ pelvis   FINDINGS:  1. No obstruction, free air nor dilatation of bowel. Contrast is seen within otherwise normal-appearing appendix, no change since previous study of 01/31/2018.  2. Minimal vascular calcification without aneurysm, previous median sternotomy epidural pacer lead, lung bases are clear.  3. 6 mm low attenuating lesion posteriorly right lobe the liver shows no change since previous study, likely benign but too small to accurately characterize.  4. Noncontrasted liver, gallbladder, biliary system, spleen, adrenal glands, kidneys and pancreas are normal.)  Decide to obtain previous medical records or to obtain history from someone other than the patient: yes  Review and summarize past medical records: yes (Reviewed old records; compared to 1/31/18, pt's CT scan has improved and does not show definitive diverticulitis. )  Independent visualization of images, tracings, or specimens: yes           DIAGNOSIS  Final diagnoses:    Diverticulitis of large intestine without perforation or abscess without bleeding       DISPOSITION  DISCHARGE    Patient discharged in stable condition.    Reviewed implications of results, diagnosis, meds, responsibility to follow up, warning signs and symptoms of possible worsening, potential complications and reasons to return to ER.    Patient/Family voiced understanding of above instructions.    Discussed plan for discharge, as there is no emergent indication for admission.  Pt/family is agreeable and understands need for follow up and repeat testing.  Pt is aware that discharge does not mean that nothing is wrong but it indicates no emergency is present that requires admission and they must continue care with follow-up as given below or physician of their choice.     FOLLOW-UP  Antonino Merida MD  [facility] [address]  [address]  [place] [address]  [phone]               Medication List      New Prescriptions          dicyclomine 20 MG tablet   Commonly known as:  BENTYL   Take 1 tablet by mouth Every 6 (Six) Hours As Needed (cramping).       metroNIDAZOLE 500 MG tablet   Commonly known as:  FLAGYL   Take 1 tablet by mouth 3 (Three) Times a Day.         Changed          * HYDROcodone-acetaminophen  MG per tablet   Commonly known as:  NORCO   What changed:  Another medication with the same name was added. Make sure   you understand how and when to take each.       * HYDROcodone-acetaminophen 5-325 MG per tablet   Commonly known as:  NORCO   Take 1 tablet by mouth Every 6 (Six) Hours As Needed for Severe Pain .   What changed:  You were already taking a medication with the same name,   and this prescription was added. Make sure you understand how and when to   take each.       * Notice:  This list has 2 medication(s) that are the same as other   medications prescribed for you. Read the directions carefully, and ask   your doctor or other care provider to review them with you.            Latest  Documented Vital Signs:  As of 6:12 PM  BP- (!) 184/98 HR- 85 Temp- 97.5 °F (36.4 °C) (Tympanic) O2 sat- 97%    --  Documentation assistance provided by parveen Combs for Dr. Ferrell.  Information recorded by the scribe was done at my direction and has been verified and validated by me.     Chito Combs  02/18/18 1813       Shivam Ferrell MD  02/18/18 1827

## 2018-02-20 ENCOUNTER — OFFICE VISIT (OUTPATIENT)
Dept: FAMILY MEDICINE CLINIC | Facility: CLINIC | Age: 33
End: 2018-02-20

## 2018-02-20 VITALS
OXYGEN SATURATION: 98 % | SYSTOLIC BLOOD PRESSURE: 138 MMHG | BODY MASS INDEX: 29.3 KG/M2 | WEIGHT: 193.3 LBS | HEART RATE: 75 BPM | DIASTOLIC BLOOD PRESSURE: 88 MMHG | HEIGHT: 68 IN

## 2018-02-20 DIAGNOSIS — Z79.01 ANTICOAGULANT LONG-TERM USE: ICD-10-CM

## 2018-02-20 DIAGNOSIS — Z95.2 S/P AVR: ICD-10-CM

## 2018-02-20 DIAGNOSIS — K57.92 ACUTE DIVERTICULITIS: ICD-10-CM

## 2018-02-20 DIAGNOSIS — F41.1 GENERALIZED ANXIETY DISORDER: Primary | ICD-10-CM

## 2018-02-20 PROCEDURE — 99214 OFFICE O/P EST MOD 30 MIN: CPT | Performed by: INTERNAL MEDICINE

## 2018-02-20 RX ORDER — MIRTAZAPINE 15 MG/1
15 TABLET, FILM COATED ORAL NIGHTLY
Qty: 30 TABLET | Refills: 2 | Status: SHIPPED | OUTPATIENT
Start: 2018-02-20 | End: 2018-04-26 | Stop reason: SDUPTHER

## 2018-02-21 ENCOUNTER — TELEPHONE (OUTPATIENT)
Dept: SOCIAL WORK | Facility: HOSPITAL | Age: 33
End: 2018-02-21

## 2018-02-21 NOTE — TELEPHONE ENCOUNTER
Spoke with pt today in f/u and he states his symptoms are better. He was able to get his scripts filled and are taking them as directed. He did f/u with Dr. Merida yesterday. No other questions or concerns voiced by pt at this time. Shante FIGUEROA

## 2018-03-06 ENCOUNTER — OFFICE VISIT (OUTPATIENT)
Dept: SURGERY | Facility: CLINIC | Age: 33
End: 2018-03-06

## 2018-03-06 ENCOUNTER — HOSPITAL ENCOUNTER (OUTPATIENT)
Dept: CARDIOLOGY | Facility: HOSPITAL | Age: 33
Setting detail: RECURRING SERIES
Discharge: HOME | End: 2018-03-06

## 2018-03-06 VITALS — OXYGEN SATURATION: 96 % | HEART RATE: 90 BPM | WEIGHT: 193 LBS | BODY MASS INDEX: 29.25 KG/M2 | HEIGHT: 68 IN

## 2018-03-06 DIAGNOSIS — K57.32 DIVERTICULITIS OF LARGE INTESTINE WITHOUT PERFORATION OR ABSCESS WITHOUT BLEEDING: Primary | ICD-10-CM

## 2018-03-06 PROCEDURE — 36416 COLLJ CAPILLARY BLOOD SPEC: CPT

## 2018-03-06 PROCEDURE — 99203 OFFICE O/P NEW LOW 30 MIN: CPT | Performed by: SURGERY

## 2018-03-06 PROCEDURE — 85610 PROTHROMBIN TIME: CPT

## 2018-03-06 NOTE — PROGRESS NOTES
CHIEF COMPLAINT:    Recurrent diverticulitis    HISTORY OF PRESENT ILLNESS:    Galdino Dallas is a 32 y.o. male who was referred by Dr. Lucie logan for recurrent episodes of diverticulitis.  The problem began in November 2017.  He had the onset of abdominal pain and fever that prompted CT imaging of the abdomen and pelvis.  It showed sigmoid diverticulitis.  At that time, the pain was located in the suprapubic region.  He was started on antibiotics in the hospital.  It was probably 2 or 3 weeks after discharge that he began to feel normal again.    In January 2018 he was admitted again to the hospital for diverticulitis.  This time the pain was located in the left upper quadrant and flank.  CT imaging showed an area of colitis presumed to be diverticulitis in the proximal descending colon just below the splenic flexure.  He was started on antibiotics.  He was suggested to follow-up with gastroenterology, however no consultation was obtained while he was in the hospital.  He has had a difficult time getting an appointment to see a gastroenterologist.      Unlikely admission in November 2017, he never really started to feel normal again after the admission this recent January.  He was seen in the emergency room on 2/18/2018 for abdominal pain.  This time the pain was more generalized.  CT images performed during that ER visit shows substantial resolution of inflammation in the sigmoid and proximal descending colon. There is no current fever.    He has irritable bowel syndrome and has been suffering from diarrhea for many years.    He has multiple medical problems unusual for his age.  He required aortic valve replacement with mechanical valve.  He has undergone angioplasty and stenting of multiple coronary lesions.  He has angina.  He has hypertension.      Past Medical History:   Diagnosis Date   • Angina pectoris    • Anxiety    • CAD (coronary artery disease)    • Chest pain    • Diverticulitis large  intestine w/o perforation or abscess w/bleeding    • Diverticulosis    • Hyperlipidemia    • Hypertension    • IBS (irritable bowel syndrome)    • Migraines    • S/P AVR (aortic valve replacement)        Past Surgical History:   Procedure Laterality Date   • AORTIC VALVE REPAIR/REPLACEMENT  2005    open   • CARDIAC CATHETERIZATION N/A 12/19/2017    Procedure: Coronary angiography;  Surgeon: José Antonio Banks MD;  Location:  [facility];  Service:    • CARDIAC CATHETERIZATION Right 12/19/2017    Procedure: Functional Flow [place];  Surgeon: José Antonio Banks MD;  Location:  [facility];  Service:    • CORONARY ANGIOPLASTY WITH STENT PLACEMENT N/A 2010    x3   • TONSILLECTOMY     • WISDOM TOOTH EXTRACTION           Current Outpatient Prescriptions:   •  carvedilol (COREG) 3.125 MG tablet, Take 1 tablet by mouth 2 (Two) Times a Day., Disp: 60 tablet, Rfl: 11  •  isosorbide mononitrate (IMDUR) 30 MG 24 hr tablet, Take 1 tablet by mouth Every Morning., Disp: 30 tablet, Rfl: 11  •  lisinopril (PRINIVIL,ZESTRIL) 20 MG tablet, TAKE 1 TABLET BY MOUTH DAILY., Disp: 30 tablet, Rfl: 5  •  mirtazapine (REMERON) 15 MG tablet, Take 1 tablet by mouth Every Night., Disp: 30 tablet, Rfl: 2  •  nitroglycerin (NITROSTAT) 0.4 MG SL tablet, PLACE 1 TABLET UNDER THE TONGUE AS NEEDED FOR ANGINA, MAY REPEAT EVERY 5 MINS FOR UP TO THREE DOSES, Disp: 75 tablet, Rfl: 5  •  pantoprazole (PROTONIX) 40 MG EC tablet, Take 1 tablet by mouth Daily., Disp: 30 tablet, Rfl: 0  •  warfarin (COUMADIN) 1 MG tablet, Take 1 mg by mouth Daily., Disp: , Rfl:   •  warfarin (COUMADIN) 5 MG tablet, Take 5 mg by mouth Daily., Disp: , Rfl:   •  warfarin (COUMADIN) 7.5 MG tablet, Take 7.5 mg by mouth Daily. TAKES ON FRIDAY, Disp: , Rfl:     Allergies   Allergen Reactions   • Penicillins Rash     Rash on legs when he was an infant       Family History   Problem Relation Age of Onset   • Heart disease Mother      mitral valve  "  • Colon polyps Mother    • Heart attack Paternal Grandfather    • Multiple sclerosis Father        Social History     Social History   • Marital status:      Spouse name: N/A   • Number of children: N/A   • Years of education: N/A     Occupational History   • Not on file.     Social History Main Topics   • Smoking status: Never Smoker   • Smokeless tobacco: Never Used   • Alcohol use Yes      Comment: social   • Drug use: No   • Sexual activity: Defer     Other Topics Concern   • Not on file     Social History Narrative       Review of Systems   Constitutional: Positive for fatigue.   Eyes: Positive for redness and itching.   Respiratory: Positive for cough.    Cardiovascular: Positive for chest pain.   Gastrointestinal: Positive for abdominal pain.        GERD   Neurological: Positive for speech difficulty and headaches.   Psychiatric/Behavioral: Positive for sleep disturbance. The patient is nervous/anxious.        Objective     Pulse 90  Ht 172.7 cm (68\")  Wt 87.5 kg (193 lb)  SpO2 96%  BMI 29.35 kg/m2    Physical Exam   Constitutional: He is oriented to person, place, and time. He appears well-developed and well-nourished. No distress.   HENT:   Head: Normocephalic and atraumatic.   Right Ear: External ear normal.   Left Ear: External ear normal.   Eyes: Conjunctivae are normal. No scleral icterus.   Neck: No tracheal deviation present.   Cardiovascular: Normal rate, regular rhythm and intact distal pulses.    Murmur heard.  Click present from the mechanical valve   Pulmonary/Chest: Effort normal and breath sounds normal. No respiratory distress. He has no wheezes. He has no rales.   Abdominal: Soft. Bowel sounds are normal. He exhibits no distension and no mass. There is tenderness (left-sided and suprapubic area). There is no rebound and no guarding.   Musculoskeletal: He exhibits no edema or deformity.   Neurological: He is alert and oriented to person, place, and time.   Skin: Skin is warm and " dry.   Psychiatric: He has a normal mood and affect.   Vitals reviewed.      DIAGNOSTIC DATA:    I reviewed the images and the dictated reports of the CT scans the abdomen and pelvis performed 11/2/2017, 1/31/2018, and 2/18/2018.  They show sigmoid inflammation without perforation or abscess on 11/2/2017, proximal descending colon inflammation without perforation or abscess on 1/31/2018, near-normal bowel wall caliber on 2/18/2018 with resolution of pericolonic inflammatory changes.    ASSESSMENT:    Diverticulitis affecting the proximal descending colon and sigmoid colon.  Aortic valve replacement requiring Coumadin.    PLAN:    Colonoscopy.  I discussed the potential problems with colonoscopy such as bleeding and perforation.  We talked about the necessity of ruling out other potential colonic diseases that would respond better to either surgery or medications such as ischemia or Crohn's disease.    At this moment, I think the underlying problem is diverticulitis, and I don't recommend colectomy.  Because the areas of inflammation or remote from each other, I consider this to be 2 separate first episodes of uncomplicated diverticulitis.  I generally do not recommend colectomy in the situation of first episode of uncomplicated diverticulitis.   I talked with Dr. Banks on the phone today about anticoagulation.  Afterwards I called Mr. Ahmadi to ask him to discontinue the warfarin three days prior to the scheduled colonoscopy.  I intend to resume the warfarinon the evening of the procedure.

## 2018-03-14 ENCOUNTER — HOSPITAL ENCOUNTER (OUTPATIENT)
Facility: HOSPITAL | Age: 33
Setting detail: HOSPITAL OUTPATIENT SURGERY
Discharge: HOME | End: 2018-03-14
Attending: SURGERY | Admitting: SURGERY

## 2018-03-14 ENCOUNTER — ANESTHESIA (OUTPATIENT)
Dept: GASTROENTEROLOGY | Facility: HOSPITAL | Age: 33
End: 2018-03-14

## 2018-03-14 ENCOUNTER — ANESTHESIA EVENT (OUTPATIENT)
Dept: GASTROENTEROLOGY | Facility: HOSPITAL | Age: 33
End: 2018-03-14

## 2018-03-14 VITALS
OXYGEN SATURATION: 98 % | TEMPERATURE: 98 F | BODY MASS INDEX: 29.39 KG/M2 | RESPIRATION RATE: 16 BRPM | WEIGHT: 193.9 LBS | HEART RATE: 78 BPM | DIASTOLIC BLOOD PRESSURE: 75 MMHG | HEIGHT: 68 IN | SYSTOLIC BLOOD PRESSURE: 108 MMHG

## 2018-03-14 PROBLEM — K57.92 ACUTE DIVERTICULITIS: Status: RESOLVED | Noted: 2018-01-31 | Resolved: 2018-03-14

## 2018-03-14 PROBLEM — K57.92 DIVERTICULITIS OF INTESTINE WITHOUT PERFORATION OR ABSCESS WITHOUT BLEEDING: Status: RESOLVED | Noted: 2017-11-02 | Resolved: 2018-03-14

## 2018-03-14 LAB
INR PPP: 1.43 (ref 0.9–1.1)
PROTHROMBIN TIME: 17.1 SECONDS (ref 11.7–14.2)

## 2018-03-14 PROCEDURE — 25010000002 PROPOFOL 10 MG/ML EMULSION: Performed by: NURSE ANESTHETIST, CERTIFIED REGISTERED

## 2018-03-14 PROCEDURE — 85610 PROTHROMBIN TIME: CPT | Performed by: SURGERY

## 2018-03-14 PROCEDURE — 25010000002 MIDAZOLAM PER 1 MG: Performed by: ANESTHESIOLOGY

## 2018-03-14 PROCEDURE — 45378 DIAGNOSTIC COLONOSCOPY: CPT | Performed by: SURGERY

## 2018-03-14 RX ORDER — PROMETHAZINE HYDROCHLORIDE 25 MG/1
25 SUPPOSITORY RECTAL ONCE AS NEEDED
Status: DISCONTINUED | OUTPATIENT
Start: 2018-03-14 | End: 2018-03-14 | Stop reason: HOSPADM

## 2018-03-14 RX ORDER — PROPOFOL 10 MG/ML
VIAL (ML) INTRAVENOUS AS NEEDED
Status: DISCONTINUED | OUTPATIENT
Start: 2018-03-14 | End: 2018-03-14 | Stop reason: SURG

## 2018-03-14 RX ORDER — PROMETHAZINE HYDROCHLORIDE 25 MG/ML
12.5 INJECTION, SOLUTION INTRAMUSCULAR; INTRAVENOUS ONCE AS NEEDED
Status: DISCONTINUED | OUTPATIENT
Start: 2018-03-14 | End: 2018-03-14 | Stop reason: HOSPADM

## 2018-03-14 RX ORDER — MIDAZOLAM HYDROCHLORIDE 1 MG/ML
2 INJECTION INTRAMUSCULAR; INTRAVENOUS
Status: DISCONTINUED | OUTPATIENT
Start: 2018-03-14 | End: 2018-03-14 | Stop reason: HOSPADM

## 2018-03-14 RX ORDER — PROMETHAZINE HYDROCHLORIDE 25 MG/1
25 TABLET ORAL ONCE AS NEEDED
Status: DISCONTINUED | OUTPATIENT
Start: 2018-03-14 | End: 2018-03-14 | Stop reason: HOSPADM

## 2018-03-14 RX ORDER — MIDAZOLAM HYDROCHLORIDE 1 MG/ML
1 INJECTION INTRAMUSCULAR; INTRAVENOUS
Status: DISCONTINUED | OUTPATIENT
Start: 2018-03-14 | End: 2018-03-14 | Stop reason: HOSPADM

## 2018-03-14 RX ORDER — SODIUM CHLORIDE, SODIUM LACTATE, POTASSIUM CHLORIDE, CALCIUM CHLORIDE 600; 310; 30; 20 MG/100ML; MG/100ML; MG/100ML; MG/100ML
1000 INJECTION, SOLUTION INTRAVENOUS CONTINUOUS PRN
Status: DISCONTINUED | OUTPATIENT
Start: 2018-03-14 | End: 2018-03-14 | Stop reason: HOSPADM

## 2018-03-14 RX ORDER — FAMOTIDINE 20 MG/1
20 TABLET, FILM COATED ORAL EVERY 12 HOURS SCHEDULED
Status: DISCONTINUED | OUTPATIENT
Start: 2018-03-14 | End: 2018-03-14 | Stop reason: HOSPADM

## 2018-03-14 RX ORDER — PROPOFOL 10 MG/ML
VIAL (ML) INTRAVENOUS CONTINUOUS PRN
Status: DISCONTINUED | OUTPATIENT
Start: 2018-03-14 | End: 2018-03-14 | Stop reason: SURG

## 2018-03-14 RX ORDER — LIDOCAINE HYDROCHLORIDE 10 MG/ML
0.5 INJECTION, SOLUTION INFILTRATION; PERINEURAL ONCE AS NEEDED
Status: DISCONTINUED | OUTPATIENT
Start: 2018-03-14 | End: 2018-03-14 | Stop reason: HOSPADM

## 2018-03-14 RX ORDER — FAMOTIDINE 10 MG/ML
20 INJECTION, SOLUTION INTRAVENOUS EVERY 12 HOURS SCHEDULED
Status: DISCONTINUED | OUTPATIENT
Start: 2018-03-14 | End: 2018-03-14 | Stop reason: HOSPADM

## 2018-03-14 RX ORDER — SODIUM CHLORIDE 0.9 % (FLUSH) 0.9 %
3 SYRINGE (ML) INJECTION AS NEEDED
Status: DISCONTINUED | OUTPATIENT
Start: 2018-03-14 | End: 2018-03-14 | Stop reason: HOSPADM

## 2018-03-14 RX ADMIN — PROPOFOL 140 MCG/KG/MIN: 10 INJECTION, EMULSION INTRAVENOUS at 12:07

## 2018-03-14 RX ADMIN — FAMOTIDINE 20 MG: 10 INJECTION, SOLUTION INTRAVENOUS at 12:01

## 2018-03-14 RX ADMIN — PROPOFOL 20 MG: 10 INJECTION, EMULSION INTRAVENOUS at 12:10

## 2018-03-14 RX ADMIN — SODIUM CHLORIDE, POTASSIUM CHLORIDE, SODIUM LACTATE AND CALCIUM CHLORIDE 1000 ML: 600; 310; 30; 20 INJECTION, SOLUTION INTRAVENOUS at 11:11

## 2018-03-14 RX ADMIN — PROPOFOL 80 MG: 10 INJECTION, EMULSION INTRAVENOUS at 12:07

## 2018-03-14 RX ADMIN — MIDAZOLAM 1 MG: 1 INJECTION INTRAMUSCULAR; INTRAVENOUS at 12:00

## 2018-03-14 NOTE — ANESTHESIA POSTPROCEDURE EVALUATION
"Patient: Galdino Dallas    Procedure Summary     Date:  03/14/18 Room / Location:   [facility] /  [facility]    Anesthesia Start:  1204 Anesthesia Stop:  1233    Procedure:  COLONOSCOPY TO CECUM AND TERMINAL ILEUM (N/A ) Diagnosis:       Diverticulitis of large intestine without perforation or abscess without bleeding      (Diverticulitis of large intestine without perforation or abscess without bleeding [K57.32])    Surgeon:  Nnamdi Davis MD Provider:  Marge Jones MD    Anesthesia Type:  MAC ASA Status:  3          Anesthesia Type: MAC  Last vitals  BP   (!) 90/37 (03/14/18 1230)   Temp   36.7 °C (98 °F) (03/14/18 1108)   Pulse   83 (03/14/18 1230)   Resp   16 (03/14/18 1230)     SpO2   95 % (03/14/18 1230)     Post Anesthesia Care and Evaluation    Patient location during evaluation: PHASE II  Patient participation: complete - patient participated  Level of consciousness: awake  Pain management: adequate  Airway patency: patent  Anesthetic complications: No anesthetic complications    Cardiovascular status: acceptable  Respiratory status: acceptable  Hydration status: acceptable    Comments: /75 (BP Location: Left arm, Patient Position: Sitting)   Pulse 82   Temp 36.7 °C (98 °F) (Oral)   Resp 16   Ht 172.7 cm (68\")   Wt 88 kg (193 lb 14.4 oz)   SpO2 98%   BMI 29.48 kg/m²       "

## 2018-03-14 NOTE — OP NOTE
Preoperative diagnosis: Diverticulitis.  Postoperative diagnosis: DIverticulosis.  Procedure: Colonoscopy to Terminal ileum.  Attending surgeon: Nnamdi Davis M.D.  Anesthesia: MAC  Specimens: None.  Blood loss: None.  Drains: None.  Bowel prep: Excellent.  Scope withdrawal time: 11:12.  Indications:Galdino Dallas is a 32 y.o. male  who is asymptomatic, and has a negative immediate family history. He presents for screening colonoscopy.  Description of procedure: He is taken to the endoscopy suite and positioned on the stretcher in the left lateral decubitus position. After graduated amounts of propofol were administered, a digital rectal exam was performed. It was normal.   The flexible colonoscope was inserted into the anus and advanced to the level of the cecum without difficulty. The ileocecal valve was identified. The appendiceal orifice was identified and photographed.  The ileocecal valve was traversed.  The distal 10 cm of terminal ileum was evaluated.  It appeared normal.  The intraluminal surface of the colon was examined upon withdrawal scope.  The bowel prep was excellent. There was some clear liquid within all segments of the colon that was easily evacuated with the suction channel the scope. There were descending colon and sigmoid diverticuli. There were no strictures or inflammatory changes.  There were no polyps. There were no vascular malformations. There were no hemorrhoids.  He tolerated the procedure very well, and is returned to the recovery area in stable condition.

## 2018-03-14 NOTE — ANESTHESIA PREPROCEDURE EVALUATION
Anesthesia Evaluation     Patient summary reviewed and Nursing notes reviewed   NPO Solid Status: > 8 hours  NPO Liquid Status: > 2 hours           Airway   Dental      Pulmonary - normal exam   (+) sleep apnea,   Cardiovascular - normal exam    (+) hypertension, valvular problems/murmurs, CAD (patient has stable but frequent angina from blocked coronary that is medically managed), angina, hyperlipidemia,       Neuro/Psych  (+) headaches, psychiatric history Anxiety,     GI/Hepatic/Renal/Endo    (+)  GI bleeding,     Musculoskeletal     Abdominal    Substance History      OB/GYN          Other                      Anesthesia Plan    ASA 3     MAC     Anesthetic plan and risks discussed with patient.

## 2018-03-14 NOTE — DISCHARGE INSTRUCTIONS
For the next 24 hours patient needs to be with a responsible adult.    For 24 hours DO NOT drive, operate machinery, appliances, drink alcohol, make important decisions or sign legal documents.    Start with a light or bland diet and advance to regular diet as tolerated.    Follow recommendations on procedure report provided by your doctor.    Call Dr. gallagher for problems     Problems may include but not limited to: large amounts of bleeding, trouble breathing, repeated vomiting, severe unrelieved pain, fever or chills.

## 2018-03-14 NOTE — H&P (VIEW-ONLY)
CHIEF COMPLAINT:    Recurrent diverticulitis    HISTORY OF PRESENT ILLNESS:    Galdino Dallas is a 32 y.o. male who was referred by Dr. Lucie logan for recurrent episodes of diverticulitis.  The problem began in November 2017.  He had the onset of abdominal pain and fever that prompted CT imaging of the abdomen and pelvis.  It showed sigmoid diverticulitis.  At that time, the pain was located in the suprapubic region.  He was started on antibiotics in the hospital.  It was probably 2 or 3 weeks after discharge that he began to feel normal again.    In January 2018 he was admitted again to the hospital for diverticulitis.  This time the pain was located in the left upper quadrant and flank.  CT imaging showed an area of colitis presumed to be diverticulitis in the proximal descending colon just below the splenic flexure.  He was started on antibiotics.  He was suggested to follow-up with gastroenterology, however no consultation was obtained while he was in the hospital.  He has had a difficult time getting an appointment to see a gastroenterologist.      Unlikely admission in November 2017, he never really started to feel normal again after the admission this recent January.  He was seen in the emergency room on 2/18/2018 for abdominal pain.  This time the pain was more generalized.  CT images performed during that ER visit shows substantial resolution of inflammation in the sigmoid and proximal descending colon. There is no current fever.    He has irritable bowel syndrome and has been suffering from diarrhea for many years.    He has multiple medical problems unusual for his age.  He required aortic valve replacement with mechanical valve.  He has undergone angioplasty and stenting of multiple coronary lesions.  He has angina.  He has hypertension.      Past Medical History:   Diagnosis Date   • Angina pectoris    • Anxiety    • CAD (coronary artery disease)    • Chest pain    • Diverticulitis large  intestine w/o perforation or abscess w/bleeding    • Diverticulosis    • Hyperlipidemia    • Hypertension    • IBS (irritable bowel syndrome)    • Migraines    • S/P AVR (aortic valve replacement)        Past Surgical History:   Procedure Laterality Date   • AORTIC VALVE REPAIR/REPLACEMENT  2005    open   • CARDIAC CATHETERIZATION N/A 12/19/2017    Procedure: Coronary angiography;  Surgeon: José Antonio Banks MD;  Location:  [facility];  Service:    • CARDIAC CATHETERIZATION Right 12/19/2017    Procedure: Functional Flow Harper;  Surgeon: José Antonio Bansk MD;  Location:  [facility];  Service:    • CORONARY ANGIOPLASTY WITH STENT PLACEMENT N/A 2010    x3   • TONSILLECTOMY     • WISDOM TOOTH EXTRACTION           Current Outpatient Prescriptions:   •  carvedilol (COREG) 3.125 MG tablet, Take 1 tablet by mouth 2 (Two) Times a Day., Disp: 60 tablet, Rfl: 11  •  isosorbide mononitrate (IMDUR) 30 MG 24 hr tablet, Take 1 tablet by mouth Every Morning., Disp: 30 tablet, Rfl: 11  •  lisinopril (PRINIVIL,ZESTRIL) 20 MG tablet, TAKE 1 TABLET BY MOUTH DAILY., Disp: 30 tablet, Rfl: 5  •  mirtazapine (REMERON) 15 MG tablet, Take 1 tablet by mouth Every Night., Disp: 30 tablet, Rfl: 2  •  nitroglycerin (NITROSTAT) 0.4 MG SL tablet, PLACE 1 TABLET UNDER THE TONGUE AS NEEDED FOR ANGINA, MAY REPEAT EVERY 5 MINS FOR UP TO THREE DOSES, Disp: 75 tablet, Rfl: 5  •  pantoprazole (PROTONIX) 40 MG EC tablet, Take 1 tablet by mouth Daily., Disp: 30 tablet, Rfl: 0  •  warfarin (COUMADIN) 1 MG tablet, Take 1 mg by mouth Daily., Disp: , Rfl:   •  warfarin (COUMADIN) 5 MG tablet, Take 5 mg by mouth Daily., Disp: , Rfl:   •  warfarin (COUMADIN) 7.5 MG tablet, Take 7.5 mg by mouth Daily. TAKES ON FRIDAY, Disp: , Rfl:     Allergies   Allergen Reactions   • Penicillins Rash     Rash on legs when he was an infant       Family History   Problem Relation Age of Onset   • Heart disease Mother      mitral valve  "  • Colon polyps Mother    • Heart attack Paternal Grandfather    • Multiple sclerosis Father        Social History     Social History   • Marital status:      Spouse name: N/A   • Number of children: N/A   • Years of education: N/A     Occupational History   • Not on file.     Social History Main Topics   • Smoking status: Never Smoker   • Smokeless tobacco: Never Used   • Alcohol use Yes      Comment: social   • Drug use: No   • Sexual activity: Defer     Other Topics Concern   • Not on file     Social History Narrative       Review of Systems   Constitutional: Positive for fatigue.   Eyes: Positive for redness and itching.   Respiratory: Positive for cough.    Cardiovascular: Positive for chest pain.   Gastrointestinal: Positive for abdominal pain.        GERD   Neurological: Positive for speech difficulty and headaches.   Psychiatric/Behavioral: Positive for sleep disturbance. The patient is nervous/anxious.        Objective     Pulse 90  Ht 172.7 cm (68\")  Wt 87.5 kg (193 lb)  SpO2 96%  BMI 29.35 kg/m2    Physical Exam   Constitutional: He is oriented to person, place, and time. He appears well-developed and well-nourished. No distress.   HENT:   Head: Normocephalic and atraumatic.   Right Ear: External ear normal.   Left Ear: External ear normal.   Eyes: Conjunctivae are normal. No scleral icterus.   Neck: No tracheal deviation present.   Cardiovascular: Normal rate, regular rhythm and intact distal pulses.    Murmur heard.  Click present from the mechanical valve   Pulmonary/Chest: Effort normal and breath sounds normal. No respiratory distress. He has no wheezes. He has no rales.   Abdominal: Soft. Bowel sounds are normal. He exhibits no distension and no mass. There is tenderness (left-sided and suprapubic area). There is no rebound and no guarding.   Musculoskeletal: He exhibits no edema or deformity.   Neurological: He is alert and oriented to person, place, and time.   Skin: Skin is warm and " dry.   Psychiatric: He has a normal mood and affect.   Vitals reviewed.      DIAGNOSTIC DATA:    I reviewed the images and the dictated reports of the CT scans the abdomen and pelvis performed 11/2/2017, 1/31/2018, and 2/18/2018.  They show sigmoid inflammation without perforation or abscess on 11/2/2017, proximal descending colon inflammation without perforation or abscess on 1/31/2018, near-normal bowel wall caliber on 2/18/2018 with resolution of pericolonic inflammatory changes.    ASSESSMENT:    Diverticulitis affecting the proximal descending colon and sigmoid colon.  Aortic valve replacement requiring Coumadin.    PLAN:    Colonoscopy.  I discussed the potential problems with colonoscopy such as bleeding and perforation.  We talked about the necessity of ruling out other potential colonic diseases that would respond better to either surgery or medications such as ischemia or Crohn's disease.    At this moment, I think the underlying problem is diverticulitis, and I don't recommend colectomy.  Because the areas of inflammation or remote from each other, I consider this to be 2 separate first episodes of uncomplicated diverticulitis.  I generally do not recommend colectomy in the situation of first episode of uncomplicated diverticulitis.   I talked with Dr. Banks on the phone today about anticoagulation.  Afterwards I called Mr. Ahmadi to ask him to discontinue the warfarin three days prior to the scheduled colonoscopy.  I intend to resume the warfarinon the evening of the procedure.

## 2018-04-05 ENCOUNTER — OFFICE VISIT (OUTPATIENT)
Dept: GASTROENTEROLOGY | Facility: CLINIC | Age: 33
End: 2018-04-05

## 2018-04-05 VITALS
SYSTOLIC BLOOD PRESSURE: 132 MMHG | DIASTOLIC BLOOD PRESSURE: 72 MMHG | WEIGHT: 199.8 LBS | TEMPERATURE: 98.3 F | BODY MASS INDEX: 30.28 KG/M2 | HEIGHT: 68 IN

## 2018-04-05 DIAGNOSIS — K21.9 GASTROESOPHAGEAL REFLUX DISEASE, ESOPHAGITIS PRESENCE NOT SPECIFIED: ICD-10-CM

## 2018-04-05 DIAGNOSIS — K57.90 DIVERTICULOSIS OF INTESTINE WITHOUT BLEEDING, UNSPECIFIED INTESTINAL TRACT LOCATION: ICD-10-CM

## 2018-04-05 DIAGNOSIS — R10.32 LLQ ABDOMINAL PAIN: Primary | ICD-10-CM

## 2018-04-05 PROCEDURE — 99213 OFFICE O/P EST LOW 20 MIN: CPT | Performed by: INTERNAL MEDICINE

## 2018-04-05 RX ORDER — NITROGLYCERIN 0.4 MG/1
0.4 TABLET SUBLINGUAL
Qty: 100 TABLET | Refills: 1 | Status: SHIPPED | OUTPATIENT
Start: 2018-04-05 | End: 2018-06-04 | Stop reason: SDUPTHER

## 2018-04-05 RX ORDER — PANTOPRAZOLE SODIUM 40 MG/1
40 TABLET, DELAYED RELEASE ORAL DAILY
Qty: 90 TABLET | Refills: 3 | Status: SHIPPED | OUTPATIENT
Start: 2018-04-05 | End: 2019-08-27

## 2018-04-05 NOTE — PROGRESS NOTES
Chief Complaint   Patient presents with   • Diverticulitis     hospital follow up   • Heartburn       History of Present Illness: 33 yo  with h/o diverticulitiis twice. He still has some lower and especially LLQ abdominal discomfort. He had a colonsocopy in 3/18 by Dr. Davis. He has a mechanical aortic valve and CAD with stents. He went to the ER in 2/18 with left sided abdominal pain. CT abd did not show anything. He was put on flagyl and it may have helped. NO fevers, chills. He doesn't know what will make his lefft sided abdominal pain worse or better. No nausea or vomiting. Weight has gone up a little. No rectal bleeding or melena. He has occasional diarrhea. No cconstipation. He gets angina occasionally. Denies NSAIDs. 2 glasses of wine/day.     Past Medical History:   Diagnosis Date   • Angina pectoris    • Anxiety    • CAD (coronary artery disease)    • Chest pain    • Diverticulitis large intestine w/o perforation or abscess w/bleeding    • Diverticulosis    • Hyperlipidemia    • Hypertension    • IBS (irritable bowel syndrome)    • Migraines    • S/P AVR (aortic valve replacement)        Past Surgical History:   Procedure Laterality Date   • AORTIC VALVE REPAIR/REPLACEMENT  2005    open   • CARDIAC CATHETERIZATION N/A 12/19/2017    Procedure: Coronary angiography;  Surgeon: José Antonio Banks MD;  Location: [facility];  Service:    • CARDIAC CATHETERIZATION Right 12/19/2017    Procedure: Functional Flow Nada;  Surgeon: José Antonio Banks MD;  Location: [facility] INVAS[facility];  Service:    • COLONOSCOPY N/A 3/14/2018    Procedure: COLONOSCOPY TO CECUM AND TERMINAL ILEUM;  Surgeon: Nnamdi Davis MD;  Location: [facility]COPY;  Service: Gastroenterology   • CORONARY ANGIOPLASTY WITH STENT PLACEMENT N/A 2010    x3   • TONSILLECTOMY     • WISDOM TOOTH EXTRACTION           Current Outpatient Prescriptions:   •  carvedilol (COREG) 3.125 MG tablet, Take 1 tablet  by mouth 2 (Two) Times a Day., Disp: 60 tablet, Rfl: 11  •  isosorbide mononitrate (IMDUR) 30 MG 24 hr tablet, Take 1 tablet by mouth Every Morning., Disp: 30 tablet, Rfl: 11  •  lisinopril (PRINIVIL,ZESTRIL) 20 MG tablet, TAKE 1 TABLET BY MOUTH DAILY., Disp: 30 tablet, Rfl: 5  •  mirtazapine (REMERON) 15 MG tablet, Take 1 tablet by mouth Every Night., Disp: 30 tablet, Rfl: 2  •  nitroglycerin (NITROSTAT) 0.4 MG SL tablet, PLACE 1 TABLET UNDER THE TONGUE AS NEEDED FOR ANGINA, MAY REPEAT EVERY 5 MINS FOR UP TO THREE DOSES, Disp: 75 tablet, Rfl: 5  •  warfarin (COUMADIN) 1 MG tablet, Take 1 mg by mouth Daily. 6 DAYS WEEKLY SKIPS FRIDAY, Disp: , Rfl:   •  warfarin (COUMADIN) 5 MG tablet, Take 2.5 mg by mouth 1 (One) Time Per Week. TAKES ON FRIDAY , Disp: , Rfl:   •  warfarin (COUMADIN) 5 MG tablet, Take 5 mg by mouth Daily., Disp: , Rfl:   •  pantoprazole (PROTONIX) 40 MG EC tablet, Take 1 tablet by mouth Daily., Disp: 30 tablet, Rfl: 0  •  pantoprazole (PROTONIX) 40 MG EC tablet, Take 1 tablet by mouth Daily., Disp: 90 tablet, Rfl: 3    Allergies   Allergen Reactions   • Penicillins Rash     Rash on legs when he was an infant       Family History   Problem Relation Age of Onset   • Heart disease Mother      mitral valve   • Colon polyps Mother    • Heart attack Paternal Grandfather    • Multiple sclerosis Father        Social History     Social History   • Marital status:      Spouse name: N/A   • Number of children: N/A   • Years of education: N/A     Occupational History   • Not on file.     Social History Main Topics   • Smoking status: Never Smoker   • Smokeless tobacco: Never Used   • Alcohol use Yes      Comment: social   • Drug use: No   • Sexual activity: Defer     Other Topics Concern   • Not on file     Social History Narrative   • No narrative on file       Review of Systems   All other systems reviewed and are negative.      Vitals:    04/05/18 0850   BP: 132/72   Temp: 98.3 °F (36.8 °C)        Physical Exam   Constitutional: He is oriented to person, place, and time. He appears well-developed and well-nourished. No distress.   HENT:   Head: Normocephalic and atraumatic. Hair is normal.   Right Ear: Hearing, tympanic membrane, external ear and ear canal normal.   Left Ear: Hearing, tympanic membrane, external ear and ear canal normal.   Nose: No sinus tenderness or nasal deformity.   Mouth/Throat: Uvula is midline, oropharynx is clear and moist and mucous membranes are normal. No oral lesions. No uvula swelling.   Eyes: Conjunctivae, EOM and lids are normal. Pupils are equal, round, and reactive to light. Right eye exhibits no discharge. Left eye exhibits no discharge. No scleral icterus. Right eye exhibits normal extraocular motion and no nystagmus. Left eye exhibits normal extraocular motion and no nystagmus.   Fundoscopic exam:       The right eye shows red reflex.        The left eye shows red reflex.   Neck: Normal range of motion. Neck supple. No JVD present. No tracheal deviation present. No thyromegaly present.   Cardiovascular: Normal rate, regular rhythm, normal heart sounds, intact distal pulses and normal pulses.  Exam reveals no gallop.    No murmur heard.  Pulmonary/Chest: Effort normal and breath sounds normal. No respiratory distress. He has no wheezes. He has no rales. He exhibits no tenderness.   Abdominal: Soft. Bowel sounds are normal. He exhibits no distension and no mass. There is no tenderness. There is no guarding. No hernia.   Genitourinary: Rectum normal and prostate normal.   Genitourinary Comments: Refused rectal exam.   Musculoskeletal: Normal range of motion. He exhibits no edema, tenderness or deformity.   Lymphadenopathy:     He has no cervical adenopathy.   Neurological: He is alert and oriented to person, place, and time. He has normal reflexes. He displays normal reflexes. No cranial nerve deficit. He exhibits normal muscle tone. Coordination normal.   Skin: Skin is  warm and dry. No rash noted. He is not diaphoretic.   Psychiatric: He has a normal mood and affect. His behavior is normal. Judgment and thought content normal.   Nursing note and vitals reviewed.      Galdino was seen today for diverticulitis and heartburn.    Diagnoses and all orders for this visit:    LLQ abdominal pain    Gastroesophageal reflux disease, esophagitis presence not specified    Diverticulosis of intestine without bleeding, unspecified intestinal tract location    Other orders  -     pantoprazole (PROTONIX) 40 MG EC tablet; Take 1 tablet by mouth Daily.      Assessment:  1) recurrent diverticulitis  2) Left sided abdominal pain.  3) GERD    Recommendations:  1) Trial of Protonix  2) We discussed lifestyle changes to treat GERD. We also discussed side effects of chronic PPI use.   3) Fiber supplement daily.  4) f/u 6 mos.    Return in about 6 months (around 10/5/2018).    Mohan Osorio MD  4/5/2018

## 2018-04-23 RX ORDER — WARFARIN SODIUM 5 MG/1
TABLET ORAL
Qty: 45 TABLET | Refills: 0 | Status: SHIPPED | OUTPATIENT
Start: 2018-04-23 | End: 2018-05-22 | Stop reason: SDUPTHER

## 2018-04-26 RX ORDER — MIRTAZAPINE 15 MG/1
15 TABLET, FILM COATED ORAL NIGHTLY
Qty: 90 TABLET | Refills: 0 | Status: SHIPPED | OUTPATIENT
Start: 2018-04-26 | End: 2018-05-15 | Stop reason: ALTCHOICE

## 2018-04-27 ENCOUNTER — HOSPITAL ENCOUNTER (OUTPATIENT)
Dept: CARDIOLOGY | Facility: HOSPITAL | Age: 33
Setting detail: RECURRING SERIES
Discharge: HOME | End: 2018-04-27

## 2018-04-27 PROCEDURE — 36416 COLLJ CAPILLARY BLOOD SPEC: CPT

## 2018-04-27 PROCEDURE — 85610 PROTHROMBIN TIME: CPT

## 2018-04-27 RX ORDER — WARFARIN SODIUM 1 MG/1
TABLET ORAL
Qty: 90 TABLET | Refills: 0 | Status: SHIPPED | OUTPATIENT
Start: 2018-04-27 | End: 2018-08-02 | Stop reason: SDUPTHER

## 2018-05-01 ENCOUNTER — TELEPHONE (OUTPATIENT)
Dept: FAMILY MEDICINE CLINIC | Facility: CLINIC | Age: 33
End: 2018-05-01

## 2018-05-01 NOTE — TELEPHONE ENCOUNTER
----- Message from Solange Ohara MA sent at 4/30/2018 11:30 AM EDT -----  Regarding: RE: Anxiety medication update  Contact: [phone]  Called and gave information to patient.  He stated he has been keeping a log of the episodes and will call if they increase in any way.  He was agreeable to staying on the current dose.    KK    ----- Message -----  From: Antonino Merida MD  Sent: 4/26/2018   4:44 PM  To: Solange Ohara MA  Subject: RE: Anxiety medication update                    Call patient:    I do not think increasing the medication will help with those brief/fleeting episodes of anxiety.  I recommend he stay on the current dose (15 mg) of medication.  Should the frequency, severity, or duration of anxiety episodes escalate, we will consider increasing his Remeron up to 30 mg nightly.    If he disagrees with this recommendation, let me know and I will speak with him directly.    Thanks,  GIOVANNY  ----- Message -----  From: Solange Ohara MA  Sent: 4/26/2018   8:33 AM  To: Antonino Merida MD  Subject: FW: Anxiety medication update                        ----- Message -----  From: Jackie Bowden  Sent: 4/25/2018   3:11 PM  To: Solange Ohara MA  Subject: Anxiety medication update                        Chuy Dallas said he needed to give you an update on the anxiety medication - he is sleeping better - no side effects - the past week he had 5 anxiety episodes, lasting 1 minutes each.  Thanks, AJ

## 2018-05-15 ENCOUNTER — OFFICE VISIT (OUTPATIENT)
Dept: FAMILY MEDICINE CLINIC | Facility: CLINIC | Age: 33
End: 2018-05-15

## 2018-05-15 VITALS
BODY MASS INDEX: 30.3 KG/M2 | DIASTOLIC BLOOD PRESSURE: 86 MMHG | HEART RATE: 97 BPM | WEIGHT: 199.9 LBS | HEIGHT: 68 IN | OXYGEN SATURATION: 97 % | SYSTOLIC BLOOD PRESSURE: 140 MMHG

## 2018-05-15 DIAGNOSIS — F41.1 GENERALIZED ANXIETY DISORDER: Primary | ICD-10-CM

## 2018-05-15 PROCEDURE — 99213 OFFICE O/P EST LOW 20 MIN: CPT | Performed by: INTERNAL MEDICINE

## 2018-05-15 RX ORDER — ESCITALOPRAM OXALATE 10 MG/1
10 TABLET ORAL DAILY
Qty: 30 TABLET | Refills: 2 | Status: SHIPPED | OUTPATIENT
Start: 2018-05-15 | End: 2018-08-01 | Stop reason: SDUPTHER

## 2018-05-15 RX ORDER — ALPRAZOLAM 0.25 MG/1
0.25 TABLET ORAL 2 TIMES DAILY PRN
Qty: 20 TABLET | Refills: 0 | Status: SHIPPED | OUTPATIENT
Start: 2018-05-15 | End: 2018-06-01 | Stop reason: SDUPTHER

## 2018-05-15 NOTE — PROGRESS NOTES
Subjective   Galdino Dallas is a 32 y.o. male who presents today for:    Panic Attack    History of Present Illness   He presents today to discuss anxiety issues.  He has had trouble with anxiety going back several years, to his heart surgery, but symptoms had improved.  With the rigors of a new job, he feels more stressed. He reports stress carrying over to home due to late hours and working from home at night.  It affects his sleep; he awakens at night almost in a panic, but is able to fall back to sleep.  These episodes have started to occur during the day, also, feeling like he is on the edge of panic and on the verge of tears.  He feels a sense of dread before going to work every morning.  This has been escalating recently.    Remeron at night initially helped him sleep through the night, but has not helped in these regards more recently.    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  He has only been in marital counseling through [employer] since then.    Mr. Dallas  reports that he has never smoked. He has never used smokeless tobacco. He reports that he drinks alcohol. He reports that he does not use drugs.     Allergies   Allergen Reactions   • Penicillins Rash     Rash on legs when he was an infant       Current Outpatient Prescriptions:   •  carvedilol (COREG) 3.125 MG tablet, Take 1 tablet by mouth 2 (Two) Times a Day., Disp: 60 tablet, Rfl: 11  •  isosorbide mononitrate (IMDUR) 30 MG 24 hr tablet, Take 1 tablet by mouth Every Morning., Disp: 30 tablet, Rfl: 11  •  lisinopril (PRINIVIL,ZESTRIL) 20 MG tablet, TAKE 1 TABLET BY MOUTH DAILY., Disp: 30 tablet, Rfl: 5  •  nitroglycerin (NITROSTAT) 0.4 MG SL tablet, Place 1 tablet under the tongue Every 5 (Five) Minutes As Needed for Chest Pain. Take no more than 3 doses in 15 minutes., Disp: 100 tablet, Rfl: 1  •  pantoprazole (PROTONIX) 40 MG EC tablet, Take 1 tablet by mouth Daily., Disp: 90 tablet, Rfl: 3  •   "mirtazapine (REMERON) 15 MG tablet, Take 1 tablet by mouth Every Night., Disp: 90 tablet, Rfl: 0  •  warfarin (COUMADIN) 1 MG tablet, TAKE 1 TABLET BY MOUTH DAILY. WITH THE 5MG TABLET OR AS DIRECTED, Disp: 90 tablet, Rfl: 0  •  warfarin (COUMADIN) 5 MG tablet, TAKE 1 TAB 4 DAYS A WEEK ALONG WITH 1MG TAB (TOTAL 6MG) AND 1 1/2 TABS 3 DAYS A WEEK., Disp: 45 tablet, Rfl: 0      Review of Systems   Constitutional: Negative for unexpected weight change.   Cardiovascular: Negative for chest pain.   Gastrointestinal: Negative for abdominal pain.   Neurological: Negative.    Psychiatric/Behavioral: Positive for dysphoric mood and sleep disturbance. Negative for suicidal ideas. The patient is nervous/anxious.           Objective   Vitals:    05/15/18 1344   BP: 140/86   BP Location: Right arm   Patient Position: Sitting   Cuff Size: Large Adult   Pulse: 97   SpO2: 97%   Weight: 90.7 kg (199 lb 14.4 oz)   Height: 172.7 cm (68\")     Physical Exam    Well-kempt, in NAD. Affect appropriate.  Mood is anxious.  No SI. No hallucinations.  Insight and judgement are intact.  RRR; valve click audible.          Galdino was seen today for panic attack.    Diagnoses and all orders for this visit:    Generalized anxiety disorder  Counseled re: med changes and the need to see a counselor for further assistance.  -     escitalopram (LEXAPRO) 10 MG tablet; Take 1 tablet by mouth Daily.  -     ALPRAZolam (XANAX) 0.25 MG tablet; Take 1 tablet by mouth 2 (Two) Times a Day As Needed for Anxiety.      "

## 2018-05-22 RX ORDER — WARFARIN SODIUM 5 MG/1
TABLET ORAL
Qty: 45 TABLET | Refills: 3 | Status: SHIPPED | OUTPATIENT
Start: 2018-05-22 | End: 2018-09-06 | Stop reason: SDUPTHER

## 2018-06-01 ENCOUNTER — OFFICE VISIT (OUTPATIENT)
Dept: FAMILY MEDICINE CLINIC | Facility: CLINIC | Age: 33
End: 2018-06-01

## 2018-06-01 VITALS
OXYGEN SATURATION: 96 % | SYSTOLIC BLOOD PRESSURE: 140 MMHG | HEIGHT: 68 IN | DIASTOLIC BLOOD PRESSURE: 82 MMHG | WEIGHT: 200.7 LBS | BODY MASS INDEX: 30.42 KG/M2 | HEART RATE: 85 BPM

## 2018-06-01 DIAGNOSIS — F41.1 GENERALIZED ANXIETY DISORDER: Primary | ICD-10-CM

## 2018-06-01 PROCEDURE — 99213 OFFICE O/P EST LOW 20 MIN: CPT | Performed by: INTERNAL MEDICINE

## 2018-06-01 RX ORDER — ALPRAZOLAM 0.25 MG/1
0.25 TABLET ORAL 2 TIMES DAILY PRN
Qty: 40 TABLET | Refills: 0 | Status: SHIPPED | OUTPATIENT
Start: 2018-06-01 | End: 2018-08-01 | Stop reason: SDUPTHER

## 2018-06-01 NOTE — PROGRESS NOTES
Subjective   Galdino Dallas is a 32 y.o. male who presents today for:    Anxiety (6 week f/u)    History of Present Illness     He has had trouble with anxiety going back several years, to his heart surgery, but symptoms had improved.  With the rigors of a new job, he feels more stressed. He reports stress carrying over to home due to late hours and working from home at night.  It affects his sleep; he awakens at night almost in a panic, but is able to fall back to sleep.  These episodes have started to occur during the day, also, feeling like he is on the edge of panic and on the verge of tears.  He feels a sense of dread before going to work every morning.  This has been escalating recently.     Remeron at night initially helped him sleep through the night, but has not helped in these regards more recently.     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  He has only been in marital counseling through [facility] since then.    Sleeping better, awakening rarely and falling back to sleep easily when he does.  He has backed off on the Xanax use after taking it more regularly initially.  Lexapro has helped with some of his anxiety symptoms without side-effects.      Mr. Dallas  reports that he has never smoked. He has never used smokeless tobacco. He reports that he drinks alcohol. He reports that he does not use drugs.     Allergies   Allergen Reactions   • Penicillins Rash     Rash on legs when he was an infant       Current Outpatient Prescriptions:   •  ALPRAZolam (XANAX) 0.25 MG tablet, Take 1 tablet by mouth 2 (Two) Times a Day As Needed for Anxiety., Disp: 20 tablet, Rfl: 0  •  carvedilol (COREG) 3.125 MG tablet, Take 1 tablet by mouth 2 (Two) Times a Day., Disp: 60 tablet, Rfl: 11  •  escitalopram (LEXAPRO) 10 MG tablet, Take 1 tablet by mouth Daily., Disp: 30 tablet, Rfl: 2  •  isosorbide mononitrate (IMDUR) 30 MG 24 hr tablet, Take 1 tablet by mouth Every Morning., Disp:  "30 tablet, Rfl: 11  •  lisinopril (PRINIVIL,ZESTRIL) 20 MG tablet, TAKE 1 TABLET BY MOUTH DAILY., Disp: 30 tablet, Rfl: 5  •  nitroglycerin (NITROSTAT) 0.4 MG SL tablet, Place 1 tablet under the tongue Every 5 (Five) Minutes As Needed for Chest Pain. Take no more than 3 doses in 15 minutes., Disp: 100 tablet, Rfl: 1  •  pantoprazole (PROTONIX) 40 MG EC tablet, Take 1 tablet by mouth Daily., Disp: 90 tablet, Rfl: 3  •  warfarin (COUMADIN) 1 MG tablet, TAKE 1 TABLET BY MOUTH DAILY. WITH THE 5MG TABLET OR AS DIRECTED, Disp: 90 tablet, Rfl: 0  •  warfarin (COUMADIN) 5 MG tablet, TAKE 1 TAB 4 DAYS A WEEK ALONG WITH 1MG TAB (TOTAL 6MG) AND 1 1/2 TABS 3 DAYS A WEEK., Disp: 45 tablet, Rfl: 3      Review of Systems   Constitutional: Negative for unexpected weight change.   Gastrointestinal: Negative for abdominal distention and abdominal pain.   Endocrine:        Decreased libido (chronic; precedes the Lexapro)   Psychiatric/Behavioral: Positive for sleep disturbance (improved). The patient is nervous/anxious.          Objective   Vitals:    06/01/18 1451   BP: 140/82   Pulse: 85   SpO2: 96%   Weight: 91 kg (200 lb 11.2 oz)   Height: 172.7 cm (67.99\")     Physical Exam   Constitutional: He is oriented to person, place, and time. He appears well-developed and well-nourished. No distress.   Cardiovascular: Normal rate and regular rhythm.    Neurological: He is alert and oriented to person, place, and time.   Psychiatric: He has a normal mood and affect. His behavior is normal.             Galdino was seen today for anxiety.    Diagnoses and all orders for this visit:    Generalized anxiety disorder    Other orders  -     ALPRAZolam (XANAX) 0.25 MG tablet; Take 1 tablet by mouth 2 (Two) Times a Day As Needed for Anxiety.    He seems to be responding to the Lexapro fairly well.  He will continue to walk off the Xanax.  Our goal is to have him not use it with any regularity.  At most, once or twice a week is her goal.  If he is " taking it as many days as not, we will need to increase the Lexapro, change it, or add a second medication to the regimen.ADDISON report was obtained and reviewed during the course of today's office visit.  CSA was reviewed and signed.  Urine drug test will be obtained if we continue the Xanax use beyond the next appointment.  Otherwise, our goal is to limit its use to only this transition period as he starts the Lexapro.    20 minutes of the 20 minute office visit were spent counseling the patient regarding the above treatment plan.

## 2018-06-04 RX ORDER — NITROGLYCERIN 0.4 MG/1
TABLET SUBLINGUAL
Qty: 100 TABLET | Refills: 1 | Status: SHIPPED | OUTPATIENT
Start: 2018-06-04 | End: 2018-07-25 | Stop reason: SDUPTHER

## 2018-06-11 RX ORDER — LISINOPRIL 20 MG/1
TABLET ORAL
Qty: 30 TABLET | Refills: 2 | Status: SHIPPED | OUTPATIENT
Start: 2018-06-11 | End: 2018-09-18 | Stop reason: SDUPTHER

## 2018-07-25 RX ORDER — NITROGLYCERIN 0.4 MG/1
TABLET SUBLINGUAL
Qty: 100 TABLET | Refills: 0 | Status: SHIPPED | OUTPATIENT
Start: 2018-07-25 | End: 2018-08-23 | Stop reason: SDUPTHER

## 2018-07-31 ENCOUNTER — HOSPITAL ENCOUNTER (OUTPATIENT)
Dept: CARDIOLOGY | Facility: HOSPITAL | Age: 33
Setting detail: RECURRING SERIES
Discharge: HOME | End: 2018-07-31

## 2018-07-31 PROCEDURE — 36416 COLLJ CAPILLARY BLOOD SPEC: CPT

## 2018-07-31 PROCEDURE — 85610 PROTHROMBIN TIME: CPT

## 2018-08-01 ENCOUNTER — APPOINTMENT (OUTPATIENT)
Dept: CT IMAGING | Facility: HOSPITAL | Age: 33
End: 2018-08-01

## 2018-08-01 ENCOUNTER — APPOINTMENT (OUTPATIENT)
Dept: GENERAL RADIOLOGY | Facility: HOSPITAL | Age: 33
End: 2018-08-01

## 2018-08-01 ENCOUNTER — HOSPITAL ENCOUNTER (EMERGENCY)
Facility: HOSPITAL | Age: 33
Discharge: HOME | End: 2018-08-01
Attending: EMERGENCY MEDICINE | Admitting: EMERGENCY MEDICINE

## 2018-08-01 ENCOUNTER — OFFICE VISIT (OUTPATIENT)
Dept: FAMILY MEDICINE CLINIC | Facility: CLINIC | Age: 33
End: 2018-08-01

## 2018-08-01 VITALS
DIASTOLIC BLOOD PRESSURE: 85 MMHG | SYSTOLIC BLOOD PRESSURE: 138 MMHG | HEART RATE: 64 BPM | TEMPERATURE: 97.9 F | HEIGHT: 68 IN | WEIGHT: 199 LBS | RESPIRATION RATE: 18 BRPM | BODY MASS INDEX: 30.16 KG/M2 | OXYGEN SATURATION: 97 %

## 2018-08-01 VITALS
DIASTOLIC BLOOD PRESSURE: 84 MMHG | HEIGHT: 68 IN | OXYGEN SATURATION: 94 % | WEIGHT: 199.3 LBS | BODY MASS INDEX: 30.2 KG/M2 | SYSTOLIC BLOOD PRESSURE: 128 MMHG | HEART RATE: 76 BPM

## 2018-08-01 DIAGNOSIS — Z79.01 ANTICOAGULATED: ICD-10-CM

## 2018-08-01 DIAGNOSIS — S39.012A STRAIN OF LUMBAR REGION, INITIAL ENCOUNTER: ICD-10-CM

## 2018-08-01 DIAGNOSIS — S00.81XA ABRASION OF FOREHEAD, INITIAL ENCOUNTER: ICD-10-CM

## 2018-08-01 DIAGNOSIS — Z79.01 LONG TERM CURRENT USE OF ANTICOAGULANT: ICD-10-CM
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
## 2018-08-01 DIAGNOSIS — F41.1 GENERALIZED ANXIETY DISORDER: Primary | ICD-10-CM

## 2018-08-01 DIAGNOSIS — G47.00 INSOMNIA, UNSPECIFIED TYPE: ICD-10-CM

## 2018-08-01 LAB
INR PPP: 1.82 (ref 0.9–1.1)
PROTHROMBIN TIME: 20.7 SECONDS (ref 11.7–14.2)

## 2018-08-01 PROCEDURE — 99213 OFFICE O/P EST LOW 20 MIN: CPT | Performed by: INTERNAL MEDICINE

## 2018-08-01 PROCEDURE — 90715 TDAP VACCINE 7 YRS/> IM: CPT | Performed by: NURSE PRACTITIONER

## 2018-08-01 PROCEDURE — 36415 COLL VENOUS BLD VENIPUNCTURE: CPT

## 2018-08-01 PROCEDURE — 25010000002 TDAP 5-2.5-18.5 LF-MCG/0.5 SUSPENSION: Performed by: NURSE PRACTITIONER

## 2018-08-01 PROCEDURE — 99283 EMERGENCY DEPT VISIT LOW MDM: CPT

## 2018-08-01 PROCEDURE — 90471 IMMUNIZATION ADMIN: CPT | Performed by: NURSE PRACTITIONER

## 2018-08-01 PROCEDURE — 70450 CT HEAD/BRAIN W/O DYE: CPT

## 2018-08-01 PROCEDURE — 85610 PROTHROMBIN TIME: CPT | Performed by: NURSE PRACTITIONER

## 2018-08-01 PROCEDURE — 72110 X-RAY EXAM L-2 SPINE 4/>VWS: CPT

## 2018-08-01 RX ORDER — ESCITALOPRAM OXALATE 10 MG/1
10 TABLET ORAL DAILY
Qty: 30 TABLET | Refills: 5 | Status: SHIPPED | OUTPATIENT
Start: 2018-08-01 | End: 2019-01-16 | Stop reason: SDUPTHER

## 2018-08-01 RX ORDER — ALPRAZOLAM 0.25 MG/1
0.25 TABLET ORAL 2 TIMES DAILY PRN
Qty: 40 TABLET | Refills: 0 | Status: SHIPPED | OUTPATIENT
Start: 2018-08-01 | End: 2018-10-23 | Stop reason: SDUPTHER

## 2018-08-01 RX ORDER — TRAMADOL HYDROCHLORIDE 50 MG/1
50 TABLET ORAL EVERY 6 HOURS PRN
Qty: 12 TABLET | Refills: 0 | Status: SHIPPED | OUTPATIENT
Start: 2018-08-01 | End: 2018-09-05 | Stop reason: ALTCHOICE

## 2018-08-01 RX ORDER — HYDROCODONE BITARTRATE AND ACETAMINOPHEN 5; 325 MG/1; MG/1
1 TABLET ORAL ONCE
Status: COMPLETED | OUTPATIENT
Start: 2018-08-01 | End: 2018-08-01

## 2018-08-01 RX ORDER — ONDANSETRON 4 MG/1
4 TABLET, ORALLY DISINTEGRATING ORAL ONCE
Status: COMPLETED | OUTPATIENT
Start: 2018-08-01 | End: 2018-08-01

## 2018-08-01 RX ADMIN — TETANUS TOXOID, REDUCED DIPHTHERIA TOXOID AND ACELLULAR PERTUSSIS VACCINE, ADSORBED 0.5 ML: 5; 2.5; 8; 8; 2.5 SUSPENSION INTRAMUSCULAR at 15:14

## 2018-08-01 RX ADMIN — ONDANSETRON 4 MG: 4 TABLET, ORALLY DISINTEGRATING ORAL at 14:35

## 2018-08-01 RX ADMIN — HYDROCODONE BITARTRATE AND ACETAMINOPHEN 1 TABLET: 5; 325 TABLET ORAL at 14:34

## 2018-08-01 NOTE — PROGRESS NOTES
"Chief Complaint   Patient presents with   • Anxiety     CSE   • Depression     He has had trouble with anxiety going back several years, to his heart surgery, but symptoms had improved.  With the rigors of a new job, he feels more stressed. He reports stress carrying over to home due to late hours and working from home at night.  It affects his sleep; he awakens at night almost in a panic, but is able to fall back to sleep.  These episodes have started to occur during the day, also, feeling like he is on the edge of panic and on the verge of tears.  He feels a sense of dread before going to work every morning.  This has been escalating this year.     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  He was in counseling there.  He has only been in marital counseling through [facility] since then.     Sleeping better, although he struggles to fall to sleep some nights, awakening rarely and falling back to sleep easily when he does.  He has backed off on the Xanax use after taking it more regularly initially.  Lexapro has helped with some of his anxiety symptoms without side-effects.    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      Current Outpatient Prescriptions:   •  ALPRAZolam (XANAX) 0.25 MG tablet, Take 1 tablet by mouth 2 (Two) Times a Day As Needed for Anxiety., Disp: 40 tablet, Rfl: 0  •  carvedilol (COREG) 3.125 MG tablet, Take 1 tablet by mouth 2 (Two) Times a Day., Disp: 60 tablet, Rfl: 11  •  escitalopram (LEXAPRO) 10 MG tablet, Take 1 tablet by mouth Daily., Disp: 30 tablet, Rfl: 2  •  isosorbide mononitrate (IMDUR) 30 MG 24 hr tablet, Take 1 tablet by mouth Every Morning., Disp: 30 tablet, Rfl: 11  •  lisinopril (PRINIVIL,ZESTRIL) 20 MG tablet, TAKE 1 TABLET BY MOUTH DAILY., Disp: 30 tablet, Rfl: 2  •  " "nitroglycerin (NITROSTAT) 0.4 MG SL tablet, TAKE 1 SUBLINGUALLY EVERY 5MINUTES AS NEEDED FOR CHEST PAIN. DONT TAKE MORE THAN 3 IN 15MINUTES., Disp: 100 tablet, Rfl: 0  •  pantoprazole (PROTONIX) 40 MG EC tablet, Take 1 tablet by mouth Daily., Disp: 90 tablet, Rfl: 3  •  warfarin (COUMADIN) 1 MG tablet, TAKE 1 TABLET BY MOUTH DAILY. WITH THE 5MG TABLET OR AS DIRECTED, Disp: 90 tablet, Rfl: 0  •  warfarin (COUMADIN) 5 MG tablet, TAKE 1 TAB 4 DAYS A WEEK ALONG WITH 1MG TAB (TOTAL 6MG) AND 1 1/2 TABS 3 DAYS A WEEK., Disp: 45 tablet, Rfl: 3    He denies any family history of substance abuse.  He does not smoke cigarettes.  He usually drinks alcohol socially, but has abstained for the past week.    ROS:    Mood changes as noted above.  Difficulty sleeping.  No chest pain.  No syncope or near-syncope.    /84 (BP Location: Right arm, Patient Position: Sitting, Cuff Size: Large Adult)   Pulse 76   Ht 172.7 cm (68\")   Wt 90.4 kg (199 lb 4.8 oz)   SpO2 94%   BMI 30.30 kg/m²     EXAM:  Well-developed, well-nourished, and well kempt. In no acute distress.  Insight and judgment appear to be intact.  Mood is described as anxious. Affect is appropriate.  Answers all questions appropriately. Speech is normal in content and pace.      Galdino was seen today for anxiety and depression.    Diagnoses and all orders for this visit:    Generalized anxiety disorder    Insomnia, unspecified type    -     ALPRAZolam (XANAX) 0.25 MG tablet; Take 1 tablet by mouth 2 (Two) Times a Day As Needed for Anxiety.  -     escitalopram (LEXAPRO) 10 MG tablet; Take 1 tablet by mouth Daily.    ADDISON report was obtained and reviewed during the course of the office visit today.  Our goal is to try to get him to take less of the alprazolam.  In this regard, continue the Lexapro unchanged. We'll monitor his Xanax use via refills.  Hopefully, the job change to a less stressful position will help with his mood as well.    "
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
## 2018-08-02 RX ORDER — WARFARIN SODIUM 1 MG/1
TABLET ORAL
Qty: 90 TABLET | Refills: 0 | Status: SHIPPED | OUTPATIENT
Start: 2018-08-02 | End: 2019-03-22 | Stop reason: SDUPTHER

## 2018-08-07 LAB — DRUGS UR: NORMAL

## 2018-08-21 ENCOUNTER — HOSPITAL ENCOUNTER (OUTPATIENT)
Dept: CARDIOLOGY | Facility: HOSPITAL | Age: 33
Setting detail: RECURRING SERIES
Discharge: HOME | End: 2018-08-21

## 2018-08-21 PROCEDURE — 36416 COLLJ CAPILLARY BLOOD SPEC: CPT

## 2018-08-21 PROCEDURE — 85610 PROTHROMBIN TIME: CPT

## 2018-08-23 RX ORDER — NITROGLYCERIN 0.4 MG/1
TABLET SUBLINGUAL
Qty: 100 TABLET | Refills: 0 | Status: SHIPPED | OUTPATIENT
Start: 2018-08-23 | End: 2018-10-20 | Stop reason: SDUPTHER

## 2018-09-05 ENCOUNTER — OFFICE VISIT (OUTPATIENT)
Dept: FAMILY MEDICINE CLINIC | Facility: CLINIC | Age: 33
End: 2018-09-05

## 2018-09-05 VITALS
HEART RATE: 74 BPM | HEIGHT: 68 IN | DIASTOLIC BLOOD PRESSURE: 84 MMHG | OXYGEN SATURATION: 98 % | SYSTOLIC BLOOD PRESSURE: 126 MMHG | WEIGHT: 197 LBS | BODY MASS INDEX: 29.86 KG/M2

## 2018-09-05 DIAGNOSIS — M54.50 ACUTE BILATERAL LOW BACK PAIN WITHOUT SCIATICA: ICD-10-CM

## 2018-09-05 DIAGNOSIS — S06.0X0S CONCUSSION WITHOUT LOSS OF CONSCIOUSNESS, SEQUELA (HCC): ICD-10-CM
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
## 2018-09-05 PROCEDURE — 99214 OFFICE O/P EST MOD 30 MIN: CPT | Performed by: INTERNAL MEDICINE

## 2018-09-05 RX ORDER — HYDROCODONE BITARTRATE AND ACETAMINOPHEN 7.5; 325 MG/1; MG/1
TABLET ORAL
Refills: 0 | COMMUNITY
Start: 2018-08-31 | End: 2018-09-19

## 2018-09-05 RX ORDER — TOPIRAMATE 25 MG/1
TABLET ORAL
Start: 2018-09-05 | End: 2019-01-16
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
## 2018-09-06 RX ORDER — WARFARIN SODIUM 5 MG/1
TABLET ORAL
Qty: 45 TABLET | Refills: 2 | Status: SHIPPED | OUTPATIENT
Start: 2018-09-06 | End: 2019-02-18 | Stop reason: SDUPTHER

## 2018-09-18 RX ORDER — LISINOPRIL 20 MG/1
TABLET ORAL
Qty: 30 TABLET | Refills: 2 | Status: SHIPPED | OUTPATIENT
Start: 2018-09-18 | End: 2018-11-29 | Stop reason: SDUPTHER

## 2018-09-19 ENCOUNTER — HOSPITAL ENCOUNTER (OUTPATIENT)
Dept: CARDIOLOGY | Facility: HOSPITAL | Age: 33
Setting detail: RECURRING SERIES
Discharge: HOME | End: 2018-09-19

## 2018-09-19 ENCOUNTER — APPOINTMENT (OUTPATIENT)
Dept: GENERAL RADIOLOGY | Facility: HOSPITAL | Age: 33
End: 2018-09-19

## 2018-09-19 ENCOUNTER — APPOINTMENT (OUTPATIENT)
Dept: CT IMAGING | Facility: HOSPITAL | Age: 33
End: 2018-09-19

## 2018-09-19 ENCOUNTER — APPOINTMENT (OUTPATIENT)
Dept: MRI IMAGING | Facility: HOSPITAL | Age: 33
End: 2018-09-19
Attending: EMERGENCY MEDICINE

## 2018-09-19 ENCOUNTER — HOSPITAL ENCOUNTER (EMERGENCY)
Facility: HOSPITAL | Age: 33
Discharge: HOME | End: 2018-09-19
Attending: EMERGENCY MEDICINE | Admitting: EMERGENCY MEDICINE

## 2018-09-19 VITALS
HEART RATE: 71 BPM | BODY MASS INDEX: 29.82 KG/M2 | DIASTOLIC BLOOD PRESSURE: 87 MMHG | WEIGHT: 190 LBS | OXYGEN SATURATION: 97 % | RESPIRATION RATE: 18 BRPM | HEIGHT: 67 IN | TEMPERATURE: 98.2 F | SYSTOLIC BLOOD PRESSURE: 143 MMHG

## 2018-09-19 DIAGNOSIS — M79.652 LEFT THIGH PAIN: Primary | ICD-10-CM

## 2018-09-19 DIAGNOSIS — R79.1 SUPRATHERAPEUTIC INR: ICD-10-CM

## 2018-09-19 DIAGNOSIS — M51.9 ANNULAR TEAR: ICD-10-CM

## 2018-09-19 LAB
ALBUMIN SERPL-MCNC: 4.3 G/DL (ref 3.5–5.2)
ALBUMIN/GLOB SERPL: 1.7 G/DL
ALP SERPL-CCNC: 64 U/L (ref 39–117)
ALT SERPL W P-5'-P-CCNC: 66 U/L (ref 1–41)
ANION GAP SERPL CALCULATED.3IONS-SCNC: 11.3 MMOL/L
AST SERPL-CCNC: 43 U/L (ref 1–40)
BASOPHILS # BLD AUTO: 0.04 10*3/MM3 (ref 0–0.2)
BASOPHILS NFR BLD AUTO: 0.5 % (ref 0–1.5)
BILIRUB SERPL-MCNC: 0.4 MG/DL (ref 0.1–1.2)
BUN BLD-MCNC: 12 MG/DL (ref 6–20)
BUN/CREAT SERPL: 13.5 (ref 7–25)
CALCIUM SPEC-SCNC: 8.9 MG/DL (ref 8.6–10.5)
CHLORIDE SERPL-SCNC: 106 MMOL/L (ref 98–107)
CO2 SERPL-SCNC: 23.7 MMOL/L (ref 22–29)
CREAT BLD-MCNC: 0.89 MG/DL (ref 0.76–1.27)
D DIMER PPP FEU-MCNC: <0.27 MCGFEU/ML (ref 0–0.49)
DEPRECATED RDW RBC AUTO: 45.1 FL (ref 37–54)
EOSINOPHIL # BLD AUTO: 0.32 10*3/MM3 (ref 0–0.7)
EOSINOPHIL NFR BLD AUTO: 3.6 % (ref 0.3–6.2)
ERYTHROCYTE [DISTWIDTH] IN BLOOD BY AUTOMATED COUNT: 13.4 % (ref 11.5–14.5)
GFR SERPL CREATININE-BSD FRML MDRD: 98 ML/MIN/1.73
GLOBULIN UR ELPH-MCNC: 2.6 GM/DL
GLUCOSE BLD-MCNC: 87 MG/DL (ref 65–99)
HCT VFR BLD AUTO: 47.7 % (ref 40.4–52.2)
HGB BLD-MCNC: 15.9 G/DL (ref 13.7–17.6)
IMM GRANULOCYTES # BLD: 0.03 10*3/MM3 (ref 0–0.03)
IMM GRANULOCYTES NFR BLD: 0.3 % (ref 0–0.5)
INR PPP: 8.28 (ref 0.9–1.1)
LYMPHOCYTES # BLD AUTO: 2.23 10*3/MM3 (ref 0.9–4.8)
LYMPHOCYTES NFR BLD AUTO: 25.1 % (ref 19.6–45.3)
MCH RBC QN AUTO: 31 PG (ref 27–32.7)
MCHC RBC AUTO-ENTMCNC: 33.3 G/DL (ref 32.6–36.4)
MCV RBC AUTO: 93 FL (ref 79.8–96.2)
MONOCYTES # BLD AUTO: 0.56 10*3/MM3 (ref 0.2–1.2)
MONOCYTES NFR BLD AUTO: 6.3 % (ref 5–12)
NEUTROPHILS # BLD AUTO: 5.69 10*3/MM3 (ref 1.9–8.1)
NEUTROPHILS NFR BLD AUTO: 64.2 % (ref 42.7–76)
PLATELET # BLD AUTO: 215 10*3/MM3 (ref 140–500)
PMV BLD AUTO: 10.4 FL (ref 6–12)
POTASSIUM BLD-SCNC: 4.7 MMOL/L (ref 3.5–5.2)
PROT SERPL-MCNC: 6.9 G/DL (ref 6–8.5)
PROTHROMBIN TIME: 67.8 SECONDS (ref 11.7–14.2)
RBC # BLD AUTO: 5.13 10*6/MM3 (ref 4.6–6)
SODIUM BLD-SCNC: 141 MMOL/L (ref 136–145)
WBC NRBC COR # BLD: 8.87 10*3/MM3 (ref 4.5–10.7)

## 2018-09-19 PROCEDURE — 36416 COLLJ CAPILLARY BLOOD SPEC: CPT

## 2018-09-19 PROCEDURE — 25010000002 MORPHINE PER 10 MG: Performed by: NURSE PRACTITIONER

## 2018-09-19 PROCEDURE — 96375 TX/PRO/DX INJ NEW DRUG ADDON: CPT

## 2018-09-19 PROCEDURE — 25010000002 ONDANSETRON PER 1 MG: Performed by: EMERGENCY MEDICINE

## 2018-09-19 PROCEDURE — 96376 TX/PRO/DX INJ SAME DRUG ADON: CPT

## 2018-09-19 PROCEDURE — 85379 FIBRIN DEGRADATION QUANT: CPT | Performed by: EMERGENCY MEDICINE

## 2018-09-19 PROCEDURE — 96374 THER/PROPH/DIAG INJ IV PUSH: CPT

## 2018-09-19 PROCEDURE — 85025 COMPLETE CBC W/AUTO DIFF WBC: CPT | Performed by: EMERGENCY MEDICINE

## 2018-09-19 PROCEDURE — 25010000002 ONDANSETRON PER 1 MG: Performed by: NURSE PRACTITIONER

## 2018-09-19 PROCEDURE — 73502 X-RAY EXAM HIP UNI 2-3 VIEWS: CPT

## 2018-09-19 PROCEDURE — 80053 COMPREHEN METABOLIC PANEL: CPT | Performed by: EMERGENCY MEDICINE

## 2018-09-19 PROCEDURE — 25010000002 LORAZEPAM PER 2 MG: Performed by: EMERGENCY MEDICINE

## 2018-09-19 PROCEDURE — 99284 EMERGENCY DEPT VISIT MOD MDM: CPT

## 2018-09-19 PROCEDURE — 85610 PROTHROMBIN TIME: CPT

## 2018-09-19 PROCEDURE — 25010000002 MORPHINE PER 10 MG: Performed by: EMERGENCY MEDICINE

## 2018-09-19 PROCEDURE — 0 GADOBENATE DIMEGLUMINE 529 MG/ML SOLUTION: Performed by: EMERGENCY MEDICINE

## 2018-09-19 PROCEDURE — 72158 MRI LUMBAR SPINE W/O & W/DYE: CPT

## 2018-09-19 PROCEDURE — 85610 PROTHROMBIN TIME: CPT | Performed by: NURSE PRACTITIONER

## 2018-09-19 PROCEDURE — 25010000002 METHYLPREDNISOLONE PER 125 MG: Performed by: NURSE PRACTITIONER

## 2018-09-19 PROCEDURE — 73552 X-RAY EXAM OF FEMUR 2/>: CPT

## 2018-09-19 PROCEDURE — A9577 INJ MULTIHANCE: HCPCS | Performed by: EMERGENCY MEDICINE

## 2018-09-19 RX ORDER — METHYLPREDNISOLONE SODIUM SUCCINATE 125 MG/2ML
125 INJECTION, POWDER, LYOPHILIZED, FOR SOLUTION INTRAMUSCULAR; INTRAVENOUS ONCE
Status: COMPLETED | OUTPATIENT
Start: 2018-09-19 | End: 2018-09-19

## 2018-09-19 RX ORDER — OXYCODONE AND ACETAMINOPHEN 7.5; 325 MG/1; MG/1
1 TABLET ORAL EVERY 6 HOURS PRN
Qty: 20 TABLET | Refills: 0 | Status: SHIPPED | OUTPATIENT
Start: 2018-09-19 | End: 2018-10-23 | Stop reason: ALTCHOICE

## 2018-09-19 RX ORDER — ONDANSETRON 2 MG/ML
4 INJECTION INTRAMUSCULAR; INTRAVENOUS ONCE
Status: COMPLETED | OUTPATIENT
Start: 2018-09-19 | End: 2018-09-19

## 2018-09-19 RX ORDER — METHYLPREDNISOLONE 4 MG/1
TABLET ORAL
Qty: 21 EACH | Refills: 0 | Status: SHIPPED | OUTPATIENT
Start: 2018-09-19 | End: 2018-10-23

## 2018-09-19 RX ORDER — LORAZEPAM 2 MG/ML
1 INJECTION INTRAMUSCULAR ONCE
Status: COMPLETED | OUTPATIENT
Start: 2018-09-19 | End: 2018-09-19

## 2018-09-19 RX ADMIN — ONDANSETRON 4 MG: 2 INJECTION INTRAMUSCULAR; INTRAVENOUS at 11:25

## 2018-09-19 RX ADMIN — METHYLPREDNISOLONE SODIUM SUCCINATE 125 MG: 125 INJECTION, POWDER, FOR SOLUTION INTRAMUSCULAR; INTRAVENOUS at 11:25

## 2018-09-19 RX ADMIN — LORAZEPAM 1 MG: 2 INJECTION INTRAMUSCULAR; INTRAVENOUS at 13:28

## 2018-09-19 RX ADMIN — MORPHINE SULFATE 4 MG: 4 INJECTION INTRAVENOUS at 15:13

## 2018-09-19 RX ADMIN — ONDANSETRON 4 MG: 2 INJECTION INTRAMUSCULAR; INTRAVENOUS at 15:13

## 2018-09-19 RX ADMIN — MORPHINE SULFATE 4 MG: 4 INJECTION INTRAVENOUS at 11:25

## 2018-09-19 RX ADMIN — GADOBENATE DIMEGLUMINE 18 ML: 529 INJECTION, SOLUTION INTRAVENOUS at 14:13

## 2018-09-19 NOTE — DISCHARGE INSTRUCTIONS
Hold your Coumadin until you hear back from the [facility]. Apply heat to the areas of pain. Stop taking your Norco while you are taking the Percocet.
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
## 2018-09-21 ENCOUNTER — HOSPITAL ENCOUNTER (OUTPATIENT)
Dept: CARDIOLOGY | Facility: HOSPITAL | Age: 33
Setting detail: RECURRING SERIES
Discharge: HOME | End: 2018-09-21

## 2018-09-21 PROCEDURE — 85610 PROTHROMBIN TIME: CPT

## 2018-09-21 PROCEDURE — 36416 COLLJ CAPILLARY BLOOD SPEC: CPT

## 2018-09-24 RX ORDER — CARVEDILOL 3.12 MG/1
3.12 TABLET ORAL
Qty: 60 TABLET | Refills: 5 | Status: SHIPPED | OUTPATIENT
Start: 2018-09-24 | End: 2018-11-19 | Stop reason: SDUPTHER

## 2018-10-09 ENCOUNTER — TRANSCRIBE ORDERS (OUTPATIENT)
Dept: ADMINISTRATIVE | Facility: HOSPITAL | Age: 33
End: 2018-10-09

## 2018-10-09 DIAGNOSIS — G43.909 SICK HEADACHE: ICD-10-CM

## 2018-10-09 DIAGNOSIS — M54.2 CERVICALGIA: Primary | ICD-10-CM

## 2018-10-09 DIAGNOSIS — S13.9XXS NECK SPRAIN, SEQUELA: ICD-10-CM

## 2018-10-16 ENCOUNTER — ANTICOAGULATION VISIT (OUTPATIENT)
Dept: PHARMACY | Facility: HOSPITAL | Age: 33
End: 2018-10-16

## 2018-10-16 DIAGNOSIS — Z79.01 ANTICOAGULANT LONG-TERM USE: ICD-10-CM

## 2018-10-16 DIAGNOSIS — Z95.2 S/P AVR: ICD-10-CM

## 2018-10-17 ENCOUNTER — APPOINTMENT (OUTPATIENT)
Dept: MRI IMAGING | Facility: HOSPITAL | Age: 33
End: 2018-10-17

## 2018-10-22 RX ORDER — NITROGLYCERIN 0.4 MG/1
TABLET SUBLINGUAL
Qty: 100 TABLET | Refills: 0 | Status: SHIPPED | OUTPATIENT
Start: 2018-10-22 | End: 2018-11-29 | Stop reason: SDUPTHER

## 2018-10-23 ENCOUNTER — OFFICE VISIT (OUTPATIENT)
Dept: FAMILY MEDICINE CLINIC | Facility: CLINIC | Age: 33
End: 2018-10-23

## 2018-10-23 VITALS
DIASTOLIC BLOOD PRESSURE: 100 MMHG | TEMPERATURE: 98.1 F | SYSTOLIC BLOOD PRESSURE: 160 MMHG | HEIGHT: 67 IN | WEIGHT: 197 LBS | HEART RATE: 84 BPM | BODY MASS INDEX: 30.92 KG/M2 | OXYGEN SATURATION: 98 %

## 2018-10-23 DIAGNOSIS — F41.1 GENERALIZED ANXIETY DISORDER: Primary | ICD-10-CM

## 2018-10-23 DIAGNOSIS — Z23 FLU VACCINE NEED: ICD-10-CM

## 2018-10-23 PROCEDURE — 90471 IMMUNIZATION ADMIN: CPT | Performed by: INTERNAL MEDICINE

## 2018-10-23 PROCEDURE — 90674 CCIIV4 VAC NO PRSV 0.5 ML IM: CPT | Performed by: INTERNAL MEDICINE

## 2018-10-23 PROCEDURE — 99213 OFFICE O/P EST LOW 20 MIN: CPT | Performed by: INTERNAL MEDICINE

## 2018-10-23 RX ORDER — HYDROCODONE BITARTRATE AND ACETAMINOPHEN 7.5; 325 MG/1; MG/1
TABLET ORAL
Refills: 0 | COMMUNITY
Start: 2018-10-19 | End: 2020-04-09 | Stop reason: ALTCHOICE

## 2018-10-23 RX ORDER — ALPRAZOLAM 0.25 MG/1
0.25 TABLET ORAL DAILY PRN
Qty: 30 TABLET | Refills: 0 | Status: SHIPPED | OUTPATIENT
Start: 2018-10-23 | End: 2019-01-16 | Stop reason: SDUPTHER
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
## 2018-10-27 ENCOUNTER — HOSPITAL ENCOUNTER (OUTPATIENT)
Dept: MRI IMAGING | Facility: HOSPITAL | Age: 33
Discharge: HOME | End: 2018-10-27

## 2018-10-27 ENCOUNTER — HOSPITAL ENCOUNTER (OUTPATIENT)
Dept: MRI IMAGING | Facility: HOSPITAL | Age: 33
Discharge: HOME | End: 2018-10-27
Admitting: SURGERY

## 2018-10-27 DIAGNOSIS — M54.2 CERVICALGIA: ICD-10-CM

## 2018-10-27 DIAGNOSIS — G43.909 SICK HEADACHE: ICD-10-CM

## 2018-10-27 DIAGNOSIS — S13.9XXS NECK SPRAIN, SEQUELA: ICD-10-CM

## 2018-10-27 PROCEDURE — 70551 MRI BRAIN STEM W/O DYE: CPT

## 2018-10-27 PROCEDURE — 72141 MRI NECK SPINE W/O DYE: CPT

## 2018-10-30 ENCOUNTER — TELEPHONE (OUTPATIENT)
Dept: PHARMACY | Facility: HOSPITAL | Age: 33
End: 2018-10-30

## 2018-10-30 NOTE — PROGRESS NOTES
This visit is for documentation purposes only for the transition into the [facility]. This patient was a no-show for their first appointment in the clinic; the patient has not yet been seen.

## 2018-11-09 ENCOUNTER — OFFICE VISIT (OUTPATIENT)
Dept: FAMILY MEDICINE CLINIC | Facility: CLINIC | Age: 33
End: 2018-11-09

## 2018-11-09 VITALS
DIASTOLIC BLOOD PRESSURE: 82 MMHG | RESPIRATION RATE: 14 BRPM | SYSTOLIC BLOOD PRESSURE: 120 MMHG | HEART RATE: 75 BPM | OXYGEN SATURATION: 98 % | TEMPERATURE: 98.2 F | WEIGHT: 192 LBS | HEIGHT: 67 IN | BODY MASS INDEX: 30.13 KG/M2

## 2018-11-09 DIAGNOSIS — J20.9 ACUTE BRONCHITIS, UNSPECIFIED ORGANISM: Primary | ICD-10-CM

## 2018-11-09 PROCEDURE — 96372 THER/PROPH/DIAG INJ SC/IM: CPT | Performed by: NURSE PRACTITIONER

## 2018-11-09 PROCEDURE — 99213 OFFICE O/P EST LOW 20 MIN: CPT | Performed by: NURSE PRACTITIONER

## 2018-11-09 RX ORDER — PREDNISONE 20 MG/1
20 TABLET ORAL 2 TIMES DAILY
Qty: 10 TABLET | Refills: 0 | Status: SHIPPED | OUTPATIENT
Start: 2018-11-09 | End: 2018-11-14

## 2018-11-09 RX ORDER — ALBUTEROL SULFATE 90 UG/1
2 AEROSOL, METERED RESPIRATORY (INHALATION) EVERY 4 HOURS PRN
Qty: 18 G | Refills: 0 | Status: SHIPPED | OUTPATIENT
Start: 2018-11-09 | End: 2018-11-29

## 2018-11-09 RX ORDER — CLARITHROMYCIN 500 MG/1
500 TABLET, COATED ORAL 2 TIMES DAILY
Qty: 20 TABLET | Refills: 0 | Status: SHIPPED | OUTPATIENT
Start: 2018-11-09 | End: 2018-11-19

## 2018-11-09 RX ORDER — METHYLPREDNISOLONE SODIUM SUCCINATE 40 MG/ML
40 INJECTION, POWDER, LYOPHILIZED, FOR SOLUTION INTRAMUSCULAR; INTRAVENOUS ONCE
Status: COMPLETED | OUTPATIENT
Start: 2018-11-09 | End: 2018-11-09

## 2018-11-09 RX ORDER — DEXTROMETHORPHAN HYDROBROMIDE AND PROMETHAZINE HYDROCHLORIDE 15; 6.25 MG/5ML; MG/5ML
5 SYRUP ORAL 4 TIMES DAILY PRN
Qty: 240 ML | Refills: 0 | Status: SHIPPED | OUTPATIENT
Start: 2018-11-09 | End: 2018-11-29

## 2018-11-09 RX ADMIN — METHYLPREDNISOLONE SODIUM SUCCINATE 40 MG: 40 INJECTION, POWDER, LYOPHILIZED, FOR SOLUTION INTRAMUSCULAR; INTRAVENOUS at 13:24

## 2018-11-09 NOTE — PROGRESS NOTES
Subjective   Galdino Dallas is a 32 y.o. male.     Chief Complaint   Patient presents with   • Cough     x 2-3 weeks    • Nasal Congestion   • Wheezing     Mr. Dallas is a patient of Dr. Merida, who presents today with complaints of chest congestion and cough that has been going on for the past 3 to 4 weeks. He states that his symptoms resolved and then returned 3 days ago. He is flying out of town [facility] and wanted to be seen before he leaves. He states he is coughing and wheezing. Denies fever, chills, body aches, ear symptoms, sore throat or post nasal drip. He denies a history of Bronchitis. He has not taken any medications for his symptoms.    I have reviewed the patient's medical history in detail and updated the computerized patient record.     The following portions of the patient's history were reviewed and updated as appropriate: allergies, current medications, past family history, past medical history, past social history, past surgical history and problem list.       Current Outpatient Prescriptions:   •  ALPRAZolam (XANAX) 0.25 MG tablet, Take 1 tablet by mouth Daily As Needed for Anxiety., Disp: 30 tablet, Rfl: 0  •  carvedilol (COREG) 3.125 MG tablet, TAKE 1 TABLET BY MOUTH 2 (TWO) TIMES A DAY., Disp: 60 tablet, Rfl: 5  •  escitalopram (LEXAPRO) 10 MG tablet, Take 1 tablet by mouth Daily., Disp: 30 tablet, Rfl: 5  •  HYDROcodone-acetaminophen (NORCO) 7.5-325 MG per tablet, TK 1 T PO TID PRN, Disp: , Rfl: 0  •  isosorbide mononitrate (IMDUR) 30 MG 24 hr tablet, Take 1 tablet by mouth Every Morning., Disp: 30 tablet, Rfl: 11  •  lisinopril (PRINIVIL,ZESTRIL) 20 MG tablet, TAKE 1 TABLET BY MOUTH DAILY., Disp: 30 tablet, Rfl: 2  •  nitroglycerin (NITROSTAT) 0.4 MG SL tablet, TAKE 1 SUBLINGUALLY EVERY 5MINUTES AS NEEDED FOR CHEST PAIN. DONT TAKE MORE THAN 3 IN 15MINUTES., Disp: 100 tablet, Rfl: 0  •  pantoprazole (PROTONIX) 40 MG EC tablet, Take 1 tablet by mouth Daily., Disp: 90 tablet, Rfl: 3  •   topiramate (TOPAMAX) 25 MG tablet, 25 mg PO q AM and 50 mg PO q PM (Patient taking differently: Take 25 mg by mouth 2 (Two) Times a Day.), Disp: , Rfl:   •  warfarin (COUMADIN) 1 MG tablet, TAKE 1 TABLET BY MOUTH DAILY. WITH THE 5MG TABLET OR AS DIRECTED, Disp: 90 tablet, Rfl: 0  •  warfarin (COUMADIN) 5 MG tablet, TAKE 1 TAB 4 DAYS A WEEK ALONG WITH 1MG TAB (TOTAL 6MG) AND 1 1/2 TABS 3 DAYS A WEEK., Disp: 45 tablet, Rfl: 2    Review of Systems   Constitutional: Negative.  Negative for chills, fatigue and fever.   HENT: Positive for congestion (chest). Negative for postnasal drip, rhinorrhea, sinus pressure and sore throat.    Respiratory: Positive for cough, shortness of breath (with coughing) and wheezing.    Musculoskeletal: Negative.  Negative for myalgias.   Neurological: Negative.        Vitals:    11/09/18 1141   BP: 120/82   Pulse: 75   Resp: 14   Temp: 98.2 °F (36.8 °C)   SpO2: 98%     Objective   Physical Exam   Constitutional: He is oriented to person, place, and time. He appears well-developed and well-nourished.   Cardiovascular: Normal rate, regular rhythm and normal heart sounds.    Pulmonary/Chest: No respiratory distress. He has wheezes.   Neurological: He is alert and oriented to person, place, and time.   Skin: Skin is warm and dry.   No acute distress   Psychiatric:   No acute distress   Vitals reviewed.        Assessment/Plan   Galdino was seen today for cough, nasal congestion and wheezing.    Diagnoses and all orders for this visit:    Acute bronchitis, unspecified organism  -     clarithromycin (BIAXIN) 500 MG tablet; Take 1 tablet by mouth 2 (Two) Times a Day for 10 days.  -     predniSONE (DELTASONE) 20 MG tablet; Take 1 tablet by mouth 2 (Two) Times a Day for 5 days.  -     promethazine-dextromethorphan (PROMETHAZINE-DM) 6.25-15 MG/5ML syrup; Take 5 mL by mouth 4 (Four) Times a Day As Needed for Cough.  -     albuterol (PROVENTIL HFA;VENTOLIN HFA) 108 (90 Base) MCG/ACT inhaler; Inhale 2  puffs Every 4 (Four) Hours As Needed for Wheezing or Shortness of Air.    1. He has acute bronchitis. He is to start Clarithromycin 500 mg twice daily x 10 days, promethazine DM 6.25-15/5 ml, 5 ml four times a day as needed for cough.  2. He received Solu-Medrol 40 mg IM today in the office and start tomorrow Prednisone 20 mg twice a day x 5 days.   3. Follow up as needed.

## 2018-11-14 ENCOUNTER — TELEPHONE (OUTPATIENT)
Dept: PHARMACY | Facility: HOSPITAL | Age: 33
End: 2018-11-14

## 2018-11-19 RX ORDER — CARVEDILOL 3.12 MG/1
3.12 TABLET ORAL 2 TIMES DAILY WITH MEALS
Qty: 180 TABLET | Refills: 1 | Status: SHIPPED | OUTPATIENT
Start: 2018-11-19 | End: 2019-06-28 | Stop reason: SDUPTHER

## 2018-11-29 ENCOUNTER — ANTICOAGULATION VISIT (OUTPATIENT)
Dept: PHARMACY | Facility: HOSPITAL | Age: 33
End: 2018-11-29

## 2018-11-29 ENCOUNTER — OFFICE VISIT (OUTPATIENT)
Dept: CARDIOLOGY | Facility: CLINIC | Age: 33
End: 2018-11-29

## 2018-11-29 ENCOUNTER — APPOINTMENT (OUTPATIENT)
Dept: LAB | Facility: HOSPITAL | Age: 33
End: 2018-11-29

## 2018-11-29 ENCOUNTER — DOCUMENTATION (OUTPATIENT)
Dept: CARDIOLOGY | Facility: CLINIC | Age: 33
End: 2018-11-29

## 2018-11-29 ENCOUNTER — TELEPHONE (OUTPATIENT)
Dept: PHARMACY | Facility: HOSPITAL | Age: 33
End: 2018-11-29

## 2018-11-29 VITALS
WEIGHT: 194 LBS | DIASTOLIC BLOOD PRESSURE: 90 MMHG | HEART RATE: 85 BPM | HEIGHT: 67 IN | BODY MASS INDEX: 30.45 KG/M2 | SYSTOLIC BLOOD PRESSURE: 170 MMHG

## 2018-11-29 DIAGNOSIS — I10 ESSENTIAL HYPERTENSION: ICD-10-CM

## 2018-11-29 DIAGNOSIS — Z79.01 ANTICOAGULANT LONG-TERM USE: ICD-10-CM

## 2018-11-29 DIAGNOSIS — Z95.2 S/P AVR: ICD-10-CM

## 2018-11-29 DIAGNOSIS — I25.118 CORONARY ARTERY DISEASE OF NATIVE ARTERY OF NATIVE HEART WITH STABLE ANGINA PECTORIS (HCC): Primary | ICD-10-CM

## 2018-11-29 LAB
INR PPP: 4.21 (ref 0.9–1.1)
INR PPP: 6.8 (ref 0.91–1.09)
PROTHROMBIN TIME: 40 SECONDS (ref 11.7–14.2)
PROTHROMBIN TIME: 81.1 SECONDS (ref 10–13.8)

## 2018-11-29 PROCEDURE — 85610 PROTHROMBIN TIME: CPT

## 2018-11-29 PROCEDURE — G0463 HOSPITAL OUTPT CLINIC VISIT: HCPCS | Performed by: PHARMACIST

## 2018-11-29 PROCEDURE — 93000 ELECTROCARDIOGRAM COMPLETE: CPT | Performed by: INTERNAL MEDICINE

## 2018-11-29 PROCEDURE — 99214 OFFICE O/P EST MOD 30 MIN: CPT | Performed by: INTERNAL MEDICINE

## 2018-11-29 PROCEDURE — 36416 COLLJ CAPILLARY BLOOD SPEC: CPT

## 2018-11-29 PROCEDURE — 36415 COLL VENOUS BLD VENIPUNCTURE: CPT

## 2018-11-29 RX ORDER — LISINOPRIL 40 MG/1
40 TABLET ORAL DAILY
Qty: 90 TABLET | Refills: 3 | Status: SHIPPED | OUTPATIENT
Start: 2018-11-29 | End: 2019-06-28 | Stop reason: SDUPTHER

## 2018-11-29 RX ORDER — NITROGLYCERIN 0.4 MG/1
TABLET SUBLINGUAL
Qty: 100 TABLET | Refills: 3 | Status: SHIPPED | OUTPATIENT
Start: 2018-11-29 | End: 2019-04-04 | Stop reason: SDUPTHER

## 2018-11-29 NOTE — PROGRESS NOTES
11/29/2018-received text from answering service lab values at [facility].  Was advised by Alma in the laboratory data results of the patient's INR was 4.21.    Attempted to contact patient via his home number [phone][ no answer], and emergency contact spouse [phone] cell number.  Left voicemail at that number to contact [facility] office and INR clinic tomorrow for follow-up regarding dosing of warfarin.    Also advised not to take his warfarin dose tonight.
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
## 2018-11-29 NOTE — PROGRESS NOTES
Patient had INR reading >4.5.  [facility] recommended for patient to have a lab draw to confirm the readings per the Roche statement regarding current strip lot calibration issues; Patient was educated about the variability of the test strip readings at values >4.5 and was also educated to monitor for any signs excessive bleeding (including in the urine, stool, emesis, etc.). We will call the patient with the lab results and proceed with dosing from there.    Anticoagulation Clinic Progress Note  Anticoagulation Summary  As of 2018    INR goal:   2.5-3.5   TTR:   0.0 % (2.9 wk)   INR used for dosin.21! (2018)   Warfarin maintenance plan:   5 mg every day   Weekly warfarin total:   35 mg   Plan last modified:   Carol Ng RPH (2018)   Next INR check:   2018   Priority:   High   Target end date:       Indications    S/P AVR [Z95.2]  Anticoagulant long-term use [Z79.01]             Anticoagulation Episode Summary     INR check location:       Preferred lab:       Send INR reminders to:    YANDY POLANCO [facility]    Comments:         Anticoagulation Care Providers     Provider Role Specialty Phone number    José Antonio Banks MD Referring Cardiology [phone]        Clinic Interview:  Patient Findings     Positives:   Signs/symptoms of bleeding, Upcoming invasive procedure,   Change in medications    Negatives:   Signs/symptoms of thrombosis, Laboratory test error   suspected, Change in health, Change in alcohol use, Change in activity,   Emergency department visit, Upcoming dental procedure, Missed doses, Extra   doses, Change in diet/appetite, Hospital admission, Bruising, Other   complaints    Comments:   Pt had been on prednisone 3 weeks prior; finished 1.5 weeks   ago. He is on Norco consistently Q4H and has been for the past few months.   He states he had a nose bleed in [place] 3 weeks prior. He has not had   his INR checked/reported with [facility]  since 18. He is having an upcoming   epidural and they want to hold 5 days; will contact Jocelyn for approval   and bridge plans.      Clinical Outcomes     Negatives:   Major bleeding event, Thromboembolic event,   Anticoagulation-related hospital admission, Anticoagulation-related ED   visit, Anticoagulation-related fatality    Comments:   Pt had been on prednisone 3 weeks prior; finished 1.5 weeks   ago. He is on Norco consistently Q4H and has been for the past few months.   He states he had a nose bleed in [place] 3 weeks prior. He has not had   his INR checked/reported with [facility] since 18. He is having an upcoming   epidural and they want to hold 5 days; will contact Jocelyn for approval   and bridge plans.        Education:  Galdino Dallas is a new start in the [facility] Management [facility]. We discussed the followin) Warfarin's indication, mechanism, and dosing  2) Enforced the importance of taking warfarin as instructed and at the same time every day, preferably in the evening so that we can make dose adjustments more easily following subsequent clinic visits  3) What he should do about a missed dose; pts can take missed doses within about 12 hours of their usual scheduled dose, but he was instructed on the importance of not doubling up on doses unless told to do so by the [facility]  4) Explained possible side effects of warfarin therapy, including increased risk of bleeding, s/sx of bleeding and s/sx of any additional clots/PE/CVA.   5) Discussed monitoring of warfarin, the INR, goal INR range, and the frequency of monitoring  6) Reviewed drug/food/tobacco/EtOH interactions and provided written information covering these topics in more detail, explaining that green, leafy vegetables interact most heavily with warfarin  7) Instructed the pt not to take or discontinue any medications without informing his physician/pharmacist and reminded him to inform us of any dietary  changes, as well  8) Explained that he would be coming into the clinic more frequently in these first few weeks of therapy as we try to adjust his dose and achieve a therapeutic INR x 2 consecutive readings. Once that is achieved, patient will follow up in clinic every 4 weeks, on average.    He stated no problems with transportation or scheduling clinic appts in this manner. he expressed understanding of the information provided and has no additional questions at this time.    Galdino Dallas was presented with a copy of the Patients Rights and Responsibilities. he expressed verbal consent and agreement to receive care in the [facility] Management Clinic under the current collaborative care agreement with [facility].       INR History:  Previous INR data from [facility] is recorded in [facility] and scanned into the patient's chart in [employer]. These results have been analyzed and reviewed.    Plan:  1. INR is Supratherapeutic today- see above in Anticoagulation Summary.   Will instruct Galdino Dallas to Change their warfarin regimen- see above in Anticoagulation Summary.  2. Follow up in 5 days  3. Patient desires warfarin refills.  4. Verbal and written information provided. Patient expresses understanding and has no further questions at this time.    Carol Ng [facility]

## 2018-11-30 NOTE — PROGRESS NOTES
Tried to call patient @  [phone] and his emergency contact spouse @ [phone].  I left voicemails on both numbers stating that the patient needed to contact [facility] and INR clinic for follow-ups regarding dosing of warfarin.  Thank you,  Reina

## 2018-11-30 NOTE — PROGRESS NOTES
He was told to contact the coag clinic and he is schedule for 12/5/18 to have it drawn again.    Michelle

## 2018-12-03 RX ORDER — NITROGLYCERIN 0.4 MG/1
TABLET SUBLINGUAL
Qty: 100 TABLET | Refills: 0 | Status: SHIPPED | OUTPATIENT
Start: 2018-12-03 | End: 2019-01-16 | Stop reason: SDUPTHER

## 2018-12-05 ENCOUNTER — ANTICOAGULATION VISIT (OUTPATIENT)
Dept: PHARMACY | Facility: HOSPITAL | Age: 33
End: 2018-12-05

## 2018-12-05 DIAGNOSIS — Z79.01 ANTICOAGULANT LONG-TERM USE: ICD-10-CM

## 2018-12-05 DIAGNOSIS — Z95.2 S/P AVR: ICD-10-CM

## 2018-12-05 LAB
INR PPP: 5.5 (ref 0.91–1.09)
PROTHROMBIN TIME: 65.8 SECONDS (ref 10–13.8)

## 2018-12-05 PROCEDURE — G0463 HOSPITAL OUTPT CLINIC VISIT: HCPCS | Performed by: PHARMACIST

## 2018-12-05 PROCEDURE — 85610 PROTHROMBIN TIME: CPT

## 2018-12-05 PROCEDURE — 36416 COLLJ CAPILLARY BLOOD SPEC: CPT

## 2018-12-05 NOTE — PATIENT INSTRUCTIONS
12/5 start holding warfarin for 5 days  12/7 start lovenox 80mg injection every 12 hours  12/9 stop lovenox injections 24 hours before procedure  12/10 restart lovenox and warfarin when instructed

## 2018-12-05 NOTE — PROGRESS NOTES
Anticoagulation Clinic Progress Note    Anticoagulation Summary  As of 2018    INR goal:   2.5-3.5   TTR:   --   INR used for dosin.5! (2018)   Warfarin maintenance plan:   5 mg every day   Weekly warfarin total:   35 mg   Plan last modified:   Carol Ng [facility] (2018)   Next INR check:   2018   Priority:   High   Target end date:       Indications    S/P AVR [Z95.2]  Anticoagulant long-term use [Z79.01]             Anticoagulation Episode Summary     INR check location:       Preferred lab:       Send INR reminders to:   [facility] CLINICAL POOL    Comments:         Anticoagulation Care Providers     Provider Role Specialty Phone number    José Antonio Banks MD Referring Cardiology [phone]          Drug interactions: has remained unchanged.  Diet: has remained unchanged.    Clinic Interview:  Patient Findings     Negatives:   Signs/symptoms of thrombosis, Signs/symptoms of bleeding,   Laboratory test error suspected, Change in health, Change in alcohol use,   Change in activity, Upcoming invasive procedure, Emergency department   visit, Upcoming dental procedure, Missed doses, Extra doses, Change in   medications, Change in diet/appetite, Hospital admission, Bruising, Other   complaints      Clinical Outcomes     Negatives:   Major bleeding event, Thromboembolic event,   Anticoagulation-related hospital admission, Anticoagulation-related ED   visit, Anticoagulation-related fatality        INR History:  Anticoagulation Monitoring 10/16/2018 2018 2018   INR - 4.21 5.5   INR Date - 2018   INR Goal 2.5-3.5 2.5-3.5 2.5-3.5   Trend - Down Same   Last Week Total 0 mg 37.5 mg 30 mg   Next Week Total 37.5 mg 25 mg 10 mg   Sun - 5 mg Hold ()   Mon - 5 mg 5 mg   Tue - 5 mg 5 mg   Wed - - Hold (); Otherwise 5 mg   Thu - Hold () Hold ()   Fri - 5 mg Hold ()   Sat - 5 mg Hold ()   Visit Report - - -   Some recent data might be  hidden       Patient had INR readings >4.5.  [facility] Clinic recommended for patient to have a lab draw to confirm the readings due to current issue with Roche/strip calibration; however, patient REFUSED lab draw.  Patient was educated about the variability of the test strip readings at values >4.5 and was also educated to monitor for any signs excessive bleeding (including in the urine, stool, emesis, etc.)    12/5 start holding warfarin for 5 days  12/7 start lovenox 80mg injection every 12 hours  12/9 stop lovenox injections 24 hours before procedure  12/10 restart lovenox and warfarin when instructed    Plan:  1. INR is Supratherapeutic today- see above in Anticoagulation Summary.  Will instruct Galdino YRN JonesBurt to Change their warfarin regimen- see above in Anticoagulation Summary. Holding warfarin for the next 5 days for epidural on 12/10. Wanted pt to come back for INR reading on Friday before beginning lovenox, but pt refused. Starting lovenox 12/7.  2. Follow up in 3 days post procedure  3. Patient declines warfarin refills.  4. Verbal and written information provided. Patient expresses understanding and has no further questions at this time.    Carol Ng, [facility]

## 2018-12-13 RX ORDER — LISINOPRIL 20 MG/1
TABLET ORAL
Qty: 30 TABLET | Refills: 2 | Status: SHIPPED | OUTPATIENT
Start: 2018-12-13 | End: 2019-01-16 | Stop reason: DRUGHIGH

## 2018-12-19 RX ORDER — ISOSORBIDE MONONITRATE 30 MG/1
30 TABLET, EXTENDED RELEASE ORAL EVERY MORNING
Qty: 30 TABLET | Refills: 5 | Status: SHIPPED | OUTPATIENT
Start: 2018-12-19 | End: 2019-06-28 | Stop reason: SDUPTHER

## 2018-12-20 ENCOUNTER — TELEPHONE (OUTPATIENT)
Dept: PHARMACY | Facility: HOSPITAL | Age: 33
End: 2018-12-20

## 2019-01-14 ENCOUNTER — TELEPHONE (OUTPATIENT)
Dept: PHARMACY | Facility: HOSPITAL | Age: 34
End: 2019-01-14

## 2019-01-16 ENCOUNTER — OFFICE VISIT (OUTPATIENT)
Dept: FAMILY MEDICINE CLINIC | Facility: CLINIC | Age: 34
End: 2019-01-16

## 2019-01-16 VITALS
HEART RATE: 80 BPM | HEIGHT: 67 IN | OXYGEN SATURATION: 96 % | DIASTOLIC BLOOD PRESSURE: 82 MMHG | SYSTOLIC BLOOD PRESSURE: 140 MMHG | WEIGHT: 200.9 LBS | BODY MASS INDEX: 31.53 KG/M2

## 2019-01-16 DIAGNOSIS — I10 ESSENTIAL HYPERTENSION: Primary | ICD-10-CM

## 2019-01-16 DIAGNOSIS — F41.1 GENERALIZED ANXIETY DISORDER: ICD-10-CM

## 2019-01-16 PROCEDURE — 99213 OFFICE O/P EST LOW 20 MIN: CPT | Performed by: INTERNAL MEDICINE

## 2019-01-16 RX ORDER — ALPRAZOLAM 0.25 MG/1
0.25 TABLET ORAL DAILY PRN
Qty: 30 TABLET | Refills: 0 | Status: SHIPPED | OUTPATIENT
Start: 2019-01-16 | End: 2019-04-11 | Stop reason: SDUPTHER

## 2019-01-16 RX ORDER — ESCITALOPRAM OXALATE 20 MG/1
20 TABLET ORAL DAILY
Qty: 30 TABLET | Refills: 5 | Status: SHIPPED | OUTPATIENT
Start: 2019-01-16 | End: 2019-04-11 | Stop reason: SDUPTHER
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
## 2019-02-08 ENCOUNTER — TELEPHONE (OUTPATIENT)
Dept: PHARMACY | Facility: HOSPITAL | Age: 34
End: 2019-02-08

## 2019-02-26 ENCOUNTER — TELEPHONE (OUTPATIENT)
Dept: PHARMACY | Facility: HOSPITAL | Age: 34
End: 2019-02-26

## 2019-02-27 RX ORDER — WARFARIN SODIUM 5 MG/1
TABLET ORAL
Qty: 14 TABLET | Refills: 0 | Status: SHIPPED | OUTPATIENT
Start: 2019-02-27 | End: 2019-03-22 | Stop reason: SDUPTHER

## 2019-03-05 ENCOUNTER — TELEPHONE (OUTPATIENT)
Dept: PHARMACY | Facility: HOSPITAL | Age: 34
End: 2019-03-05

## 2019-03-22 ENCOUNTER — ANTICOAGULATION VISIT (OUTPATIENT)
Dept: PHARMACY | Facility: HOSPITAL | Age: 34
End: 2019-03-22

## 2019-03-22 DIAGNOSIS — Z95.2 S/P AVR: ICD-10-CM

## 2019-03-22 DIAGNOSIS — Z79.01 ANTICOAGULANT LONG-TERM USE: ICD-10-CM

## 2019-03-22 LAB
INR PPP: 1.9 (ref 0.91–1.09)
PROTHROMBIN TIME: 22.8 SECONDS (ref 10–13.8)

## 2019-03-22 PROCEDURE — 85610 PROTHROMBIN TIME: CPT

## 2019-03-22 PROCEDURE — G0463 HOSPITAL OUTPT CLINIC VISIT: HCPCS

## 2019-03-22 PROCEDURE — 36416 COLLJ CAPILLARY BLOOD SPEC: CPT

## 2019-03-22 RX ORDER — WARFARIN SODIUM 5 MG/1
5 TABLET ORAL NIGHTLY
Qty: 30 TABLET | Refills: 0 | Status: SHIPPED | OUTPATIENT
Start: 2019-03-22 | End: 2019-04-13 | Stop reason: SDUPTHER

## 2019-03-22 NOTE — PROGRESS NOTES
Anticoagulation Clinic Progress Note    Anticoagulation Summary  As of 3/22/2019    INR goal:   2.5-3.5   TTR:   28.7 % (3.4 mo)   INR used for dosin.9! (3/22/2019)   Warfarin maintenance plan:   5 mg every day   Weekly warfarin total:   35 mg   Plan last modified:   Carol Ng RPH (2018)   Next INR check:   3/29/2019   Priority:   High   Target end date:       Indications    S/P AVR [Z95.2]  Anticoagulant long-term use [Z79.01]             Anticoagulation Episode Summary     INR check location:       Preferred lab:       Send INR reminders to:    [facility]    Comments:         Anticoagulation Care Providers     Provider Role Specialty Phone number    José Antonio Banks MD Referring Cardiology [phone]          Clinic Interview:  Patient Findings     Positives:   Change in medications    Negatives:   Signs/symptoms of thrombosis, Signs/symptoms of bleeding,   Laboratory test error suspected, Change in health, Change in alcohol use,   Change in activity, Upcoming invasive procedure, Emergency department   visit, Upcoming dental procedure, Missed doses, Extra doses, Change in   diet/appetite, Hospital admission, Bruising, Other complaints    Comments:   Increased hydrocodone/APAP r/t back pain flare up.       Clinical Outcomes     Negatives:   Major bleeding event, Thromboembolic event,   Anticoagulation-related hospital admission, Anticoagulation-related ED   visit, Anticoagulation-related fatality    Comments:   Increased hydrocodone/APAP r/t back pain flare up.         INR History:  Anticoagulation Monitoring 2018 2018 3/22/2019   INR 4.21 5.5 1.9   INR Date 2018 2018 3/22/2019   INR Goal 2.5-3.5 2.5-3.5 2.5-3.5   Trend Down Same Same   Last Week Total 37.5 mg 30 mg 35 mg   Next Week Total 25 mg 10 mg 40 mg   Sun 5 mg Hold () 5 mg   Mon 5 mg 5 mg 5 mg   Tue 5 mg 5 mg 5 mg   Wed - Hold (); Otherwise 5 mg 5 mg   Thu Hold () Hold () 5  mg   Fri 5 mg Hold (12/7) 10 mg (3/22)   Sat 5 mg Hold (12/8) 5 mg   Visit Report - - -   Some recent data might be hidden       Plan:  1. INR is Subtherapeutic today- see above in Anticoagulation Summary.  Will instruct Galdino Dallas to Change their warfarin regimen- see above in Anticoagulation Summary.  2. Follow up in 1 week  3. Patient declines warfarin refills.  4. Verbal and written information provided. Patient expresses understanding and has no further questions at this time.    Joe De [facility]

## 2019-03-29 ENCOUNTER — ANTICOAGULATION VISIT (OUTPATIENT)
Dept: PHARMACY | Facility: HOSPITAL | Age: 34
End: 2019-03-29

## 2019-03-29 DIAGNOSIS — Z95.2 S/P AVR: ICD-10-CM

## 2019-03-29 DIAGNOSIS — Z79.01 ANTICOAGULANT LONG-TERM USE: ICD-10-CM

## 2019-03-29 LAB
INR PPP: 4.7 (ref 0.91–1.09)
PROTHROMBIN TIME: 56 SECONDS (ref 10–13.8)

## 2019-03-29 PROCEDURE — G0463 HOSPITAL OUTPT CLINIC VISIT: HCPCS

## 2019-03-29 PROCEDURE — 36416 COLLJ CAPILLARY BLOOD SPEC: CPT

## 2019-03-29 PROCEDURE — 85610 PROTHROMBIN TIME: CPT
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
## 2019-04-04 RX ORDER — NITROGLYCERIN 0.4 MG/1
TABLET SUBLINGUAL
Qty: 100 TABLET | Refills: 3 | Status: SHIPPED | OUTPATIENT
Start: 2019-04-04 | End: 2019-06-28 | Stop reason: SDUPTHER

## 2019-04-04 RX ORDER — PANTOPRAZOLE SODIUM 40 MG/1
40 TABLET, DELAYED RELEASE ORAL DAILY
Qty: 90 TABLET | Refills: 3 | OUTPATIENT
Start: 2019-04-04

## 2019-04-10 ENCOUNTER — ANTICOAGULATION VISIT (OUTPATIENT)
Dept: PHARMACY | Facility: HOSPITAL | Age: 34
End: 2019-04-10

## 2019-04-10 DIAGNOSIS — Z79.01 ANTICOAGULANT LONG-TERM USE: ICD-10-CM

## 2019-04-10 DIAGNOSIS — Z95.2 S/P AVR: ICD-10-CM

## 2019-04-10 LAB
INR PPP: 2.7 (ref 0.91–1.09)
PROTHROMBIN TIME: 32.9 SECONDS (ref 10–13.8)

## 2019-04-10 PROCEDURE — 85610 PROTHROMBIN TIME: CPT

## 2019-04-10 PROCEDURE — 36416 COLLJ CAPILLARY BLOOD SPEC: CPT

## 2019-04-10 NOTE — PROGRESS NOTES
Anticoagulation Clinic Progress Note    Anticoagulation Summary  As of 4/10/2019    INR goal:   2.5-3.5   TTR:   30.3 % (4.1 mo)   INR used for dosin.7 (4/10/2019)   Warfarin maintenance plan:   5 mg every day   Weekly warfarin total:   35 mg   No change documented:   Christy Mathis   Plan last modified:   Carol Ng [facility] (2018)   Next INR check:   2019   Priority:   High   Target end date:       Indications    S/P AVR [Z95.2]  Anticoagulant long-term use [Z79.01]             Anticoagulation Episode Summary     INR check location:       Preferred lab:       Send INR reminders to:    [facility]    Comments:         Anticoagulation Care Providers     Provider Role Specialty Phone number    José Antonio Banks MD Referring Cardiology [phone]          Clinic Interview:  Patient Findings     Negatives:   Signs/symptoms of thrombosis, Signs/symptoms of bleeding,   Laboratory test error suspected, Change in health, Change in alcohol use,   Change in activity, Upcoming invasive procedure, Emergency department   visit, Upcoming dental procedure, Missed doses, Extra doses, Change in   medications, Change in diet/appetite, Hospital admission, Bruising, Other   complaints      Clinical Outcomes     Negatives:   Major bleeding event, Thromboembolic event,   Anticoagulation-related hospital admission, Anticoagulation-related ED   visit, Anticoagulation-related fatality        INR History:  Anticoagulation Monitoring 3/22/2019 3/29/2019 4/10/2019   INR 1.9 4.7 2.7   INR Date 3/22/2019 3/29/2019 4/10/2019   INR Goal 2.5-3.5 2.5-3.5 2.5-3.5   Trend Same Same Same   Last Week Total 35 mg 40 mg 35 mg   Next Week Total 40 mg 30 mg 35 mg   Sun 5 mg 5 mg 5 mg   Mon 5 mg 5 mg 5 mg   Tue 5 mg - 5 mg   Wed 5 mg - 5 mg   Thu 5 mg - 5 mg   Fri 10 mg (3/22) Hold (3/29) 5 mg   Sat 5 mg 5 mg 5 mg   Visit Report - - -   Some recent data might be hidden       Plan:  1. INR is therapeutic today-  see above in Anticoagulation Summary.   Will instruct Galdino Dallas to continue their warfarin regimen- see above in Anticoagulation Summary.  2. Follow up in 1 week.  3. Patient declines warfarin refills.  4. Verbal and written information provided. Patient expresses understanding and has no further questions at this time.    Christy Mathis

## 2019-04-11 ENCOUNTER — OFFICE VISIT (OUTPATIENT)
Dept: FAMILY MEDICINE CLINIC | Facility: CLINIC | Age: 34
End: 2019-04-11

## 2019-04-11 VITALS
SYSTOLIC BLOOD PRESSURE: 140 MMHG | RESPIRATION RATE: 16 BRPM | HEART RATE: 80 BPM | DIASTOLIC BLOOD PRESSURE: 86 MMHG | HEIGHT: 67 IN | WEIGHT: 202 LBS | TEMPERATURE: 98.6 F | BODY MASS INDEX: 31.71 KG/M2 | OXYGEN SATURATION: 98 %

## 2019-04-11 DIAGNOSIS — F41.1 GENERALIZED ANXIETY DISORDER: Primary | ICD-10-CM

## 2019-04-11 PROCEDURE — 99214 OFFICE O/P EST MOD 30 MIN: CPT | Performed by: NURSE PRACTITIONER

## 2019-04-11 RX ORDER — ALPRAZOLAM 0.25 MG/1
0.25 TABLET ORAL DAILY PRN
Qty: 30 TABLET | Refills: 0 | Status: SHIPPED | OUTPATIENT
Start: 2019-04-11 | End: 2019-06-17 | Stop reason: SDUPTHER

## 2019-04-11 RX ORDER — ESCITALOPRAM OXALATE 20 MG/1
20 TABLET ORAL DAILY
Qty: 90 TABLET | Refills: 3 | Status: SHIPPED | OUTPATIENT
Start: 2019-04-11 | End: 2020-04-09 | Stop reason: ALTCHOICE

## 2019-04-11 NOTE — PROGRESS NOTES
Subjective   Galdino Dallas is a 33 y.o. male.     Chief Complaint   Patient presents with   • Anxiety     CSE      Mr. Dallas presents today to follow up on anxiety and to get refills on his xanax. He states that Dr. Merida was weaning him off of xanax. He states that he is still having anxiety and does not have to take xanax as often. He is currently taking Lexapro 10 mg daily and xanax 0.25 mg.    I have reviewed the patient's medical history in detail and updated the computerized patient record.     The following portions of the patient's history were reviewed and updated as appropriate: allergies, current medications, past family history, past medical history, past social history, past surgical history and problem list.       Current Outpatient Medications:   •  ALPRAZolam (XANAX) 0.25 MG tablet, Take 1 tablet by mouth Daily As Needed for Anxiety., Disp: 30 tablet, Rfl: 0  •  carvedilol (COREG) 3.125 MG tablet, Take 1 tablet by mouth 2 (Two) Times a Day With Meals., Disp: 180 tablet, Rfl: 1  •  escitalopram (LEXAPRO) 20 MG tablet, Take 1 tablet by mouth Daily. (Patient taking differently: Take 15 mg by mouth Daily.), Disp: 30 tablet, Rfl: 5  •  HYDROcodone-acetaminophen (NORCO) 7.5-325 MG per tablet, TK 1 T PO TID PRN, Disp: , Rfl: 0  •  isosorbide mononitrate (IMDUR) 30 MG 24 hr tablet, TAKE 1 TABLET BY MOUTH EVERY MORNING., Disp: 30 tablet, Rfl: 5  •  lisinopril (PRINIVIL,ZESTRIL) 40 MG tablet, Take 1 tablet by mouth Daily., Disp: 90 tablet, Rfl: 3  •  nitroglycerin (NITROSTAT) 0.4 MG SL tablet, Take no more than 3 doses in 15 minutes., Disp: 100 tablet, Rfl: 3  •  pantoprazole (PROTONIX) 40 MG EC tablet, Take 1 tablet by mouth Daily., Disp: 90 tablet, Rfl: 3  •  warfarin (COUMADIN) 5 MG tablet, Take 1 tablet by mouth Every Night. Or as directed by the [facility]., Disp: 30 tablet, Rfl: 0    Review of Systems   Constitutional: Negative.    Respiratory: Negative.    Cardiovascular:  "Negative.    Neurological: Negative.    Psychiatric/Behavioral: Positive for sleep disturbance. The patient is nervous/anxious.         Vitals:    04/11/19 0948   BP: 140/86   BP Location: Right arm   Patient Position: Sitting   Cuff Size: Adult   Pulse: 80   Resp: 16   Temp: 98.6 °F (37 °C)   TempSrc: Oral   SpO2: 98%   Weight: 91.6 kg (202 lb)   Height: 170.2 cm (67\")       Objective   Physical Exam   Constitutional: He is oriented to person, place, and time. He appears well-developed and well-nourished.   Cardiovascular: Normal rate, regular rhythm, normal heart sounds and intact distal pulses.   Pulmonary/Chest: Effort normal and breath sounds normal.   Neurological: He is alert and oriented to person, place, and time.   Skin: Skin is warm and dry.   Psychiatric:   No acute distress   Vitals reviewed.        Assessment/Plan   Galdino was seen today for anxiety.    Diagnoses and all orders for this visit:    Generalized anxiety disorder  -     escitalopram (LEXAPRO) 20 MG tablet; Take 1 tablet by mouth Daily.  -     ALPRAZolam (XANAX) 0.25 MG tablet; Take 1 tablet by mouth Daily As Needed for Anxiety.    1. He is to increase the Lexapro to 20 mg daily.  2. I have reviewed his ADDISON report and it is consistent with the medications being prescribed for him. He has signed a controled substance agreement with me today. I have ordered Alprazolam 0.25 mg daily as needed for anxiety attacks.   3. He is to return in 3 months for his next CSE.           "

## 2019-04-15 RX ORDER — WARFARIN SODIUM 5 MG/1
5 TABLET ORAL NIGHTLY
Qty: 30 TABLET | Refills: 0 | Status: SHIPPED | OUTPATIENT
Start: 2019-04-15 | End: 2019-05-21 | Stop reason: SDUPTHER

## 2019-04-22 ENCOUNTER — TELEPHONE (OUTPATIENT)
Dept: CARDIOLOGY | Facility: CLINIC | Age: 34
End: 2019-04-22

## 2019-04-22 NOTE — TELEPHONE ENCOUNTER
Patient left message stating that he is scheduled to see a Dentist on Wednesday for a toothache. He would like to know if he needs to be premedicated with an antibiotic.     Please advise.

## 2019-04-23 ENCOUNTER — APPOINTMENT (OUTPATIENT)
Dept: PHARMACY | Facility: HOSPITAL | Age: 34
End: 2019-04-23

## 2019-04-23 RX ORDER — CLINDAMYCIN HYDROCHLORIDE 300 MG/1
600 CAPSULE ORAL ONCE
Qty: 2 CAPSULE | Refills: 0 | Status: SHIPPED | OUTPATIENT
Start: 2019-04-23 | End: 2019-04-23

## 2019-05-15 ENCOUNTER — TELEPHONE (OUTPATIENT)
Dept: PHARMACY | Facility: HOSPITAL | Age: 34
End: 2019-05-15

## 2019-05-17 NOTE — TELEPHONE ENCOUNTER
Mr. Dallas is overdue for INR check. Left voicemail requesting return call to schedule appt prior to refill being granted.

## 2019-05-20 ENCOUNTER — TELEPHONE (OUTPATIENT)
Dept: PHARMACY | Facility: HOSPITAL | Age: 34
End: 2019-05-20

## 2019-05-20 RX ORDER — PANTOPRAZOLE SODIUM 40 MG/1
TABLET, DELAYED RELEASE ORAL
Qty: 90 TABLET | Refills: 1 | OUTPATIENT
Start: 2019-05-20

## 2019-05-21 ENCOUNTER — ANTICOAGULATION VISIT (OUTPATIENT)
Dept: PHARMACY | Facility: HOSPITAL | Age: 34
End: 2019-05-21

## 2019-05-21 DIAGNOSIS — Z79.01 ANTICOAGULANT LONG-TERM USE: ICD-10-CM

## 2019-05-21 DIAGNOSIS — Z95.2 S/P AVR: ICD-10-CM

## 2019-05-21 LAB
INR PPP: 1.3 (ref 0.91–1.09)
PROTHROMBIN TIME: 15.8 SECONDS (ref 10–13.8)

## 2019-05-21 PROCEDURE — 36416 COLLJ CAPILLARY BLOOD SPEC: CPT

## 2019-05-21 PROCEDURE — G0463 HOSPITAL OUTPT CLINIC VISIT: HCPCS

## 2019-05-21 PROCEDURE — 85610 PROTHROMBIN TIME: CPT

## 2019-05-21 RX ORDER — WARFARIN SODIUM 5 MG/1
5 TABLET ORAL NIGHTLY
Qty: 30 TABLET | Refills: 0 | Status: SHIPPED | OUTPATIENT
Start: 2019-05-21 | End: 2019-06-28 | Stop reason: SDUPTHER

## 2019-05-21 RX ORDER — WARFARIN SODIUM 5 MG/1
5 TABLET ORAL NIGHTLY
Qty: 30 TABLET | Refills: 0 | Status: SHIPPED | OUTPATIENT
Start: 2019-05-21 | End: 2019-07-01 | Stop reason: SDUPTHER

## 2019-05-21 NOTE — PROGRESS NOTES
"Anticoagulation Clinic Progress Note    Anticoagulation Summary  As of 2019    INR goal:   2.5-3.5   TTR:   26.2 % (5.4 mo)   INR used for dosin.3! (2019)   Warfarin maintenance plan:   5 mg every day   Weekly warfarin total:   35 mg   Plan last modified:   Carol Ng RPH (2018)   Next INR check:   2019   Priority:   High   Target end date:       Indications    S/P AVR [Z95.2]  Anticoagulant long-term use [Z79.01]             Anticoagulation Episode Summary     INR check location:       Preferred lab:       Send INR reminders to:   [facility] [place]    Comments:         Anticoagulation Care Providers     Provider Role Specialty Phone number    José Antonio Banks MD Referring Cardiology [phone]          Clinic Interview:  Patient Findings     Positives:   Change in medications    Negatives:   Signs/symptoms of thrombosis, Signs/symptoms of bleeding,   Laboratory test error suspected, Change in health, Change in alcohol use,   Change in activity, Upcoming invasive procedure, Emergency department   visit, Upcoming dental procedure, Missed doses, Extra doses, Change in   diet/appetite, Hospital admission, Bruising, Other complaints    Comments:   Reports no missed doses. Started taking an \"amino acid\"   (thinks its phenylalanine), would not expect interaction. Reports using   more norco for back pain, would not expect low INR.      Clinical Outcomes     Negatives:   Major bleeding event, Thromboembolic event,   Anticoagulation-related hospital admission, Anticoagulation-related ED   visit, Anticoagulation-related fatality    Comments:   Reports no missed doses. Started taking an \"amino acid\"   (thinks its phenylalanine), would not expect interaction. Reports using   more norco for back pain, would not expect low INR.        INR History:  Anticoagulation Monitoring 3/29/2019 4/10/2019 2019   INR 4.7 2.7 1.3   INR Date 3/29/2019 4/10/2019 2019   INR Goal " 2.5-3.5 2.5-3.5 2.5-3.5   Trend Same Same Same   Last Week Total 40 mg 35 mg 35 mg   Next Week Total 30 mg 35 mg 45 mg   Sun 5 mg 5 mg -   Mon 5 mg 5 mg -   Tue - 5 mg 10 mg (5/21)   Wed - 5 mg 7.5 mg (5/22)   Thu - 5 mg 7.5 mg (5/23)   Fri Hold (3/29) 5 mg -   Sat 5 mg 5 mg -   Visit Report - - -   Some recent data might be hidden       Plan:  1. INR is Subtherapeutic today- see above in Anticoagulation Summary.  Will instruct Galdino Dallas to Change their warfarin regimen- see above in Anticoagulation Summary.  2. Follow up in 3 days  3. Patient desires warfarin refills. Refill sent to [facility].  4. Verbal and written information provided. Patient expresses understanding and has no further questions at this time.    Bismark Infante [facility]

## 2019-06-17 DIAGNOSIS — F41.1 GENERALIZED ANXIETY DISORDER: ICD-10-CM

## 2019-06-17 RX ORDER — ALPRAZOLAM 0.25 MG/1
0.25 TABLET ORAL DAILY PRN
Qty: 30 TABLET | Refills: 0 | Status: SHIPPED | OUTPATIENT
Start: 2019-06-17 | End: 2020-04-09 | Stop reason: ALTCHOICE

## 2019-06-21 ENCOUNTER — TELEPHONE (OUTPATIENT)
Dept: PHARMACY | Facility: HOSPITAL | Age: 34
End: 2019-06-21

## 2019-06-24 ENCOUNTER — ANTICOAGULATION VISIT (OUTPATIENT)
Dept: PHARMACY | Facility: HOSPITAL | Age: 34
End: 2019-06-24

## 2019-06-24 DIAGNOSIS — Z95.2 S/P AVR: ICD-10-CM

## 2019-06-24 DIAGNOSIS — Z79.01 ANTICOAGULANT LONG-TERM USE: ICD-10-CM

## 2019-06-24 LAB
INR PPP: >8 (ref 0.91–1.09)
PROTHROMBIN TIME: >96 SECONDS (ref 10–13.8)

## 2019-06-24 PROCEDURE — 36416 COLLJ CAPILLARY BLOOD SPEC: CPT

## 2019-06-24 PROCEDURE — G0463 HOSPITAL OUTPT CLINIC VISIT: HCPCS

## 2019-06-24 PROCEDURE — 85610 PROTHROMBIN TIME: CPT

## 2019-06-24 NOTE — PROGRESS NOTES
Anticoagulation Clinic Progress Note    Anticoagulation Summary  As of 6/24/2019    INR goal:   2.5-3.5   TTR:   26.2 % (5.4 mo)   INR used for dosing:   >8.0! (6/24/2019)   Warfarin maintenance plan:   5 mg every day   Weekly warfarin total:   35 mg   Plan last modified:   Carol Ng [facility] (11/29/2018)   Next INR check:   6/25/2019   Priority:   High   Target end date:       Indications    S/P AVR [Z95.2]  Anticoagulant long-term use [Z79.01]             Anticoagulation Episode Summary     INR check location:       Preferred lab:       Send INR reminders to:    YANDY POLANCO [facility]    Comments:         Anticoagulation Care Providers     Provider Role Specialty Phone number    José Antonio Banks MD Referring Cardiology [phone]          Clinic Interview:      INR History:  Anticoagulation Monitoring 4/10/2019 5/21/2019 6/24/2019   INR 2.7 1.3 >8.0   INR Date 4/10/2019 5/21/2019 6/24/2019   INR Goal 2.5-3.5 2.5-3.5 2.5-3.5   Trend Same Same Same   Last Week Total 35 mg 35 mg 40 mg   Next Week Total 35 mg 45 mg 30 mg   Sun 5 mg - -   Mon 5 mg - Hold (6/24)   Tue 5 mg 10 mg (5/21) -   Wed 5 mg 7.5 mg (5/22) -   Thu 5 mg 7.5 mg (5/23) -   Fri 5 mg - -   Sat 5 mg - -   Visit Report - - -   Some recent data might be hidden       Plan:  1. INR is Supratherapeutic today- see above in Anticoagulation Summary.  Will instruct Galdino Dallas to HOLD their warfarin regimen- see above in Anticoagulation Summary.  2. Follow up in 1 day  3. Patient declines warfarin refills.  4. Instructed patient he is at increased risk of bleeding and needs to have INR drawn at lab to confirm but he refuses. States he will go to lab tomorrow. Patient expresses understanding that we would give vitamin K if he is greater than 10 or bleeding and has no further questions at this time.    Paulette Martin [facility]

## 2019-06-25 ENCOUNTER — LAB (OUTPATIENT)
Dept: LAB | Facility: HOSPITAL | Age: 34
End: 2019-06-25

## 2019-06-25 ENCOUNTER — ANTICOAGULATION VISIT (OUTPATIENT)
Dept: PHARMACY | Facility: HOSPITAL | Age: 34
End: 2019-06-25

## 2019-06-25 DIAGNOSIS — Z95.2 S/P AVR: ICD-10-CM

## 2019-06-25 DIAGNOSIS — Z79.01 ANTICOAGULANT LONG-TERM USE: ICD-10-CM

## 2019-06-25 LAB
INR PPP: 5.34 (ref 0.9–1.1)
PROTHROMBIN TIME: 48.8 SECONDS (ref 11.7–14.2)

## 2019-06-25 PROCEDURE — 85610 PROTHROMBIN TIME: CPT

## 2019-06-25 PROCEDURE — 36415 COLL VENOUS BLD VENIPUNCTURE: CPT

## 2019-06-27 NOTE — PROGRESS NOTES
Anticoagulation Clinic Progress Note    Anticoagulation Summary  As of 2019    INR goal:   2.5-3.5   TTR:   26.0 % (6.6 mo)   INR used for dosin.34! (2019)   Warfarin maintenance plan:   5 mg every day   Weekly warfarin total:   35 mg   Plan last modified:   Carol Ng [facility] (2018)   Next INR check:   2019   Priority:   High   Target end date:       Indications    S/P AVR [Z95.2]  Anticoagulant long-term use [Z79.01]             Anticoagulation Episode Summary     INR check location:       Preferred lab:       Send INR reminders to:    [facility] [place]    Comments:         Anticoagulation Care Providers     Provider Role Specialty Phone number    José Antonio Banks MD Referring Cardiology [phone]          Clinic Interview:      INR History:  Anticoagulation Monitoring 2019   INR 1.3 >8.0 5.34   INR Date 2019   INR Goal 2.5-3.5 2.5-3.5 2.5-3.5   Trend Same Same Same   Last Week Total 35 mg 40 mg 35 mg   Next Week Total 45 mg 30 mg 30 mg   Sun - - -   Mon - Hold () -   Tue 10 mg () - Hold ()   Wed 7.5 mg () - 5 mg   Thu 7.5 mg () - 5 mg   Fri - - -   Sat - - -   Visit Report - - -   Some recent data might be hidden       Plan:  1. INR is Supratherapeutic today- see above in Anticoagulation Summary.   Will instruct Galdino Dallas to HOLD  then continue their warfarin regimen- see above in Anticoagulation Summary.  2. Follow up in 3 days  3. Left .  Paulette Martin [facility]

## 2019-06-28 ENCOUNTER — OFFICE VISIT (OUTPATIENT)
Dept: CARDIOLOGY | Facility: CLINIC | Age: 34
End: 2019-06-28

## 2019-06-28 VITALS
WEIGHT: 199 LBS | DIASTOLIC BLOOD PRESSURE: 108 MMHG | BODY MASS INDEX: 31.23 KG/M2 | HEART RATE: 74 BPM | SYSTOLIC BLOOD PRESSURE: 200 MMHG | HEIGHT: 67 IN

## 2019-06-28 DIAGNOSIS — I10 ESSENTIAL HYPERTENSION: ICD-10-CM

## 2019-06-28 DIAGNOSIS — Z95.2 S/P AVR: Primary | ICD-10-CM

## 2019-06-28 DIAGNOSIS — Z79.01 ANTICOAGULANT LONG-TERM USE: ICD-10-CM

## 2019-06-28 DIAGNOSIS — I25.118 CORONARY ARTERY DISEASE OF NATIVE ARTERY OF NATIVE HEART WITH STABLE ANGINA PECTORIS (HCC): ICD-10-CM

## 2019-06-28 PROCEDURE — 99214 OFFICE O/P EST MOD 30 MIN: CPT | Performed by: INTERNAL MEDICINE

## 2019-06-28 PROCEDURE — 93000 ELECTROCARDIOGRAM COMPLETE: CPT | Performed by: INTERNAL MEDICINE

## 2019-06-28 RX ORDER — WARFARIN SODIUM 5 MG/1
5 TABLET ORAL NIGHTLY
Qty: 90 TABLET | Refills: 0 | Status: SHIPPED | OUTPATIENT
Start: 2019-06-28 | End: 2019-07-01 | Stop reason: SDUPTHER

## 2019-06-28 RX ORDER — LISINOPRIL 40 MG/1
40 TABLET ORAL DAILY
Qty: 90 TABLET | Refills: 3 | Status: SHIPPED | OUTPATIENT
Start: 2019-06-28 | End: 2019-07-22 | Stop reason: SDUPTHER

## 2019-06-28 RX ORDER — CARVEDILOL 6.25 MG/1
6.25 TABLET ORAL 2 TIMES DAILY WITH MEALS
Qty: 180 TABLET | Refills: 3 | Status: SHIPPED | OUTPATIENT
Start: 2019-06-28 | End: 2019-09-02 | Stop reason: HOSPADM

## 2019-06-28 RX ORDER — NITROGLYCERIN 0.4 MG/1
TABLET SUBLINGUAL
Qty: 100 TABLET | Refills: 3 | Status: SHIPPED | OUTPATIENT
Start: 2019-06-28 | End: 2019-12-04 | Stop reason: SDUPTHER

## 2019-06-28 RX ORDER — ISOSORBIDE MONONITRATE 30 MG/1
30 TABLET, EXTENDED RELEASE ORAL EVERY MORNING
Qty: 90 TABLET | Refills: 3 | Status: SHIPPED | OUTPATIENT
Start: 2019-06-28 | End: 2019-07-22 | Stop reason: SDUPTHER

## 2019-06-28 NOTE — PROGRESS NOTES
Galdino YRN Burt  1985  Date of Office Visit:6/28/19  Encounter Provider: José Antonio Banks MD  Place of Service: [facility]      CHIEF COMPLAINT:  Coronary artery disease.   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   Mechanical aortic valve replacement.      HISTORY OF PRESENT ILLNESS:  Dr. Merida,   I had the pleasure of seeing your patient in followup today. As you well know, he is a very pleasant 33-year-old gentleman with a medical history of bicuspid aortic valve with mechanical replacement, coronary artery disease, three stents (per my review) in RPL, OM and also a 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     He has had stable angina which has been a [place] [employer] class III for a long period of time.  On our last visit, his discomfort was increasing in frequency intensity and decreasing in the amount of exertion that he had to put forth before he started to have angina.  As such, we recommended he undergo a repeat coronary angiography which he had performed on 12/19/2017.  He was noted to have a 50-60% mid LAD stenosis and a 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  His apical LAD filled via right to left collaterals.  His OM2 and OM3 were chronically occluded and OM3 filled via collaterals as well.  He had a 50% RPL stenosis also documented.  He had an FFR of the mid LAD and the diagonal, and FFR of the RPL that were both not hemodynamically significant.      His chronic total occlusions are not revascularization candidates.      Since her last visit, he states that his angina has been less frequent.  It sounds like he is doing less walking however though. He has run out of his blood pressure medication over the past 48 hours and his blood pressure today is slightly elevated.  He denies any chest pain, visual changes, or headache at this point in time.        He has been struggling with increasing stressors and anxiety secondary  to difficulties at work due to the health of some of his colleagues.             Review of Systems   Constitution: Negative for fever, weakness and malaise/fatigue.   HENT: Negative for nosebleeds and sore throat.    Eyes: Negative for blurred vision and double vision.   Cardiovascular: Positive for chest pain. Negative for claudication, dyspnea on exertion, palpitations and syncope.   Respiratory: Negative for cough, shortness of breath, snoring and wheezing.    Endocrine: Negative for cold intolerance, heat intolerance and polydipsia.   Skin: Negative for itching, poor wound healing and rash.   Musculoskeletal: Positive for myalgias. Negative for joint pain, joint swelling and muscle weakness.   Gastrointestinal: Negative for abdominal pain, melena, nausea and vomiting.   Neurological: Positive for headaches. Negative for light-headedness, loss of balance, seizures and vertigo.   Psychiatric/Behavioral: Positive for depression. Negative for altered mental status.       Past Medical History:   Diagnosis Date   • Anxiety    • Diverticulosis    • GERD (gastroesophageal reflux disease)    • Hyperlipidemia    • Hypertension    • IBS (irritable bowel syndrome)    • Migraines        The following portions of the patient's history were reviewed and updated as appropriate: Social history , Family history and Surgical history     Current Outpatient Medications on File Prior to Visit   Medication Sig Dispense Refill   • ALPRAZolam (XANAX) 0.25 MG tablet Take 1 tablet by mouth Daily As Needed for Anxiety. 30 tablet 0   • carvedilol (COREG) 3.125 MG tablet Take 1 tablet by mouth 2 (Two) Times a Day With Meals. 180 tablet 1   • escitalopram (LEXAPRO) 20 MG tablet Take 1 tablet by mouth Daily. 90 tablet 3   • HYDROcodone-acetaminophen (NORCO) 7.5-325 MG per tablet TK 1 T PO TID PRN  0   • isosorbide mononitrate (IMDUR) 30 MG 24 hr tablet TAKE 1 TABLET BY MOUTH EVERY MORNING. 30 tablet 5   • lisinopril (PRINIVIL,ZESTRIL) 40 MG  "tablet Take 1 tablet by mouth Daily. 90 tablet 3   • nitroglycerin (NITROSTAT) 0.4 MG SL tablet Take no more than 3 doses in 15 minutes. 100 tablet 3   • pantoprazole (PROTONIX) 40 MG EC tablet Take 1 tablet by mouth Daily. 90 tablet 3   • warfarin (COUMADIN) 5 MG tablet TAKE 1 TABLET BY MOUTH EVERY NIGHT. OR AS DIRECTED BY THE MEDICATION MANAGEMENT CLINIC. 30 tablet 0   • warfarin (COUMADIN) 5 MG tablet Take 1 tablet by mouth Every Night. Or as directed by the Medication Management Clinic. 30 tablet 0     No current facility-administered medications on file prior to visit.        Allergies   Allergen Reactions   • Penicillins Rash     Rash on legs when he was an infant       Vitals:    06/28/19 1539   BP: (!) 200/108   Pulse: 74   Weight: 90.3 kg (199 lb)   Height: 170.2 cm (67\")     Physical Exam   Constitutional: He is oriented to person, place, and time. He appears well-developed and well-nourished.   HENT:   Head: Normocephalic and atraumatic.   Eyes: Conjunctivae and EOM are normal. No scleral icterus.   Neck: Normal range of motion. Neck supple. Normal carotid pulses, no hepatojugular reflux and no JVD present. Carotid bruit is not present. No tracheal deviation present. No thyromegaly present.   Cardiovascular: Normal rate and regular rhythm. Exam reveals no gallop and no friction rub.   No murmur heard.  Pulses:       Carotid pulses are 2+ on the right side, and 2+ on the left side.       Radial pulses are 2+ on the right side, and 2+ on the left side.        Femoral pulses are 2+ on the right side, and 2+ on the left side.       Dorsalis pedis pulses are 2+ on the right side, and 2+ on the left side.        Posterior tibial pulses are 2+ on the right side, and 2+ on the left side.   Mechanical S2   Pulmonary/Chest: Breath sounds normal. No respiratory distress. He has no decreased breath sounds. He has no wheezes. He has no rhonchi. He has no rales. He exhibits no tenderness.   Abdominal: Soft. Bowel " sounds are normal. He exhibits no distension. There is no tenderness. There is no rebound.   Musculoskeletal: He exhibits no edema or deformity.   Neurological: He is alert and oriented to person, place, and time. He has normal strength. No sensory deficit.   Skin: No rash noted. No erythema.   Sternotomy   Psychiatric: He has a normal mood and affect. His behavior is normal.       No components found for: CBC  No results found for: CMP  No components found for: LIPID  No results found for: BMP      ECG 12 Lead  Date/Time: 6/28/2019 4:25 PM  Performed by: José Antonio Banks MD  Authorized by: José Antonio Banks MD   Comparison: compared with previous ECG from 11/29/2018  Similar to previous ECG  Rhythm: sinus rhythm  Rate: normal  QRS axis: left  Other findings: left ventricular hypertrophy    Clinical impression: abnormal EKG               12/19/17  Conclusions:   1. Left main: Normal  2. LAD: Chronic total occlusion first and second diagonal branches.  50-60% mid vessel LAD stenosis.  Chronic total occlusion distal LAD.  Apical LAD fills via right to left collaterals  3. LCX: Chronic total occlusion of the OM 2 and OM 3.  OM 3 fills via right to left collaterals  4. RCA: Dominant vessel.  50% mid RPL stenosis.  40% mid RPDA stenosis.  5.  Fractional flow reserve of the mid LAD in to diagonal and RPL hemodynamically nonsignificant.    DISCUSSION/SUMMARY   33 year-old gentleman with a medical history of bicuspid aortic valve with mechanical replacement, coronary artery disease, three stents (per my review) in RPL, OM and also a 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  He presents back to me for follow-up.  He continues to have stable angina but he states that this has actually improved.  I wonder if it is improved because his activity level has decreased.  He has run out of his lisinopril and Imdur and his blood pressure is significant the elevated today.    1. Aortic valve replacement.  He is to continue  his current anticoagulant regimen.  Coumadin  2.   Hypertension.  Poorly controlled.  At this time I would recommend restarting his lisinopril and isosorbide mononitrate.  I will also double his carvedilol therapy.  This should help us with his angina as well.  I think it is unlikely to make much impact on his anxiety as it is a little bit more cardioselective  3.  Coronary artery disease: Angina has not worsened.  Using nitroglycerin less frequently than in the past   4.  Anxiety disorder: Per primary.    Plan on seeing him back in 6 months.    Coronary Artery Disease  Assessment  • The patient has CCS class II - angina only during vigorous physical activity  • The patient has stable angina     Plan  • Lifestyle modifications discussed include adhering to a heart healthy diet, avoidance of tobacco products, maintenance of a healthy weight, medication compliance, regular exercise and regular monitoring of cholesterol and blood pressure    Subjective - Objective  • Current antiplatelet therapy includes aspirin 81 mg

## 2019-07-01 ENCOUNTER — ANTICOAGULATION VISIT (OUTPATIENT)
Dept: PHARMACY | Facility: HOSPITAL | Age: 34
End: 2019-07-01

## 2019-07-01 DIAGNOSIS — Z79.01 ANTICOAGULANT LONG-TERM USE: ICD-10-CM

## 2019-07-01 DIAGNOSIS — Z95.2 S/P AVR: ICD-10-CM

## 2019-07-01 LAB
INR PPP: 3 (ref 0.91–1.09)
PROTHROMBIN TIME: 36.5 SECONDS (ref 10–13.8)

## 2019-07-01 PROCEDURE — 85610 PROTHROMBIN TIME: CPT

## 2019-07-01 PROCEDURE — 36416 COLLJ CAPILLARY BLOOD SPEC: CPT

## 2019-07-01 PROCEDURE — G0463 HOSPITAL OUTPT CLINIC VISIT: HCPCS

## 2019-07-01 RX ORDER — WARFARIN SODIUM 5 MG/1
5 TABLET ORAL NIGHTLY
Qty: 90 TABLET | Refills: 0 | Status: SHIPPED | OUTPATIENT
Start: 2019-07-01 | End: 2019-09-02 | Stop reason: HOSPADM

## 2019-07-01 NOTE — PROGRESS NOTES
Anticoagulation Clinic Progress Note    Anticoagulation Summary  As of 7/1/2019    INR goal:   2.5-3.5   TTR:   25.9 % (6.8 mo)   INR used for dosing:   3.0 (7/1/2019)   Warfarin maintenance plan:   5 mg every day   Weekly warfarin total:   35 mg   Plan last modified:   Carol Ng [facility] (11/29/2018)   Next INR check:   7/15/2019   Priority:   High   Target end date:       Indications    S/P AVR [Z95.2]  Anticoagulant long-term use [Z79.01]             Anticoagulation Episode Summary     INR check location:       Preferred lab:       Send IN[facility] reminders [facility]:    [facility]    Comments:         Anticoagulation Care Providers     Provider Role Specialty Phone number    José Antonio Banks MD Referring Cardiology [phone]          Clinic Interview:      INR History:  Anticoagulation Monitoring 6/24/2019 6/25/2019 7/1/2019   INR >8.0 5.34 3.0   INR Date 6/24/2019 6/25/2019 7/1/2019   INR Goal 2.5-3.5 2.5-3.5 2.5-3.5   Trend Same Same Same   Last Week Total 40 mg 35 mg 25 mg   Next Week Total 30 mg 30 mg 35 mg   Sun - - 5 mg   Mon Hold (6/24) - 5 mg   Tue - Hold (6/25) 5 mg   Wed - 5 mg 5 mg   Thu - 5 mg 5 mg   Fri - - 5 mg   Sat - - 5 mg   Visit Report - - -   Some recent data might be hidden       Plan:  1. INR is Therapeutic today- see above in Anticoagulation Summary.  Will instruct Galdino Dallas to Continue their warfarin regimen- see above in Anticoagulation Summary.  2. Follow up in 2 weeks  3. Patient needs warfarin refills.  4. Verbal and written information provided. Patient expresses understanding and has no further questions at this time.    Paulette Martin [facility]

## 2019-07-13 ENCOUNTER — HOSPITAL ENCOUNTER (EMERGENCY)
Facility: HOSPITAL | Age: 34
Discharge: HOME | End: 2019-07-13
Attending: EMERGENCY MEDICINE | Admitting: EMERGENCY MEDICINE

## 2019-07-13 VITALS
HEIGHT: 67 IN | TEMPERATURE: 97.6 F | SYSTOLIC BLOOD PRESSURE: 158 MMHG | HEART RATE: 79 BPM | DIASTOLIC BLOOD PRESSURE: 100 MMHG | RESPIRATION RATE: 16 BRPM | BODY MASS INDEX: 30.45 KG/M2 | WEIGHT: 194 LBS | OXYGEN SATURATION: 98 %

## 2019-07-13 DIAGNOSIS — R20.2 PARESTHESIA OF RIGHT FOOT: Primary | ICD-10-CM

## 2019-07-13 LAB — GLUCOSE BLDC GLUCOMTR-MCNC: 90 MG/DL (ref 70–130)

## 2019-07-13 PROCEDURE — 99283 EMERGENCY DEPT VISIT LOW MDM: CPT

## 2019-07-13 PROCEDURE — 82962 GLUCOSE BLOOD TEST: CPT

## 2019-07-13 NOTE — ED PROVIDER NOTES
"EMERGENCY DEPARTMENT ENCOUNTER    Room Number:  27/27  Date seen:  7/13/2019  Time seen: 10:34 AM  PCP: Sherry Nava APRN    HPI:  Chief complaint: right foot numbness  Context:Galdino Dallas is a 33 y.o. male who presents to the ED with c/o R foot numbness that started last night and is worse with bearing weight and walking. It affects only the entire plantar aspect of his R foot that started two days ago and makes the pt feel as if his foot is \"asleep\". Toes are spared. Pt has hx of HTN and hyperlipidemia. Pt denies hx of DM. Pt denies any recent injuries or traumas to his foot. Pt is currently anti-coagulated on warfarin. He was searching on the Internet about his symptoms and became worried he had undiagnosed diabetes and came to the ER for further eval.    Onset: gradual  Location:R foot  Radiation: none  Duration: since last night  Timing: waxing and waning  Character:R foot pain  Aggravating Factors: bearing weight and walking  Alleviating Factors: keeping off of his feet  Severity: moderate    ALLERGIES  Penicillins    PAST MEDICAL HISTORY  Active Ambulatory Problems     Diagnosis Date Noted   • Stable angina (CMS/HCC) 05/01/2017   • Coronary artery disease of native artery of native heart with stable angina pectoris (CMS/HCC) 05/01/2017   • Essential hypertension 05/01/2017   • S/P AVR 05/01/2017   • DERRELL (obstructive sleep apnea) 11/02/2017   • Anticoagulant long-term use 11/02/2017   • Diverticulitis of large intestine without perforation or abscess without bleeding 03/06/2018   • Generalized anxiety disorder 01/16/2019     Resolved Ambulatory Problems     Diagnosis Date Noted   • Diverticulitis of intestine without perforation or abscess without bleeding 11/02/2017   • Coumadin toxicity 11/02/2017   • Unstable angina (CMS/HCC) 11/28/2017   • Acute diverticulitis 01/31/2018     Past Medical History:   Diagnosis Date   • Anxiety    • Diverticulosis    • GERD (gastroesophageal reflux disease)    • " Hyperlipidemia    • Hypertension    • IBS (irritable bowel syndrome)    • Migraines        PAST SURGICAL HISTORY  Past Surgical History:   Procedure Laterality Date   • AORTIC VALVE REPAIR/REPLACEMENT  2005    open   • CARDIAC CATHETERIZATION N/A 12/19/2017    Procedure: Coronary angiography;  Surgeon: José Antonio Banks MD;  Location:  [facility];  Service:    • CARDIAC CATHETERIZATION Right 12/19/2017    Procedure: Functional Flow Shallowater;  Surgeon: José Antonio Banks MD;  Location:  [facility];  Service:    • COLONOSCOPY N/A 3/14/2018    Procedure: COLONOSCOPY TO CECUM AND TERMINAL ILEUM;  Surgeon: Nnamdi Davis MD;  Location:  [facility];  Service: Gastroenterology   • CORONARY ANGIOPLASTY WITH STENT PLACEMENT N/A 2010    x3   • TONSILLECTOMY     • WISDOM TOOTH EXTRACTION         FAMILY HISTORY  Family History   Problem Relation Age of Onset   • Heart disease Mother         mitral valve   • Colon polyps Mother    • Heart attack Paternal Grandfather    • Multiple sclerosis Father        SOCIAL HISTORY  Social History     Socioeconomic History   • Marital status:      Spouse name: Not on file   • Number of children: Not on file   • Years of education: Not on file   • Highest education level: Not on file   Tobacco Use   • Smoking status: Never Smoker   • Smokeless tobacco: Never Used   Substance and Sexual Activity   • Alcohol use: Yes     Comment: social   • Drug use: No   • Sexual activity: Defer       REVIEW OF SYSTEMS  Review of Systems   Constitutional: Negative for fever.   Musculoskeletal: Negative for back pain and neck pain.        Pt c/o R foot pain worse with walking and bearing weight   Neurological: Positive for numbness (to the sole of the R foot for 2 days). Negative for weakness and headaches.   All other ROS are otherwise negative unless stated above.     PHYSICAL EXAM  ED Triage Vitals [07/13/19 1017]   Temp Heart Rate Resp BP SpO2   97.6  °F (36.4 °C) 80 16 -- 98 %      Temp src Heart Rate Source Patient Position BP Location FiO2 (%)   Tympanic -- -- -- --     Physical Exam   Constitutional: No distress.   HENT:   Head: Normocephalic and atraumatic.   Eyes: EOM are normal.   Neck: Normal range of motion.   Pulmonary/Chest: No respiratory distress.   Abdominal: There is no tenderness.   Musculoskeletal: He exhibits no edema.   Sensation is intact to light touch throughout the bilateral lower extremities, but dulled diffusely on the plantar aspect of the R foot. Muscle strength is 5/5 and symmetrical with plantarflexion and EHL. Patellar reflexes are 2+ and equal bilaterally. DP and PT pulses are 2+ bilaterally. Pt has normal sensation in all of his toes.   Neurological: He is alert.   Skin: Skin is warm and dry.   Nursing note and vitals reviewed.      LAB RESULTS  Recent Results (from the past 24 hour(s))   POC Glucose Once    Collection Time: 07/13/19 10:43 AM   Result Value Ref Range    Glucose 90 70 - 130 mg/dL       I ordered the above labs and reviewed the results.     MEDICATIONS GIVEN IN ER  Medications - No data to display        PROCEDURES  Procedures      PROGRESS AND CONSULTS    Progress Notes:    1040 POC Glucose ordered for further evaluation.     1105 Reviewed pt's history and workup with Dr. Rios.  After a bedside evaluation; Dr Rios agrees with the plan of care.     1132 Rechecked pt. Pt is resting comfortably. Notified pt of POC glucose level (90). Unusual distribution of symptoms. No dorsal paresthesia. No hx of this. No evidence of DM. No other neurologic deficits.    1239 Rechecked pt. Pt is resting comfortably. Dr. Rios evaluated the pt at bedside. Discussed the plan to discharge the pt home. I instructed the pt to follow up with Podiatry. Pt understands and agrees with the plan, all questions answered.    Disposition vitals:  /100 Comment: Pt reports he is being followed by cardio for b/p problems  Pulse 79   Temp 97.6  "°F (36.4 °C) (Tympanic)   Resp 16   Ht 170.2 cm (67\")   Wt 88 kg (194 lb)   SpO2 98%   BMI 30.38 kg/m²       DIAGNOSIS  Final diagnoses:   Paresthesia of right foot       FOLLOW UP   Kun Faith, DPM  [address] [facility] [address]  [place] [address]  [phone]      Podiatry      RX     Medication List      No changes were made to your prescriptions during this visit.           Documentation assistance provided by parveen Curtis for Solange Lagunas PA-C.  Information recorded by the scribe was done at my direction and has been verified and validated by me.           Oswaldo Curtis  07/13/19 2798       Solange Lagunas PA  07/16/19 3177    "

## 2019-07-13 NOTE — ED PROVIDER NOTES
The BRUNO and I have discussed this patient's history, physical exam, and treatment plan. I have reviewed the documentation and personally had a face to face interaction with the patient  I affirm the documentation and agree with the treatment and plan.  The following describes my personal findings.    The patient presents complaining of numbness of the sole of his right foot.  Pt denies new pain, numbness/weakness of back/leg, denies incontinence, denies swelling, pain, redness, and rash.  Pt denies history of DM but states he was concerned he might be.  Pt is on coumadin for mechan heart valve replacement years ago.  Last INR was 3.0 within the past 1-2 weeks.    Limited physical exam:  Patient is nontoxic appearing alert, oriented  Lungs/cardiovascular: CTA B, reg rate, nontachycardic, mechanical click heard  Abdomen: nontender  Back/extremities: moves all ext well, normal strength and sensation DP/PT pulses palp B strong, cap refill less than 2 secs all toes B feet, no rash/discoloration, negative straight leg raise B, reflexes 2+=B patellar/achilles.        Documentation assistance provided by parveen Lopez.  Information recorded by the scraudi was done at my direction and has been verified and validated by me.         Cherie Lopez  07/13/19 3197       Zahraa Rios MD  07/14/19 4911

## 2019-07-13 NOTE — ED NOTES
"Pt states \"Over the last few days if I stand or walk for too long my right foot starts to feel numb and hurt. It gets better when I'm off my feet, but every time I walk it gets numb.\" Denies injury.     Aliza Roy, RN  07/13/19 1017    "

## 2019-07-13 NOTE — ED NOTES
PT reports right foot pain and toe pain. Pt reports when he moves his toes the pain increases in his foot     Bhavani Garay RN  07/13/19 1026       Bhavani Garay RN  07/13/19 1025

## 2019-07-15 ENCOUNTER — ANTICOAGULATION VISIT (OUTPATIENT)
Dept: PHARMACY | Facility: HOSPITAL | Age: 34
End: 2019-07-15

## 2019-07-15 DIAGNOSIS — Z79.01 ANTICOAGULANT LONG-TERM USE: ICD-10-CM

## 2019-07-15 DIAGNOSIS — Z95.2 S/P AVR: ICD-10-CM

## 2019-07-15 LAB
INR PPP: 2.2 (ref 0.91–1.09)
PROTHROMBIN TIME: 26.5 SECONDS (ref 10–13.8)

## 2019-07-15 PROCEDURE — 36416 COLLJ CAPILLARY BLOOD SPEC: CPT

## 2019-07-15 PROCEDURE — G0463 HOSPITAL OUTPT CLINIC VISIT: HCPCS

## 2019-07-15 PROCEDURE — 85610 PROTHROMBIN TIME: CPT

## 2019-07-15 NOTE — PROGRESS NOTES
Anticoagulation Clinic Progress Note    Anticoagulation Summary  As of 7/15/2019    INR goal:   2.5-3.5   TTR:   28.2 % (7.3 mo)   INR used for dosin.2! (7/15/2019)   Warfarin maintenance plan:   5 mg every day   Weekly warfarin total:   35 mg   Plan last modified:   Carol Ng RPH (2018)   Next INR check:   2019   Priority:   High   Target end date:       Indications    S/P AVR [Z95.2]  Anticoagulant long-term use [Z79.01]             Anticoagulation Episode Summary     INR check location:       Preferred lab:       Send INR reminders to:   [facility]    Comments:         Anticoagulation Care Providers     Provider Role Specialty Phone number    José Antonio Banks MD Referring Cardiology [phone]          Clinic Interview:  Patient Findings     Positives:   Other complaints    Negatives:   Signs/symptoms of thrombosis, Signs/symptoms of bleeding,   Laboratory test error suspected, Change in health, Change in alcohol use,   Change in activity, Upcoming invasive procedure, Emergency department   visit, Upcoming dental procedure, Missed doses, Extra doses, Change in   medications, Change in diet/appetite, Hospital admission, Bruising    Comments:   Increased stress with work. Transitioning jobs in a couple   wks.      Clinical Outcomes     Negatives:   Major bleeding event, Thromboembolic event,   Anticoagulation-related hospital admission, Anticoagulation-related ED   visit, Anticoagulation-related fatality    Comments:   Increased stress with work. Transitioning jobs in a couple   wks.        INR History:  Anticoagulation Monitoring 2019 2019 7/15/2019   INR 5.34 3.0 2.2   INR Date 2019 2019 7/15/2019   INR Goal 2.5-3.5 2.5-3.5 2.5-3.5   Trend Same Same Same   Last Week Total 35 mg 25 mg 35 mg   Next Week Total 30 mg 35 mg 37.5 mg   Sun - 5 mg 5 mg   Mon - 5 mg 7.5 mg (7/15); Otherwise 5 mg   Tue Hold () 5 mg 5 mg   Wed 5 mg 5 mg 5 mg   Thu 5  mg 5 mg 5 mg   Fri - 5 mg 5 mg   Sat - 5 mg 5 mg   Visit Report - - -   Some recent data might be hidden       Plan:  1. INR is Subtherapeutic today- see above in Anticoagulation Summary.  Will instruct Galdino Dallas to Change their warfarin regimen- see above in Anticoagulation Summary.  2. Follow up in 2 weeks  3. Patient declines warfarin refills.  4. Verbal and written information provided. Patient expresses understanding and has no further questions at this time.    Joe De [facility]

## 2019-07-22 RX ORDER — LISINOPRIL 40 MG/1
40 TABLET ORAL DAILY
Qty: 90 TABLET | Refills: 1 | Status: SHIPPED | OUTPATIENT
Start: 2019-07-22 | End: 2019-09-02 | Stop reason: HOSPADM

## 2019-07-22 RX ORDER — ISOSORBIDE MONONITRATE 30 MG/1
30 TABLET, EXTENDED RELEASE ORAL EVERY MORNING
Qty: 90 TABLET | Refills: 1 | Status: SHIPPED | OUTPATIENT
Start: 2019-07-22 | End: 2019-09-02 | Stop reason: HOSPADM

## 2019-08-15 ENCOUNTER — TELEPHONE (OUTPATIENT)
Dept: PHARMACY | Facility: HOSPITAL | Age: 34
End: 2019-08-15

## 2019-08-26 ENCOUNTER — APPOINTMENT (OUTPATIENT)
Dept: CT IMAGING | Facility: HOSPITAL | Age: 34
End: 2019-08-26

## 2019-08-26 ENCOUNTER — APPOINTMENT (OUTPATIENT)
Dept: GENERAL RADIOLOGY | Facility: HOSPITAL | Age: 34
End: 2019-08-26

## 2019-08-26 ENCOUNTER — HOSPITAL ENCOUNTER (INPATIENT)
Facility: HOSPITAL | Age: 34
LOS: 7 days | Discharge: HOME | End: 2019-09-02
Attending: EMERGENCY MEDICINE | Admitting: INTERNAL MEDICINE

## 2019-08-26 DIAGNOSIS — Z95.2 S/P AVR: ICD-10-CM

## 2019-08-26 DIAGNOSIS — I16.1 HYPERTENSIVE EMERGENCY: Primary | ICD-10-CM

## 2019-08-26 DIAGNOSIS — R20.0 NUMBNESS AND TINGLING IN LEFT ARM: ICD-10-CM

## 2019-08-26 DIAGNOSIS — R29.898 LEFT ARM WEAKNESS: ICD-10-CM

## 2019-08-26 DIAGNOSIS — R20.2 NUMBNESS AND TINGLING IN LEFT ARM: ICD-10-CM

## 2019-08-26 DIAGNOSIS — R51.9 ACUTE NONINTRACTABLE HEADACHE, UNSPECIFIED HEADACHE TYPE: ICD-10-CM

## 2019-08-26 LAB
ALBUMIN SERPL-MCNC: 4.9 G/DL (ref 3.5–5.2)
ALBUMIN/GLOB SERPL: 2.5 G/DL
ALP SERPL-CCNC: 72 U/L (ref 39–117)
ALT SERPL W P-5'-P-CCNC: 69 U/L (ref 1–41)
ANION GAP SERPL CALCULATED.3IONS-SCNC: 11 MMOL/L (ref 5–15)
AST SERPL-CCNC: 45 U/L (ref 1–40)
BASOPHILS # BLD AUTO: 0.05 10*3/MM3 (ref 0–0.2)
BASOPHILS NFR BLD AUTO: 0.6 % (ref 0–1.5)
BILIRUB SERPL-MCNC: 0.6 MG/DL (ref 0.2–1.2)
BUN BLD-MCNC: 10 MG/DL (ref 6–20)
BUN/CREAT SERPL: 12 (ref 7–25)
CALCIUM SPEC-SCNC: 9.5 MG/DL (ref 8.6–10.5)
CHLORIDE SERPL-SCNC: 97 MMOL/L (ref 98–107)
CO2 SERPL-SCNC: 25 MMOL/L (ref 22–29)
CREAT BLD-MCNC: 0.83 MG/DL (ref 0.76–1.27)
DEPRECATED RDW RBC AUTO: 43.6 FL (ref 37–54)
EOSINOPHIL # BLD AUTO: 0.06 10*3/MM3 (ref 0–0.4)
EOSINOPHIL NFR BLD AUTO: 0.7 % (ref 0.3–6.2)
ERYTHROCYTE [DISTWIDTH] IN BLOOD BY AUTOMATED COUNT: 12.7 % (ref 12.3–15.4)
GFR SERPL CREATININE-BSD FRML MDRD: 106 ML/MIN/1.73
GLOBULIN UR ELPH-MCNC: 2 GM/DL
GLUCOSE BLD-MCNC: 97 MG/DL (ref 65–99)
HCT VFR BLD AUTO: 48.6 % (ref 37.5–51)
HGB BLD-MCNC: 16.5 G/DL (ref 13–17.7)
IMM GRANULOCYTES # BLD AUTO: 0.04 10*3/MM3 (ref 0–0.05)
IMM GRANULOCYTES NFR BLD AUTO: 0.4 % (ref 0–0.5)
INR PPP: 1.63 (ref 0.9–1.1)
LYMPHOCYTES # BLD AUTO: 1.9 10*3/MM3 (ref 0.7–3.1)
LYMPHOCYTES NFR BLD AUTO: 21.3 % (ref 19.6–45.3)
MCH RBC QN AUTO: 31.7 PG (ref 26.6–33)
MCHC RBC AUTO-ENTMCNC: 34 G/DL (ref 31.5–35.7)
MCV RBC AUTO: 93.5 FL (ref 79–97)
MONOCYTES # BLD AUTO: 0.61 10*3/MM3 (ref 0.1–0.9)
MONOCYTES NFR BLD AUTO: 6.8 % (ref 5–12)
NEUTROPHILS # BLD AUTO: 6.25 10*3/MM3 (ref 1.7–7)
NEUTROPHILS NFR BLD AUTO: 70.2 % (ref 42.7–76)
NRBC BLD AUTO-RTO: 0 /100 WBC (ref 0–0.2)
PLATELET # BLD AUTO: 193 10*3/MM3 (ref 140–450)
PMV BLD AUTO: 9.4 FL (ref 6–12)
POTASSIUM BLD-SCNC: 4.5 MMOL/L (ref 3.5–5.2)
PROT SERPL-MCNC: 6.9 G/DL (ref 6–8.5)
PROTHROMBIN TIME: 19 SECONDS (ref 11.7–14.2)
RBC # BLD AUTO: 5.2 10*6/MM3 (ref 4.14–5.8)
SODIUM BLD-SCNC: 133 MMOL/L (ref 136–145)
TROPONIN T SERPL-MCNC: <0.01 NG/ML (ref 0–0.03)
WBC NRBC COR # BLD: 8.91 10*3/MM3 (ref 3.4–10.8)

## 2019-08-26 PROCEDURE — 84484 ASSAY OF TROPONIN QUANT: CPT | Performed by: EMERGENCY MEDICINE

## 2019-08-26 PROCEDURE — 70450 CT HEAD/BRAIN W/O DYE: CPT

## 2019-08-26 PROCEDURE — 99285 EMERGENCY DEPT VISIT HI MDM: CPT

## 2019-08-26 PROCEDURE — 93005 ELECTROCARDIOGRAM TRACING: CPT | Performed by: EMERGENCY MEDICINE

## 2019-08-26 PROCEDURE — 80053 COMPREHEN METABOLIC PANEL: CPT | Performed by: EMERGENCY MEDICINE

## 2019-08-26 PROCEDURE — 71045 X-RAY EXAM CHEST 1 VIEW: CPT

## 2019-08-26 PROCEDURE — 85025 COMPLETE CBC W/AUTO DIFF WBC: CPT | Performed by: EMERGENCY MEDICINE

## 2019-08-26 PROCEDURE — 85610 PROTHROMBIN TIME: CPT | Performed by: EMERGENCY MEDICINE

## 2019-08-26 PROCEDURE — 25010000002 ONDANSETRON PER 1 MG: Performed by: EMERGENCY MEDICINE

## 2019-08-26 PROCEDURE — 93010 ELECTROCARDIOGRAM REPORT: CPT | Performed by: INTERNAL MEDICINE

## 2019-08-26 PROCEDURE — 25010000002 HYDROMORPHONE PER 4 MG: Performed by: EMERGENCY MEDICINE

## 2019-08-26 RX ORDER — LABETALOL HYDROCHLORIDE 5 MG/ML
20 INJECTION, SOLUTION INTRAVENOUS ONCE
Status: COMPLETED | OUTPATIENT
Start: 2019-08-26 | End: 2019-08-26

## 2019-08-26 RX ORDER — ONDANSETRON 2 MG/ML
4 INJECTION INTRAMUSCULAR; INTRAVENOUS ONCE
Status: COMPLETED | OUTPATIENT
Start: 2019-08-26 | End: 2019-08-26

## 2019-08-26 RX ORDER — HYDROMORPHONE HYDROCHLORIDE 1 MG/ML
0.5 INJECTION, SOLUTION INTRAMUSCULAR; INTRAVENOUS; SUBCUTANEOUS ONCE
Status: COMPLETED | OUTPATIENT
Start: 2019-08-26 | End: 2019-08-26

## 2019-08-26 RX ADMIN — HYDROMORPHONE HYDROCHLORIDE 0.5 MG: 1 INJECTION, SOLUTION INTRAMUSCULAR; INTRAVENOUS; SUBCUTANEOUS at 23:20

## 2019-08-26 RX ADMIN — LABETALOL 20 MG/4 ML (5 MG/ML) INTRAVENOUS SYRINGE 20 MG: at 21:40

## 2019-08-26 RX ADMIN — LABETALOL 20 MG/4 ML (5 MG/ML) INTRAVENOUS SYRINGE 20 MG: at 23:23

## 2019-08-26 RX ADMIN — ONDANSETRON 4 MG: 2 INJECTION INTRAMUSCULAR; INTRAVENOUS at 23:21

## 2019-08-27 ENCOUNTER — APPOINTMENT (OUTPATIENT)
Dept: MRI IMAGING | Facility: HOSPITAL | Age: 34
End: 2019-08-27

## 2019-08-27 ENCOUNTER — APPOINTMENT (OUTPATIENT)
Dept: CARDIOLOGY | Facility: HOSPITAL | Age: 34
End: 2019-08-27

## 2019-08-27 PROBLEM — G43.909 MIGRAINE HEADACHE: Status: ACTIVE | Noted: 2019-08-27

## 2019-08-27 PROBLEM — R20.2 ARM PARESTHESIA, LEFT: Status: ACTIVE | Noted: 2019-08-27

## 2019-08-27 LAB
ALBUMIN SERPL-MCNC: 4.5 G/DL (ref 3.5–5.2)
ALBUMIN/GLOB SERPL: 2.8 G/DL
ALP SERPL-CCNC: 67 U/L (ref 39–117)
ALT SERPL W P-5'-P-CCNC: 66 U/L (ref 1–41)
ANION GAP SERPL CALCULATED.3IONS-SCNC: 13.5 MMOL/L (ref 5–15)
AST SERPL-CCNC: 42 U/L (ref 1–40)
BASOPHILS # BLD AUTO: 0.04 10*3/MM3 (ref 0–0.2)
BASOPHILS NFR BLD AUTO: 0.5 % (ref 0–1.5)
BH CV ECHO MEAS - AO MAX PG (FULL): 30.6 MMHG
BH CV ECHO MEAS - AO MAX PG: 32.5 MMHG
BH CV ECHO MEAS - AO MEAN PG (FULL): 15 MMHG
BH CV ECHO MEAS - AO MEAN PG: 16 MMHG
BH CV ECHO MEAS - AO ROOT AREA (BSA CORRECTED): 1.9
BH CV ECHO MEAS - AO ROOT AREA: 11.9 CM^2
BH CV ECHO MEAS - AO ROOT DIAM: 3.9 CM
BH CV ECHO MEAS - AO V2 MAX: 313 CM/SEC
BH CV ECHO MEAS - AO V2 MEAN: 184 CM/SEC
BH CV ECHO MEAS - AO V2 VTI: 52.5 CM
BH CV ECHO MEAS - ASC AORTA: 3.9 CM
BH CV ECHO MEAS - AVA(I,A): 0.83 CM^2
BH CV ECHO MEAS - AVA(I,D): 0.83 CM^2
BH CV ECHO MEAS - AVA(V,A): 0.84 CM^2
BH CV ECHO MEAS - AVA(V,D): 0.84 CM^2
BH CV ECHO MEAS - BSA(HAYCOCK): 2.1 M^2
BH CV ECHO MEAS - BSA: 2.1 M^2
BH CV ECHO MEAS - BZI_BMI: 30.7 KILOGRAMS/M^2
BH CV ECHO MEAS - BZI_METRIC_HEIGHT: 172.7 CM
BH CV ECHO MEAS - BZI_METRIC_WEIGHT: 91.6 KG
BH CV ECHO MEAS - EDV(CUBED): 148.9 ML
BH CV ECHO MEAS - EDV(MOD-SP2): 121 ML
BH CV ECHO MEAS - EDV(MOD-SP4): 128 ML
BH CV ECHO MEAS - EDV(TEICH): 135.3 ML
BH CV ECHO MEAS - EF(CUBED): 57 %
BH CV ECHO MEAS - EF(MOD-BP): 49 %
BH CV ECHO MEAS - EF(MOD-SP2): 48.8 %
BH CV ECHO MEAS - EF(MOD-SP4): 47.7 %
BH CV ECHO MEAS - EF(TEICH): 48.3 %
BH CV ECHO MEAS - ESV(CUBED): 64 ML
BH CV ECHO MEAS - ESV(MOD-SP2): 62 ML
BH CV ECHO MEAS - ESV(MOD-SP4): 67 ML
BH CV ECHO MEAS - ESV(TEICH): 70 ML
BH CV ECHO MEAS - FS: 24.5 %
BH CV ECHO MEAS - IVS/LVPW: 1.1
BH CV ECHO MEAS - IVSD: 1.5 CM
BH CV ECHO MEAS - LAT PEAK E' VEL: 7 CM/SEC
BH CV ECHO MEAS - LV DIASTOLIC VOL/BSA (35-75): 62.4 ML/M^2
BH CV ECHO MEAS - LV MASS(C)D: 335.5 GRAMS
BH CV ECHO MEAS - LV MASS(C)DI: 163.4 GRAMS/M^2
BH CV ECHO MEAS - LV MAX PG: 1.9 MMHG
BH CV ECHO MEAS - LV MEAN PG: 1 MMHG
BH CV ECHO MEAS - LV SYSTOLIC VOL/BSA (12-30): 32.6 ML/M^2
BH CV ECHO MEAS - LV V1 MAX: 69 CM/SEC
BH CV ECHO MEAS - LV V1 MEAN: 46.2 CM/SEC
BH CV ECHO MEAS - LV V1 VTI: 12.6 CM
BH CV ECHO MEAS - LVIDD: 5.3 CM
BH CV ECHO MEAS - LVIDS: 4 CM
BH CV ECHO MEAS - LVLD AP2: 8.6 CM
BH CV ECHO MEAS - LVLD AP4: 8.6 CM
BH CV ECHO MEAS - LVLS AP2: 7.5 CM
BH CV ECHO MEAS - LVLS AP4: 7.6 CM
BH CV ECHO MEAS - LVOT AREA (M): 3.5 CM^2
BH CV ECHO MEAS - LVOT AREA: 3.5 CM^2
BH CV ECHO MEAS - LVOT DIAM: 2.1 CM
BH CV ECHO MEAS - LVPWD: 1.4 CM
BH CV ECHO MEAS - MED PEAK E' VEL: 5 CM/SEC
BH CV ECHO MEAS - MV A DUR: 0.15 SEC
BH CV ECHO MEAS - MV A MAX VEL: 109 CM/SEC
BH CV ECHO MEAS - MV DEC SLOPE: 370 CM/SEC^2
BH CV ECHO MEAS - MV DEC TIME: 231 SEC
BH CV ECHO MEAS - MV E MAX VEL: 83.1 CM/SEC
BH CV ECHO MEAS - MV E/A: 0.76
BH CV ECHO MEAS - MV MAX PG: 4.6 MMHG
BH CV ECHO MEAS - MV MEAN PG: 1 MMHG
BH CV ECHO MEAS - MV P1/2T MAX VEL: 97 CM/SEC
BH CV ECHO MEAS - MV P1/2T: 76.8 MSEC
BH CV ECHO MEAS - MV V2 MAX: 107 CM/SEC
BH CV ECHO MEAS - MV V2 MEAN: 54.7 CM/SEC
BH CV ECHO MEAS - MV V2 VTI: 28.8 CM
BH CV ECHO MEAS - MVA P1/2T LCG: 2.3 CM^2
BH CV ECHO MEAS - MVA(P1/2T): 2.9 CM^2
BH CV ECHO MEAS - MVA(VTI): 1.5 CM^2
BH CV ECHO MEAS - PA ACC TIME: 0.11 SEC
BH CV ECHO MEAS - PA MAX PG (FULL): 0.21 MMHG
BH CV ECHO MEAS - PA MAX PG: 3.8 MMHG
BH CV ECHO MEAS - PA PR(ACCEL): 31.3 MMHG
BH CV ECHO MEAS - PA V2 MAX: 97 CM/SEC
BH CV ECHO MEAS - RAP SYSTOLE: 3 MMHG
BH CV ECHO MEAS - RV MAX PG: 3.5 MMHG
BH CV ECHO MEAS - RV MEAN PG: 2 MMHG
BH CV ECHO MEAS - RV V1 MAX: 94.2 CM/SEC
BH CV ECHO MEAS - RV V1 MEAN: 56 CM/SEC
BH CV ECHO MEAS - RV V1 VTI: 20.7 CM
BH CV ECHO MEAS - SI(AO): 305.5 ML/M^2
BH CV ECHO MEAS - SI(CUBED): 41.4 ML/M^2
BH CV ECHO MEAS - SI(LVOT): 21.3 ML/M^2
BH CV ECHO MEAS - SI(MOD-SP2): 28.7 ML/M^2
BH CV ECHO MEAS - SI(MOD-SP4): 29.7 ML/M^2
BH CV ECHO MEAS - SI(TEICH): 31.8 ML/M^2
BH CV ECHO MEAS - SV(AO): 627.2 ML
BH CV ECHO MEAS - SV(CUBED): 84.9 ML
BH CV ECHO MEAS - SV(LVOT): 43.6 ML
BH CV ECHO MEAS - SV(MOD-SP2): 59 ML
BH CV ECHO MEAS - SV(MOD-SP4): 61 ML
BH CV ECHO MEAS - SV(TEICH): 65.3 ML
BH CV ECHO MEAS - TAPSE (>1.6): 1.7 CM2
BH CV ECHO MEASUREMENTS AVERAGE E/E' RATIO: 13.85
BH CV VAS BP RIGHT ARM: NORMAL MMHG
BH CV XLRA - RV BASE: 3.7 CM
BH CV XLRA - TDI S': 9 CM/SEC
BILIRUB SERPL-MCNC: 0.8 MG/DL (ref 0.2–1.2)
BUN BLD-MCNC: 10 MG/DL (ref 6–20)
BUN/CREAT SERPL: 12.2 (ref 7–25)
CALCIUM SPEC-SCNC: 9 MG/DL (ref 8.6–10.5)
CHLORIDE SERPL-SCNC: 98 MMOL/L (ref 98–107)
CHOLEST SERPL-MCNC: 255 MG/DL (ref 0–200)
CO2 SERPL-SCNC: 22.5 MMOL/L (ref 22–29)
CREAT BLD-MCNC: 0.82 MG/DL (ref 0.76–1.27)
DEPRECATED RDW RBC AUTO: 43.8 FL (ref 37–54)
DEPRECATED RDW RBC AUTO: 44.8 FL (ref 37–54)
EOSINOPHIL # BLD AUTO: 0.05 10*3/MM3 (ref 0–0.4)
EOSINOPHIL NFR BLD AUTO: 0.6 % (ref 0.3–6.2)
ERYTHROCYTE [DISTWIDTH] IN BLOOD BY AUTOMATED COUNT: 12.7 % (ref 12.3–15.4)
ERYTHROCYTE [DISTWIDTH] IN BLOOD BY AUTOMATED COUNT: 12.8 % (ref 12.3–15.4)
GFR SERPL CREATININE-BSD FRML MDRD: 108 ML/MIN/1.73
GLOBULIN UR ELPH-MCNC: 1.6 GM/DL
GLUCOSE BLD-MCNC: 99 MG/DL (ref 65–99)
GLUCOSE BLDC GLUCOMTR-MCNC: 106 MG/DL (ref 70–130)
GLUCOSE BLDC GLUCOMTR-MCNC: 109 MG/DL (ref 70–130)
GLUCOSE BLDC GLUCOMTR-MCNC: 112 MG/DL (ref 70–130)
HBA1C MFR BLD: 4.7 % (ref 4.8–5.6)
HCT VFR BLD AUTO: 46.7 % (ref 37.5–51)
HCT VFR BLD AUTO: 49.4 % (ref 37.5–51)
HDLC SERPL-MCNC: 43 MG/DL (ref 40–60)
HGB BLD-MCNC: 15.8 G/DL (ref 13–17.7)
HGB BLD-MCNC: 16.3 G/DL (ref 13–17.7)
IMM GRANULOCYTES # BLD AUTO: 0.04 10*3/MM3 (ref 0–0.05)
IMM GRANULOCYTES NFR BLD AUTO: 0.5 % (ref 0–0.5)
INR PPP: 1.49 (ref 0.9–1.1)
LDLC SERPL CALC-MCNC: 136 MG/DL (ref 0–100)
LDLC/HDLC SERPL: 3.17 {RATIO}
LEFT ATRIUM VOLUME INDEX: 23 ML/M2
LV EF 2D ECHO EST: 49 %
LYMPHOCYTES # BLD AUTO: 1.48 10*3/MM3 (ref 0.7–3.1)
LYMPHOCYTES NFR BLD AUTO: 18.5 % (ref 19.6–45.3)
MCH RBC QN AUTO: 31.3 PG (ref 26.6–33)
MCH RBC QN AUTO: 32 PG (ref 26.6–33)
MCHC RBC AUTO-ENTMCNC: 33 G/DL (ref 31.5–35.7)
MCHC RBC AUTO-ENTMCNC: 33.8 G/DL (ref 31.5–35.7)
MCV RBC AUTO: 94.7 FL (ref 79–97)
MCV RBC AUTO: 95 FL (ref 79–97)
MONOCYTES # BLD AUTO: 0.58 10*3/MM3 (ref 0.1–0.9)
MONOCYTES NFR BLD AUTO: 7.2 % (ref 5–12)
NEUTROPHILS # BLD AUTO: 5.83 10*3/MM3 (ref 1.7–7)
NEUTROPHILS NFR BLD AUTO: 72.7 % (ref 42.7–76)
NRBC BLD AUTO-RTO: 0 /100 WBC (ref 0–0.2)
PLATELET # BLD AUTO: 170 10*3/MM3 (ref 140–450)
PLATELET # BLD AUTO: 180 10*3/MM3 (ref 140–450)
PMV BLD AUTO: 10 FL (ref 6–12)
PMV BLD AUTO: 10.3 FL (ref 6–12)
POTASSIUM BLD-SCNC: 4.3 MMOL/L (ref 3.5–5.2)
PROT SERPL-MCNC: 6.1 G/DL (ref 6–8.5)
PROTHROMBIN TIME: 17.7 SECONDS (ref 11.7–14.2)
RBC # BLD AUTO: 4.93 10*6/MM3 (ref 4.14–5.8)
RBC # BLD AUTO: 5.2 10*6/MM3 (ref 4.14–5.8)
SODIUM BLD-SCNC: 134 MMOL/L (ref 136–145)
TRIGL SERPL-MCNC: 379 MG/DL (ref 0–150)
TSH SERPL DL<=0.05 MIU/L-ACNC: 1.98 MIU/ML (ref 0.27–4.2)
VLDLC SERPL-MCNC: 75.8 MG/DL (ref 5–40)
WBC NRBC COR # BLD: 7.91 10*3/MM3 (ref 3.4–10.8)
WBC NRBC COR # BLD: 8.02 10*3/MM3 (ref 3.4–10.8)

## 2019-08-27 PROCEDURE — 85027 COMPLETE CBC AUTOMATED: CPT | Performed by: NURSE PRACTITIONER

## 2019-08-27 PROCEDURE — 80053 COMPREHEN METABOLIC PANEL: CPT | Performed by: NURSE PRACTITIONER

## 2019-08-27 PROCEDURE — 25010000002 HYDROMORPHONE 1 MG/ML SOLUTION: Performed by: INTERNAL MEDICINE

## 2019-08-27 PROCEDURE — 70549 MR ANGIOGRAPH NECK W/O&W/DYE: CPT

## 2019-08-27 PROCEDURE — 99253 IP/OBS CNSLTJ NEW/EST LOW 45: CPT | Performed by: PSYCHIATRY & NEUROLOGY

## 2019-08-27 PROCEDURE — 25010000002 KETOROLAC TROMETHAMINE PER 15 MG: Performed by: NURSE PRACTITIONER

## 2019-08-27 PROCEDURE — 25010000002 ENOXAPARIN PER 10 MG: Performed by: INTERNAL MEDICINE

## 2019-08-27 PROCEDURE — 83036 HEMOGLOBIN GLYCOSYLATED A1C: CPT | Performed by: NURSE PRACTITIONER

## 2019-08-27 PROCEDURE — 70544 MR ANGIOGRAPHY HEAD W/O DYE: CPT

## 2019-08-27 PROCEDURE — 25010000002 ENOXAPARIN PER 10 MG: Performed by: EMERGENCY MEDICINE

## 2019-08-27 PROCEDURE — 25010000002 DIPHENHYDRAMINE PER 50 MG: Performed by: INTERNAL MEDICINE

## 2019-08-27 PROCEDURE — 85610 PROTHROMBIN TIME: CPT | Performed by: PSYCHIATRY & NEUROLOGY

## 2019-08-27 PROCEDURE — 82962 GLUCOSE BLOOD TEST: CPT

## 2019-08-27 PROCEDURE — 25010000002 PROCHLORPERAZINE 10 MG/2ML SOLUTION: Performed by: INTERNAL MEDICINE

## 2019-08-27 PROCEDURE — 85730 THROMBOPLASTIN TIME PARTIAL: CPT | Performed by: PSYCHIATRY & NEUROLOGY

## 2019-08-27 PROCEDURE — 93306 TTE W/DOPPLER COMPLETE: CPT | Performed by: INTERNAL MEDICINE

## 2019-08-27 PROCEDURE — A9577 INJ MULTIHANCE: HCPCS | Performed by: INTERNAL MEDICINE

## 2019-08-27 PROCEDURE — 84443 ASSAY THYROID STIM HORMONE: CPT | Performed by: NURSE PRACTITIONER

## 2019-08-27 PROCEDURE — 25010000002 MAGNESIUM SULFATE IN D5W 1G/100ML (PREMIX) 1-5 GM/100ML-% SOLUTION: Performed by: INTERNAL MEDICINE

## 2019-08-27 PROCEDURE — 70553 MRI BRAIN STEM W/O & W/DYE: CPT

## 2019-08-27 PROCEDURE — 93306 TTE W/DOPPLER COMPLETE: CPT

## 2019-08-27 PROCEDURE — 0 GADOBENATE DIMEGLUMINE 529 MG/ML SOLUTION: Performed by: INTERNAL MEDICINE

## 2019-08-27 PROCEDURE — 85610 PROTHROMBIN TIME: CPT | Performed by: NURSE PRACTITIONER

## 2019-08-27 PROCEDURE — 85025 COMPLETE CBC W/AUTO DIFF WBC: CPT | Performed by: PSYCHIATRY & NEUROLOGY

## 2019-08-27 PROCEDURE — 25010000002 HYDROMORPHONE PER 4 MG: Performed by: PSYCHIATRY & NEUROLOGY

## 2019-08-27 PROCEDURE — 36415 COLL VENOUS BLD VENIPUNCTURE: CPT | Performed by: NURSE PRACTITIONER

## 2019-08-27 PROCEDURE — 25010000002 PERFLUTREN (DEFINITY) 8.476 MG IN SODIUM CHLORIDE 0.9 % 10 ML INJECTION: Performed by: NURSE PRACTITIONER

## 2019-08-27 PROCEDURE — 80061 LIPID PANEL: CPT | Performed by: NURSE PRACTITIONER

## 2019-08-27 RX ORDER — ESCITALOPRAM OXALATE 20 MG/1
20 TABLET ORAL DAILY
Status: DISCONTINUED | OUTPATIENT
Start: 2019-08-27 | End: 2019-09-02 | Stop reason: HOSPADM

## 2019-08-27 RX ORDER — PROCHLORPERAZINE EDISYLATE 5 MG/ML
10 INJECTION INTRAMUSCULAR; INTRAVENOUS ONCE
Status: COMPLETED | OUTPATIENT
Start: 2019-08-28 | End: 2019-08-28

## 2019-08-27 RX ORDER — MAGNESIUM SULFATE 1 G/100ML
1 INJECTION INTRAVENOUS ONCE
Status: COMPLETED | OUTPATIENT
Start: 2019-08-27 | End: 2019-08-27

## 2019-08-27 RX ORDER — NITROGLYCERIN 0.4 MG/1
0.4 TABLET SUBLINGUAL
Status: DISCONTINUED | OUTPATIENT
Start: 2019-08-27 | End: 2019-09-02 | Stop reason: HOSPADM

## 2019-08-27 RX ORDER — DIPHENHYDRAMINE HYDROCHLORIDE 50 MG/ML
12.5 INJECTION INTRAMUSCULAR; INTRAVENOUS ONCE
Status: COMPLETED | OUTPATIENT
Start: 2019-08-27 | End: 2019-08-27

## 2019-08-27 RX ORDER — BISACODYL 10 MG
10 SUPPOSITORY, RECTAL RECTAL DAILY PRN
Status: DISCONTINUED | OUTPATIENT
Start: 2019-08-27 | End: 2019-09-02 | Stop reason: HOSPADM

## 2019-08-27 RX ORDER — KETOROLAC TROMETHAMINE 15 MG/ML
15 INJECTION, SOLUTION INTRAMUSCULAR; INTRAVENOUS ONCE
Status: COMPLETED | OUTPATIENT
Start: 2019-08-27 | End: 2019-08-27

## 2019-08-27 RX ORDER — ASPIRIN 325 MG
325 TABLET ORAL DAILY
Status: DISCONTINUED | OUTPATIENT
Start: 2019-08-27 | End: 2019-08-28

## 2019-08-27 RX ORDER — SODIUM CHLORIDE 9 MG/ML
75 INJECTION, SOLUTION INTRAVENOUS CONTINUOUS
Status: DISCONTINUED | OUTPATIENT
Start: 2019-08-27 | End: 2019-08-30

## 2019-08-27 RX ORDER — PROCHLORPERAZINE EDISYLATE 5 MG/ML
10 INJECTION INTRAMUSCULAR; INTRAVENOUS ONCE
Status: COMPLETED | OUTPATIENT
Start: 2019-08-27 | End: 2019-08-27

## 2019-08-27 RX ORDER — ONDANSETRON 2 MG/ML
4 INJECTION INTRAMUSCULAR; INTRAVENOUS EVERY 6 HOURS PRN
Status: DISCONTINUED | OUTPATIENT
Start: 2019-08-27 | End: 2019-09-02 | Stop reason: HOSPADM

## 2019-08-27 RX ORDER — ACETAMINOPHEN 325 MG/1
650 TABLET ORAL EVERY 4 HOURS PRN
Status: DISCONTINUED | OUTPATIENT
Start: 2019-08-27 | End: 2019-09-02 | Stop reason: HOSPADM

## 2019-08-27 RX ORDER — ASPIRIN 300 MG/1
300 SUPPOSITORY RECTAL DAILY
Status: DISCONTINUED | OUTPATIENT
Start: 2019-08-27 | End: 2019-08-28

## 2019-08-27 RX ORDER — HEPARIN SODIUM 10000 [USP'U]/100ML
12 INJECTION, SOLUTION INTRAVENOUS
Status: DISCONTINUED | OUTPATIENT
Start: 2019-08-28 | End: 2019-09-01

## 2019-08-27 RX ORDER — WARFARIN SODIUM 5 MG/1
5 TABLET ORAL
Status: DISCONTINUED | OUTPATIENT
Start: 2019-08-27 | End: 2019-08-27

## 2019-08-27 RX ORDER — LABETALOL HYDROCHLORIDE 5 MG/ML
10 INJECTION, SOLUTION INTRAVENOUS ONCE
Status: COMPLETED | OUTPATIENT
Start: 2019-08-27 | End: 2019-08-27

## 2019-08-27 RX ORDER — ACETAMINOPHEN 650 MG/1
650 SUPPOSITORY RECTAL EVERY 4 HOURS PRN
Status: DISCONTINUED | OUTPATIENT
Start: 2019-08-27 | End: 2019-09-02 | Stop reason: HOSPADM

## 2019-08-27 RX ORDER — ATORVASTATIN CALCIUM 80 MG/1
80 TABLET, FILM COATED ORAL NIGHTLY
Status: DISCONTINUED | OUTPATIENT
Start: 2019-08-27 | End: 2019-08-29

## 2019-08-27 RX ORDER — WARFARIN SODIUM 5 MG/1
5 TABLET ORAL NIGHTLY
Status: DISCONTINUED | OUTPATIENT
Start: 2019-08-27 | End: 2019-08-27

## 2019-08-27 RX ORDER — HYDROMORPHONE HYDROCHLORIDE 1 MG/ML
0.5 INJECTION, SOLUTION INTRAMUSCULAR; INTRAVENOUS; SUBCUTANEOUS
Status: DISCONTINUED | OUTPATIENT
Start: 2019-08-27 | End: 2019-08-28

## 2019-08-27 RX ADMIN — PROCHLORPERAZINE EDISYLATE 10 MG: 5 INJECTION INTRAMUSCULAR; INTRAVENOUS at 10:52

## 2019-08-27 RX ADMIN — GADOBENATE DIMEGLUMINE 20 ML: 529 INJECTION, SOLUTION INTRAVENOUS at 21:30

## 2019-08-27 RX ADMIN — HYDROMORPHONE HYDROCHLORIDE 0.5 MG: 1 INJECTION, SOLUTION INTRAMUSCULAR; INTRAVENOUS; SUBCUTANEOUS at 13:18

## 2019-08-27 RX ADMIN — HYDROMORPHONE HYDROCHLORIDE 0.5 MG: 1 INJECTION, SOLUTION INTRAMUSCULAR; INTRAVENOUS; SUBCUTANEOUS at 21:59

## 2019-08-27 RX ADMIN — ACETAMINOPHEN 650 MG: 325 TABLET, FILM COATED ORAL at 08:31

## 2019-08-27 RX ADMIN — MAGNESIUM SULFATE HEPTAHYDRATE 1 G: 1 INJECTION, SOLUTION INTRAVENOUS at 12:42

## 2019-08-27 RX ADMIN — PERFLUTREN 3 ML: 6.52 INJECTION, SUSPENSION INTRAVENOUS at 12:00

## 2019-08-27 RX ADMIN — ENOXAPARIN SODIUM 90 MG: 100 INJECTION SUBCUTANEOUS at 17:32

## 2019-08-27 RX ADMIN — LABETALOL 20 MG/4 ML (5 MG/ML) INTRAVENOUS SYRINGE 10 MG: at 02:37

## 2019-08-27 RX ADMIN — ENOXAPARIN SODIUM 90 MG: 100 INJECTION SUBCUTANEOUS at 00:02

## 2019-08-27 RX ADMIN — WARFARIN SODIUM 5 MG: 5 TABLET ORAL at 17:32

## 2019-08-27 RX ADMIN — ATORVASTATIN CALCIUM 80 MG: 80 TABLET, FILM COATED ORAL at 21:59

## 2019-08-27 RX ADMIN — ATORVASTATIN CALCIUM 80 MG: 80 TABLET, FILM COATED ORAL at 01:27

## 2019-08-27 RX ADMIN — ESCITALOPRAM 20 MG: 20 TABLET, FILM COATED ORAL at 08:30

## 2019-08-27 RX ADMIN — ACETAMINOPHEN 650 MG: 325 TABLET, FILM COATED ORAL at 01:28

## 2019-08-27 RX ADMIN — KETOROLAC TROMETHAMINE 15 MG: 15 INJECTION, SOLUTION INTRAMUSCULAR; INTRAVENOUS at 02:36

## 2019-08-27 RX ADMIN — ASPIRIN 325 MG: 325 TABLET ORAL at 08:31

## 2019-08-27 RX ADMIN — HYDROMORPHONE HYDROCHLORIDE 0.5 MG: 1 INJECTION, SOLUTION INTRAMUSCULAR; INTRAVENOUS; SUBCUTANEOUS at 17:32

## 2019-08-27 RX ADMIN — SODIUM CHLORIDE 75 ML/HR: 9 INJECTION, SOLUTION INTRAVENOUS at 02:36

## 2019-08-27 RX ADMIN — DIPHENHYDRAMINE HYDROCHLORIDE 12.5 MG: 50 INJECTION, SOLUTION INTRAMUSCULAR; INTRAVENOUS at 10:52

## 2019-08-27 RX ADMIN — SODIUM CHLORIDE 75 ML/HR: 9 INJECTION, SOLUTION INTRAVENOUS at 19:31

## 2019-08-28 ENCOUNTER — APPOINTMENT (OUTPATIENT)
Dept: CT IMAGING | Facility: HOSPITAL | Age: 34
End: 2019-08-28

## 2019-08-28 PROBLEM — I63.9 ACUTE CEREBROVASCULAR ACCIDENT (CVA) OF CEREBELLUM (HCC): Status: ACTIVE | Noted: 2019-08-28

## 2019-08-28 LAB
APTT PPP: 35 SECONDS (ref 22.7–35.4)
APTT PPP: 50.6 SECONDS (ref 22.7–35.4)
APTT PPP: 54.6 SECONDS (ref 22.7–35.4)
APTT PPP: 64.1 SECONDS (ref 22.7–35.4)
BASOPHILS # BLD AUTO: 0.04 10*3/MM3 (ref 0–0.2)
BASOPHILS NFR BLD AUTO: 0.5 % (ref 0–1.5)
DEPRECATED RDW RBC AUTO: 44.6 FL (ref 37–54)
EOSINOPHIL # BLD AUTO: 0.05 10*3/MM3 (ref 0–0.4)
EOSINOPHIL NFR BLD AUTO: 0.6 % (ref 0.3–6.2)
ERYTHROCYTE [DISTWIDTH] IN BLOOD BY AUTOMATED COUNT: 12.8 % (ref 12.3–15.4)
HCT VFR BLD AUTO: 50.2 % (ref 37.5–51)
HGB BLD-MCNC: 16.7 G/DL (ref 13–17.7)
IMM GRANULOCYTES # BLD AUTO: 0.06 10*3/MM3 (ref 0–0.05)
IMM GRANULOCYTES NFR BLD AUTO: 0.7 % (ref 0–0.5)
INR PPP: 1.31 (ref 0.9–1.1)
INR PPP: 1.34 (ref 0.9–1.1)
LYMPHOCYTES # BLD AUTO: 1.6 10*3/MM3 (ref 0.7–3.1)
LYMPHOCYTES NFR BLD AUTO: 19.6 % (ref 19.6–45.3)
MCH RBC QN AUTO: 31.7 PG (ref 26.6–33)
MCHC RBC AUTO-ENTMCNC: 33.3 G/DL (ref 31.5–35.7)
MCV RBC AUTO: 95.3 FL (ref 79–97)
MONOCYTES # BLD AUTO: 0.59 10*3/MM3 (ref 0.1–0.9)
MONOCYTES NFR BLD AUTO: 7.2 % (ref 5–12)
NEUTROPHILS # BLD AUTO: 5.82 10*3/MM3 (ref 1.7–7)
NEUTROPHILS NFR BLD AUTO: 71.4 % (ref 42.7–76)
NRBC BLD AUTO-RTO: 0 /100 WBC (ref 0–0.2)
PLATELET # BLD AUTO: 162 10*3/MM3 (ref 140–450)
PMV BLD AUTO: 10 FL (ref 6–12)
PROTHROMBIN TIME: 15.9 SECONDS (ref 11.7–14.2)
PROTHROMBIN TIME: 16.3 SECONDS (ref 11.7–14.2)
RBC # BLD AUTO: 5.27 10*6/MM3 (ref 4.14–5.8)
TROPONIN T SERPL-MCNC: <0.01 NG/ML (ref 0–0.03)
TROPONIN T SERPL-MCNC: <0.01 NG/ML (ref 0–0.03)
WBC NRBC COR # BLD: 8.16 10*3/MM3 (ref 3.4–10.8)

## 2019-08-28 PROCEDURE — 25010000002 LEVETIRACETAM IN NACL 0.82% 500 MG/100ML SOLUTION: Performed by: INTERNAL MEDICINE

## 2019-08-28 PROCEDURE — 85730 THROMBOPLASTIN TIME PARTIAL: CPT | Performed by: INTERNAL MEDICINE

## 2019-08-28 PROCEDURE — 25010000002 PROCHLORPERAZINE EDISYLATE PER 10 MG: Performed by: PSYCHIATRY & NEUROLOGY

## 2019-08-28 PROCEDURE — 25010000002 DIPHENHYDRAMINE PER 50 MG: Performed by: INTERNAL MEDICINE

## 2019-08-28 PROCEDURE — 84484 ASSAY OF TROPONIN QUANT: CPT | Performed by: INTERNAL MEDICINE

## 2019-08-28 PROCEDURE — 25010000002 HYDROMORPHONE PER 4 MG: Performed by: PSYCHIATRY & NEUROLOGY

## 2019-08-28 PROCEDURE — 25010000002 METOCLOPRAMIDE PER 10 MG: Performed by: INTERNAL MEDICINE

## 2019-08-28 PROCEDURE — 36415 COLL VENOUS BLD VENIPUNCTURE: CPT | Performed by: PSYCHIATRY & NEUROLOGY

## 2019-08-28 PROCEDURE — 99253 IP/OBS CNSLTJ NEW/EST LOW 45: CPT | Performed by: INTERNAL MEDICINE

## 2019-08-28 PROCEDURE — 99233 SBSQ HOSP IP/OBS HIGH 50: CPT | Performed by: NURSE PRACTITIONER

## 2019-08-28 PROCEDURE — 85610 PROTHROMBIN TIME: CPT | Performed by: NURSE PRACTITIONER

## 2019-08-28 PROCEDURE — 25010000002 HEPARIN (PORCINE) PER 1000 UNITS: Performed by: PSYCHIATRY & NEUROLOGY

## 2019-08-28 PROCEDURE — 85025 COMPLETE CBC W/AUTO DIFF WBC: CPT | Performed by: PSYCHIATRY & NEUROLOGY

## 2019-08-28 PROCEDURE — 70450 CT HEAD/BRAIN W/O DYE: CPT

## 2019-08-28 PROCEDURE — 85730 THROMBOPLASTIN TIME PARTIAL: CPT | Performed by: PSYCHIATRY & NEUROLOGY

## 2019-08-28 RX ORDER — ALPRAZOLAM 0.25 MG/1
0.25 TABLET ORAL DAILY PRN
Status: DISCONTINUED | OUTPATIENT
Start: 2019-08-28 | End: 2019-08-28

## 2019-08-28 RX ORDER — LEVETIRACETAM 5 MG/ML
500 INJECTION INTRAVASCULAR EVERY 8 HOURS
Status: COMPLETED | OUTPATIENT
Start: 2019-08-28 | End: 2019-08-29

## 2019-08-28 RX ORDER — HYDROCODONE BITARTRATE AND ACETAMINOPHEN 7.5; 325 MG/1; MG/1
1 TABLET ORAL EVERY 8 HOURS PRN
Status: DISCONTINUED | OUTPATIENT
Start: 2019-08-28 | End: 2019-08-31

## 2019-08-28 RX ORDER — DIPHENHYDRAMINE HYDROCHLORIDE 50 MG/ML
25 INJECTION INTRAMUSCULAR; INTRAVENOUS EVERY 8 HOURS
Status: COMPLETED | OUTPATIENT
Start: 2019-08-28 | End: 2019-08-29

## 2019-08-28 RX ORDER — METOCLOPRAMIDE HYDROCHLORIDE 5 MG/ML
10 INJECTION INTRAMUSCULAR; INTRAVENOUS EVERY 8 HOURS
Status: COMPLETED | OUTPATIENT
Start: 2019-08-28 | End: 2019-08-29

## 2019-08-28 RX ORDER — ALPRAZOLAM 0.25 MG/1
0.25 TABLET ORAL 3 TIMES DAILY PRN
Status: DISCONTINUED | OUTPATIENT
Start: 2019-08-28 | End: 2019-09-02 | Stop reason: HOSPADM

## 2019-08-28 RX ADMIN — ACETAMINOPHEN 650 MG: 325 TABLET, FILM COATED ORAL at 21:58

## 2019-08-28 RX ADMIN — METOCLOPRAMIDE 10 MG: 5 INJECTION, SOLUTION INTRAMUSCULAR; INTRAVENOUS at 16:10

## 2019-08-28 RX ADMIN — ALPRAZOLAM 0.25 MG: 0.25 TABLET ORAL at 13:04

## 2019-08-28 RX ADMIN — HYDROMORPHONE HYDROCHLORIDE 0.5 MG: 1 INJECTION, SOLUTION INTRAMUSCULAR; INTRAVENOUS; SUBCUTANEOUS at 00:38

## 2019-08-28 RX ADMIN — SODIUM CHLORIDE 75 ML/HR: 9 INJECTION, SOLUTION INTRAVENOUS at 11:17

## 2019-08-28 RX ADMIN — HYDROCODONE BITARTRATE AND ACETAMINOPHEN 1 TABLET: 7.5; 325 TABLET ORAL at 17:46

## 2019-08-28 RX ADMIN — SODIUM CHLORIDE 75 ML/HR: 9 INJECTION, SOLUTION INTRAVENOUS at 23:03

## 2019-08-28 RX ADMIN — HYDROMORPHONE HYDROCHLORIDE 0.5 MG: 1 INJECTION, SOLUTION INTRAMUSCULAR; INTRAVENOUS; SUBCUTANEOUS at 11:59

## 2019-08-28 RX ADMIN — LEVETIRACETAM 500 MG: 5 INJECTION INTRAVENOUS at 16:05

## 2019-08-28 RX ADMIN — PROCHLORPERAZINE EDISYLATE 10 MG: 5 INJECTION INTRAMUSCULAR; INTRAVENOUS at 00:38

## 2019-08-28 RX ADMIN — ESCITALOPRAM 20 MG: 20 TABLET, FILM COATED ORAL at 08:12

## 2019-08-28 RX ADMIN — HEPARIN SODIUM 13 UNITS/KG/HR: 10000 INJECTION, SOLUTION INTRAVENOUS at 22:43

## 2019-08-28 RX ADMIN — METOCLOPRAMIDE 10 MG: 5 INJECTION, SOLUTION INTRAMUSCULAR; INTRAVENOUS at 23:00

## 2019-08-28 RX ADMIN — DIPHENHYDRAMINE HYDROCHLORIDE 25 MG: 50 INJECTION, SOLUTION INTRAMUSCULAR; INTRAVENOUS at 23:00

## 2019-08-28 RX ADMIN — ATORVASTATIN CALCIUM 80 MG: 80 TABLET, FILM COATED ORAL at 20:01

## 2019-08-28 RX ADMIN — HYDROMORPHONE HYDROCHLORIDE 0.5 MG: 1 INJECTION, SOLUTION INTRAMUSCULAR; INTRAVENOUS; SUBCUTANEOUS at 08:12

## 2019-08-28 RX ADMIN — DIPHENHYDRAMINE HYDROCHLORIDE 25 MG: 50 INJECTION, SOLUTION INTRAMUSCULAR; INTRAVENOUS at 16:09

## 2019-08-28 RX ADMIN — ASPIRIN 325 MG: 325 TABLET ORAL at 08:12

## 2019-08-28 RX ADMIN — HEPARIN SODIUM 12 UNITS/KG/HR: 10000 INJECTION, SOLUTION INTRAVENOUS at 00:38

## 2019-08-28 RX ADMIN — HYDROMORPHONE HYDROCHLORIDE 0.5 MG: 1 INJECTION, SOLUTION INTRAMUSCULAR; INTRAVENOUS; SUBCUTANEOUS at 05:14

## 2019-08-28 RX ADMIN — LEVETIRACETAM 500 MG: 5 INJECTION INTRAVENOUS at 23:00

## 2019-08-29 ENCOUNTER — TELEPHONE (OUTPATIENT)
Dept: PHARMACY | Facility: HOSPITAL | Age: 34
End: 2019-08-29

## 2019-08-29 LAB
APTT PPP: 63.3 SECONDS (ref 22.7–35.4)
APTT PPP: 65.9 SECONDS (ref 22.7–35.4)
APTT PPP: 66.4 SECONDS (ref 22.7–35.4)
BASOPHILS # BLD AUTO: 0.03 10*3/MM3 (ref 0–0.2)
BASOPHILS NFR BLD AUTO: 0.4 % (ref 0–1.5)
DEPRECATED RDW RBC AUTO: 44.5 FL (ref 37–54)
EOSINOPHIL # BLD AUTO: 0.05 10*3/MM3 (ref 0–0.4)
EOSINOPHIL NFR BLD AUTO: 0.6 % (ref 0.3–6.2)
ERYTHROCYTE [DISTWIDTH] IN BLOOD BY AUTOMATED COUNT: 12.6 % (ref 12.3–15.4)
HCT VFR BLD AUTO: 47.2 % (ref 37.5–51)
HGB BLD-MCNC: 15.8 G/DL (ref 13–17.7)
IMM GRANULOCYTES # BLD AUTO: 0.05 10*3/MM3 (ref 0–0.05)
IMM GRANULOCYTES NFR BLD AUTO: 0.6 % (ref 0–0.5)
INR PPP: 1.4 (ref 0.9–1.1)
LYMPHOCYTES # BLD AUTO: 2.14 10*3/MM3 (ref 0.7–3.1)
LYMPHOCYTES NFR BLD AUTO: 26.6 % (ref 19.6–45.3)
MCH RBC QN AUTO: 32.2 PG (ref 26.6–33)
MCHC RBC AUTO-ENTMCNC: 33.5 G/DL (ref 31.5–35.7)
MCV RBC AUTO: 96.1 FL (ref 79–97)
MONOCYTES # BLD AUTO: 0.55 10*3/MM3 (ref 0.1–0.9)
MONOCYTES NFR BLD AUTO: 6.8 % (ref 5–12)
NEUTROPHILS # BLD AUTO: 5.22 10*3/MM3 (ref 1.7–7)
NEUTROPHILS NFR BLD AUTO: 65 % (ref 42.7–76)
NRBC BLD AUTO-RTO: 0 /100 WBC (ref 0–0.2)
PLATELET # BLD AUTO: 152 10*3/MM3 (ref 140–450)
PMV BLD AUTO: 10.5 FL (ref 6–12)
PROTHROMBIN TIME: 16.9 SECONDS (ref 11.7–14.2)
RBC # BLD AUTO: 4.91 10*6/MM3 (ref 4.14–5.8)
WBC NRBC COR # BLD: 8.04 10*3/MM3 (ref 3.4–10.8)

## 2019-08-29 PROCEDURE — 97165 OT EVAL LOW COMPLEX 30 MIN: CPT

## 2019-08-29 PROCEDURE — 25010000002 DIPHENHYDRAMINE PER 50 MG: Performed by: NURSE PRACTITIONER

## 2019-08-29 PROCEDURE — 99233 SBSQ HOSP IP/OBS HIGH 50: CPT | Performed by: NURSE PRACTITIONER

## 2019-08-29 PROCEDURE — 85610 PROTHROMBIN TIME: CPT | Performed by: NURSE PRACTITIONER

## 2019-08-29 PROCEDURE — 85730 THROMBOPLASTIN TIME PARTIAL: CPT | Performed by: INTERNAL MEDICINE

## 2019-08-29 PROCEDURE — 25010000002 DIPHENHYDRAMINE PER 50 MG: Performed by: INTERNAL MEDICINE

## 2019-08-29 PROCEDURE — 99232 SBSQ HOSP IP/OBS MODERATE 35: CPT | Performed by: INTERNAL MEDICINE

## 2019-08-29 PROCEDURE — 25010000002 LEVETIRACETAM IN NACL 0.82% 500 MG/100ML SOLUTION: Performed by: INTERNAL MEDICINE

## 2019-08-29 PROCEDURE — 85730 THROMBOPLASTIN TIME PARTIAL: CPT | Performed by: NURSE PRACTITIONER

## 2019-08-29 PROCEDURE — 25010000002 METOCLOPRAMIDE PER 10 MG: Performed by: INTERNAL MEDICINE

## 2019-08-29 PROCEDURE — 25010000002 HEPARIN (PORCINE) PER 1000 UNITS: Performed by: PSYCHIATRY & NEUROLOGY

## 2019-08-29 PROCEDURE — 25010000002 LEVETIRACETAM IN NACL 0.82% 500 MG/100ML SOLUTION: Performed by: NURSE PRACTITIONER

## 2019-08-29 PROCEDURE — 85730 THROMBOPLASTIN TIME PARTIAL: CPT | Performed by: PSYCHIATRY & NEUROLOGY

## 2019-08-29 PROCEDURE — 85025 COMPLETE CBC W/AUTO DIFF WBC: CPT | Performed by: PSYCHIATRY & NEUROLOGY

## 2019-08-29 RX ORDER — ATORVASTATIN CALCIUM 20 MG/1
20 TABLET, FILM COATED ORAL NIGHTLY
Status: DISCONTINUED | OUTPATIENT
Start: 2019-08-29 | End: 2019-09-02 | Stop reason: HOSPADM

## 2019-08-29 RX ORDER — PROCHLORPERAZINE EDISYLATE 5 MG/ML
10 INJECTION INTRAMUSCULAR; INTRAVENOUS ONCE AS NEEDED
Status: COMPLETED | OUTPATIENT
Start: 2019-08-29 | End: 2019-08-31

## 2019-08-29 RX ORDER — DIPHENHYDRAMINE HYDROCHLORIDE 50 MG/ML
25 INJECTION INTRAMUSCULAR; INTRAVENOUS ONCE AS NEEDED
Status: COMPLETED | OUTPATIENT
Start: 2019-08-29 | End: 2019-08-29

## 2019-08-29 RX ORDER — LEVETIRACETAM 5 MG/ML
500 INJECTION INTRAVASCULAR ONCE AS NEEDED
Status: COMPLETED | OUTPATIENT
Start: 2019-08-29 | End: 2019-08-29

## 2019-08-29 RX ADMIN — HEPARIN SODIUM 16 UNITS/KG/HR: 10000 INJECTION, SOLUTION INTRAVENOUS at 17:13

## 2019-08-29 RX ADMIN — ACETAMINOPHEN 650 MG: 325 TABLET, FILM COATED ORAL at 14:10

## 2019-08-29 RX ADMIN — ATORVASTATIN CALCIUM 20 MG: 20 TABLET, FILM COATED ORAL at 19:58

## 2019-08-29 RX ADMIN — DIPHENHYDRAMINE HYDROCHLORIDE 25 MG: 50 INJECTION, SOLUTION INTRAMUSCULAR; INTRAVENOUS at 06:56

## 2019-08-29 RX ADMIN — ESCITALOPRAM 20 MG: 20 TABLET, FILM COATED ORAL at 08:19

## 2019-08-29 RX ADMIN — LEVETIRACETAM 500 MG: 5 INJECTION INTRAVENOUS at 06:57

## 2019-08-29 RX ADMIN — SODIUM CHLORIDE 75 ML/HR: 9 INJECTION, SOLUTION INTRAVENOUS at 13:35

## 2019-08-29 RX ADMIN — HYDROCODONE BITARTRATE AND ACETAMINOPHEN 1 TABLET: 7.5; 325 TABLET ORAL at 21:32

## 2019-08-29 RX ADMIN — DIPHENHYDRAMINE HYDROCHLORIDE 25 MG: 50 INJECTION, SOLUTION INTRAMUSCULAR; INTRAVENOUS at 19:58

## 2019-08-29 RX ADMIN — METOCLOPRAMIDE 10 MG: 5 INJECTION, SOLUTION INTRAMUSCULAR; INTRAVENOUS at 06:56

## 2019-08-29 RX ADMIN — LEVETIRACETAM 500 MG: 5 INJECTION INTRAVENOUS at 19:58

## 2019-08-29 RX ADMIN — HYDROCODONE BITARTRATE AND ACETAMINOPHEN 1 TABLET: 7.5; 325 TABLET ORAL at 11:43

## 2019-08-29 RX ADMIN — HYDROCODONE BITARTRATE AND ACETAMINOPHEN 1 TABLET: 7.5; 325 TABLET ORAL at 02:56

## 2019-08-29 RX ADMIN — ACETAMINOPHEN 650 MG: 325 TABLET, FILM COATED ORAL at 18:53

## 2019-08-29 RX ADMIN — ACETAMINOPHEN 650 MG: 325 TABLET, FILM COATED ORAL at 08:19

## 2019-08-29 RX ADMIN — ALPRAZOLAM 0.25 MG: 0.25 TABLET ORAL at 08:20

## 2019-08-29 NOTE — TELEPHONE ENCOUNTER
Thanks, noted, this is a patient of Dr. Banks (I was simply seeing on call) and best to address with him, thanks

## 2019-08-29 NOTE — TELEPHONE ENCOUNTER
"Per inpatient notes from Dr. Leone possible home testing for this patient was discussed :    This patient has recently been enrolled with home testing company [employer] and they attempted to contact him 7/31/19 to 8/7/19 but he did not return their calls.  He is currently in their \"inactive status\" list.      He would need to call [phone], ext [phone] and speak with Patient Enrollment in order to discuss moving forward as home stephen.  "

## 2019-08-30 ENCOUNTER — APPOINTMENT (OUTPATIENT)
Dept: CT IMAGING | Facility: HOSPITAL | Age: 34
End: 2019-08-30

## 2019-08-30 LAB
APTT PPP: 76.1 SECONDS (ref 22.7–35.4)
BASOPHILS # BLD AUTO: 0.04 10*3/MM3 (ref 0–0.2)
BASOPHILS NFR BLD AUTO: 0.5 % (ref 0–1.5)
DEPRECATED RDW RBC AUTO: 43.4 FL (ref 37–54)
EOSINOPHIL # BLD AUTO: 0.1 10*3/MM3 (ref 0–0.4)
EOSINOPHIL NFR BLD AUTO: 1.4 % (ref 0.3–6.2)
ERYTHROCYTE [DISTWIDTH] IN BLOOD BY AUTOMATED COUNT: 12.5 % (ref 12.3–15.4)
HCT VFR BLD AUTO: 45.8 % (ref 37.5–51)
HGB BLD-MCNC: 15.1 G/DL (ref 13–17.7)
IMM GRANULOCYTES # BLD AUTO: 0.04 10*3/MM3 (ref 0–0.05)
IMM GRANULOCYTES NFR BLD AUTO: 0.5 % (ref 0–0.5)
INR PPP: 1.17 (ref 0.9–1.1)
LYMPHOCYTES # BLD AUTO: 2.49 10*3/MM3 (ref 0.7–3.1)
LYMPHOCYTES NFR BLD AUTO: 33.9 % (ref 19.6–45.3)
MCH RBC QN AUTO: 31.3 PG (ref 26.6–33)
MCHC RBC AUTO-ENTMCNC: 33 G/DL (ref 31.5–35.7)
MCV RBC AUTO: 95 FL (ref 79–97)
MONOCYTES # BLD AUTO: 0.65 10*3/MM3 (ref 0.1–0.9)
MONOCYTES NFR BLD AUTO: 8.8 % (ref 5–12)
NEUTROPHILS # BLD AUTO: 4.03 10*3/MM3 (ref 1.7–7)
NEUTROPHILS NFR BLD AUTO: 54.9 % (ref 42.7–76)
NRBC BLD AUTO-RTO: 0 /100 WBC (ref 0–0.2)
PLATELET # BLD AUTO: 154 10*3/MM3 (ref 140–450)
PMV BLD AUTO: 10.8 FL (ref 6–12)
PROTHROMBIN TIME: 14.6 SECONDS (ref 11.7–14.2)
RBC # BLD AUTO: 4.82 10*6/MM3 (ref 4.14–5.8)
WBC NRBC COR # BLD: 7.35 10*3/MM3 (ref 3.4–10.8)

## 2019-08-30 PROCEDURE — 85730 THROMBOPLASTIN TIME PARTIAL: CPT | Performed by: PSYCHIATRY & NEUROLOGY

## 2019-08-30 PROCEDURE — 85025 COMPLETE CBC W/AUTO DIFF WBC: CPT | Performed by: PSYCHIATRY & NEUROLOGY

## 2019-08-30 PROCEDURE — 70450 CT HEAD/BRAIN W/O DYE: CPT

## 2019-08-30 PROCEDURE — 99232 SBSQ HOSP IP/OBS MODERATE 35: CPT | Performed by: NURSE PRACTITIONER

## 2019-08-30 PROCEDURE — 97530 THERAPEUTIC ACTIVITIES: CPT

## 2019-08-30 PROCEDURE — 25010000002 HEPARIN (PORCINE) PER 1000 UNITS: Performed by: PSYCHIATRY & NEUROLOGY

## 2019-08-30 PROCEDURE — 99233 SBSQ HOSP IP/OBS HIGH 50: CPT | Performed by: NURSE PRACTITIONER

## 2019-08-30 PROCEDURE — 85610 PROTHROMBIN TIME: CPT | Performed by: NURSE PRACTITIONER

## 2019-08-30 RX ORDER — CARVEDILOL 6.25 MG/1
6.25 TABLET ORAL 2 TIMES DAILY WITH MEALS
Status: DISCONTINUED | OUTPATIENT
Start: 2019-08-30 | End: 2019-08-31

## 2019-08-30 RX ORDER — LABETALOL HYDROCHLORIDE 5 MG/ML
10 INJECTION, SOLUTION INTRAVENOUS EVERY 4 HOURS PRN
Status: DISCONTINUED | OUTPATIENT
Start: 2019-08-30 | End: 2019-09-02 | Stop reason: HOSPADM

## 2019-08-30 RX ORDER — WARFARIN SODIUM 10 MG/1
10 TABLET ORAL
Status: COMPLETED | OUTPATIENT
Start: 2019-08-30 | End: 2019-08-30

## 2019-08-30 RX ADMIN — ALPRAZOLAM 0.25 MG: 0.25 TABLET ORAL at 09:40

## 2019-08-30 RX ADMIN — ESCITALOPRAM 20 MG: 20 TABLET, FILM COATED ORAL at 08:12

## 2019-08-30 RX ADMIN — HYDROCODONE BITARTRATE AND ACETAMINOPHEN 1 TABLET: 7.5; 325 TABLET ORAL at 08:12

## 2019-08-30 RX ADMIN — ALPRAZOLAM 0.25 MG: 0.25 TABLET ORAL at 19:55

## 2019-08-30 RX ADMIN — CARVEDILOL 6.25 MG: 6.25 TABLET, FILM COATED ORAL at 13:59

## 2019-08-30 RX ADMIN — ACETAMINOPHEN 650 MG: 325 TABLET, FILM COATED ORAL at 19:50

## 2019-08-30 RX ADMIN — ACETAMINOPHEN 650 MG: 325 TABLET, FILM COATED ORAL at 15:41

## 2019-08-30 RX ADMIN — WARFARIN SODIUM 10 MG: 10 TABLET ORAL at 17:25

## 2019-08-30 RX ADMIN — CARVEDILOL 6.25 MG: 6.25 TABLET, FILM COATED ORAL at 19:51

## 2019-08-30 RX ADMIN — HYDROCODONE BITARTRATE AND ACETAMINOPHEN 1 TABLET: 7.5; 325 TABLET ORAL at 16:31

## 2019-08-30 RX ADMIN — SODIUM CHLORIDE 75 ML/HR: 9 INJECTION, SOLUTION INTRAVENOUS at 02:39

## 2019-08-30 RX ADMIN — HEPARIN SODIUM 16 UNITS/KG/HR: 10000 INJECTION, SOLUTION INTRAVENOUS at 10:27

## 2019-08-30 RX ADMIN — ATORVASTATIN CALCIUM 20 MG: 20 TABLET, FILM COATED ORAL at 19:52

## 2019-08-31 PROBLEM — I16.1 HYPERTENSIVE EMERGENCY: Status: RESOLVED | Noted: 2019-08-26 | Resolved: 2019-08-31

## 2019-08-31 PROBLEM — R20.2 ARM PARESTHESIA, LEFT: Status: RESOLVED | Noted: 2019-08-27 | Resolved: 2019-08-31

## 2019-08-31 LAB
ALBUMIN SERPL-MCNC: 4.4 G/DL (ref 3.5–5.2)
ALBUMIN/GLOB SERPL: 2.1 G/DL
ALP SERPL-CCNC: 69 U/L (ref 39–117)
ALT SERPL W P-5'-P-CCNC: 96 U/L (ref 1–41)
ANION GAP SERPL CALCULATED.3IONS-SCNC: 15.7 MMOL/L (ref 5–15)
APTT PPP: 68.4 SECONDS (ref 22.7–35.4)
APTT PPP: 94.7 SECONDS (ref 22.7–35.4)
AST SERPL-CCNC: 70 U/L (ref 1–40)
BASOPHILS # BLD AUTO: 0.05 10*3/MM3 (ref 0–0.2)
BASOPHILS NFR BLD AUTO: 0.6 % (ref 0–1.5)
BILIRUB SERPL-MCNC: 0.6 MG/DL (ref 0.2–1.2)
BUN BLD-MCNC: 10 MG/DL (ref 6–20)
BUN/CREAT SERPL: 15.2 (ref 7–25)
CALCIUM SPEC-SCNC: 9.4 MG/DL (ref 8.6–10.5)
CHLORIDE SERPL-SCNC: 101 MMOL/L (ref 98–107)
CO2 SERPL-SCNC: 17.3 MMOL/L (ref 22–29)
CREAT BLD-MCNC: 0.66 MG/DL (ref 0.76–1.27)
DEPRECATED RDW RBC AUTO: 43.9 FL (ref 37–54)
EOSINOPHIL # BLD AUTO: 0.13 10*3/MM3 (ref 0–0.4)
EOSINOPHIL NFR BLD AUTO: 1.6 % (ref 0.3–6.2)
ERYTHROCYTE [DISTWIDTH] IN BLOOD BY AUTOMATED COUNT: 12.6 % (ref 12.3–15.4)
GFR SERPL CREATININE-BSD FRML MDRD: 138 ML/MIN/1.73
GLOBULIN UR ELPH-MCNC: 2.1 GM/DL
GLUCOSE BLD-MCNC: 75 MG/DL (ref 65–99)
HCT VFR BLD AUTO: 47.5 % (ref 37.5–51)
HGB BLD-MCNC: 15.8 G/DL (ref 13–17.7)
IMM GRANULOCYTES # BLD AUTO: 0.06 10*3/MM3 (ref 0–0.05)
IMM GRANULOCYTES NFR BLD AUTO: 0.7 % (ref 0–0.5)
INR PPP: 1.2 (ref 0.9–1.1)
LYMPHOCYTES # BLD AUTO: 2.4 10*3/MM3 (ref 0.7–3.1)
LYMPHOCYTES NFR BLD AUTO: 29.1 % (ref 19.6–45.3)
MCH RBC QN AUTO: 31.9 PG (ref 26.6–33)
MCHC RBC AUTO-ENTMCNC: 33.3 G/DL (ref 31.5–35.7)
MCV RBC AUTO: 95.8 FL (ref 79–97)
MONOCYTES # BLD AUTO: 0.67 10*3/MM3 (ref 0.1–0.9)
MONOCYTES NFR BLD AUTO: 8.1 % (ref 5–12)
NEUTROPHILS # BLD AUTO: 4.95 10*3/MM3 (ref 1.7–7)
NEUTROPHILS NFR BLD AUTO: 59.9 % (ref 42.7–76)
NRBC BLD AUTO-RTO: 0 /100 WBC (ref 0–0.2)
PLATELET # BLD AUTO: 143 10*3/MM3 (ref 140–450)
PMV BLD AUTO: 9.9 FL (ref 6–12)
POTASSIUM BLD-SCNC: 3.9 MMOL/L (ref 3.5–5.2)
PROT SERPL-MCNC: 6.5 G/DL (ref 6–8.5)
PROTHROMBIN TIME: 14.9 SECONDS (ref 11.7–14.2)
RBC # BLD AUTO: 4.96 10*6/MM3 (ref 4.14–5.8)
SODIUM BLD-SCNC: 134 MMOL/L (ref 136–145)
WBC NRBC COR # BLD: 8.26 10*3/MM3 (ref 3.4–10.8)

## 2019-08-31 PROCEDURE — 85730 THROMBOPLASTIN TIME PARTIAL: CPT | Performed by: INTERNAL MEDICINE

## 2019-08-31 PROCEDURE — 99232 SBSQ HOSP IP/OBS MODERATE 35: CPT | Performed by: NURSE PRACTITIONER

## 2019-08-31 PROCEDURE — 80053 COMPREHEN METABOLIC PANEL: CPT | Performed by: NURSE PRACTITIONER

## 2019-08-31 PROCEDURE — 25010000002 HEPARIN (PORCINE) PER 1000 UNITS: Performed by: PSYCHIATRY & NEUROLOGY

## 2019-08-31 PROCEDURE — 85610 PROTHROMBIN TIME: CPT | Performed by: NURSE PRACTITIONER

## 2019-08-31 PROCEDURE — 85730 THROMBOPLASTIN TIME PARTIAL: CPT | Performed by: PSYCHIATRY & NEUROLOGY

## 2019-08-31 PROCEDURE — 85025 COMPLETE CBC W/AUTO DIFF WBC: CPT | Performed by: PSYCHIATRY & NEUROLOGY

## 2019-08-31 PROCEDURE — 25010000002 PROCHLORPERAZINE EDISYLATE PER 10 MG: Performed by: NURSE PRACTITIONER

## 2019-08-31 PROCEDURE — 36415 COLL VENOUS BLD VENIPUNCTURE: CPT | Performed by: PSYCHIATRY & NEUROLOGY

## 2019-08-31 PROCEDURE — 99232 SBSQ HOSP IP/OBS MODERATE 35: CPT | Performed by: INTERNAL MEDICINE

## 2019-08-31 RX ORDER — LISINOPRIL 10 MG/1
10 TABLET ORAL EVERY 12 HOURS SCHEDULED
Status: DISCONTINUED | OUTPATIENT
Start: 2019-08-31 | End: 2019-09-02 | Stop reason: HOSPADM

## 2019-08-31 RX ORDER — LISINOPRIL 20 MG/1
20 TABLET ORAL
Status: DISCONTINUED | OUTPATIENT
Start: 2019-08-31 | End: 2019-08-31

## 2019-08-31 RX ORDER — ACETAMINOPHEN, ASPIRIN AND CAFFEINE 250; 250; 65 MG/1; MG/1; MG/1
2 TABLET, FILM COATED ORAL EVERY 6 HOURS PRN
Status: DISCONTINUED | OUTPATIENT
Start: 2019-08-31 | End: 2019-08-31

## 2019-08-31 RX ORDER — WARFARIN SODIUM 10 MG/1
10 TABLET ORAL
Status: COMPLETED | OUTPATIENT
Start: 2019-08-31 | End: 2019-08-31

## 2019-08-31 RX ORDER — HYDROCODONE BITARTRATE AND ACETAMINOPHEN 7.5; 325 MG/1; MG/1
1 TABLET ORAL EVERY 4 HOURS PRN
Status: DISCONTINUED | OUTPATIENT
Start: 2019-08-31 | End: 2019-09-02 | Stop reason: HOSPADM

## 2019-08-31 RX ORDER — CARVEDILOL 12.5 MG/1
12.5 TABLET ORAL 2 TIMES DAILY WITH MEALS
Status: DISCONTINUED | OUTPATIENT
Start: 2019-08-31 | End: 2019-09-02 | Stop reason: HOSPADM

## 2019-08-31 RX ADMIN — HYDROCODONE BITARTRATE AND ACETAMINOPHEN 1 TABLET: 7.5; 325 TABLET ORAL at 00:12

## 2019-08-31 RX ADMIN — HYDROCODONE BITARTRATE AND ACETAMINOPHEN 1 TABLET: 7.5; 325 TABLET ORAL at 17:28

## 2019-08-31 RX ADMIN — HYDROCODONE BITARTRATE AND ACETAMINOPHEN 1 TABLET: 7.5; 325 TABLET ORAL at 13:32

## 2019-08-31 RX ADMIN — HYDROCODONE BITARTRATE AND ACETAMINOPHEN 1 TABLET: 7.5; 325 TABLET ORAL at 21:16

## 2019-08-31 RX ADMIN — ALPRAZOLAM 0.25 MG: 0.25 TABLET ORAL at 21:25

## 2019-08-31 RX ADMIN — ESCITALOPRAM 20 MG: 20 TABLET, FILM COATED ORAL at 09:06

## 2019-08-31 RX ADMIN — LISINOPRIL 10 MG: 10 TABLET ORAL at 21:15

## 2019-08-31 RX ADMIN — ATORVASTATIN CALCIUM 20 MG: 20 TABLET, FILM COATED ORAL at 21:16

## 2019-08-31 RX ADMIN — LABETALOL 20 MG/4 ML (5 MG/ML) INTRAVENOUS SYRINGE 10 MG: at 01:53

## 2019-08-31 RX ADMIN — WARFARIN SODIUM 10 MG: 10 TABLET ORAL at 17:25

## 2019-08-31 RX ADMIN — CARVEDILOL 6.25 MG: 6.25 TABLET, FILM COATED ORAL at 09:06

## 2019-08-31 RX ADMIN — PROCHLORPERAZINE EDISYLATE 10 MG: 5 INJECTION INTRAMUSCULAR; INTRAVENOUS at 02:03

## 2019-08-31 RX ADMIN — CARVEDILOL 12.5 MG: 12.5 TABLET, FILM COATED ORAL at 17:25

## 2019-08-31 RX ADMIN — HYDROCODONE BITARTRATE AND ACETAMINOPHEN 1 TABLET: 7.5; 325 TABLET ORAL at 09:07

## 2019-08-31 RX ADMIN — HEPARIN SODIUM 16 UNITS/KG/HR: 10000 INJECTION, SOLUTION INTRAVENOUS at 05:20

## 2019-09-01 LAB
APTT PPP: 69.1 SECONDS (ref 22.7–35.4)
BASOPHILS # BLD AUTO: 0.05 10*3/MM3 (ref 0–0.2)
BASOPHILS NFR BLD AUTO: 0.7 % (ref 0–1.5)
DEPRECATED RDW RBC AUTO: 42.9 FL (ref 37–54)
EOSINOPHIL # BLD AUTO: 0.12 10*3/MM3 (ref 0–0.4)
EOSINOPHIL NFR BLD AUTO: 1.8 % (ref 0.3–6.2)
ERYTHROCYTE [DISTWIDTH] IN BLOOD BY AUTOMATED COUNT: 12.4 % (ref 12.3–15.4)
HCT VFR BLD AUTO: 45.5 % (ref 37.5–51)
HGB BLD-MCNC: 15.1 G/DL (ref 13–17.7)
IMM GRANULOCYTES # BLD AUTO: 0.06 10*3/MM3 (ref 0–0.05)
IMM GRANULOCYTES NFR BLD AUTO: 0.9 % (ref 0–0.5)
INR PPP: 1.46 (ref 0.9–1.1)
LYMPHOCYTES # BLD AUTO: 2.13 10*3/MM3 (ref 0.7–3.1)
LYMPHOCYTES NFR BLD AUTO: 31.8 % (ref 19.6–45.3)
MCH RBC QN AUTO: 31.5 PG (ref 26.6–33)
MCHC RBC AUTO-ENTMCNC: 33.2 G/DL (ref 31.5–35.7)
MCV RBC AUTO: 95 FL (ref 79–97)
MONOCYTES # BLD AUTO: 0.66 10*3/MM3 (ref 0.1–0.9)
MONOCYTES NFR BLD AUTO: 9.9 % (ref 5–12)
NEUTROPHILS # BLD AUTO: 3.68 10*3/MM3 (ref 1.7–7)
NEUTROPHILS NFR BLD AUTO: 54.9 % (ref 42.7–76)
NRBC BLD AUTO-RTO: 0 /100 WBC (ref 0–0.2)
PLATELET # BLD AUTO: 142 10*3/MM3 (ref 140–450)
PMV BLD AUTO: 10.6 FL (ref 6–12)
PROTHROMBIN TIME: 17.4 SECONDS (ref 11.7–14.2)
RBC # BLD AUTO: 4.79 10*6/MM3 (ref 4.14–5.8)
WBC NRBC COR # BLD: 6.7 10*3/MM3 (ref 3.4–10.8)

## 2019-09-01 PROCEDURE — 25010000002 HEPARIN (PORCINE) PER 1000 UNITS: Performed by: PSYCHIATRY & NEUROLOGY

## 2019-09-01 PROCEDURE — 85730 THROMBOPLASTIN TIME PARTIAL: CPT | Performed by: PSYCHIATRY & NEUROLOGY

## 2019-09-01 PROCEDURE — 85025 COMPLETE CBC W/AUTO DIFF WBC: CPT | Performed by: PSYCHIATRY & NEUROLOGY

## 2019-09-01 PROCEDURE — 25010000002 ENOXAPARIN PER 10 MG: Performed by: INTERNAL MEDICINE

## 2019-09-01 PROCEDURE — 85610 PROTHROMBIN TIME: CPT | Performed by: NURSE PRACTITIONER

## 2019-09-01 RX ORDER — CALCIUM CARBONATE 200(500)MG
2 TABLET,CHEWABLE ORAL 3 TIMES DAILY PRN
Status: DISCONTINUED | OUTPATIENT
Start: 2019-09-01 | End: 2019-09-02 | Stop reason: HOSPADM

## 2019-09-01 RX ORDER — HEPARIN SODIUM 10000 [USP'U]/100ML
12 INJECTION, SOLUTION INTRAVENOUS
Status: DISPENSED | OUTPATIENT
Start: 2019-09-01 | End: 2019-09-01

## 2019-09-01 RX ORDER — WARFARIN SODIUM 10 MG/1
10 TABLET ORAL
Status: COMPLETED | OUTPATIENT
Start: 2019-09-01 | End: 2019-09-01

## 2019-09-01 RX ORDER — CHOLECALCIFEROL (VITAMIN D3) 125 MCG
5 CAPSULE ORAL NIGHTLY PRN
Status: DISCONTINUED | OUTPATIENT
Start: 2019-09-01 | End: 2019-09-02 | Stop reason: HOSPADM

## 2019-09-01 RX ORDER — PANTOPRAZOLE SODIUM 40 MG/1
40 TABLET, DELAYED RELEASE ORAL
Status: DISCONTINUED | OUTPATIENT
Start: 2019-09-01 | End: 2019-09-02 | Stop reason: HOSPADM

## 2019-09-01 RX ADMIN — PANTOPRAZOLE SODIUM 40 MG: 40 TABLET, DELAYED RELEASE ORAL at 11:43

## 2019-09-01 RX ADMIN — HYDROCODONE BITARTRATE AND ACETAMINOPHEN 1 TABLET: 7.5; 325 TABLET ORAL at 01:39

## 2019-09-01 RX ADMIN — ATORVASTATIN CALCIUM 20 MG: 20 TABLET, FILM COATED ORAL at 21:22

## 2019-09-01 RX ADMIN — Medication 2 TABLET: at 11:43

## 2019-09-01 RX ADMIN — HYDROCODONE BITARTRATE AND ACETAMINOPHEN 1 TABLET: 7.5; 325 TABLET ORAL at 15:34

## 2019-09-01 RX ADMIN — HYDROCODONE BITARTRATE AND ACETAMINOPHEN 1 TABLET: 7.5; 325 TABLET ORAL at 23:46

## 2019-09-01 RX ADMIN — LISINOPRIL 10 MG: 10 TABLET ORAL at 07:41

## 2019-09-01 RX ADMIN — LISINOPRIL 10 MG: 10 TABLET ORAL at 21:22

## 2019-09-01 RX ADMIN — HEPARIN SODIUM 14 UNITS/KG/HR: 10000 INJECTION, SOLUTION INTRAVENOUS at 01:39

## 2019-09-01 RX ADMIN — Medication 5 MG: at 23:46

## 2019-09-01 RX ADMIN — ALPRAZOLAM 0.25 MG: 0.25 TABLET ORAL at 23:46

## 2019-09-01 RX ADMIN — WARFARIN SODIUM 10 MG: 10 TABLET ORAL at 18:12

## 2019-09-01 RX ADMIN — CARVEDILOL 12.5 MG: 12.5 TABLET, FILM COATED ORAL at 07:41

## 2019-09-01 RX ADMIN — HYDROCODONE BITARTRATE AND ACETAMINOPHEN 1 TABLET: 7.5; 325 TABLET ORAL at 07:41

## 2019-09-01 RX ADMIN — CARVEDILOL 12.5 MG: 12.5 TABLET, FILM COATED ORAL at 18:12

## 2019-09-01 RX ADMIN — HYDROCODONE BITARTRATE AND ACETAMINOPHEN 1 TABLET: 7.5; 325 TABLET ORAL at 19:32

## 2019-09-01 RX ADMIN — ESCITALOPRAM 20 MG: 20 TABLET, FILM COATED ORAL at 07:41

## 2019-09-01 RX ADMIN — ENOXAPARIN SODIUM 90 MG: 100 INJECTION SUBCUTANEOUS at 21:19

## 2019-09-01 RX ADMIN — HYDROCODONE BITARTRATE AND ACETAMINOPHEN 1 TABLET: 7.5; 325 TABLET ORAL at 11:43

## 2019-09-01 NOTE — PROGRESS NOTES
Pharmacy Consult: Warfarin Dosing/ Monitoring    Galdino Dallas is a 33 y.o. male, estimated creatinine clearance is 137.9 mL/min (by C-G formula based on SCr of 0.82 mg/dL). weighing 89.4 kg (197 lb 3.2 oz).     has a past medical history of Anxiety, Diverticulosis, GERD (gastroesophageal reflux disease), Hyperlipidemia, Hypertension, IBS (irritable bowel syndrome), and Migraines.    Social History     Tobacco Use   • Smoking status: Never Smoker   • Smokeless tobacco: Never Used   Substance Use Topics   • Alcohol use: Yes     Comment: social   • Drug use: No       Results from last 7 days   Lab Units 08/31/19  1234 08/31/19  0508 08/30/19  0357 08/29/19  2321 08/29/19  1046 08/29/19  0511 08/28/19  2216  08/28/19  0643 08/27/19  2318  08/27/19  0854 08/27/19  0530 08/26/19  2139   INR   --  1.20* 1.17*  --   --  1.40*  --   --  1.34* 1.31*  --  1.49*  --  1.63*   APTT seconds 68.4* 94.7* 76.1* 65.9* 63.3* 66.4* 50.6*   < > 64.1* 35.0   < >  --   --   --    HEMOGLOBIN g/dL  --  15.8 15.1  --   --  15.8  --   --  16.7 16.3  --   --  15.8 16.5   HEMATOCRIT %  --  47.5 45.8  --   --  47.2  --   --  50.2 49.4  --   --  46.7 48.6   PLATELETS 10*3/mm3  --  143 154  --   --  152  --   --  162 170  --   --  180 193    < > = values in this interval not displayed.     Results from last 7 days   Lab Units 08/31/19  0508 08/27/19  0530 08/26/19  2139   SODIUM mmol/L 134* 134* 133*   POTASSIUM mmol/L 3.9 4.3 4.5   CHLORIDE mmol/L 101 98 97*   CO2 mmol/L 17.3* 22.5 25.0   BUN mg/dL 10 10 10   CREATININE mg/dL 0.66* 0.82 0.83   CALCIUM mg/dL 9.4 9.0 9.5   BILIRUBIN mg/dL 0.6 0.8 0.6   ALK PHOS U/L 69 67 72   ALT (SGPT) U/L 96* 66* 69*   AST (SGOT) U/L 70* 42* 45*   GLUCOSE mg/dL 75 99 97     Anticoagulation history: CAD s/p stent, bicuspid aortic valve s/p mechanical AVR (on warfarin 5mg at HS prior to admission)    Hospital Anticoagulation:  Consulting provider: Amena WILBURN  Start date: 8/30/19  Indication:  Mechanical valve/stroke  Target INR: 2.5-3.5  Expected duration: indefinite  Bridge Therapy: on heparin drip (asked to bridge with heparin until INR >2)                Date 8/28 8/29 8/30 8/31 9/01        INR 1.34 1.40 1.17 1.2 1.46        Warfarin dose 5mg 5mg 10mg 10mg 10mg          Potential drug interactions: lexapro    Relevant nutrition status: on regular diet/  [id number]    Other:     Education complete?/ Date: not yet    Assessment/Plan:  Dose: 10mg tonight  x 1 dose (INR is subtherapeutic at 1.46)   Monitor INR daily  Follow up tomorrow    Pharmacy will continue to follow until discharge or discontinuation of warfarin.    Reina Atkinson, [facility]    8/30/2019

## 2019-09-01 NOTE — PLAN OF CARE
Problem: Patient Care Overview  Goal: Plan of Care Review  Outcome: Ongoing (interventions implemented as appropriate)   09/01/19 9687   Coping/Psychosocial   Plan of Care Reviewed With patient   Plan of Care Review   Progress improving   OTHER   Outcome Summary VSS with pain under better control. BP's improved with incresed medication, PTT/INR contiune to be therapeutic, will CTM and provide care.

## 2019-09-01 NOTE — PROGRESS NOTES
Chart reviewed. BP looks much better.   Continue current therapy for BP, INR slowly rising. Could consider lovenox if ok with all.     Will see again tomorrow.

## 2019-09-01 NOTE — PLAN OF CARE
Problem: Pain, Acute (Adult)  Goal: Acceptable Pain Control/Comfort Level  Outcome: Ongoing (interventions implemented as appropriate)      Problem: Stroke (Ischemic) (Adult)  Goal: Signs and Symptoms of Listed Potential Problems Will be Absent, Minimized or Managed (Stroke)  Outcome: Ongoing (interventions implemented as appropriate)      Problem: Patient Care Overview  Goal: Plan of Care Review  Outcome: Ongoing (interventions implemented as appropriate)   09/01/19 5618   Coping/Psychosocial   Plan of Care Reviewed With patient   Plan of Care Review   Progress improving   OTHER   Outcome Summary vss, no falls, c/o pain, norco q4, start lovenox tonight/dc heparin, poss dc tomorrow, continue to monitor       Problem: Skin Injury Risk (Adult)  Goal: Skin Health and Integrity  Outcome: Ongoing (interventions implemented as appropriate)

## 2019-09-02 VITALS
OXYGEN SATURATION: 90 % | HEART RATE: 63 BPM | BODY MASS INDEX: 28.48 KG/M2 | SYSTOLIC BLOOD PRESSURE: 116 MMHG | HEIGHT: 68 IN | WEIGHT: 187.9 LBS | RESPIRATION RATE: 18 BRPM | DIASTOLIC BLOOD PRESSURE: 69 MMHG | TEMPERATURE: 98.1 F

## 2019-09-02 LAB
APTT PPP: 38.2 SECONDS (ref 22.7–35.4)
BASOPHILS # BLD AUTO: 0.05 10*3/MM3 (ref 0–0.2)
BASOPHILS NFR BLD AUTO: 0.7 % (ref 0–1.5)
DEPRECATED RDW RBC AUTO: 42.4 FL (ref 37–54)
EOSINOPHIL # BLD AUTO: 0.12 10*3/MM3 (ref 0–0.4)
EOSINOPHIL NFR BLD AUTO: 1.7 % (ref 0.3–6.2)
ERYTHROCYTE [DISTWIDTH] IN BLOOD BY AUTOMATED COUNT: 12.3 % (ref 12.3–15.4)
HCT VFR BLD AUTO: 47.6 % (ref 37.5–51)
HGB BLD-MCNC: 16.3 G/DL (ref 13–17.7)
IMM GRANULOCYTES # BLD AUTO: 0.09 10*3/MM3 (ref 0–0.05)
IMM GRANULOCYTES NFR BLD AUTO: 1.3 % (ref 0–0.5)
INR PPP: 1.51 (ref 0.9–1.1)
LYMPHOCYTES # BLD AUTO: 1.94 10*3/MM3 (ref 0.7–3.1)
LYMPHOCYTES NFR BLD AUTO: 28.2 % (ref 19.6–45.3)
MCH RBC QN AUTO: 31.8 PG (ref 26.6–33)
MCHC RBC AUTO-ENTMCNC: 34.2 G/DL (ref 31.5–35.7)
MCV RBC AUTO: 92.8 FL (ref 79–97)
MONOCYTES # BLD AUTO: 0.73 10*3/MM3 (ref 0.1–0.9)
MONOCYTES NFR BLD AUTO: 10.6 % (ref 5–12)
NEUTROPHILS # BLD AUTO: 3.95 10*3/MM3 (ref 1.7–7)
NEUTROPHILS NFR BLD AUTO: 57.5 % (ref 42.7–76)
NRBC BLD AUTO-RTO: 0 /100 WBC (ref 0–0.2)
PLATELET # BLD AUTO: 154 10*3/MM3 (ref 140–450)
PMV BLD AUTO: 10.3 FL (ref 6–12)
PROTHROMBIN TIME: 17.9 SECONDS (ref 11.7–14.2)
RBC # BLD AUTO: 5.13 10*6/MM3 (ref 4.14–5.8)
WBC NRBC COR # BLD: 6.88 10*3/MM3 (ref 3.4–10.8)

## 2019-09-02 PROCEDURE — 25010000002 ENOXAPARIN PER 10 MG: Performed by: INTERNAL MEDICINE

## 2019-09-02 PROCEDURE — 36415 COLL VENOUS BLD VENIPUNCTURE: CPT | Performed by: PSYCHIATRY & NEUROLOGY

## 2019-09-02 PROCEDURE — 85730 THROMBOPLASTIN TIME PARTIAL: CPT | Performed by: PSYCHIATRY & NEUROLOGY

## 2019-09-02 PROCEDURE — 85610 PROTHROMBIN TIME: CPT | Performed by: NURSE PRACTITIONER

## 2019-09-02 PROCEDURE — 85025 COMPLETE CBC W/AUTO DIFF WBC: CPT | Performed by: PSYCHIATRY & NEUROLOGY

## 2019-09-02 RX ORDER — LISINOPRIL 10 MG/1
10 TABLET ORAL EVERY 12 HOURS SCHEDULED
Qty: 60 TABLET | Refills: 0 | Status: SHIPPED | OUTPATIENT
Start: 2019-09-02 | End: 2019-10-02

## 2019-09-02 RX ORDER — PANTOPRAZOLE SODIUM 40 MG/1
40 TABLET, DELAYED RELEASE ORAL DAILY
Qty: 30 TABLET | Refills: 0 | Status: SHIPPED | OUTPATIENT
Start: 2019-09-02 | End: 2019-10-02

## 2019-09-02 RX ORDER — WARFARIN SODIUM 7.5 MG/1
7.5 TABLET ORAL NIGHTLY
Qty: 5 TABLET | Refills: 0 | Status: SHIPPED | OUTPATIENT
Start: 2019-09-02 | End: 2019-09-04 | Stop reason: SDUPTHER

## 2019-09-02 RX ORDER — WARFARIN SODIUM 7.5 MG/1
7.5 TABLET ORAL
Status: DISCONTINUED | OUTPATIENT
Start: 2019-09-02 | End: 2019-09-02 | Stop reason: HOSPADM

## 2019-09-02 RX ORDER — CARVEDILOL 12.5 MG/1
12.5 TABLET ORAL 2 TIMES DAILY WITH MEALS
Qty: 60 TABLET | Refills: 0 | Status: SHIPPED | OUTPATIENT
Start: 2019-09-02 | End: 2019-10-02

## 2019-09-02 RX ORDER — ATORVASTATIN CALCIUM 20 MG/1
20 TABLET, FILM COATED ORAL NIGHTLY
Qty: 30 TABLET | Refills: 0 | Status: SHIPPED | OUTPATIENT
Start: 2019-09-02 | End: 2019-10-02

## 2019-09-02 RX ADMIN — PANTOPRAZOLE SODIUM 40 MG: 40 TABLET, DELAYED RELEASE ORAL at 07:08

## 2019-09-02 RX ADMIN — ENOXAPARIN SODIUM 90 MG: 100 INJECTION SUBCUTANEOUS at 08:38

## 2019-09-02 RX ADMIN — CARVEDILOL 12.5 MG: 12.5 TABLET, FILM COATED ORAL at 08:38

## 2019-09-02 RX ADMIN — LISINOPRIL 10 MG: 10 TABLET ORAL at 08:38

## 2019-09-02 RX ADMIN — HYDROCODONE BITARTRATE AND ACETAMINOPHEN 1 TABLET: 7.5; 325 TABLET ORAL at 11:51

## 2019-09-02 RX ADMIN — HYDROCODONE BITARTRATE AND ACETAMINOPHEN 1 TABLET: 7.5; 325 TABLET ORAL at 07:28

## 2019-09-02 RX ADMIN — ESCITALOPRAM 20 MG: 20 TABLET, FILM COATED ORAL at 08:38

## 2019-09-02 NOTE — PLAN OF CARE
Problem: Patient Care Overview  Goal: Plan of Care Review  Outcome: Ongoing (interventions implemented as appropriate)   09/02/19 9337   Coping/Psychosocial   Plan of Care Reviewed With patient   Plan of Care Review   Progress improving   OTHER   Outcome Summary VSS with contiuned c/o headache pain requiring norco frequently, Sleeping well with melatoin, Lovenox started last night and heparin getts stopped - will cont. to mx labs and anticipate DC in the AM. will CTM and provide care.

## 2019-09-02 NOTE — DISCHARGE SUMMARY
Date of Admission: 8/26/2019  Date of Discharge:  9/2/2019  Primary Care Physician: Sherry Nava, APRN     Discharge Diagnosis:  Active Hospital Problems    Diagnosis  POA   • **Acute cerebrovascular accident (CVA) (CMS/[facility]) [I63.9]  Yes   • Migraine headache [G43.909]  Yes   • Anticoagulant long-term use [Z79.01]  Not Applicable   • Coronary artery disease of native artery of native heart with stable angina pectoris (CMS/[facility]) [I25.118]  Yes   • S/P AVR [Z95.2]  Not Applicable   • Essential hypertension [I10]  Yes      Resolved Hospital Problems    Diagnosis Date Resolved POA   • Arm paresthesia, left [R20.2] 08/31/2019 Yes   • Hypertensive emergency [I16.1] 08/31/2019 Yes       DETAILS OF HOSPITAL STAY     Pertinent Test Results and Procedures Performed  CXR:  Cardiomegaly is identified. There is no evidence of vascular congestion.  No pneumothorax or pleural effusion is seen. There is some bibasilar  atelectasis. The patient is status post median sternotomy.    Head CT on 8/26/2019:  No acute intracranial hemorrhage is seen. There is mild atrophy. This is  somewhat advanced for the age 33. There is an old left basal ganglia  infarct. There is no midline shift or mass effect. Inspissated  secretions are identified within the left sphenoid sinus. The mastoid  air cells appear clear.    Brain MRI with MRA of the head neck on 8/27/2019:  1. Large area of restricted diffusion identified within the medial right  temporal lobe, as well as additional tiny foci of restricted diffusion  within the right thalamus and right corona radiata. No evidence of  hemorrhagic transformation at this time.  2. Occlusion of the proximal right posterior cerebral artery. No  reconstitution is seen.    Head CT on 8/28/2019:  Acute infarct involving the right PCA vascular distribution  unchanged in area as compared to 08/27/2019. There is no evidence of  hemorrhage or of a new infarction.    Head CT on 8/30/2019:  Again identified is a  large right PCA distribution infarct  with no evidence of hemorrhage. The most anterior aspect of the infarct  noted on 08/27/2019 MRI is more conspicuous on the current examination  as is a infarct involving the lateral aspect of the thalamus on the  right. There is no evidence of hydrocephalus or midline shift. Further  evaluation could be performed with a MRI examination of the brain as  indicated.    Hospital Course  This is a very pleasant 33-year-old male with a history of aortic valve replacement who is on Coumadin at home and presented with blurry vision and left upper extremity numbness as well as headache.  Please see H&P for full details of admission.  He was found to have a left visual field cut off along with a right PCA stroke as described above.  Upon admission he was evaluated by neurology as well as his cardiologist.  Anticoagulation was cautiously reinstituted with a heparin drip and he had serial head CTs that did not show any evidence of hemorrhagic conversion.  Neurology cleared him to restart Coumadin as well as transition to Lovenox for bridging.  His INR today is 1.5.  He is medically stable at this time.  His blood pressure has been normalized.  He has been cleared by neurology for discharge and will follow up in their office in 4 weeks.  The patient will also follow-up with ophthalmology and occupational therapy for driving rehab exercises and will not drive until cleared by those services in the outpatient setting.  He is very comfortable with Lovenox as he is done this before at home.  He goes to the anticoagulation clinic here at [facility] and I have instructed him to return there in 2 days time to have his INR rechecked.  He expresses understanding of all this and will be released home today in stable condition with close outpatient follow-up.    Physical Exam at Discharge:  General: No acute distress, AAOx3  HEENT: EOMI, PERRL  Cardiovascular: +s1 and s2, RRR, mechanical valve  click  Lungs: No rhonchi or wheezing  Abdomen: soft, nontender    Consults:   Consults     Date and Time Order Name Status Description    8/28/2019 1359 Inpatient Cardiology Consult      8/28/2019 1356 Inpatient Cardiology Consult Completed     8/27/2019 0009 Inpatient Neurology Consult Stroke Completed     8/26/2019 2307 [facility] (on-call MD unless specified) Details Completed             Condition on Discharge: Stable, improved    Discharge Disposition  Home or Self Care    Discharge Medications     Discharge Medications      New Medications      Instructions Start Date   atorvastatin 20 MG tablet  Commonly known as:  LIPITOR   20 mg, Oral, Nightly      enoxaparin 100 MG/ML solution syringe  Commonly known as:  LOVENOX   1 mg/kg, Subcutaneous, Every 12 Hours      pantoprazole 40 MG EC tablet  Commonly known as:  PROTONIX   40 mg, Oral, Daily         Changes to Medications      Instructions Start Date   carvedilol 12.5 MG tablet  Commonly known as:  COREG  What changed:    · medication strength  · how much to take   12.5 mg, Oral, 2 Times Daily With Meals      lisinopril 10 MG tablet  Commonly known as:  PRINIVILZESTRIL  What changed:    · medication strength  · how much to take  · when to take this   10 mg, Oral, Every 12 Hours Scheduled      warfarin 7.5 MG tablet  Commonly known as:  COUMADIN  What changed:    · medication strength  · how much to take  · additional instructions   7.5 mg, Oral, Nightly         Continue These Medications      Instructions Start Date   ALPRAZolam 0.25 MG tablet  Commonly known as:  XANAX   0.25 mg, Oral, Daily PRN      escitalopram 20 MG tablet  Commonly known as:  LEXAPRO   20 mg, Oral, Daily      HYDROcodone-acetaminophen 7.5-325 MG per tablet  Commonly known as:  NORCO   TK 1 T PO TID PRN      nitroglycerin 0.4 MG SL tablet  Commonly known as:  NITROSTAT   Take no more than 3 doses in 15 minutes.         Stop These Medications    isosorbide mononitrate 30 MG 24 hr tablet  Commonly  known as:  IMDUR            Discharge Diet:   Diet Instructions     Diet: Regular; Thin      Discharge Diet:  Regular    Fluid Consistency:  Thin          Activity at Discharge:   Activity Instructions     Activity as Tolerated      Driving Restrictions      Type of Restriction:  Driving    Driving Restrictions:  No Driving    Until cleared by ophthalmology          Follow-up Appointments  Future Appointments   Date Time Provider Department Center   1/29/2020 12:20 PM José Antonio Banks MD [id number] None     Additional Instructions for the Follow-ups that You Need to Schedule     Discharge Follow-up with PCP   As directed       Currently Documented PCP:    Sherry Nava APRN    PCP Phone Number:    [phone]     Follow Up Details:  1 week         Discharge Follow-up with Specialty: [facility] in 2 days for INR monitoring   As directed      Specialty:  [facility] clinic in 2 days for INR monitoring         Discharge Follow-up with Specialty: [facility] Neurology stroke clinic in 4 weeks   As directed      Specialty:  [facility] stroke clinic in 4 weeks         Discharge Follow-up with Specialty: Ophthalmology in 4-6 weeks to be cleared for driving   As directed      Specialty:  Ophthalmology in 4-6 weeks to be cleared for driving         Discharge Follow-up with Specified Provider: Dr. Banks per his recommendations   As directed      To:  Dr. Banks per his recommendations         Protime-INR    Sep 04, 2019 (Approximate)      Is Patient on anti-coag:  Yes         Additional information on Labs and Follow-ups:      Home INR monitoring: [employer] [phone] for Patient Enrollment.                    I have examined and discussed discharge planning with the patient today.     Nnamdi Franco MD  09/02/19  1:11 PM    Time: Discharge greater than 30 min

## 2019-09-02 NOTE — PROGRESS NOTES
Pharmacy Consult: Warfarin Dosing/ Monitoring    Galdino Dallas is a 33 y.o. male, estimated creatinine clearance is 167.5 mL/min (A) (by C-G formula based on SCr of 0.66 mg/dL (L)). weighing 85.2 kg (187 lb 14.4 oz).     has a past medical history of Anxiety, Diverticulosis, GERD (gastroesophageal reflux disease), Hyperlipidemia, Hypertension, IBS (irritable bowel syndrome), and Migraines.    Social History     Tobacco Use   • Smoking status: Never Smoker   • Smokeless tobacco: Never Used   Substance Use Topics   • Alcohol use: Yes     Comment: social   • Drug use: No       Results from last 7 days   Lab Units 09/02/19  0617 09/01/19  0516 08/31/19  1234 08/31/19  0508 08/30/19  0357 08/29/19  2321 08/29/19  1046 08/29/19  0511  08/28/19  0643 08/27/19  2318   INR  1.51* 1.46*  --  1.20* 1.17*  --   --  1.40*  --  1.34* 1.31*   APTT seconds 38.2* 69.1* 68.4* 94.7* 76.1* 65.9* 63.3* 66.4*   < > 64.1* 35.0   HEMOGLOBIN g/dL 16.3 15.1  --  15.8 15.1  --   --  15.8  --  16.7 16.3   HEMATOCRIT % 47.6 45.5  --  47.5 45.8  --   --  47.2  --  50.2 49.4   PLATELETS 10*3/mm3 154 142  --  143 154  --   --  152  --  162 170    < > = values in this interval not displayed.     Results from last 7 days   Lab Units 08/31/19  0508 08/27/19  0530 08/26/19  2139   SODIUM mmol/L 134* 134* 133*   POTASSIUM mmol/L 3.9 4.3 4.5   CHLORIDE mmol/L 101 98 97*   CO2 mmol/L 17.3* 22.5 25.0   BUN mg/dL 10 10 10   CREATININE mg/dL 0.66* 0.82 0.83   CALCIUM mg/dL 9.4 9.0 9.5   BILIRUBIN mg/dL 0.6 0.8 0.6   ALK PHOS U/L 69 67 72   ALT (SGPT) U/L 96* 66* 69*   AST (SGOT) U/L 70* 42* 45*   GLUCOSE mg/dL 75 99 97     Anticoagulation history: CAD s/p stent, bicuspid aortic valve s/p mechanical AVR (on warfarin 5mg at HS prior to admission per [facility] clinic with last visit 7/15/19)    Hospital Anticoagulation:  Consulting provider: Amena WILBURN  Start date: 8/30/19  Indication: mechanical AVR replacement/stroke  Target INR:  2.5-3.5  Expected duration: indefinite  Bridge Therapy: prior Heparin drip, Dr. Franco converted to Enoxaparin 1mg/kg (90mg) on 9/1                Date 8/28 8/29 8/30 8/31 9/01 9/2        INR 1.34 1.40 1.17 1.2 1.46 1.51       Warfarin dose 5mg 5mg 10mg 10mg 10mg          Potential drug interactions:   -APAP- increased risk of bleeding  -Escitalopram (home med) increased risk of bleeding  -Melatonin increased risk of bleeding  -Pantoprazole - increase risk of INR elevation    Relevant nutrition status: on regular diet    Education complete?/ Date: TBD, followed by [facility] clinic    Assessment/Plan:  Patient receives Warfarin 5mg daily at home and has received 10mg daily the past 3 days.  INR changes typically seen 72-96hr after Warfarin dose adjustments so anticipate seeing continued upwards trend.  As on Enoxaparin bridge and INR starting to increase, will re-dose with 7.5mg x1 dose today and Pharmacy will follow daily PT/INR    Pharmacy will continue to follow until discharge or discontinuation of warfarin.   Jason Ghosh, PharmD, BCPS  9/2/2019

## 2019-09-02 NOTE — PLAN OF CARE
Problem: Patient Care Overview  Goal: Plan of Care Review  Outcome: Ongoing (interventions implemented as appropriate)   09/02/19 4037   Coping/Psychosocial   Plan of Care Reviewed With patient   Plan of Care Review   Progress improving   OTHER   Outcome Summary vss, no falls, c/ol pain, prn norco, dc home today, educated/assessed pt give own lovenox injection, continue to monitor       Problem: Skin Injury Risk (Adult)  Goal: Skin Health and Integrity  Outcome: Ongoing (interventions implemented as appropriate)

## 2019-09-03 ENCOUNTER — READMISSION MANAGEMENT (OUTPATIENT)
Dept: CALL CENTER | Facility: HOSPITAL | Age: 34
End: 2019-09-03

## 2019-09-03 NOTE — PAYOR COMM NOTE
"Alison Dallast YRN (33 y.o. Male)   PLEASE SEE ATTACHED CLN REVIEW.       REF#[id number]    PLEASE CALL   OR  [phone] WITH INPT AUTH AND DAYS APPROVED.    THANK YOU    MIRZA HARRIS LPN  CCP        Date of Birth Social Security Number Address Home Phone MRN    1985  [id number] [address] [phone] [id number]    Worship Marital Status          Muslim        Admission Date Admission Type Admitting Provider Attending Provider Department, Room/Bed    8/26/19 Emergency Bob Spann MD  [address], N[address]/1    Discharge Date Discharge Disposition Discharge Destination        9/2/2019 Home or Self Care              Attending Provider:  (none)   Allergies:  Penicillins    Isolation:  None   Infection:  None   Code Status:  Prior    Ht:  172.7 cm (68\")   Wt:  85.2 kg (187 lb 14.4 oz)    Admission Cmt:  None   Principal Problem:  Acute cerebrovascular accident (CVA) (CMS/[facility]) [I63.9]                 Active Insurance as of 8/26/2019     Primary Coverage     Payor Plan Insurance Group Employer/Plan Group    [employer] [place] [employer] PPO [id number]     Payor Plan Address Payor Plan Phone Number Payor Plan Fax Number Effective Dates    [address]   3/1/2019 - None Entered    [place] [address]       Subscriber Name Subscriber Birth Date Member ID       GALDINO DALLAS 1985 [id number]                 Emergency Contacts      (Rel.) Home Phone Work Phone Mobile Phone    Sharee Dallas (Spouse) [phone] -- [phone]    dakota ovidio (Mother) -- -- [phone]            Intake & Output (last 3 days)       08/31 0701 - 09/01 0700 09/01 0701 - 09/02 0700 09/02 0701 - 09/03 0700 09/03 0701 - 09/04 0700    P.O.        I.V. (mL/kg)        Total Intake(mL/kg)        Urine (mL/kg/hr)        Stool        Total Output        Net                Urine Unmeasured Occurrence 3 x 2 x 3 x     Stool " Unmeasured Occurrence 1 x            Lines, Drains & Airways    Active LDAs     Name:   Placement date:   Placement time:   Site:   Days:    Peripheral IV 09/01/19 1051 Left;Posterior Forearm   09/01/19    1051    Forearm   1                CIWA (last 3 days)     None        Blood Administration Record (From admission, onward)    None        Lab Results (last 72 hours)     Procedure Component Value Units Date/Time    Protime-INR [[id number]]  (Abnormal) Collected:  09/02/19 0617    Specimen:  Blood Updated:  09/02/19 0802     Protime 17.9 Seconds      INR 1.51    aPTT [[id number]]  (Abnormal) Collected:  09/02/19 0617    Specimen:  Blood Updated:  09/02/19 0802     PTT 38.2 seconds     CBC & Differential [[id number]] Collected:  09/02/19 0617    Specimen:  Blood Updated:  09/02/19 0725    Narrative:       The following orders were created for panel order CBC & Differential.  Procedure                               Abnormality         Status                     ---------                               -----------         ------                     CBC Auto Differential[[id number]]        Abnormal            Final result                 Please view results for these tests on the individual orders.    CBC Auto Differential [[id number]]  (Abnormal) Collected:  09/02/19 0617    Specimen:  Blood Updated:  09/02/19 0725     WBC 6.88 10*3/mm3      RBC 5.13 10*6/mm3      Hemoglobin 16.3 g/dL      Hematocrit 47.6 %      MCV 92.8 fL      MCH 31.8 pg      MCHC 34.2 g/dL      RDW 12.3 %      RDW-SD 42.4 fl      MPV 10.3 fL      Platelets 154 10*3/mm3      Neutrophil % 57.5 %      Lymphocyte % 28.2 %      Monocyte % 10.6 %      Eosinophil % 1.7 %      Basophil % 0.7 %      Immature Grans % 1.3 %      Neutrophils, Absolute 3.95 10*3/mm3      Lymphocytes, Absolute 1.94 10*3/mm3      Monocytes, Absolute 0.73 10*3/mm3      Eosinophils, Absolute 0.12 10*3/mm3      Basophils, Absolute 0.05 10*3/mm3      Immature Grans, Absolute 0.09 10*3/mm3       nRBC 0.0 /100 WBC     aPTT [[id number]]  (Abnormal) Collected:  09/01/19 0516    Specimen:  Blood from Arm, Left Updated:  09/01/19 0642     PTT 69.1 seconds     Protime-INR [[id number]]  (Abnormal) Collected:  09/01/19 0516    Specimen:  Blood from Arm, Left Updated:  09/01/19 0642     Protime 17.4 Seconds      INR 1.46    CBC & Differential [[id number]] Collected:  09/01/19 0516    Specimen:  Blood Updated:  09/01/19 0619    Narrative:       The following orders were created for panel order CBC & Differential.  Procedure                               Abnormality         Status                     ---------                               -----------         ------                     CBC Auto Differential[[id number]]        Abnormal            Final result                 Please view results for these tests on the individual orders.    CBC Auto Differential [[id number]]  (Abnormal) Collected:  09/01/19 0516    Specimen:  Blood Updated:  09/01/19 0619     WBC 6.70 10*3/mm3      RBC 4.79 10*6/mm3      Hemoglobin 15.1 g/dL      Hematocrit 45.5 %      MCV 95.0 fL      MCH 31.5 pg      MCHC 33.2 g/dL      RDW 12.4 %      RDW-SD 42.9 fl      MPV 10.6 fL      Platelets 142 10*3/mm3      Neutrophil % 54.9 %      Lymphocyte % 31.8 %      Monocyte % 9.9 %      Eosinophil % 1.8 %      Basophil % 0.7 %      Immature Grans % 0.9 %      Neutrophils, Absolute 3.68 10*3/mm3      Lymphocytes, Absolute 2.13 10*3/mm3      Monocytes, Absolute 0.66 10*3/mm3      Eosinophils, Absolute 0.12 10*3/mm3      Basophils, Absolute 0.05 10*3/mm3      Immature Grans, Absolute 0.06 10*3/mm3      nRBC 0.0 /100 WBC     aPTT [[id number]]  (Abnormal) Collected:  08/31/19 1234    Specimen:  Blood from Arm, Left Updated:  08/31/19 1303     PTT 68.4 seconds     Comprehensive Metabolic Panel [[id number]]  (Abnormal) Collected:  08/31/19 0508    Specimen:  Blood Updated:  08/31/19 0642     Glucose 75 mg/dL      BUN 10 mg/dL      Creatinine 0.66 mg/dL       Sodium 134 mmol/L      Potassium 3.9 mmol/L      Chloride 101 mmol/L      CO2 17.3 mmol/L      Calcium 9.4 mg/dL      Total Protein 6.5 g/dL      Albumin 4.40 g/dL      ALT (SGPT) 96 U/L      AST (SGOT) 70 U/L      Alkaline Phosphatase 69 U/L      Total Bilirubin 0.6 mg/dL      eGFR Non African Amer 138 mL/min/1.73      Globulin 2.1 gm/dL      A/G Ratio 2.1 g/dL      BUN/Creatinine Ratio 15.2     Anion Gap 15.7 mmol/L     Narrative:       GFR Normal >60  Chronic Kidney Disease <60  Kidney Failure <15    aPTT [[id number]]  (Abnormal) Collected:  08/31/19 0508    Specimen:  Blood Updated:  08/31/19 0618     PTT 94.7 seconds     Protime-INR [[id number]]  (Abnormal) Collected:  08/31/19 0508    Specimen:  Blood Updated:  08/31/19 0616     Protime 14.9 Seconds      INR 1.20    CBC & Differential [[id number]] Collected:  08/31/19 0508    Specimen:  Blood Updated:  08/31/19 0557    Narrative:       The following orders were created for panel order CBC & Differential.  Procedure                               Abnormality         Status                     ---------                               -----------         ------                     CBC Auto Differential[[id number]]        Abnormal            Final result                 Please view results for these tests on the individual orders.    CBC Auto Differential [[id number]]  (Abnormal) Collected:  08/31/19 0508    Specimen:  Blood Updated:  08/31/19 0557     WBC 8.26 10*3/mm3      RBC 4.96 10*6/mm3      Hemoglobin 15.8 g/dL      Hematocrit 47.5 %      MCV 95.8 fL      MCH 31.9 pg      MCHC 33.3 g/dL      RDW 12.6 %      RDW-SD 43.9 fl      MPV 9.9 fL      Platelets 143 10*3/mm3      Neutrophil % 59.9 %      Lymphocyte % 29.1 %      Monocyte % 8.1 %      Eosinophil % 1.6 %      Basophil % 0.6 %      Immature Grans % 0.7 %      Neutrophils, Absolute 4.95 10*3/mm3      Lymphocytes, Absolute 2.40 10*3/mm3      Monocytes, Absolute 0.67 10*3/mm3      Eosinophils, Absolute 0.13  10*3/mm3      Basophils, Absolute 0.05 10*3/mm3      Immature Grans, Absolute 0.06 10*3/mm3      nRBC 0.0 /100 WBC           Imaging Results (last 72 hours)     ** No results found for the last 72 hours. **        ECG/EMG Results (last 72 hours)     Procedure Component Value Units Date/Time    SCANNED EKG [[id number]] Resulted:  08/26/19      Updated:  09/01/19 0956          Orders (last 72 hrs)     Start     Ordered    09/02/19 1700  warfarin (COUMADIN) tablet 7.5 mg  Once (Warfarin),   Status:  Discontinued      09/02/19 0908    09/02/19 1308  Discontinue IV  Once,   Status:  Canceled      09/02/19 1311    09/02/19 1305  Discharge patient  Once      09/02/19 1311 09/02/19 0600  CBC Auto Differential  PROCEDURE ONCE     Comments:  Discontinue After Heparin Stopped      09/02/19 0002    09/02/19 0000  Protime-INR      09/02/19 1311    09/02/19 0000  warfarin (COUMADIN) 7.5 MG tablet  Nightly      09/02/19 1311    09/02/19 0000  atorvastatin (LIPITOR) 20 MG tablet  Nightly      09/02/19 1311    09/02/19 0000  lisinopril (PRINIVIL,ZESTRIL) 10 MG tablet  Every 12 Hours Scheduled      09/02/19 1311 09/02/19 0000  carvedilol (COREG) 12.5 MG tablet  2 Times Daily With Meals      09/02/19 1311 09/02/19 0000  pantoprazole (PROTONIX) 40 MG EC tablet  Daily      09/02/19 1311    09/02/19 0000  Discharge Follow-up with PCP      09/02/19 1311 09/02/19 0000  Discharge Follow-up with Specified Provider: Dr. Banks per his recommendations      09/02/19 1311 09/02/19 0000  Discharge Follow-up with Specialty: [facility] stroke clinic in 4 weeks      09/02/19 1311    09/02/19 0000  Diet: Regular; Thin      09/02/19 1311 09/02/19 0000  Activity as Tolerated      09/02/19 1311    09/02/19 0000  Driving Restrictions      09/02/19 1311 09/02/19 0000  Discharge Follow-up with Specialty: Ophthalmology in 4-6 weeks to be cleared for driving      09/02/19 1311    09/02/19 0000  Discharge Follow-up with Specialty:  [facility] Anticoagulation clinic in 2 days for INR monitoring      09/02/19 1311    09/02/19 0000  enoxaparin (LOVENOX) 100 MG/ML solution syringe  Every 12 Hours      09/02/19 1311    09/01/19 2100  enoxaparin (LOVENOX) syringe 90 mg  Every 12 Hours,   Status:  Discontinued      09/01/19 1231    09/01/19 1930  melatonin tablet 5 mg  Nightly PRN,   Status:  Discontinued      09/01/19 1930    09/01/19 1800  warfarin (COUMADIN) tablet 10 mg  Once (Warfarin)      09/01/19 1142    09/01/19 1330  heparin 99282 units/250 mL (100 units/mL) in 0.45 % NaCl infusion  Titrated      09/01/19 1231    09/01/19 1145  pantoprazole (PROTONIX) EC tablet 40 mg  Every Early Morning,   Status:  Discontinued      09/01/19 1056    09/01/19 1056  calcium carbonate (TUMS) chewable tablet 500 mg (200 mg elemental)  3 Times Daily PRN,   Status:  Discontinued      09/01/19 1056    09/01/19 0600  CBC Auto Differential  PROCEDURE ONCE     Comments:  Discontinue After Heparin Stopped      09/01/19 0002    08/31/19 1900  lisinopril (PRINIVIL,ZESTRIL) tablet 20 mg  Every 24 Hours Scheduled,   Status:  Discontinued      08/31/19 1807    08/31/19 1900  lisinopril (PRINIVIL,ZESTRIL) tablet 10 mg  Every 12 Hours Scheduled,   Status:  Discontinued      08/31/19 1807    08/31/19 1800  carvedilol (COREG) tablet 12.5 mg  2 Times Daily With Meals,   Status:  Discontinued      08/31/19 1014    08/31/19 1800  warfarin (COUMADIN) tablet 10 mg  Once (Warfarin)      08/31/19 1310    08/31/19 1240  aPTT  Timed      08/31/19 0914    08/31/19 1215  HYDROcodone-acetaminophen (NORCO) 7.5-325 MG per tablet 1 tablet  Every 4 Hours PRN,   Status:  Discontinued      08/31/19 1203    08/31/19 1054  aspirin-acetaminophen-caffeine (EXCEDRIN MIGRAINE) per tablet 2 tablet  Every 6 Hours PRN,   Status:  Discontinued      08/31/19 1055    08/30/19 1424  Pharmacy Consult - Pharmacy to dose  Continuous PRN,   Status:  Discontinued      08/30/19 1424    08/30/19 1422  labetalol  (NORMODYNE,TRANDATE) injection 10 mg  Every 4 Hours PRN,   Status:  Discontinued      08/30/19 1422    08/30/19 1130  carvedilol (COREG) tablet 6.25 mg  2 Times Daily With Meals,   Status:  Discontinued      08/30/19 1021    08/29/19 2100  atorvastatin (LIPITOR) tablet 20 mg  Nightly,   Status:  Discontinued      08/29/19 0849    08/28/19 1415  ALPRAZolam (XANAX) tablet 0.25 mg  3 Times Daily PRN,   Status:  Discontinued      08/28/19 1401    08/28/19 1109  HYDROcodone-acetaminophen (NORCO) 7.5-325 MG per tablet 1 tablet  Every 8 Hours PRN,   Status:  Discontinued      08/28/19 1109    08/28/19 0430  CBC & Differential  Daily,   Status:  Canceled     Comments:  Discontinue After Heparin Stopped      08/27/19 2305    08/28/19 0430  aPTT  Daily,   Status:  Canceled     Comments:  Cancel If Patient Has Infusion Changes      08/27/19 2305    08/28/19 0000  heparin 32487 units/250 mL (100 units/mL) in 0.45 % NaCl infusion  Titrated,   Status:  Discontinued      08/27/19 2305 08/27/19 2302  aPTT  As Needed,   Status:  Canceled      08/27/19 2305 08/27/19 0900  escitalopram (LEXAPRO) tablet 20 mg  Daily,   Status:  Discontinued      08/27/19 0649    08/27/19 0650  Protime-INR  Daily,   Status:  Canceled      08/27/19 0649    08/27/19 0649  nitroglycerin (NITROSTAT) SL tablet 0.4 mg  Every 5 Minutes PRN,   Status:  Discontinued      08/27/19 0649    08/27/19 0008  acetaminophen (TYLENOL) tablet 650 mg  Every 4 Hours PRN,   Status:  Discontinued      08/27/19 0009    08/27/19 0008  acetaminophen (TYLENOL) suppository 650 mg  Every 4 Hours PRN,   Status:  Discontinued      08/27/19 0009 08/27/19 0008  ondansetron (ZOFRAN) injection 4 mg  Every 6 Hours PRN,   Status:  Discontinued      08/27/19 0009    08/27/19 0008  bisacodyl (DULCOLAX) suppository 10 mg  Daily PRN,   Status:  Discontinued      08/27/19 0009 08/27/19 0008  Order CT Head Without Contrast for Neurological Decline  As Needed,   Status:  Canceled       "19 0009    19 0008  ECG 12 Lead  As Needed,   Status:  Canceled      19 0009    --  SCANNED EKG      19 0000          Operative/Procedure Notes (last 72 hours) (Notes from 2019  8:22 AM through 9/3/2019  8:22 AM)     No notes of this type exist for this encounter.           Physician Progress Notes (last 72 hours) (Notes from 2019  8:22 AM through 9/3/2019  8:22 AM)      Tana Cervantes MD at 2019  1:32 PM        Chart reviewed. BP looks much better.   Continue current therapy for BP, INR slowly rising. Could consider lovenox if ok with all.     Will see again tomorrow.     Electronically signed by Tana Cervantes MD at 2019  1:32 PM     Nnamdi Franco MD at 2019 11:43 AM           LOS: 6 days     Name: Galdino Dallas  Age: 33 y.o.  Sex: male  :  1985  MRN: [id number]         Primary Care Physician: Sherry Nava APRN    Subjective   Subjective  Had a mild headache this morning that improved after he took a nap.  No new complaints other than that.  No overnight events.  Denies chest pain or shortness of breath.    Objective   Vital Signs  Temp:  [97.7 °F (36.5 °C)-99 °F (37.2 °C)] 97.7 °F (36.5 °C)  Heart Rate:  [63-77] 65  Resp:  [16-18] 16  BP: (123-173)/() 123/72  Body mass index is 28.57 kg/m².    Objective:  General Appearance:  Comfortable and in no acute distress.    Vital signs: (most recent): Blood pressure 123/72, pulse 65, temperature 97.7 °F (36.5 °C), temperature source Oral, resp. rate 16, height 172.7 cm (68\"), weight 85.2 kg (187 lb 14.4 oz), SpO2 90 %.    Lungs:  Normal effort and normal respiratory rate.    Heart: Normal rate.  Regular rhythm.  (Mechanical valve click)  Abdomen: Abdomen is soft.  Bowel sounds are normal.   There is no abdominal tenderness.     Extremities: There is no dependent edema or local swelling.    Neurological: Patient is alert and oriented to person, place and time.    Skin:  Warm and dry.  "             Results Review:       I reviewed the patient's new clinical results.    Results from last 7 days   Lab Units 09/01/19  0516 08/31/19  0508 08/30/19  0357 08/29/19  0511 08/28/19  0643 08/27/19  2318 08/27/19  0530   WBC 10*3/mm3 6.70 8.26 7.35 8.04 8.16 8.02 7.91   HEMOGLOBIN g/dL 15.1 15.8 15.1 15.8 16.7 16.3 15.8   PLATELETS 10*3/mm3 142 143 154 152 162 170 180     Results from last 7 days   Lab Units 08/31/19  0508 08/27/19  0530 08/26/19  2139   SODIUM mmol/L 134* 134* 133*   POTASSIUM mmol/L 3.9 4.3 4.5   CHLORIDE mmol/L 101 98 97*   CO2 mmol/L 17.3* 22.5 25.0   BUN mg/dL 10 10 10   CREATININE mg/dL 0.66* 0.82 0.83   CALCIUM mg/dL 9.4 9.0 9.5   GLUCOSE mg/dL 75 99 97     Results from last 7 days   Lab Units 09/01/19  0516 08/31/19  0508 08/30/19  0357 08/29/19  0511 08/28/19  0643 08/27/19  2318 08/27/19  0854   INR  1.46* 1.20* 1.17* 1.40* 1.34* 1.31* 1.49*             Scheduled Meds:     atorvastatin 20 mg Oral Nightly   carvedilol 12.5 mg Oral BID With Meals   escitalopram 20 mg Oral Daily   lisinopril 10 mg Oral Q12H   pantoprazole 40 mg Oral Q AM   warfarin 10 mg Oral Once     PRN Meds:   •  acetaminophen **OR** acetaminophen  •  ALPRAZolam  •  bisacodyl  •  calcium carbonate  •  HYDROcodone-acetaminophen  •  labetalol  •  nitroglycerin  •  ondansetron  •  Pharmacy Consult - Pharmacy to dose  Continuous Infusions:    heparin (porcine) 12 Units/kg/hr Last Rate: 14 Units/kg/hr (09/01/19 0139)   Pharmacy Consult - Pharmacy to dose         Assessment/Plan   Active Hospital Problems    Diagnosis  POA   • **Acute cerebrovascular accident (CVA) (CMS/HCC) [I63.9]  Yes   • Migraine headache [G43.909]  Yes   • Anticoagulant long-term use [Z79.01]  Not Applicable   • Coronary artery disease of native artery of native heart with stable angina pectoris (CMS/HCC) [I25.118]  Yes   • S/P AVR [Z95.2]  Not Applicable   • Essential hypertension [I10]  Yes      Resolved Hospital Problems    Diagnosis Date Resolved  POA   • Arm paresthesia, left [R20.2] 08/31/2019 Yes   • Hypertensive emergency [I16.1] 08/31/2019 Yes       Assessment & Plan    Acute CVA  - right PICA, likely cardioembolic from mechanical aortic valve and subtherapeutic INR  - on heparin gtt and bridging to Coumadin.  INR 1.4 today  -Appreciate Neuro. Will see if I can find out from neurology if they would be okay with Lovenox at home as opposed to staying here on a heparin drip until INR is therapeutic     Headache  -Improved     HTN  -Blood pressure medications have been titrated up and now under much better control.     CAD  -Stable     S/p AVR  - INR sub-therapeutic on admission  -On anticoagulation and bridging to Coumadin  - could possibly benefit from home INR monitoring, per cardiology        VTE Prophylaxis - anticoagulated with heparin drip   Code Status - Full code  Disposition -home soon    Nnamdi Franco MD  [facility]  09/01/19  11:43 AM    Addendum: Discussed anticoagulation with neurology.  They are okay with transition to Lovenox.  Will order this for 9:00 this evening.  Patient feels a little uneasy about going home today and would prefer to start the Lovenox and then see what his INR looks like tomorrow.  Could possibly be discharged tomorrow with close outpatient INR monitoring.      Electronically signed by Nnamdi Franco MD at 9/1/2019  4:10 PM     Ninoska Rizzo MD at 8/31/2019  7:59 PM               LOS: 5 days   Primary Care Physician: Sherry Nava, COSMO     Subjective   Still has headache at times but better in general.  Visual field deficit unchanged.  Not dizzy.  Balance is okay    Vital Signs  Body mass index is 28.92 kg/m².  Temp:  [97.7 °F (36.5 °C)-99 °F (37.2 °C)] 99 °F (37.2 °C)  Heart Rate:  [63-81] 63  Resp:  [16-18] 16  BP: (125-191)/() 159/91      Objective:  General Appearance:  In no acute distress.    Vital signs: (most recent): Blood pressure 159/91, pulse 63,  "temperature 99 °F (37.2 °C), temperature source Oral, resp. rate 16, height 172.7 cm (68\"), weight 86.3 kg (190 lb 3.2 oz), SpO2 90 %.    Lungs:  He is not in respiratory distress.  No stridor.  No rales, decreased breath sounds, wheezes or rhonchi.    Heart: Normal rate.  Regular rhythm.  Positive for murmur.  (Grade 3/6 systolic murmur as well as a mechanical click heard entire precordium)  Abdomen: Abdomen is soft and non-distended.  Bowel sounds are normal.   There is no abdominal tenderness.   There is no splenomegaly. There is no hepatomegaly.   Extremities: There is no dependent edema.    Neurological: Patient is alert.    Skin:  Warm and dry.          Results Review:    I reviewed the patient's new clinical results.    Results from last 7 days   Lab Units 08/31/19  0508 08/30/19  0357   WBC 10*3/mm3 8.26 7.35   HEMOGLOBIN g/dL 15.8 15.1   PLATELETS 10*3/mm3 143 154     Results from last 7 days   Lab Units 08/31/19  0508 08/27/19  0530   SODIUM mmol/L 134* 134*   POTASSIUM mmol/L 3.9 4.3   CHLORIDE mmol/L 101 98   CO2 mmol/L 17.3* 22.5   BUN mg/dL 10 10   CREATININE mg/dL 0.66* 0.82   CALCIUM mg/dL 9.4 9.0   GLUCOSE mg/dL 75 99     Results from last 7 days   Lab Units 08/31/19  0508   INR  1.20*     Hemoglobin A1C:  Lab Results   Component Value Date    HGBA1C 4.70 (L) 08/27/2019       Glucose Range:No results found for: POCGLU    No results found for: [id number]    Lab Results   Component Value Date    TSH 1.980 08/27/2019       Assessment & Plan      Medication Review: Yes    Active Hospital Problems    Diagnosis  POA   • **Acute cerebrovascular accident (CVA) (CMS/[facility]) [I63.9]  Yes   • Migraine headache [G43.909]  Yes   • Anticoagulant long-term use [Z79.01]  Not Applicable   • Coronary artery disease of native artery of native heart with stable angina pectoris (CMS/[facility]) [I25.118]  Yes   • S/P AVR [Z95.2]  Not Applicable   • Essential hypertension [I10]  Yes      Resolved Hospital Problems    Diagnosis " "Date Resolved POA   • Arm paresthesia, left [R20.2] 08/31/2019 Yes   • Hypertensive emergency [I16.1] 08/31/2019 Yes       Assessment/Plan  1.  Acute stroke secondary to subtherapeutic INR and mechanical valve.  Continue heparin with Coumadin load until INR 2 or more.  Per cardiology, could consider Lovenox bridge.  Home INR monitoring per cardiology  2.  Hypertension.  Coreg dosage increased.  Start lisinopril at low-dose and titrate as needed.    Ninoska Rizzo MD  08/31/19  8:04 PM          Electronically signed by Ninoska Rizzo MD at 8/31/2019  8:05 PM     Tana Cervantes MD at 8/31/2019 10:55 AM          CC: CVA/ AVR follow up    Interval History: bad HA this morning. Visual field defect      Vital Signs  Temp:  [97.7 °F (36.5 °C)-97.8 °F (36.6 °C)] 97.7 °F (36.5 °C)  Heart Rate:  [64-81] 64  Resp:  [16-18] 18  BP: (125-191)/() 165/84  No intake or output data in the 24 hours ending 08/31/19 1055  Flowsheet Rows      First Filed Value   Admission Height  172.7 cm (68\") Documented at 08/26/2019 2107   Admission Weight  91.8 kg (202 lb 6.4 oz) Documented at 08/26/2019 2107          PHYSICAL EXAM:  General: No acute distress  Resp:NL Rate, unlabored, clear  CV:NL rate and rhythm, NL PMI, Nl S1 and artificial S2, no Murmur, no gallop, no rub, No JVD. Normal pedal pulses  ABD:Nl sounds, no masses or tenderness, nondistended, no guarding or rebound  Neuro: alert,cooperative and oriented  Extr: No edema or cyanosis, moves all extremities      Results Review:    Results from last 7 days   Lab Units 08/31/19  0508   SODIUM mmol/L 134*   POTASSIUM mmol/L 3.9   CHLORIDE mmol/L 101   CO2 mmol/L 17.3*   BUN mg/dL 10   CREATININE mg/dL 0.66*   GLUCOSE mg/dL 75   CALCIUM mg/dL 9.4     Results from last 7 days   Lab Units 08/28/19  1431 08/28/19  0936 08/26/19  2139   TROPONIN T ng/mL <0.010 <0.010 <0.010     Results from last 7 days   Lab Units 08/31/19  0508   WBC 10*3/mm3 8.26   HEMOGLOBIN g/dL 15.8   HEMATOCRIT % " 47.5   PLATELETS 10*3/mm3 143     Results from last 7 days   Lab Units 19  0508 19  0357 19  2321  19  0511   INR  1.20* 1.17*  --   --  1.40*   APTT seconds 94.7* 76.1* 65.9*   < > 66.4*    < > = values in this interval not displayed.     Results from last 7 days   Lab Units 19  0530   CHOLESTEROL mg/dL 255*         Results from last 7 days   Lab Units 19  0530   CHOLESTEROL mg/dL 255*   TRIGLYCERIDES mg/dL 379*   HDL CHOL mg/dL 43   LDL CHOL mg/dL 136*     I reviewed the patient's new clinical results.  I personally viewed and interpreted the patient's EKG/Telemetry data- NSR        Medication Review:   Meds reviewed      heparin (porcine) 12 Units/kg/hr Last Rate: 14 Units/kg/hr (19 0640)   Pharmacy Consult - Pharmacy to dose         Assessment/Plan    1.CVA in the setting of subtherapeutic INR and mechanical AV.     Heparin/ Coumadin. Could consider Lovenox bridge at home if patient is able to self administer.   -we discussed importance of routine INR monitoring   2. HTN emergency- Increase coreg to 12.5 BID  3. S/P AVR- mechanical Echo this admit showed peak and mean gradients mildly elevated. EF 49%  4. HLD atorvastatin 20  5. CAD chronic distal LAD occlusion with right to left collaterals moderate mid LAD and moderate RCA disease treated medically- continue statin will resume BB- no angina  6. Anxiety on therapy  7. HA: start mckay Cervantes MD  19  10:55 AM          Electronically signed by Tana Cervantes MD at 2019 10:56 AM     Amena Brambila APRN at 2019 10:36 AM          DOS: 2019  NAME: Galdino Dallas   : 1985  PCP: Sherry Nava APRN  Chief Complaint   Patient presents with   • Hypertension     Stroke    Subjective: Patient's headache continuing to gradually improve.  Currently out of 10.  No new TIA or stroke symptoms overnight.    Objective:  Vital signs: /84 (BP Location: Left arm, Patient Position:  "Lying)   Pulse 64   Temp 97.7 °F (36.5 °C) (Oral)   Resp 18   Ht 172.7 cm (68\")   Wt 86.3 kg (190 lb 3.2 oz)   SpO2 94%   BMI 28.92 kg/m²        General appearance: Well developed, well nourished, well groomed, alert and cooperative.   HEENT: Normocephalic.   Neck and spine: Normal range of motion. Normal alignment. No mass or tenderness.    Cardiac: Regular rate and rhythm.  Mechanical click.  Peripheral Vasculature: Radial pulses are equal and symmetric.  Chest Exam: Clear to auscultation bilaterally, no wheezes, no rhonchi.  Extremities: Normal, no edema.   Skin: No rashes or birthmarks.     Higher integrative function: Oriented to time, place, person, intact recent and remote memory, attention span, concentration and language. Spontaneous speech, fund of vocabulary are normal.   CN II: L HH  CN III IV VI: Extraocular movements are full without nystagmus. Pupils are equal, round, and reactive to light.   CN V: Normal facial sensation.  CN VII: Facial movements are symmetric, no weakness.   CN VIII: Auditory acuity is normal.   CN IX & X: Symmetric palatal movement.   CN XI: Sternocleidomastoid and trapezius are normal. No weakness.   CN XII: The tongue is midline.   Motor: Normal muscle strength, bulk, and tone in upper and lower extremities. No fasciculations, rigidity, spasticity or abnormal movements.   Sensation: Normal light touch.  Station and gait: N/A  Coordination: Finger to nose test showed no dysmetria.     Scheduled Meds:  atorvastatin 20 mg Oral Nightly   carvedilol 12.5 mg Oral BID With Meals   escitalopram 20 mg Oral Daily     Continuous Infusions:  heparin (porcine) 12 Units/kg/hr Last Rate: 14 Units/kg/hr (08/31/19 2840)   Pharmacy Consult - Pharmacy to dose       PRN Meds:.•  acetaminophen **OR** acetaminophen  •  ALPRAZolam  •  bisacodyl  •  HYDROcodone-acetaminophen  •  labetalol  •  nitroglycerin  •  ondansetron  •  Pharmacy Consult - Pharmacy to dose    Laboratory results:  Lab " Results   Component Value Date    GLUCOSE 75 08/31/2019    CALCIUM 9.4 08/31/2019     (L) 08/31/2019    K 3.9 08/31/2019    CO2 17.3 (L) 08/31/2019     08/31/2019    BUN 10 08/31/2019    CREATININE 0.66 (L) 08/31/2019    EGFRIFNONA 138 08/31/2019    BCR 15.2 08/31/2019    ANIONGAP 15.7 (H) 08/31/2019     Lab Results   Component Value Date    WBC 8.26 08/31/2019    HGB 15.8 08/31/2019    HCT 47.5 08/31/2019    MCV 95.8 08/31/2019     08/31/2019     Lab Results   Component Value Date    CHOL 255 (H) 08/27/2019     Lab Results   Component Value Date    HDL 43 08/27/2019     Lab Results   Component Value Date     (H) 08/27/2019     Lab Results   Component Value Date    TRIG 379 (H) 08/27/2019         Impression:  Mr. Dallas is a 33-year-old male with PMH of HTN, HLD, CAD s/p stent, bicuspid aortic valve s/p tmechanical AVR (on warfarin), and IBS who was admitted 8/26 with complaints of left arm numbness/weakness, dizziness, blurred vision, and throbbing headache.  Blood pressure in the ED was 209/117.  On exam he had left-sided vision cut.  Prior to arrival he was on antiplatelet or statin.     Diagnosis: Right PCA stroke secondary to right PCA occlusion, cardioembolic related to mechanical valve and low INR  · MRI brain with and without contrast 8/27: Right temporal lobe stroke with additional areas of restricted diffusion in the right corona radiata and right thalamus.  Several areas of decreased signal intensity in the left cerebral hemisphere on GRE.  No hemorrhagic transformation and the acute infarct in the right PCA territory.  · MRI head/neck 8/27: Right PCA occlusion  · 2Decho 8/27: EF 49%, moderate LV concentric hypertrophy, normal left atrial size, saline test results are negative.  Mechanical aortic valve noted.  · CT head 8/28: Right PCA stroke unchanged compared to 8/27.  No hemorrhage.  · CT head 8/30: stable R PCA stroke, no hemorrhage  · Labs: Hemoglobin A1c 4.70%, TSH 1.98,  , INR 1.2 today    The above impression statement reviewed and changes noted.    Plan:  Warfarin started 8/30.  Pharmacy dosing.  Continue heparin until INR greater than 2.0.  antilatelet not needed.  Discussed with patient risk for bleed on anticoagulation but mechanical valve risk outweighs benefit.  Patient instructed to notify if acute worsening headache or new neuro change in repeat CT head.  Lipitor 20 mg started this admission  Neurochecks  BP control-gradually normalize, IV labetalol for SBP greater than 180  Stroke Education  Patient discussed with Dr. Coleman today.  We will sign off but please call if further concerns are neuro change.  No driving, will be cleared by occupational therapy or ophthalmology.  Patient to follow-up as outpatient in 4 weeks.      Electronically signed by Amena Brambila APRN at 8/31/2019 10:42 AM          Discharge Summary      Nnamdi Franco MD at 9/2/2019  1:11 PM            Date of Admission: 8/26/2019  Date of Discharge:  9/2/2019  Primary Care Physician: Sherry Nava APRN     Discharge Diagnosis:  Active Hospital Problems    Diagnosis  POA   • **Acute cerebrovascular accident (CVA) (CMS/[facility]) [I63.9]  Yes   • Migraine headache [G43.909]  Yes   • Anticoagulant long-term use [Z79.01]  Not Applicable   • Coronary artery disease of native artery of native heart with stable angina pectoris (CMS/[facility]) [I25.118]  Yes   • S/P AVR [Z95.2]  Not Applicable   • Essential hypertension [I10]  Yes      Resolved Hospital Problems    Diagnosis Date Resolved POA   • Arm paresthesia, left [R20.2] 08/31/2019 Yes   • Hypertensive emergency [I16.1] 08/31/2019 Yes       DETAILS OF HOSPITAL STAY     Pertinent Test Results and Procedures Performed  CXR:  Cardiomegaly is identified. There is no evidence of vascular congestion.  No pneumothorax or pleural effusion is seen. There is some bibasilar  atelectasis. The patient is status post median sternotomy.    Head CT on  8/26/2019:  No acute intracranial hemorrhage is seen. There is mild atrophy. This is  somewhat advanced for the age 33. There is an old left basal ganglia  infarct. There is no midline shift or mass effect. Inspissated  secretions are identified within the left sphenoid sinus. The mastoid  air cells appear clear.    Brain MRI with MRA of the head neck on 8/27/2019:  1. Large area of restricted diffusion identified within the medial right  temporal lobe, as well as additional tiny foci of restricted diffusion  within the right thalamus and right corona radiata. No evidence of  hemorrhagic transformation at this time.  2. Occlusion of the proximal right posterior cerebral artery. No  reconstitution is seen.    Head CT on 8/28/2019:  Acute infarct involving the right PCA vascular distribution  unchanged in area as compared to 08/27/2019. There is no evidence of  hemorrhage or of a new infarction.    Head CT on 8/30/2019:  Again identified is a large right PCA distribution infarct  with no evidence of hemorrhage. The most anterior aspect of the infarct  noted on 08/27/2019 MRI is more conspicuous on the current examination  as is a infarct involving the lateral aspect of the thalamus on the  right. There is no evidence of hydrocephalus or midline shift. Further  evaluation could be performed with a MRI examination of the brain as  indicated.    Hospital Course  This is a very pleasant 33-year-old male with a history of aortic valve replacement who is on Coumadin at home and presented with blurry vision and left upper extremity numbness as well as headache.  Please see H&P for full details of admission.  He was found to have a left visual field cut off along with a right PCA stroke as described above.  Upon admission he was evaluated by neurology as well as his cardiologist.  Anticoagulation was cautiously reinstituted with a heparin drip and he had serial head CTs that did not show any evidence of hemorrhagic  conversion.  Neurology cleared him to restart Coumadin as well as transition to Lovenox for bridging.  His INR today is 1.5.  He is medically stable at this time.  His blood pressure has been normalized.  He has been cleared by neurology for discharge and will follow up in their office in 4 weeks.  The patient will also follow-up with ophthalmology and occupational therapy for driving rehab exercises and will not drive until cleared by those services in the outpatient setting.  He is very comfortable with Lovenox as he is done this before at home.  He goes to the anticoagulation clinic [facility] at [facility] and I have instructed him to return there in 2 days time to have his INR rechecked.  He expresses understanding of all this and will be released home today in stable condition with close outpatient follow-up.    Physical Exam at Discharge:  General: No acute distress, AAOx3  HEENT: EOMI, PERRL  Cardiovascular: +s1 and s2, RRR, mechanical valve click  Lungs: No rhonchi or wheezing  Abdomen: soft, nontender    Consults:   Consults     Date and Time Order Name Status Description    8/28/2019 1359 Inpatient Cardiology Consult      8/28/2019 1356 Inpatient Cardiology Consult Completed     8/27/2019 0009 Inpatient Neurology Consult Stroke Completed     8/26/2019 2317 [facility] (on-call MD unless specified) Details Completed             Condition on Discharge: Stable, improved    Discharge Disposition  Home or Self Care    Discharge Medications     Discharge Medications      New Medications      Instructions Start Date   atorvastatin 20 MG tablet  Commonly known as:  LIPITOR   20 mg, Oral, Nightly      enoxaparin 100 MG/ML solution syringe  Commonly known as:  LOVENOX   1 mg/kg, Subcutaneous, Every 12 Hours      pantoprazole 40 MG EC tablet  Commonly known as:  PROTONIX   40 mg, Oral, Daily         Changes to Medications      Instructions Start Date   carvedilol 12.5 MG tablet  Commonly known as:  COREG  What changed:     · medication strength  · how much to take   12.5 mg, Oral, 2 Times Daily With Meals      lisinopril 10 MG tablet  Commonly known as:  PRINIVILZESTRIL  What changed:    · medication strength  · how much to take  · when to take this   10 mg, Oral, Every 12 Hours Scheduled      warfarin 7.5 MG tablet  Commonly known as:  COUMADIN  What changed:    · medication strength  · how much to take  · additional instructions   7.5 mg, Oral, Nightly         Continue These Medications      Instructions Start Date   ALPRAZolam 0.25 MG tablet  Commonly known as:  XANAX   0.25 mg, Oral, Daily PRN      escitalopram 20 MG tablet  Commonly known as:  LEXAPRO   20 mg, Oral, Daily      HYDROcodone-acetaminophen 7.5-325 MG per tablet  Commonly known as:  NORCO   TK 1 T PO TID PRN      nitroglycerin 0.4 MG SL tablet  Commonly known as:  NITROSTAT   Take no more than 3 doses in 15 minutes.         Stop These Medications    isosorbide mononitrate 30 MG 24 hr tablet  Commonly known as:  IMDUR            Discharge Diet:   Diet Instructions     Diet: Regular; Thin      Discharge Diet:  Regular    Fluid Consistency:  Thin          Activity at Discharge:   Activity Instructions     Activity as Tolerated      Driving Restrictions      Type of Restriction:  Driving    Driving Restrictions:  No Driving    Until cleared by ophthalmology          Follow-up Appointments  Future Appointments   Date Time Provider Department Center   1/29/2020 12:20 PM José Antonio Banks MD [facility] None     Additional Instructions for the Follow-ups that You Need to Schedule     Discharge Follow-up with PCP   As directed       Currently Documented PCP:    Sherry Nava APRN    PCP Phone Number:    [phone]     Follow Up Details:  1 week         Discharge Follow-up with Specialty: [facility] in 2 days for INR monitoring   As directed      Specialty:  [facility] in 2 days for INR monitoring         Discharge  Follow-up with Specialty: [facility] in 4 weeks   As directed      Specialty:  [facility] in 4 weeks         Discharge Follow-up with Specialty: Ophthalmology in 4-6 weeks to be cleared for driving   As directed      Specialty:  Ophthalmology in 4-6 weeks to be cleared for driving         Discharge Follow-up with Specified Provider: Dr. Banks per his recommendations   As directed      To:  Dr. Banks per his recommendations         Protime-INR    Sep 04, 2019 (Approximate)      Is Patient on anti-coag:  Yes         Additional information on Labs and Follow-ups:      Home INR monitoring: [facility] [phone] for Patient Enrollment.                    I have examined and discussed discharge planning with the patient today.     Nnamdi Franco MD  09/02/19  1:11 PM    Time: Discharge greater than 30 min            Electronically signed by Nnamdi Franco MD at 9/2/2019  1:17 PM

## 2019-09-04 ENCOUNTER — READMISSION MANAGEMENT (OUTPATIENT)
Dept: CALL CENTER | Facility: HOSPITAL | Age: 34
End: 2019-09-04

## 2019-09-04 ENCOUNTER — ANTICOAGULATION VISIT (OUTPATIENT)
Dept: PHARMACY | Facility: HOSPITAL | Age: 34
End: 2019-09-04

## 2019-09-04 DIAGNOSIS — Z79.01 ANTICOAGULANT LONG-TERM USE: ICD-10-CM

## 2019-09-04 DIAGNOSIS — Z95.2 S/P AVR: ICD-10-CM

## 2019-09-04 LAB
INR PPP: 1.8 (ref 0.91–1.09)
PROTHROMBIN TIME: 21.5 SECONDS (ref 10–13.8)

## 2019-09-04 PROCEDURE — 85610 PROTHROMBIN TIME: CPT

## 2019-09-04 PROCEDURE — G0463 HOSPITAL OUTPT CLINIC VISIT: HCPCS

## 2019-09-04 PROCEDURE — 36416 COLLJ CAPILLARY BLOOD SPEC: CPT

## 2019-09-04 RX ORDER — WARFARIN SODIUM 7.5 MG/1
7.5 TABLET ORAL NIGHTLY
Qty: 30 TABLET | Refills: 0 | Status: SHIPPED | OUTPATIENT
Start: 2019-09-04 | End: 2019-09-24 | Stop reason: SDUPTHER

## 2019-09-04 NOTE — OUTREACH NOTE
Prep Survey      Responses   Facility patient discharged from?  [facility]   Is patient eligible?  Yes   Discharge diagnosis  CVA   Does the patient have one of the following disease processes/diagnoses(primary or secondary)?  Stroke (TIA)   Does the patient have Home health ordered?  No   Is there a DME ordered?  No   Prep survey completed?  Yes          Ashlee Peacock RN

## 2019-09-04 NOTE — PROGRESS NOTES
Anticoagulation Clinic Progress Note    Anticoagulation Summary  As of 2019    INR goal:   2.5-3.5   TTR:   23.6 % (8.7 mo)   INR used for dosin.8! (2019)   Warfarin maintenance plan:   7.5 mg every day   Weekly warfarin total:   52.5 mg   Plan last modified:   Joe De RPH (2019)   Next INR check:   2019   Priority:   High   Target end date:       Indications    S/P AVR [Z95.2]  Anticoagulant long-term use [Z79.01]             Anticoagulation Episode Summary     INR check location:       Preferred lab:       Send INR reminders to:    [facility] [place]    Comments:         Anticoagulation Care Providers     Provider Role Specialty Phone number    José Antonio Banks MD Referring Cardiology [phone]          Clinic Interview:  Patient Findings     Positives:   Missed doses, Extra doses, Change in medications, Hospital   admission    Negatives:   Signs/symptoms of thrombosis, Signs/symptoms of bleeding,   Laboratory test error suspected, Change in health, Change in alcohol use,   Change in activity, Upcoming invasive procedure, Emergency department   visit, Upcoming dental procedure, Change in diet/appetite, Bruising, Other   complaints    Comments:   Recent hospitalization for CVA. Restarted warfarin 8/30 (10   mg x 3 days, 7.5 mg x 2 days). On enoxaparin 90 mg q12h.  Pt agreeable to   contact [facility] pt enrollment to schedule training ([phone] ext [phone])   ASAP.       Clinical Outcomes     Negatives:   Major bleeding event, Thromboembolic event,   Anticoagulation-related hospital admission, Anticoagulation-related ED   visit, Anticoagulation-related fatality    Comments:   Recent hospitalization for CVA. Restarted warfarin 8/30 (10   mg x 3 days, 7.5 mg x 2 days). On enoxaparin 90 mg q12h.  Pt agreeable to   contact [facility] pt enrollment to schedule training ([phone] ext [phone])   ASAP.         INR History:  Anticoagulation Monitoring 2019 7/15/2019 2019    INR 3.0 2.2 1.8   INR Date 7/1/2019 7/15/2019 9/4/2019   INR Goal 2.5-3.5 2.5-3.5 2.5-3.5   Trend Same Same Up   Last Week Total 25 mg 35 mg 45 mg   Next Week Total 35 mg 37.5 mg 56.25 mg   Sun 5 mg 5 mg -   Mon 5 mg 7.5 mg (7/15); Otherwise 5 mg -   Tue 5 mg 5 mg -   Wed 5 mg 5 mg 11.25 mg (9/4)   Thu 5 mg 5 mg 7.5 mg   Fri 5 mg 5 mg -   Sat 5 mg 5 mg -   Visit Report - - -   Some recent data might be hidden       Plan:  1. INR is Subtherapeutic today- see above in Anticoagulation Summary.  Will instruct Galdino Dallas to Change their warfarin regimen- see above in Anticoagulation Summary.  2. Continue enoxaparin 90 mg q12h  3. Follow up in 2 days  4. Patient declines warfarin refills.  5. Verbal and written information provided. Patient expresses understanding and has no further questions at this time.    Joe De [facility]

## 2019-09-04 NOTE — OUTREACH NOTE
Stroke Week 1 Survey      Responses   Facility patient discharged from?  [facility]   Does the patient have one of the following disease processes/diagnoses(primary or secondary)?  Stroke (TIA)   Is there a successful TCM telephone encounter documented?  No   Week 1 attempt successful?  No   Unsuccessful attempts  Attempt 1          Antonietta Hdez RN

## 2019-09-05 ENCOUNTER — READMISSION MANAGEMENT (OUTPATIENT)
Dept: CALL CENTER | Facility: HOSPITAL | Age: 34
End: 2019-09-05

## 2019-09-05 NOTE — PROGRESS NOTES
Continued Stay Note  [facility]     Patient Name: Galdino Dallas  MRN: [id number]  Today's Date: 9/5/2019    Admit Date: 8/26/2019    Discharge Plan     Row Name 09/05/19 0928       Plan    Final Discharge Disposition Code  01 - home or self-care        Discharge Codes    No documentation.       Expected Discharge Date and Time     Expected Discharge Date Expected Discharge Time    Sep 2, 2019             Mamie Rivera RN

## 2019-09-05 NOTE — OUTREACH NOTE
Stroke Week 1 Survey      Responses   Facility patient discharged from?  [place]   Does the patient have one of the following disease processes/diagnoses(primary or secondary)?  Stroke (TIA)   Is there a successful TCM telephone encounter documented?  No   Week 1 attempt successful?  No   Unsuccessful attempts  Attempt 2          Paulina Garcia RN

## 2019-09-06 ENCOUNTER — ANTICOAGULATION VISIT (OUTPATIENT)
Dept: PHARMACY | Facility: HOSPITAL | Age: 34
End: 2019-09-06

## 2019-09-06 DIAGNOSIS — Z79.01 ANTICOAGULANT LONG-TERM USE: ICD-10-CM

## 2019-09-06 DIAGNOSIS — Z95.2 S/P AVR: ICD-10-CM

## 2019-09-06 LAB
INR PPP: 2 (ref 0.91–1.09)
PROTHROMBIN TIME: 24.1 SECONDS (ref 10–13.8)

## 2019-09-06 PROCEDURE — 36416 COLLJ CAPILLARY BLOOD SPEC: CPT

## 2019-09-06 PROCEDURE — 85610 PROTHROMBIN TIME: CPT

## 2019-09-06 PROCEDURE — G0463 HOSPITAL OUTPT CLINIC VISIT: HCPCS

## 2019-09-06 NOTE — PROGRESS NOTES
Anticoagulation Clinic Progress Note    Anticoagulation Summary  As of 2019    INR goal:   2.5-3.5   TTR:   23.4 % (8.7 mo)   INR used for dosin.0! (2019)   Warfarin maintenance plan:   7.5 mg every day   Weekly warfarin total:   52.5 mg   Plan last modified:   Joe De RPH (2019)   Next INR check:   9/10/2019   Priority:   High   Target end date:       Indications    S/P AVR [Z95.2]  Anticoagulant long-term use [Z79.01]             Anticoagulation Episode Summary     INR check location:       Preferred lab:       Send INR reminders to:   [facility] [place]    Comments:         Anticoagulation Care Providers     Provider Role Specialty Phone number    José Antonio Banks MD Referring Cardiology [phone]          Clinic Interview:  Patient Findings     Positives:   Change in medications    Negatives:   Signs/symptoms of thrombosis, Signs/symptoms of bleeding,   Laboratory test error suspected, Change in health, Change in alcohol use,   Change in activity, Upcoming invasive procedure, Emergency department   visit, Upcoming dental procedure, Missed doses, Extra doses, Change in   diet/appetite, Hospital admission, Bruising, Other complaints    Comments:   Reports taking MVI w/ small amount of vit k daily.       Clinical Outcomes     Negatives:   Major bleeding event, Thromboembolic event,   Anticoagulation-related hospital admission, Anticoagulation-related ED   visit, Anticoagulation-related fatality    Comments:   Reports taking MVI w/ small amount of vit k daily.         INR History:  Anticoagulation Monitoring 7/15/2019 2019 2019   INR 2.2 1.8 2.0   INR Date 7/15/2019 2019 2019   INR Goal 2.5-3.5 2.5-3.5 2.5-3.5   Trend Same Up Same   Last Week Total 35 mg 45 mg 63.75 mg   Next Week Total 37.5 mg 56.25 mg 56.25 mg   Sun 5 mg - 7.5 mg   Mon 7.5 mg (7/15); Otherwise 5 mg - 7.5 mg   Tue 5 mg - -   Wed 5 mg 11.25 mg () -   Thu 5 mg 7.5 mg -   Fri 5 mg - 11.25  mg (9/6)   Sat 5 mg - 7.5 mg   Visit Report - - -   Some recent data might be hidden       Plan:  1. INR is Subtherapeutic today- see above in Anticoagulation Summary.  Will instruct Galdino Dallas to Change their warfarin regimen- see above in Anticoagulation Summary.  2. Continue enoxaparin 90 mg q12h  2. Follow up in 4 days  3. Patient declines warfarin refills. Enoxaparin refill sent to [facility].   4. Verbal and written information provided. Patient expresses understanding and has no further questions at this time.    Joe De, [facility]

## 2019-09-10 ENCOUNTER — ANTICOAGULATION VISIT (OUTPATIENT)
Dept: PHARMACY | Facility: HOSPITAL | Age: 34
End: 2019-09-10

## 2019-09-10 ENCOUNTER — READMISSION MANAGEMENT (OUTPATIENT)
Dept: CALL CENTER | Facility: HOSPITAL | Age: 34
End: 2019-09-10

## 2019-09-10 DIAGNOSIS — Z95.2 S/P AVR: ICD-10-CM

## 2019-09-10 DIAGNOSIS — Z79.01 ANTICOAGULANT LONG-TERM USE: ICD-10-CM

## 2019-09-10 LAB
INR PPP: 3.4 (ref 0.91–1.09)
PROTHROMBIN TIME: 40.6 SECONDS (ref 10–13.8)

## 2019-09-10 PROCEDURE — 85610 PROTHROMBIN TIME: CPT

## 2019-09-10 PROCEDURE — 36416 COLLJ CAPILLARY BLOOD SPEC: CPT

## 2019-09-10 NOTE — OUTREACH NOTE
Stroke Week 2 Survey      Responses   Facility patient discharged from?  [facility]   Does the patient have one of the following disease processes/diagnoses(primary or secondary)?  Stroke (TIA)   Week 2 attempt successful?  No   Unsuccessful attempts  Attempt 1          Sharee Thomas RN

## 2019-09-10 NOTE — PROGRESS NOTES
Anticoagulation Clinic Progress Note    Anticoagulation Summary  As of 9/10/2019    INR goal:   2.5-3.5   TTR:   24.0 % (8.9 mo)   INR used for dosing:   3.4 (9/10/2019)   Warfarin maintenance plan:   7.5 mg every day   Weekly warfarin total:   52.5 mg   No change documented:   Christy Mathis   Plan last modified:   Joe De [facility] (9/4/2019)   Next INR check:   9/12/2019   Priority:   High   Target end date:       Indications    S/P AVR [Z95.2]  Anticoagulant long-term use [Z79.01]             Anticoagulation Episode Summary     INR check location:       Preferred lab:       Send INR reminders to:    [facility]    Comments:         Anticoagulation Care Providers     Provider Role Specialty Phone number    José Antonio Banks MD Referring Cardiology [phone]          Clinic Interview:  Patient Findings     Negatives:   Signs/symptoms of thrombosis, Signs/symptoms of bleeding,   Laboratory test error suspected, Change in health, Change in alcohol use,   Change in activity, Upcoming invasive procedure, Emergency department   visit, Upcoming dental procedure, Missed doses, Extra doses, Change in   medications, Change in diet/appetite, Hospital admission, Bruising, Other   complaints      Clinical Outcomes     Negatives:   Major bleeding event, Thromboembolic event,   Anticoagulation-related hospital admission, Anticoagulation-related ED   visit, Anticoagulation-related fatality        INR History:  Anticoagulation Monitoring 9/4/2019 9/6/2019 9/10/2019   INR 1.8 2.0 3.4   INR Date 9/4/2019 9/6/2019 9/10/2019   INR Goal 2.5-3.5 2.5-3.5 2.5-3.5   Trend Up Same Same   Last Week Total 45 mg 63.75 mg 60 mg   Next Week Total 56.25 mg 56.25 mg 52.5 mg   Sun - 7.5 mg -   Mon - 7.5 mg -   Tue - - 7.5 mg   Wed 11.25 mg (9/4) - 7.5 mg   Thu 7.5 mg - -   Fri - 11.25 mg (9/6) -   Sat - 7.5 mg -   Visit Report - - -   Some recent data might be hidden       Plan:  1. INR is therapeutic today- see above in  Anticoagulation Summary.   Will instruct Galdino Dallas to continue their warfarin regimen- see above in Anticoagulation Summary.  2. Follow up in 2 days.  3. Patient declines warfarin refills.  4. Verbal and written information provided. Patient expresses understanding and has no further questions at this time.    Christy Mathis

## 2019-09-11 ENCOUNTER — READMISSION MANAGEMENT (OUTPATIENT)
Dept: CALL CENTER | Facility: HOSPITAL | Age: 34
End: 2019-09-11

## 2019-09-11 NOTE — OUTREACH NOTE
Stroke Week 2 Survey      Responses   Facility patient discharged from?  [facility]   Does the patient have one of the following disease processes/diagnoses(primary or secondary)?  Stroke (TIA)   Week 2 attempt successful?  No   Unsuccessful attempts  Attempt 2          Kelli Sanford RN

## 2019-09-12 ENCOUNTER — ANTICOAGULATION VISIT (OUTPATIENT)
Dept: PHARMACY | Facility: HOSPITAL | Age: 34
End: 2019-09-12

## 2019-09-12 DIAGNOSIS — Z79.01 ANTICOAGULANT LONG-TERM USE: ICD-10-CM

## 2019-09-12 DIAGNOSIS — Z95.2 S/P AVR: ICD-10-CM

## 2019-09-12 LAB
INR PPP: 2.1 (ref 0.91–1.09)
PROTHROMBIN TIME: 25.2 SECONDS (ref 10–13.8)

## 2019-09-12 PROCEDURE — 85610 PROTHROMBIN TIME: CPT

## 2019-09-12 PROCEDURE — 36416 COLLJ CAPILLARY BLOOD SPEC: CPT

## 2019-09-12 PROCEDURE — G0463 HOSPITAL OUTPT CLINIC VISIT: HCPCS

## 2019-09-12 NOTE — PATIENT INSTRUCTIONS
11.25 mg today and tomorrow (extra 3.75 mg x 2 days), then resume 7.5 mg daily until INR check 9/17/19.

## 2019-09-12 NOTE — PROGRESS NOTES
Anticoagulation Clinic Progress Note    Anticoagulation Summary  As of 2019    INR goal:   2.5-3.5   TTR:   24.4 % (8.9 mo)   INR used for dosin.1! (2019)   Warfarin maintenance plan:   7.5 mg every day   Weekly warfarin total:   52.5 mg   Plan last modified:   Joe De RPH (2019)   Next INR check:   2019   Priority:   High   Target end date:       Indications    S/P AVR [Z95.2]  Anticoagulant long-term use [Z79.01]             Anticoagulation Episode Summary     INR check location:       Preferred lab:       Send INR reminders to:   [facility] [place]    Comments:         Anticoagulation Care Providers     Provider Role Specialty Phone number    José Antonio Banks MD Referring Cardiology [phone]          Clinic Interview:  Patient Findings     Negatives:   Signs/symptoms of thrombosis, Signs/symptoms of bleeding,   Laboratory test error suspected, Change in health, Change in alcohol use,   Change in activity, Upcoming invasive procedure, Emergency department   visit, Upcoming dental procedure, Missed doses, Extra doses, Change in   medications, Change in diet/appetite, Hospital admission, Bruising, Other   complaints    Comments:   Awaiting training from [employer] (possibly this weekend?).      Clinical Outcomes     Negatives:   Major bleeding event, Thromboembolic event,   Anticoagulation-related hospital admission, Anticoagulation-related ED   visit, Anticoagulation-related fatality    Comments:   Awaiting training from [employer] (possibly this weekend?).        INR History:  Anticoagulation Monitoring 2019 9/10/2019 2019   INR 2.0 3.4 2.1   INR Date 2019 9/10/2019 2019   INR Goal 2.5-3.5 2.5-3.5 2.5-3.5   Trend Same Same Same   Last Week Total 63.75 mg 60 mg 56.25 mg   Next Week Total 56.25 mg 52.5 mg 60 mg   Sun 7.5 mg - 7.5 mg   Mon 7.5 mg - 7.5 mg   Tue - 7.5 mg -   Wed - 7.5 mg -   Thu - - 11.25 mg ()   Fri 11.25 mg () - 11.25 mg ()    Sat 7.5 mg - 7.5 mg   Visit Report - - -   Some recent data might be hidden       Plan:  1. INR is Subtherapeutic today- see above in Anticoagulation Summary.  Will instruct Galdino [place] to Change their warfarin regimen- see above in Anticoagulation Summary.  2. Follow up in 5 days  3. Patient declines warfarin refills.  4. Verbal and written information provided. Patient expresses understanding and has no further questions at this time. To seek immediate medical attention if pt experiences s/sx of CVA/TIA.    Joe De RP

## 2019-09-17 ENCOUNTER — TELEPHONE (OUTPATIENT)
Dept: CARDIOLOGY | Facility: CLINIC | Age: 34
End: 2019-09-17

## 2019-09-17 LAB — INR PPP: 8

## 2019-09-17 NOTE — TELEPHONE ENCOUNTER
Pt called to report his INR was 8 on his home INR device. This was the first time he used this machine and the result was confirmed with the .  Since his previous INR was 2.1 last Thursday he reports no change in his medications and he is not taking any antibiotics.  He has been eating about the same as usual. He is having no bleeding issues at present.  Discussed with Dr Mary MUSA on call.  He is not to take any coumadin tonight and to have his level checked in AM here in lab to confirm result.

## 2019-09-18 ENCOUNTER — ANTICOAGULATION VISIT (OUTPATIENT)
Dept: PHARMACY | Facility: HOSPITAL | Age: 34
End: 2019-09-18

## 2019-09-18 ENCOUNTER — TELEPHONE (OUTPATIENT)
Dept: CARDIOLOGY | Facility: CLINIC | Age: 34
End: 2019-09-18

## 2019-09-18 ENCOUNTER — APPOINTMENT (OUTPATIENT)
Dept: LAB | Facility: HOSPITAL | Age: 34
End: 2019-09-18

## 2019-09-18 DIAGNOSIS — Z79.01 ANTICOAGULANT LONG-TERM USE: ICD-10-CM

## 2019-09-18 DIAGNOSIS — Z95.2 S/P AVR: ICD-10-CM

## 2019-09-18 LAB
INR PPP: 8.49 (ref 2–3)
PROTHROMBIN TIME: 85.2 SECONDS (ref 19.4–28.5)

## 2019-09-18 PROCEDURE — 36415 COLL VENOUS BLD VENIPUNCTURE: CPT

## 2019-09-18 PROCEDURE — 85610 PROTHROMBIN TIME: CPT | Performed by: INTERNAL MEDICINE

## 2019-09-18 NOTE — TELEPHONE ENCOUNTER
Received call from [facility]    Patient INR 8.49 today    Denies any current bleeding or headache    Reviewed case with Dr. Dockery (O/C)    Instructions Provided:    1. Hold Coumadin tonight  2. Eat green leafy vegetables of choice  3. Follow up with anticoagulation clinic in the morning for further INR re-draw and Coumadin dosaging  4. Proceed to ED with any acute bleeding or headache      *Epic message sent to [facility] and Joe De (pharmacist reviewing case) to follow up with patient in AM for any further instructions needed.

## 2019-09-19 ENCOUNTER — ANTICOAGULATION VISIT (OUTPATIENT)
Dept: PHARMACY | Facility: HOSPITAL | Age: 34
End: 2019-09-19

## 2019-09-19 DIAGNOSIS — Z95.2 S/P AVR: ICD-10-CM

## 2019-09-19 DIAGNOSIS — Z79.01 ANTICOAGULANT LONG-TERM USE: ICD-10-CM

## 2019-09-19 NOTE — PROGRESS NOTES
Anticoagulation Clinic Progress Note    Anticoagulation Summary  As of 2019    INR goal:   2.5-3.5   TTR:   24.1 % (9.1 mo)   INR used for dosin.49! (2019)   Warfarin maintenance plan:   7.5 mg every day   Weekly warfarin total:   52.5 mg   Plan last modified:   Joe De RPH (2019)   Next INR check:   2019   Priority:   High   Target end date:       Indications    S/P AVR [Z95.2]  Anticoagulant long-term use [Z79.01]             Anticoagulation Episode Summary     INR check location:       Preferred lab:       Send INR reminders to:    [facility]    Comments:   [facility] **Call every time - may reconsider once stable**      Anticoagulation Care Providers     Provider Role Specialty Phone number    José Antonio Banks MD Referring Cardiology [phone]            Clinic Interview:  Patient Findings     Positives:   Missed doses, Change in diet/appetite    Negatives:   Signs/symptoms of thrombosis, Signs/symptoms of bleeding,   Laboratory test error suspected, Change in health, Change in alcohol use,   Change in activity, Upcoming invasive procedure, Emergency department   visit, Upcoming dental procedure, Extra doses, Change in medications,   Hospital admission, Bruising, Other complaints    Comments:   Held dose yesterday per rec from on-call cardiology. Had   spinach kale smoothie last night.      Clinical Outcomes     Negatives:   Major bleeding event, Thromboembolic event,   Anticoagulation-related hospital admission, Anticoagulation-related ED   visit, Anticoagulation-related fatality    Comments:   Held dose yesterday per rec from on-call cardiology. Had   spinach kale smoothie last night.        INR History:  Anticoagulation Monitoring 2019   INR 2.1 8.00 8.49   INR Date 2019   INR Goal 2.5-3.5 2.5-3.5 2.5-3.5   Trend Same Same Same   Last Week Total 56.25 mg 52.5 mg 45 mg   Next Week Total 60 mg 52.5 mg 45 mg    Sun 7.5 mg - -   Mon 7.5 mg - -   Tue - - -   Wed - - -   Thu 11.25 mg (9/12) - Hold (9/19)   Fri 11.25 mg (9/13) - -   Sat 7.5 mg - -   Visit Report - - -   Some recent data might be hidden       Plan:  1. INR is Supratherapeutic today- see above in Anticoagulation Summary.   Will instruct Galdino YRN [place] to Change their warfarin regimen- see above in Anticoagulation Summary.  2. Follow up in 1 day  3. They have been instructed to call if any changes in medications, doses, concerns, etc. To seek immediate medical attention if s/sx of bleeding develop. Patient expresses understanding and has no further questions at this time.    Joe De [facility]

## 2019-09-20 ENCOUNTER — ANTICOAGULATION VISIT (OUTPATIENT)
Dept: PHARMACY | Facility: HOSPITAL | Age: 34
End: 2019-09-20

## 2019-09-20 DIAGNOSIS — Z79.01 ANTICOAGULANT LONG-TERM USE: ICD-10-CM

## 2019-09-20 DIAGNOSIS — Z95.2 S/P AVR: ICD-10-CM

## 2019-09-20 LAB — INR PPP: 1.9

## 2019-09-20 NOTE — PROGRESS NOTES
Anticoagulation Clinic Progress Note    Anticoagulation Summary  As of 2019    INR goal:   2.5-3.5   TTR:   24.1 % (9.2 mo)   INR used for dosin.90! (2019)   Warfarin maintenance plan:   7.5 mg every day   Weekly warfarin total:   52.5 mg   No change documented:   Joe De RPH   Plan last modified:   Joe De RPH (2019)   Next INR check:   2019   Priority:   High   Target end date:       Indications    S/P AVR [Z95.2]  Anticoagulant long-term use [Z79.01]             Anticoagulation Episode Summary     INR check location:       Preferred lab:       Send INR reminders to:    [facility]    Comments:   [facility] **Call every time - may reconsider once stable**      Anticoagulation Care Providers     Provider Role Specialty Phone number    José Antonio Banks MD Referring Cardiology [phone]            Clinic Interview:  Patient Findings     Positives:   Other complaints    Negatives:   Signs/symptoms of thrombosis, Signs/symptoms of bleeding,   Laboratory test error suspected, Change in health, Change in alcohol use,   Change in activity, Upcoming invasive procedure, Emergency department   visit, Upcoming dental procedure, Missed doses, Extra doses, Change in   medications, Change in diet/appetite, Hospital admission, Bruising    Comments:   Increased stress r/t returning to work last week.       Clinical Outcomes     Negatives:   Major bleeding event, Thromboembolic event,   Anticoagulation-related hospital admission, Anticoagulation-related ED   visit, Anticoagulation-related fatality    Comments:   Increased stress r/t returning to work last week.         INR History:  Anticoagulation Monitoring 2019   INR 8.00 8.49 1.90   INR Date 2019   INR Goal 2.5-3.5 2.5-3.5 2.5-3.5   Trend Same Same Same   Last Week Total 52.5 mg 45 mg 33.75 mg   Next Week Total 52.5 mg 45 mg 52.5 mg   Sun - - 7.5 mg   Mon - - -   Tu - -  -   Wed - - -   Thu - Hold (9/19) -   Fri - - 7.5 mg   Sat - - 7.5 mg   Visit Report - - -   Some recent data might be hidden       Plan:  1. INR is Subtherapeutic today- see above in Anticoagulation Summary.   Will instruct Galdino [place] to Resume their warfarin regimen- see above in Anticoagulation Summary.  2. Start enoxaparin 90 mg q12h.   3. Follow up in 3 days  4. They have been instructed to call if any changes in medications, doses, concerns, etc. Patient expresses understanding and has no further questions at this time.    Joe De [facility]

## 2019-09-21 ENCOUNTER — HOSPITAL ENCOUNTER (EMERGENCY)
Facility: HOSPITAL | Age: 34
Discharge: HOME | End: 2019-09-21
Admitting: EMERGENCY MEDICINE

## 2019-09-21 VITALS
DIASTOLIC BLOOD PRESSURE: 70 MMHG | WEIGHT: 196.65 LBS | HEIGHT: 67 IN | HEART RATE: 76 BPM | TEMPERATURE: 98.4 F | SYSTOLIC BLOOD PRESSURE: 108 MMHG | OXYGEN SATURATION: 95 % | RESPIRATION RATE: 14 BRPM | BODY MASS INDEX: 30.87 KG/M2

## 2019-09-21 DIAGNOSIS — Z79.01 ENCOUNTER FOR MONITORING COUMADIN THERAPY: Primary | ICD-10-CM

## 2019-09-21 DIAGNOSIS — Z51.81 ENCOUNTER FOR MONITORING COUMADIN THERAPY: Primary | ICD-10-CM

## 2019-09-21 LAB
INR PPP: 1.3
INR PPP: 1.41 (ref 2–3)
PROTHROMBIN TIME: 13.9 SECONDS (ref 19.4–28.5)

## 2019-09-21 PROCEDURE — 85610 PROTHROMBIN TIME: CPT

## 2019-09-21 PROCEDURE — 99283 EMERGENCY DEPT VISIT LOW MDM: CPT

## 2019-09-21 PROCEDURE — 36415 COLL VENOUS BLD VENIPUNCTURE: CPT

## 2019-09-21 NOTE — ED NOTES
Pt reports abnormal INR lab values for the past week. Reports 8/26 hospital admission for hypertension and high INR. Sees Dr. Banks in [place].     Lexus Christian RN  09/21/19 1159

## 2019-09-21 NOTE — DISCHARGE INSTRUCTIONS
Lovenox 90 mg twice daily, continue to monitor INR, do not take Lovenox if INR is between 2.5 and 3.5  Continue your Coumadin 7.5 mg nightly  Follow-up with your Coumadin clinic on Monday morning for further orders and prescriptions

## 2019-09-21 NOTE — ED PROVIDER NOTES
Subjective   Chief Complaint: Abnormal INR  Context: Patient reports that his INR is managed by [facility] level medication management and he does home monitoring.  He reports a few days ago his INR was over 8 and he held his Coumadin for 2 days. His INR was 1.9 last pm and 1.3 this am. He restarted his coumadin 7.5 last pm and has taken 2 doses of Lovenox he had left last pm and this am at 9 pm.  He reports he is concerned because he had a stroke in August and his INR was 1.3 at that time.  He denies headache, dizziness, numbness, weakness, or tingling.  No chest pain.  He reports that he is on Coumadin for an artificial heart valve.  Duration: As above  Timing: Constant  Severity: Anxious  Associated symptoms and or modifying factors: As above          History provided by:  Patient      Review of Systems   Constitutional: Negative for chills and fever.   HENT: Negative for congestion and sore throat.    Eyes: Negative for redness.   Respiratory: Negative for cough and shortness of breath.    Cardiovascular: Negative for chest pain.   Gastrointestinal: Negative for abdominal pain, diarrhea, nausea and vomiting.   Genitourinary: Negative for dysuria.   Musculoskeletal: Negative for back pain.   Skin: Negative for rash.   Neurological: Negative for headaches.   All other systems reviewed and are negative.      Past Medical History:   Diagnosis Date   • Anxiety    • Diverticulosis    • GERD (gastroesophageal reflux disease)    • Hyperlipidemia    • Hypertension    • IBS (irritable bowel syndrome)    • Migraines        Allergies   Allergen Reactions   • Penicillins Rash     Rash on legs when he was an infant       Past Surgical History:   Procedure Laterality Date   • AORTIC VALVE REPAIR/REPLACEMENT  2005    open   • CARDIAC CATHETERIZATION N/A 12/19/2017    Procedure: Coronary angiography;  Surgeon: José Antonio Banks MD;  Location: [facility] INVASIVE LOCATION;  Service:    • CARDIAC CATHETERIZATION Right 12/19/2017     Procedure: Functional Flow Clothier;  Surgeon: José Antonio Banks MD;  Location:  [facility] INVASIVE LOCATION;  Service:    • COLONOSCOPY N/A 3/14/2018    Procedure: COLONOSCOPY TO CECUM AND TERMINAL ILEUM;  Surgeon: Nnamdi Davis MD;  Location:  [facility];  Service: Gastroenterology   • CORONARY ANGIOPLASTY WITH STENT PLACEMENT N/A 2010    x3   • TONSILLECTOMY     • WISDOM TOOTH EXTRACTION         Family History   Problem Relation Age of Onset   • Heart disease Mother         mitral valve   • Colon polyps Mother    • Heart attack Paternal Grandfather    • Multiple sclerosis Father        Social History     Socioeconomic History   • Marital status:      Spouse name: Not on file   • Number of children: Not on file   • Years of education: Not on file   • Highest education level: Not on file   Tobacco Use   • Smoking status: Never Smoker   • Smokeless tobacco: Never Used   Substance and Sexual Activity   • Alcohol use: Yes     Comment: social   • Drug use: No   • Sexual activity: Defer           Objective   Physical Exam  The patient is well-developed, well-nourished, alert, cooperative and in no acute distress.    HEENT exam is normocephalic and atraumatic. Pupils equal ,round, reactive to light. Extraocular muscles are intact. Conjunctivae non- injected, sclerae anicteric, lids without ptosis, edema or erythema.  Mucous membranes are moist.    Neck is supple, non-tender without lymphadenopathy. No JVD, bruit or stridor noted.     Lungs clear to auscultation bilaterally.    Cardiovascular. Regular rate and rhythm without murmur.     Abdomen is soft, nontender, nondistended. No guarding or rebound noted.     Back shows no CVA tenderness.     Extremities: There is no significant deformity or joint abnormality. No edema.   Neurologic: Oriented x3 without focal neurological deficits.    Skin shows no rash, petechiae, or purpura.    Procedures           ED Course      Labs Reviewed   PROTIME-INR -  "Abnormal; Notable for the following components:       Result Value    Protime 13.9 (*)     INR 1.41 (*)     All other components within normal limits     No radiology results for the last day  Medications - No data to display  /67 (BP Location: Left arm, Patient Position: Sitting)   Pulse 80   Temp 97.6 °F (36.4 °C) (Oral)   Resp 17   Ht 170.2 cm (67\")   Wt 89.2 kg (196 lb 10.4 oz)   SpO2 97%   BMI 30.80 kg/m²               MDM  Number of Diagnoses or Management Options  Encounter for monitoring Coumadin therapy:   Diagnosis management comments: MEDICAL DECISION  Comorbidities: Noted above  Differentials: Coumadin toxicity, subtherapeutic Coumadin; this list is not all inclusive and does not constitute the entirety of considered causes    Lab interpretation:  Labs viewed by me significant for, INR 1.4    Results and plan for discharge were discussed.  Patient was written prescription for for Lovenox syringes of 90 mg each to be given every 12.  He is to continue to take his Coumadin 7.5 mg nightly and he is to monitor his INR.  He should stop his Lovenox if his INR is between 2.5 and 3.5  He will follow with the Coumadin clinic on Monday.         Amount and/or Complexity of Data Reviewed  Clinical lab tests: reviewed and ordered        Final diagnoses:   Encounter for monitoring Coumadin therapy              Anthony Fernandez, APRN  09/21/19 1624    "

## 2019-09-22 LAB — INR PPP: 1.5

## 2019-09-23 ENCOUNTER — ANTICOAGULATION VISIT (OUTPATIENT)
Dept: PHARMACY | Facility: HOSPITAL | Age: 34
End: 2019-09-23

## 2019-09-23 DIAGNOSIS — Z95.2 S/P AVR: ICD-10-CM

## 2019-09-23 DIAGNOSIS — Z79.01 ANTICOAGULANT LONG-TERM USE: ICD-10-CM

## 2019-09-23 LAB — INR PPP: 2.2

## 2019-09-23 NOTE — PROGRESS NOTES
Anticoagulation Clinic Progress Note    Anticoagulation Summary  As of 2019    INR goal:   2.5-3.5   TTR:   23.9 % (9.3 mo)   INR used for dosin.50! (2019)   Warfarin maintenance plan:   7.5 mg every day   Weekly warfarin total:   52.5 mg   Plan last modified:   Joe De [facility] (2019)   Next INR check:   2019   Priority:   High   Target end date:       Indications    S/P AVR [Z95.2]  Anticoagulant long-term use [Z79.01]             Anticoagulation Episode Summary     INR check location:       Preferred lab:       Send INR reminders to:    [facility]    Comments:   [facility] **Call every time - may reconsider once stable**      Anticoagulation Care Providers     Provider Role Specialty Phone number    José Antonio Banks MD Referring Cardiology [phone]            Clinic Interview:  Patient Findings     Negatives:   Signs/symptoms of thrombosis, Signs/symptoms of bleeding,   Laboratory test error suspected, Change in health, Change in alcohol use,   Change in activity, Upcoming invasive procedure, Emergency department   visit, Upcoming dental procedure, Missed doses, Extra doses, Change in   medications, Change in diet/appetite, Hospital admission, Bruising, Other   complaints      Clinical Outcomes     Negatives:   Major bleeding event, Thromboembolic event,   Anticoagulation-related hospital admission, Anticoagulation-related ED   visit, Anticoagulation-related fatality        INR History:  Anticoagulation Monitoring 2019   INR 8.49 1.90 1.50   INR Date 2019   INR Goal 2.5-3.5 2.5-3.5 2.5-3.5   Trend Same Same Same   Last Week Total 45 mg 33.75 mg 30 mg   Next Week Total 45 mg 52.5 mg 56.25 mg   Sun - 7.5 mg -   Mon - - 11.25 mg ()   Tue - - 7.5 mg   Wed - - -   Thu Hold () - -   Fri - 7.5 mg -   Sat - 7.5 mg -   Visit Report - - -   Some recent data might be hidden       Plan:  1. INR is Subtherapeutic today-  see above in Anticoagulation Summary.   Will instruct Galdino [place] to Change their warfarin regimen- see above in Anticoagulation Summary.  2. Continue enoxaparin 90 mg q12h.  3. Follow up in 2 days  4. They have been instructed to call if any changes in medications, doses, concerns, etc. Patient expresses understanding and has no further questions at this time.    Joe De, [facility], located within [facility]

## 2019-09-24 ENCOUNTER — ANTICOAGULATION VISIT (OUTPATIENT)
Dept: PHARMACY | Facility: HOSPITAL | Age: 34
End: 2019-09-24

## 2019-09-24 DIAGNOSIS — Z95.2 S/P AVR: ICD-10-CM

## 2019-09-24 DIAGNOSIS — Z79.01 ANTICOAGULANT LONG-TERM USE: ICD-10-CM

## 2019-09-24 RX ORDER — WARFARIN SODIUM 5 MG/1
TABLET ORAL
Qty: 30 TABLET | Refills: 1 | Status: SHIPPED | OUTPATIENT
Start: 2019-09-24 | End: 2019-09-30 | Stop reason: SDUPTHER

## 2019-09-24 NOTE — PROGRESS NOTES
Anticoagulation Clinic Progress Note    Anticoagulation Summary  As of 2019    INR goal:   2.5-3.5   TTR:   23.8 % (9.3 mo)   INR used for dosin.20! (2019)   Warfarin maintenance plan:   7.5 mg every day   Weekly warfarin total:   52.5 mg   Plan last modified:   Joe De [facility] (2019)   Next INR check:   2019   Priority:   High   Target end date:       Indications    S/P AVR [Z95.2]  Anticoagulant long-term use [Z79.01]             Anticoagulation Episode Summary     INR check location:       Preferred lab:       Send INR reminders to:    [facility]    Comments:   [facility] **Call every time - may reconsider once stable**      Anticoagulation Care Providers     Provider Role Specialty Phone number    José Antonio Banks MD Referring Cardiology [phone]            Clinic Interview:  Patient Findings     Positives:   Other complaints    Negatives:   Signs/symptoms of thrombosis, Signs/symptoms of bleeding,   Laboratory test error suspected, Change in health, Change in alcohol use,   Change in activity, Upcoming invasive procedure, Emergency department   visit, Upcoming dental procedure, Missed doses, Extra doses, Change in   medications, Change in diet/appetite, Hospital admission, Bruising    Comments:   Pt took 7.5 mg yesterday due to rapidly rising INR.       Clinical Outcomes     Negatives:   Major bleeding event, Thromboembolic event,   Anticoagulation-related hospital admission, Anticoagulation-related ED   visit, Anticoagulation-related fatality    Comments:   Pt took 7.5 mg yesterday due to rapidly rising INR.         INR History:  Anticoagulation Monitoring 2019   INR 1.90 1.50 2.20   INR Date 2019   INR Goal 2.5-3.5 2.5-3.5 2.5-3.5   Trend Same Same Same   Last Week Total 33.75 mg 30 mg 30 mg   Next Week Total 52.5 mg 56.25 mg 50 mg   Sun 7.5 mg - -   Mon - 11.25 mg () -   Tu - 7.5 mg 5 mg ()   Wed - -  -   Thu - - -   Fri 7.5 mg - -   Sat 7.5 mg - -   Visit Report - - -   Some recent data might be hidden       Plan:  1. INR was Subtherapeutic yesterday- see above in Anticoagulation Summary.   Will instruct Galdino [place] to Change their warfarin regimen (5 mg today)- see above in Anticoagulation Summary.  2. STOP enoxaparin due to rapidly rising INR and likely therapeutic (possibly near supratherapeutic) INR today.  3. Follow up in 1 day (tomorrow AM before work)  4. They have been instructed to call if any changes in medications, doses, concerns, etc. Patient expresses understanding and has no further questions at this time.    Joe De [facility]

## 2019-09-25 ENCOUNTER — ANTICOAGULATION VISIT (OUTPATIENT)
Dept: PHARMACY | Facility: HOSPITAL | Age: 34
End: 2019-09-25

## 2019-09-25 DIAGNOSIS — Z79.01 ANTICOAGULANT LONG-TERM USE: ICD-10-CM

## 2019-09-25 DIAGNOSIS — Z95.2 S/P AVR: ICD-10-CM

## 2019-09-25 LAB — INR PPP: 2.8

## 2019-09-25 NOTE — PROGRESS NOTES
Anticoagulation Clinic Progress Note    Anticoagulation Summary  As of 2019    INR goal:   2.5-3.5   TTR:   24.0 % (9.4 mo)   INR used for dosin.80 (2019)   Warfarin maintenance plan:   7.5 mg every day   Weekly warfarin total:   52.5 mg   Plan last modified:   Joe De [facility] (2019)   Next INR check:   2019   Priority:   High   Target end date:       Indications    S/P AVR [Z95.2]  Anticoagulant long-term use [Z79.01]             Anticoagulation Episode Summary     INR check location:       Preferred lab:       Send INR reminders to:    [facility]    Comments:   [facility] **Call every time - may reconsider once stable**      Anticoagulation Care Providers     Provider Role Specialty Phone number    José Antonio Banks MD Referring Cardiology [phone]            Clinic Interview:  Patient Findings     Negatives:   Signs/symptoms of thrombosis, Signs/symptoms of bleeding,   Laboratory test error suspected, Change in health, Change in alcohol use,   Change in activity, Upcoming invasive procedure, Emergency department   visit, Upcoming dental procedure, Missed doses, Extra doses, Change in   medications, Change in diet/appetite, Hospital admission, Bruising, Other   complaints      Clinical Outcomes     Negatives:   Major bleeding event, Thromboembolic event,   Anticoagulation-related hospital admission, Anticoagulation-related ED   visit, Anticoagulation-related fatality        INR History:  Anticoagulation Monitoring 2019   INR 1.50 2.20 2.80   INR Date 2019   INR Goal 2.5-3.5 2.5-3.5 2.5-3.5   Trend Same Same Same   Last Week Total 30 mg 30 mg 35 mg   Next Week Total 56.25 mg 50 mg 50 mg   Sun - - -   Mon 11.25 mg () - -   Tue 7.5 mg 5 mg () -   Wed - - 5 mg ()   Thu - - 7.5 mg   Fri - - -   Sat - - -   Visit Report - - -   Some recent data might be hidden       Plan:  1. INR is Therapeutic today- see  above in Anticoagulation Summary.   Will instruct Galdino [place] to Change their warfarin regimen- see above in Anticoagulation Summary.  2. Follow up in 2 days  3. They have been instructed to call if any changes in medications, doses, concerns, etc. Patient expresses understanding and has no further questions at this time.    Joe De [facility]

## 2019-09-28 LAB — INR PPP: 3.8

## 2019-09-30 ENCOUNTER — ANTICOAGULATION VISIT (OUTPATIENT)
Dept: PHARMACY | Facility: HOSPITAL | Age: 34
End: 2019-09-30

## 2019-09-30 DIAGNOSIS — Z79.01 ANTICOAGULANT LONG-TERM USE: ICD-10-CM

## 2019-09-30 DIAGNOSIS — Z95.2 S/P AVR: ICD-10-CM

## 2019-09-30 RX ORDER — WARFARIN SODIUM 5 MG/1
5 TABLET ORAL NIGHTLY
Qty: 90 TABLET | Refills: 0 | Status: SHIPPED | OUTPATIENT
Start: 2019-09-30 | End: 2019-11-15 | Stop reason: SDUPTHER

## 2019-09-30 NOTE — PROGRESS NOTES
Anticoagulation Clinic Progress Note    Anticoagulation Summary  As of 9/30/2019    INR goal:   2.5-3.5   TTR:   24.5 % (9.5 mo)   INR used for dosing:   3.80! (9/28/2019)   Warfarin maintenance plan:   5 mg every Mon, Wed; 7.5 mg all other days   Weekly warfarin total:   47.5 mg   Plan last modified:   Ahsan Driscoll RPH (9/30/2019)   Next INR check:   10/4/2019   Priority:   High   Target end date:       Indications    S/P AVR [Z95.2]  Anticoagulant long-term use [Z79.01]             Anticoagulation Episode Summary     INR check location:       Preferred lab:       Send INR reminders to:   [facility] POOL    Comments:   [facility] **Call every time - may reconsider once stable**      Anticoagulation Care Providers     Provider Role Specialty Phone number    José Antonio Banks MD Referring Cardiology [phone]          Clinic Interview:      INR History:  Anticoagulation Monitoring 9/24/2019 9/25/2019 9/30/2019   INR 2.20 2.80 3.80   INR Date 9/23/2019 9/25/2019 9/28/2019   INR Goal 2.5-3.5 2.5-3.5 2.5-3.5   Trend Same Same Down   Last Week Total 30 mg 35 mg 47.5 mg   Next Week Total 50 mg 50 mg 47.5 mg   Sun - - -   Mon - - 5 mg   Tue 5 mg (9/24) - 7.5 mg   Wed - 5 mg (9/25) 5 mg   Thu - 7.5 mg 7.5 mg   Fri - - -   Sat - - -   Visit Report - - -   Some recent data might be hidden       Plan:  1. INR is Supratherapeutic today- see above in Anticoagulation Summary.   Will instruct Galdino [place] to Change their warfarin regimen to 5 mg on MW and 7.5 mg on all other days (likely will require 5 mg MWF, but want to ensure pt does not trend down any further)- see above in Anticoagulation Summary.  2. Follow up in 4 days  3.  Pt has agreed to only be called if INR out of range. They have been instructed to call if any changes in medications, doses, concerns, etc. Patient expresses understanding and has no further questions at this time.    Ahsan Driscoll RPH

## 2019-10-04 LAB — INR PPP: 2.1

## 2019-10-08 ENCOUNTER — TELEPHONE (OUTPATIENT)
Dept: CARDIOLOGY | Facility: CLINIC | Age: 34
End: 2019-10-08

## 2019-10-08 ENCOUNTER — LAB (OUTPATIENT)
Dept: LAB | Facility: HOSPITAL | Age: 34
End: 2019-10-08

## 2019-10-08 ENCOUNTER — ANTICOAGULATION VISIT (OUTPATIENT)
Dept: PHARMACY | Facility: HOSPITAL | Age: 34
End: 2019-10-08

## 2019-10-08 DIAGNOSIS — Z95.2 S/P AVR: ICD-10-CM

## 2019-10-08 DIAGNOSIS — Z79.01 ANTICOAGULANT LONG-TERM USE: ICD-10-CM

## 2019-10-08 LAB
INR PPP: 5.81 (ref 2–3)
PROTHROMBIN TIME: 64.8 SECONDS (ref 19.4–28.5)

## 2019-10-08 PROCEDURE — 36415 COLL VENOUS BLD VENIPUNCTURE: CPT

## 2019-10-08 PROCEDURE — 85610 PROTHROMBIN TIME: CPT

## 2019-10-08 NOTE — PROGRESS NOTES
Anticoagulation Clinic Progress Note    Anticoagulation Summary  As of 10/8/2019    INR goal:   2.5-3.5   TTR:   25.2 % (9.8 mo)   INR used for dosin.81! (10/8/2019)   Warfarin maintenance plan:   5 mg every Mon, Wed; 7.5 mg all other days   Weekly warfarin total:   47.5 mg   Plan last modified:   Ahsan Driscoll, [facility] (2019)   Next INR check:   10/11/2019   Priority:   Critical   Target end date:       Indications    S/P AVR [Z95.2]  Anticoagulant long-term use [Z79.01]             Anticoagulation Episode Summary     INR check location:       Preferred lab:       Send IN[facility]ers [facility]:    [facility]    Comments:   Acel[facility] **Call every time - may reconsider once stable**      Anticoagulation Care Providers     Provider Role Specialty Phone number    José Antonio Banks MD Referring Cardiology [phone]          Clinic Interview:  Patient Findings     Positives:   Change in health, Other complaints    Negatives:   Signs/symptoms of thrombosis, Signs/symptoms of bleeding,   Laboratory test error suspected, Change in alcohol use, Change in   activity, Upcoming invasive procedure, Emergency department visit,   Upcoming dental procedure, Missed doses, Extra doses, Change in   medications, Change in diet/appetite, Hospital admission, Bruising    Comments:   Checked INR at home 10/4, but did not call in result until   10/8. Self-adjusted dosing.Toe infection; not on abx.      Clinical Outcomes     Negatives:   Major bleeding event, Thromboembolic event,   Anticoagulation-related hospital admission, Anticoagulation-related ED   visit, Anticoagulation-related fatality    Comments:   Checked INR at home 10/4, but did not call in result until   10/8. Self-adjusted dosing.Toe infection; not on abx.        INR History:  Anticoagulation Monitoring 2019 2019 10/8/2019   INR 2.80 3.80 5.81   INR Date 2019 2019 10/8/2019   INR Goal 2.5-3.5 2.5-3.5 2.5-3.5   Trend Same Down Same    Last Week Total 35 mg 47.5 mg 47.5 mg   Next Week Total 50 mg 47.5 mg 37.5 mg   Sun - - -   Mon - 5 mg -   Tue - 7.5 mg Hold (10/8)   Wed 5 mg (9/25) 5 mg 5 mg   Thu 7.5 mg 7.5 mg 5 mg (10/10)   Fri - - -   Sat - - -   Visit Report - - -   Some recent data might be hidden       Plan:  1. INR is Supratherapeutic today- see above in Anticoagulation Summary.  Will instruct Galdino Dallas to Change their warfarin regimen (HOLD today, then trial 5 mg daily until INR check in 3 days) - see above in Anticoagulation Summary.  2. Follow up in 3 days  3. Patient declines warfarin refills.  4. Verbal and written information provided. To seek immediate medical attention if s/sx of bleeding develop or fall occurs. Patient expresses understanding and has no further questions at this time.    Joe De [facility]

## 2019-10-08 NOTE — TELEPHONE ENCOUNTER
Spoke with Mr. Dallas by phone regarding warfarin dosing. May refer to 10/8/19 [facility] Visit. Thank you.

## 2019-10-08 NOTE — TELEPHONE ENCOUNTER
Lab called critical INR of 5.81. Also, asked lab to call anitcoag clinic.    Alma Killian RN  Triage MG

## 2019-10-11 ENCOUNTER — ANTICOAGULATION VISIT (OUTPATIENT)
Dept: PHARMACY | Facility: HOSPITAL | Age: 34
End: 2019-10-11

## 2019-10-11 ENCOUNTER — LAB (OUTPATIENT)
Dept: LAB | Facility: HOSPITAL | Age: 34
End: 2019-10-11

## 2019-10-11 DIAGNOSIS — Z95.2 S/P AVR: ICD-10-CM

## 2019-10-11 DIAGNOSIS — Z79.01 ANTICOAGULANT LONG-TERM USE: ICD-10-CM

## 2019-10-11 LAB
INR PPP: 2.57 (ref 2–3)
PROTHROMBIN TIME: 24.3 SECONDS (ref 19.4–28.5)

## 2019-10-11 PROCEDURE — 36415 COLL VENOUS BLD VENIPUNCTURE: CPT

## 2019-10-11 PROCEDURE — 85610 PROTHROMBIN TIME: CPT

## 2019-10-11 NOTE — PROGRESS NOTES
Anticoagulation Clinic Progress Note    Anticoagulation Summary  As of 10/11/2019    INR goal:   2.5-3.5   TTR:   25.3 % (9.9 mo)   INR used for dosin.57 (10/11/2019)   Warfarin maintenance plan:   5 mg every Mon, Wed; 7.5 mg all other days   Weekly warfarin total:   47.5 mg   Plan last modified:   Ahsan Driscoll, [facility] (2019)   Next INR check:   10/15/2019   Priority:   Critical   Target end date:       Indications    S/P AVR [Z95.2]  Anticoagulant long-term use [Z79.01]             Anticoagulation Episode Summary     INR check location:       Preferred lab:       Send INR reminders to:    YANDY [facility]INICAL POOL    Comments:   [facility]cel[facility] **Call every time - may reconsider once stable**      Anticoagulation Care Providers     Provider Role Specialty Phone number    José Antonio Banks MD Referring Cardiology [phone]            Clinic Interview:  Patient Findings     Negatives:   Signs/symptoms of thrombosis, Signs/symptoms of bleeding,   Laboratory test error suspected, Change in health, Change in alcohol use,   Change in activity, Upcoming invasive procedure, Emergency department   visit, Upcoming dental procedure, Missed doses, Extra doses, Change in   medications, Change in diet/appetite, Hospital admission, Bruising, Other   complaints      Clinical Outcomes     Negatives:   Major bleeding event, Thromboembolic event,   Anticoagulation-related hospital admission, Anticoagulation-related ED   visit, Anticoagulation-related fatality        INR History:  Anticoagulation Monitoring 2019 10/8/2019 10/11/2019   INR 3.80 5.81 2.57   INR Date 2019 10/8/2019 10/11/2019   INR Goal 2.5-3.5 2.5-3.5 2.5-3.5   Trend Down Same Same   Last Week Total 47.5 mg 47.5 mg 37.5 mg   Next Week Total 47.5 mg 37.5 mg 45 mg   Sun - - 5 mg (10/13)   Mon 5 mg - 7.5 mg (10/14)   Tue 7.5 mg Hold (10/8) -   Wed 5 mg 5 mg -   Thu 7.5 mg 5 mg (10/10) -   Fri - - 7.5 mg   Sat - - 5 mg (10/12)   Visit Report - - -    Some recent data might be hidden       Plan:  1. INR is Therapeutic today- see above in Anticoagulation Summary.   Will instruct Galdino Dallas to Change their warfarin regimen- see above in Anticoagulation Summary.  2. Follow up in 4 days  3. They have been instructed to call if any changes in medications, doses, concerns, etc. Patient expresses understanding and has no further questions at this time.    Joe De [facility]

## 2019-10-15 ENCOUNTER — LAB (OUTPATIENT)
Dept: LAB | Facility: HOSPITAL | Age: 34
End: 2019-10-15

## 2019-10-15 DIAGNOSIS — Z95.2 S/P AVR: ICD-10-CM

## 2019-10-15 DIAGNOSIS — Z79.01 ANTICOAGULANT LONG-TERM USE: ICD-10-CM

## 2019-10-15 LAB
INR PPP: 6.9 (ref 2–3)
PROTHROMBIN TIME: 68.6 SECONDS (ref 19.4–28.5)

## 2019-10-15 PROCEDURE — 85610 PROTHROMBIN TIME: CPT

## 2019-10-15 PROCEDURE — 36415 COLL VENOUS BLD VENIPUNCTURE: CPT

## 2019-10-16 ENCOUNTER — ANTICOAGULATION VISIT (OUTPATIENT)
Dept: PHARMACY | Facility: HOSPITAL | Age: 34
End: 2019-10-16

## 2019-10-16 DIAGNOSIS — Z79.01 ANTICOAGULANT LONG-TERM USE: ICD-10-CM

## 2019-10-16 DIAGNOSIS — Z95.2 S/P AVR: ICD-10-CM

## 2019-10-16 NOTE — PROGRESS NOTES
Anticoagulation Clinic Progress Note    Anticoagulation Summary  As of 10/16/2019    INR goal:   2.5-3.5   TTR:   25.2 % (10 mo)   INR used for dosin.90! (10/15/2019)   Warfarin maintenance plan:   5 mg every day   Weekly warfarin total:   35 mg   Plan last modified:   Joe De [facility] (10/16/2019)   Next INR check:   10/18/2019   Priority:   Critical   Target end date:       Indications    S/P AVR [Z95.2]  Anticoagulant long-term use [Z79.01]             Anticoagulation Episode Summary     INR check location:       Preferred lab:       Send INR reminders to:    YANDY [facility]INICAL POOL    Comments:   [facility] **Call every time - may reconsider once stable**      Anticoagulation Care Providers     Provider Role Specialty Phone number    José Antonio Banks MD Referring Cardiology [phone]            Clinic Interview:  Patient Findings     Positives:   Change in health, Missed doses, Change in medications, Other   complaints    Negatives:   Signs/symptoms of thrombosis, Signs/symptoms of bleeding,   Laboratory test error suspected, Change in alcohol use, Change in   activity, Upcoming invasive procedure, Emergency department visit,   Upcoming dental procedure, Extra doses, Change in diet/appetite, Hospital   admission, Bruising    Comments:   Increased toe pain, had toe procedure for in-grown nail,   increased Norco use as a result. Noted that INR decreases dramatically   with small changes (hold x 1 last week decreased INR from 5.81 to 2.57).   Held last night per Cardiology following high INR.      Clinical Outcomes     Negatives:   Major bleeding event, Thromboembolic event,   Anticoagulation-related hospital admission, Anticoagulation-related ED   visit, Anticoagulation-related fatality    Comments:   Increased toe pain, had toe procedure for in-grown nail,   increased Norco use as a result. Noted that INR decreases dramatically   with small changes (hold x 1 last week decreased INR from 5.81 to  2.57).   Held last night per Cardiology following high INR.        INR History:  Anticoagulation Monitoring 10/8/2019 10/11/2019 10/16/2019   INR 5.81 2.57 6.90   INR Date 10/8/2019 10/11/2019 10/15/2019   INR Goal 2.5-3.5 2.5-3.5 2.5-3.5   Trend Same Same Down   Last Week Total 47.5 mg 37.5 mg 35 mg   Next Week Total 37.5 mg 45 mg 32.5 mg   Sun - 5 mg (10/13) -   Mon - 7.5 mg (10/14) -   Tue Hold (10/8) - -   Wed 5 mg - 2.5 mg (10/16)   Thu 5 mg (10/10) - 5 mg   Fri - 7.5 mg -   Sat - 5 mg (10/12) -   Visit Report - - -   Some recent data might be hidden       Plan:  1. INR is Supratherapeutic today- see above in Anticoagulation Summary.   Will instruct Galdino Dallas to Change their warfarin regimen (HELD yesterday, partial dose of 2.5 mg today, then decrease to 5 mg daily)- see above in Anticoagulation Summary.  2. Follow up in 2 days w/ home test (received more strips in mail)  3. They have been instructed to call if any changes in medications, doses, concerns, etc. Patient expresses understanding and has no further questions at this time.    Joe De [facility]

## 2019-10-18 ENCOUNTER — ANTICOAGULATION VISIT (OUTPATIENT)
Dept: PHARMACY | Facility: HOSPITAL | Age: 34
End: 2019-10-18

## 2019-10-18 DIAGNOSIS — Z79.01 ANTICOAGULANT LONG-TERM USE: ICD-10-CM

## 2019-10-18 DIAGNOSIS — Z95.2 S/P AVR: ICD-10-CM

## 2019-10-18 LAB — INR PPP: 1.9

## 2019-10-18 NOTE — PROGRESS NOTES
Anticoagulation Clinic Progress Note    Anticoagulation Summary  As of 10/18/2019    INR goal:   2.5-3.5   TTR:   25.2 % (10.1 mo)   INR used for dosin.90! (10/18/2019)   Warfarin maintenance plan:   5 mg every day   Weekly warfarin total:   35 mg   Plan last modified:   Joe De JALYN (10/16/2019)   Next INR check:   10/21/2019   Priority:   Critical   Target end date:       Indications    S/P AVR [Z95.2]  Anticoagulant long-term use [Z79.01]             Anticoagulation Episode Summary     INR check location:       Preferred lab:       Send INR reminders to:    YANDY [facility] CLINICAL POOL    Comments:   [facility]cel[facility] **Call every time - may reconsider once stable**      Anticoagulation Care Providers     Provider Role Specialty Phone number    José Antonio Banks MD Referring Cardiology [phone]            Clinic Interview:  Patient Findings     Positives:   Extra doses    Negatives:   Signs/symptoms of thrombosis, Signs/symptoms of bleeding,   Laboratory test error suspected, Change in health, Change in alcohol use,   Change in activity, Upcoming invasive procedure, Emergency department   visit, Upcoming dental procedure, Missed doses, Change in medications,   Change in diet/appetite, Hospital admission, Bruising, Other complaints    Comments:   Reports taking 7.5 mg yesterday instead of 5 mg?      Clinical Outcomes     Negatives:   Major bleeding event, Thromboembolic event,   Anticoagulation-related hospital admission, Anticoagulation-related ED   visit, Anticoagulation-related fatality    Comments:   Reports taking 7.5 mg yesterday instead of 5 mg?        INR History:  Anticoagulation Monitoring 10/11/2019 10/16/2019 10/18/2019   INR 2.57 6.90 1.90   INR Date 10/11/2019 10/15/2019 10/18/2019   INR Goal 2.5-3.5 2.5-3.5 2.5-3.5   Trend Same Down Same   Last Week Total 37.5 mg 35 mg 35 mg   Next Week Total 45 mg 32.5 mg 37.5 mg   Sun 5 mg (10/13) - 5 mg   Mon 7.5 mg (10/14) - -   Tu - - -   Wed - 2.5 mg  (10/16) -   Thu - 5 mg -   Fri 7.5 mg - 7.5 mg (10/18)   Sat 5 mg (10/12) - 5 mg   Visit Report - - -   Some recent data might be hidden       Plan:  1. INR is Subtherapeutic today- see above in Anticoagulation Summary.   Will instruct Galdino Jonesery to Change their warfarin regimen- see above in Anticoagulation Summary. Defer LMWH due to very labile INR with high likelihood of supratherapeutic INR with today's boost dose of warfarin.  2. Follow up in 3 days  3. They have been instructed to call if any changes in medications, doses, concerns, etc. Patient expresses understanding and has no further questions at this time.    Joe De RP

## 2019-10-21 ENCOUNTER — ANTICOAGULATION VISIT (OUTPATIENT)
Dept: PHARMACY | Facility: HOSPITAL | Age: 34
End: 2019-10-21

## 2019-10-21 DIAGNOSIS — Z95.2 S/P AVR: ICD-10-CM

## 2019-10-21 DIAGNOSIS — Z79.01 ANTICOAGULANT LONG-TERM USE: ICD-10-CM

## 2019-10-21 LAB — INR PPP: 2.7

## 2019-10-21 NOTE — PROGRESS NOTES
Anticoagulation Clinic Progress Note    Anticoagulation Summary  As of 10/21/2019    INR goal:   2.5-3.5   TTR:   25.2 % (10.2 mo)   INR used for dosin.70 (10/21/2019)   Warfarin maintenance plan:   5 mg every day   Weekly warfarin total:   35 mg   No change documented:   Joe De RPH   Plan last modified:   Joe De RPH (10/16/2019)   Next INR check:   10/24/2019   Priority:   Critical   Target end date:       Indications    S/P AVR [Z95.2]  Anticoagulant long-term use [Z79.01]             Anticoagulation Episode Summary     INR check location:       Preferred lab:       Send INR reminders to:    YANDY [facility] CLINICAL POOL    Comments:   [facility] **Call every time - may reconsider once stable**      Anticoagulation Care Providers     Provider Role Specialty Phone number    José Antonio Banks MD Referring Cardiology [phone]            Clinic Interview:  Patient Findings     Negatives:   Signs/symptoms of thrombosis, Signs/symptoms of bleeding,   Laboratory test error suspected, Change in health, Change in alcohol use,   Change in activity, Upcoming invasive procedure, Emergency department   visit, Upcoming dental procedure, Missed doses, Extra doses, Change in   medications, Change in diet/appetite, Hospital admission, Bruising, Other   complaints      Clinical Outcomes     Negatives:   Major bleeding event, Thromboembolic event,   Anticoagulation-related hospital admission, Anticoagulation-related ED   visit, Anticoagulation-related fatality        INR History:  Anticoagulation Monitoring 10/16/2019 10/18/2019 10/21/2019   INR 6.90 1.90 2.70   INR Date 10/15/2019 10/18/2019 10/21/2019   INR Goal 2.5-3.5 2.5-3.5 2.5-3.5   Trend Down Same Same   Last Week Total 35 mg 35 mg 35 mg   Next Week Total 32.5 mg 37.5 mg 35 mg   Sun - 5 mg -   Mon - - 5 mg   Tue - - 5 mg   Wed 2.5 mg (10/16) - 5 mg   Thu 5 mg - -   Fri - 7.5 mg (10/18) -   Sat - 5 mg -   Visit Report - - -   Some recent data might be  hidden       Plan:  1. INR is Therapeutic today- see above in Anticoagulation Summary.   Will instruct Galdino Dallas to trial warfarin 5 mg daily until INR check in 3 days.  see above in Anticoagulation Summary.  2. Follow up in 3 days  3. Pt has agreed to only be called if INR out of range. They have been instructed to call if any changes in medications, doses, concerns, etc. Patient expresses understanding and has no further questions at this time.    Joe De [facility]

## 2019-10-25 ENCOUNTER — ANTICOAGULATION VISIT (OUTPATIENT)
Dept: PHARMACY | Facility: HOSPITAL | Age: 34
End: 2019-10-25

## 2019-10-25 DIAGNOSIS — Z79.01 ANTICOAGULANT LONG-TERM USE: ICD-10-CM

## 2019-10-25 DIAGNOSIS — Z95.2 S/P AVR: ICD-10-CM

## 2019-10-25 LAB — INR PPP: 3.2

## 2019-10-25 NOTE — PROGRESS NOTES
Anticoagulation Clinic Progress Note    Anticoagulation Summary  As of 10/25/2019    INR goal:   2.5-3.5   TTR:   26.1 % (10.4 mo)   INR used for dosing:   3.20 (10/25/2019)   Warfarin maintenance plan:   7.5 mg every Sun; 5 mg all other days   Weekly warfarin total:   37.5 mg   Plan last modified:   Paulette Martin RPH (10/25/2019)   Next INR check:   10/30/2019   Priority:   Critical   Target end date:       Indications    S/P AVR [Z95.2]  Anticoagulant long-term use [Z79.01]             Anticoagulation Episode Summary     INR check location:       Preferred lab:       Send INR reminders to:    YANDY [facility] CLINICAL POOL    Comments:   [facility] **Call every time - may reconsider once stable**      Anticoagulation Care Providers     Provider Role Specialty Phone number    José Antonio Banks MD Referring Cardiology [phone]          Clinic Interview:      INR History:  Anticoagulation Monitoring 10/18/2019 10/21/2019 10/25/2019   INR 1.90 2.70 3.20   INR Date 10/18/2019 10/21/2019 10/25/2019   INR Goal 2.5-3.5 2.5-3.5 2.5-3.5   Trend Same Same Up   Last Week Total 35 mg 35 mg 37.5 mg   Next Week Total 37.5 mg 35 mg 37.5 mg   Sun 5 mg - 7.5 mg   Mon - 5 mg 5 mg   Tue - 5 mg 5 mg   Wed - 5 mg -   Thu - - -   Fri 7.5 mg (10/18) - 5 mg   Sat 5 mg - 5 mg   Visit Report - - -   Some recent data might be hidden       Plan:  1. INR is Therapeutic today- see above in Anticoagulation Summary.   Will instruct Galdino Dallas to Change their warfarin regimen- see above in Anticoagulation Summary.  2. Follow up in 5 days.  3. Spoke with pt today.They have been instructed to call if any changes in medications, doses, concerns, etc. Patient expresses understanding and has no further questions at this time.    Paulette Martin RPH

## 2019-10-31 ENCOUNTER — ANTICOAGULATION VISIT (OUTPATIENT)
Dept: PHARMACY | Facility: HOSPITAL | Age: 34
End: 2019-10-31

## 2019-10-31 DIAGNOSIS — Z95.2 S/P AVR: ICD-10-CM

## 2019-10-31 DIAGNOSIS — I63.9 ACUTE CEREBROVASCULAR ACCIDENT (CVA) (HCC): ICD-10-CM

## 2019-10-31 DIAGNOSIS — Z79.01 ANTICOAGULANT LONG-TERM USE: ICD-10-CM

## 2019-10-31 LAB
INR PPP: 2 (ref 0.91–1.09)
PROTHROMBIN TIME: 23.9 SECONDS (ref 10–13.8)

## 2019-10-31 PROCEDURE — 36416 COLLJ CAPILLARY BLOOD SPEC: CPT

## 2019-10-31 PROCEDURE — 85610 PROTHROMBIN TIME: CPT

## 2019-10-31 PROCEDURE — G0463 HOSPITAL OUTPT CLINIC VISIT: HCPCS

## 2019-10-31 NOTE — PROGRESS NOTES
Anticoagulation Clinic Progress Note    Anticoagulation Summary  As of 10/31/2019    INR goal:   2.5-3.5   TTR:   26.8 % (10.6 mo)   INR used for dosin.0! (10/31/2019)   Warfarin maintenance plan:   7.5 mg every Sun; 5 mg all other days   Weekly warfarin total:   37.5 mg   Plan last modified:   Paulette Martin RPH (10/25/2019)   Next INR check:   2019   Priority:   Critical   Target end date:       Indications    S/P AVR [Z95.2]  Anticoagulant long-term use [Z79.01]             Anticoagulation Episode Summary     INR check location:       Preferred lab:       Send INR reminders to:    YANDY [facility] CLINICAL POOL    Comments:   [facility] **Call every time - may reconsider once stable**      Anticoagulation Care Providers     Provider Role Specialty Phone number    José Antonio Banks MD Referring Cardiology [phone]          Clinic Interview:  Patient Findings     Positives:   Signs/symptoms of thrombosis, Change in health, Missed   doses, Change in medications, Hospital admission    Negatives:   Signs/symptoms of bleeding, Laboratory test error suspected,   Change in alcohol use, Change in activity, Upcoming invasive procedure,   Emergency department visit, Upcoming dental procedure, Extra doses, Change   in diet/appetite, Bruising, Other complaints    Comments:   CVA 10/29; INR 3.4 on admission (10/29), INR 4.1 on discharge   (10/30). Warfarin held yesterday. Discharged on cephalexin for toe   infection. To start ASA per hospital discharge.       Clinical Outcomes     Negatives:   Major bleeding event, Thromboembolic event,   Anticoagulation-related hospital admission, Anticoagulation-related ED   visit, Anticoagulation-related fatality    Comments:   CVA 10/29; INR 3.4 on admission (10/29), INR 4.1 on discharge   (10/30). Warfarin held yesterday. Discharged on cephalexin for toe   infection. To start ASA per hospital discharge.         INR History:  Anticoagulation Monitoring 10/21/2019 10/25/2019  10/31/2019   INR 2.70 3.20 2.0   INR Date 10/21/2019 10/25/2019 10/31/2019   INR Goal 2.5-3.5 2.5-3.5 2.5-3.5   Trend Same Up Same   Last Week Total 35 mg 37.5 mg 32.5 mg   Next Week Total 35 mg 37.5 mg 40 mg   Sun - 7.5 mg 5 mg (11/3)   Mon 5 mg 5 mg -   Tue 5 mg 5 mg -   Wed 5 mg - -   Thu - - 7.5 mg (10/31)   Fri - 5 mg 5 mg   Sat - 5 mg 7.5 mg (11/2)   Visit Report - - -   Some recent data might be hidden       Plan:  1. INR is Subtherapeutic today- see above in Anticoagulation Summary.  Will instruct Galdino Dallas to Change their warfarin regimen- see above in Anticoagulation Summary.  2. Start enoxaparin 90 mg q12h; Continue until INR >/= 2.5  3. Start ASA EC 81 mg daily per hospital discharge instructions to begin when INR decreased from being supratherapeutic  4. Follow up in 4 days  5. Patient declines warfarin refills. Enoxaparin refilled.  6. Verbal and written information provided. Patient expresses understanding and has no further questions at this time.    Joe De [facility]

## 2019-11-03 LAB — INR PPP: 1.3

## 2019-11-06 ENCOUNTER — ANTICOAGULATION VISIT (OUTPATIENT)
Dept: PHARMACY | Facility: HOSPITAL | Age: 34
End: 2019-11-06

## 2019-11-06 DIAGNOSIS — Z95.2 S/P AVR: ICD-10-CM

## 2019-11-06 DIAGNOSIS — Z79.01 ANTICOAGULANT LONG-TERM USE: ICD-10-CM

## 2019-11-06 DIAGNOSIS — I63.9 ACUTE CEREBROVASCULAR ACCIDENT (CVA) (HCC): ICD-10-CM

## 2019-11-06 LAB — INR PPP: 1.8

## 2019-11-06 NOTE — PROGRESS NOTES
Anticoagulation Clinic Progress Note    Anticoagulation Summary  As of 2019    INR goal:   2.5-3.5   TTR:   26.3 % (10.8 mo)   INR used for dosin.80! (2019)   Warfarin maintenance plan:   7.5 mg every Sun; 5 mg all other days   Weekly warfarin total:   37.5 mg   No change documented:   Joe De RPH   Plan last modified:   Paulette Martin RPH (10/25/2019)   Next INR check:   2019   Priority:   Critical   Target end date:       Indications    Anticoagulant long-term use [Z79.01]  S/P AVR [Z95.2]  Acute cerebrovascular accident (CVA) (CMS/HCC) [I63.9]             Anticoagulation Episode Summary     INR check location:       Preferred lab:       Send INR reminders to:   ALE[facility]ANDR[facility] CLINICAL POOL    Comments:   [facility] **Call every time - may reconsider once stable**      Anticoagulation Care Providers     Provider Role Specialty Phone number    José Antonio Banks MD Referring Cardiology [phone]            Clinic Interview:  Patient Findings     Positives:   Missed doses, Extra doses, Change in medications, Hospital   admission    Negatives:   Signs/symptoms of thrombosis, Signs/symptoms of bleeding,   Laboratory test error suspected, Change in health, Change in alcohol use,   Change in activity, Upcoming invasive procedure, Emergency department   visit, Upcoming dental procedure, Change in diet/appetite, Bruising, Other   complaints    Comments:   Admitted 10/31; warfarin missed/held 10/31. Dosing from   hospitalization updated in calendar. INRs  2.2,  1.4, 11/3 1.3,    1.4,  1.7. On enoxaparin 90 mg q12h.      Clinical Outcomes     Negatives:   Major bleeding event, Thromboembolic event,   Anticoagulation-related hospital admission, Anticoagulation-related ED   visit, Anticoagulation-related fatality    Comments:   Admitted 10/31; warfarin missed/held 10/31. Dosing from   hospitalization updated in calendar. INRs  2.2,  1.4, 3 1.3,    1.4,  1.7. On  enoxaparin 90 mg q12h.        INR History:  Anticoagulation Monitoring 10/25/2019 10/31/2019 11/6/2019   INR 3.20 2.0 1.80   INR Date 10/25/2019 10/31/2019 11/6/2019   INR Goal 2.5-3.5 2.5-3.5 2.5-3.5   Trend Up Same Same   Last Week Total 37.5 mg 32.5 mg 40 mg   Next Week Total 37.5 mg 40 mg 37.5 mg   Sun 7.5 mg 5 mg (11/3) -   Mon 5 mg - -   Tue 5 mg - -   Wed - - 5 mg   Thu - 7.5 mg (10/31) 5 mg   Fri 5 mg 5 mg -   Sat 5 mg 7.5 mg (11/2) -   Visit Report - - -   Some recent data might be hidden       Plan:  1. INR is Subtherapeutic today- see above in Anticoagulation Summary.   Will instruct Galdino Dallas to Continue their warfarin regimen- see above in Anticoagulation Summary.  2. Continue enoxaparin 90 mg q12h  3. Follow up in 2 days  4. They have been instructed to call if any changes in medications, doses, concerns, etc. Patient expresses understanding and has no further questions at this time.    Joe De [facility]

## 2019-11-08 ENCOUNTER — ANTICOAGULATION VISIT (OUTPATIENT)
Dept: PHARMACY | Facility: HOSPITAL | Age: 34
End: 2019-11-08

## 2019-11-08 DIAGNOSIS — Z79.01 ANTICOAGULANT LONG-TERM USE: ICD-10-CM

## 2019-11-08 DIAGNOSIS — Z95.2 S/P AVR: ICD-10-CM

## 2019-11-08 DIAGNOSIS — I63.9 ACUTE CEREBROVASCULAR ACCIDENT (CVA) (HCC): ICD-10-CM

## 2019-11-08 LAB
INR PPP: 1.5 (ref 0.91–1.09)
PROTHROMBIN TIME: 17.9 SECONDS (ref 10–13.8)

## 2019-11-08 PROCEDURE — 85610 PROTHROMBIN TIME: CPT

## 2019-11-08 PROCEDURE — 36416 COLLJ CAPILLARY BLOOD SPEC: CPT

## 2019-11-08 PROCEDURE — G0463 HOSPITAL OUTPT CLINIC VISIT: HCPCS

## 2019-11-08 NOTE — PROGRESS NOTES
Anticoagulation Clinic Progress Note    Anticoagulation Summary  As of 2019    INR goal:   2.5-3.5   TTR:   26.1 % (10.8 mo)   INR used for dosin.5! (2019)   Warfarin maintenance plan:   7.5 mg every Sun; 5 mg all other days   Weekly warfarin total:   37.5 mg   Plan last modified:   Paulette Martin RPH (10/25/2019)   Next INR check:   2019   Priority:   Critical   Target end date:       Indications    Anticoagulant long-term use [Z79.01]  S/P AVR [Z95.2]  Acute cerebrovascular accident (CVA) (CMS/HCC) [I63.9]             Anticoagulation Episode Summary     INR check location:       Preferred lab:       Send INR reminders to:    YANDY [facility] CLINICAL POOL    Comments:   [facility] **Call every time - may reconsider once stable**      Anticoagulation Care Providers     Provider Role Specialty Phone number    José Antonio Banks MD Referring Cardiology [phone]          Clinic Interview:  Patient Findings     Negatives:   Signs/symptoms of thrombosis, Signs/symptoms of bleeding,   Laboratory test error suspected, Change in health, Change in alcohol use,   Change in activity, Upcoming invasive procedure, Emergency department   visit, Upcoming dental procedure, Missed doses, Extra doses, Change in   medications, Change in diet/appetite, Hospital admission, Bruising, Other   complaints      Clinical Outcomes     Negatives:   Major bleeding event, Thromboembolic event,   Anticoagulation-related hospital admission, Anticoagulation-related ED   visit, Anticoagulation-related fatality        INR History:  Anticoagulation Monitoring 10/31/2019 2019 2019   INR 2.0 1.80 1.5   INR Date 10/31/2019 2019 2019   INR Goal 2.5-3.5 2.5-3.5 2.5-3.5   Trend Same Same Same   Last Week Total 32.5 mg 40 mg 50 mg   Next Week Total 40 mg 37.5 mg 45 mg   Sun 5 mg (11/3) - 7.5 mg   Mon - - -   Tue - - -   Wed - 5 mg -   Thu 7.5 mg (10/31) 5 mg -   Fri 5 mg - 10 mg ()   Sat 7.5 mg () - 7.5 mg  (11/9)   Visit Report - - -   Some recent data might be hidden       Plan:  1. INR is Subtherapeutic today- see above in Anticoagulation Summary.  Will instruct Galdino Dallas to Change their warfarin regimen- see above in Anticoagulation Summary.  2. Continue enoxaparin 90 mg q12h  3. Follow up in 3 days  4. Patient declines warfarin refills.  5. Verbal and written information provided. Patient expresses understanding and has no further questions at this time.    Joe De [facility]

## 2019-11-10 LAB — INR PPP: 1.5

## 2019-11-11 ENCOUNTER — ANTICOAGULATION VISIT (OUTPATIENT)
Dept: PHARMACY | Facility: HOSPITAL | Age: 34
End: 2019-11-11

## 2019-11-11 DIAGNOSIS — I63.9 ACUTE CEREBROVASCULAR ACCIDENT (CVA) (HCC): ICD-10-CM

## 2019-11-11 DIAGNOSIS — Z95.2 S/P AVR: ICD-10-CM

## 2019-11-11 DIAGNOSIS — Z79.01 ANTICOAGULANT LONG-TERM USE: ICD-10-CM

## 2019-11-12 NOTE — PROGRESS NOTES
Anticoagulation Clinic Progress Note    Anticoagulation Summary  As of 2019    INR goal:   2.5-3.5   TTR:   26.0 % (10.9 mo)   INR used for dosin.50! (11/10/2019)   Warfarin maintenance plan:   7.5 mg every Sun; 5 mg all other days   Weekly warfarin total:   37.5 mg   Plan last modified:   Paulette Martin RPH (10/25/2019)   Next INR check:   2019   Priority:   Critical   Target end date:       Indications    Anticoagulant long-term use [Z79.01]  S/P AVR [Z95.2]  Acute cerebrovascular accident (CVA) (CMS/HCC) [I63.9]             Anticoagulation Episode Summary     INR check location:       Preferred lab:       Send INR reminders to:    [facility]    Comments:   [facility] **Call every time - may reconsider once stable**      Anticoagulation Care Providers     Provider Role Specialty Phone number    José Antonio Banks MD Referring Cardiology [phone]            Clinic Interview:  Patient Findings     Positives:   Other complaints    Negatives:   Signs/symptoms of thrombosis, Signs/symptoms of bleeding,   Laboratory test error suspected, Change in health, Change in alcohol use,   Change in activity, Upcoming invasive procedure, Emergency department   visit, Upcoming dental procedure, Missed doses, Extra doses, Change in   medications, Change in diet/appetite, Hospital admission, Bruising    Comments:   Unsuccessful reaching until ; however, left   HIPAA-friendly voicemail  with instructions, which pt confirmed   receiving. Pt reports mistakenly taking 7.5 mg Fri, 5 mg Sat/Sun.      Clinical Outcomes     Negatives:   Major bleeding event, Thromboembolic event,   Anticoagulation-related hospital admission, Anticoagulation-related ED   visit, Anticoagulation-related fatality    Comments:   Unsuccessful reaching until ; however, left   HIPAA-friendly voicemail  with instructions, which pt confirmed   receiving. Pt reports mistakenly taking 7.5 mg Fri, 5 mg Sat/Sun.         INR History:  Anticoagulation Monitoring 11/6/2019 11/8/2019 11/11/2019   INR 1.80 1.5 1.50   INR Date 11/6/2019 11/8/2019 11/10/2019   INR Goal 2.5-3.5 2.5-3.5 2.5-3.5   Trend Same Same Same   Last Week Total 40 mg 50 mg 45 mg   Next Week Total 37.5 mg 45 mg 45 mg   Sun - 7.5 mg -   Mon - - 10 mg (11/11)   Tue - - 7.5 mg (11/12)   Wed 5 mg - -   Thu 5 mg - -   Fri - 10 mg (11/8) -   Sat - 7.5 mg (11/9) -   Visit Report - - -   Some recent data might be hidden       Plan:  1. INR is Subtherapeutic today- see above in Anticoagulation Summary.   Will instruct Galdino Dallas to Change their warfarin regimen- see above in Anticoagulation Summary.  2. Continue enoxaparin 90 mg q12h  3. Follow up in 1 day (11/13)  4. They have been instructed to call if any changes in medications, doses, concerns, etc. Patient expresses understanding and has no further questions at this time.    Joe De [facility]

## 2019-11-15 ENCOUNTER — ANTICOAGULATION VISIT (OUTPATIENT)
Dept: PHARMACY | Facility: HOSPITAL | Age: 34
End: 2019-11-15

## 2019-11-15 DIAGNOSIS — Z79.01 ANTICOAGULANT LONG-TERM USE: ICD-10-CM

## 2019-11-15 DIAGNOSIS — Z95.2 S/P AVR: ICD-10-CM

## 2019-11-15 DIAGNOSIS — I63.9 ACUTE CEREBROVASCULAR ACCIDENT (CVA) (HCC): ICD-10-CM

## 2019-11-15 LAB
INR PPP: 2.2 (ref 0.91–1.09)
PROTHROMBIN TIME: 25.8 SECONDS (ref 10–13.8)

## 2019-11-15 PROCEDURE — 36416 COLLJ CAPILLARY BLOOD SPEC: CPT

## 2019-11-15 PROCEDURE — 85610 PROTHROMBIN TIME: CPT

## 2019-11-15 PROCEDURE — G0463 HOSPITAL OUTPT CLINIC VISIT: HCPCS

## 2019-11-15 RX ORDER — WARFARIN SODIUM 7.5 MG/1
TABLET ORAL
Qty: 30 TABLET | Refills: 2 | Status: SHIPPED | OUTPATIENT
Start: 2019-11-15 | End: 2019-12-27 | Stop reason: SDUPTHER

## 2019-11-15 NOTE — PROGRESS NOTES
Anticoagulation Clinic Progress Note    Anticoagulation Summary  As of 11/15/2019    INR goal:   2.5-3.5   TTR:   25.6 % (11.1 mo)   INR used for dosin.2! (11/15/2019)   Warfarin maintenance plan:   7.5 mg every day   Weekly warfarin total:   52.5 mg   Plan last modified:   Joe De RPH (11/15/2019)   Next INR check:   2019   Priority:   Critical   Target end date:       Indications    Anticoagulant long-term use [Z79.01]  S/P AVR [Z95.2]  Acute cerebrovascular accident (CVA) (CMS/HCC) [I63.9]             Anticoagulation Episode Summary     INR check location:       Preferred lab:       Send INR reminders to:    [facility]    Comments:   [facility] **Call every time - may reconsider once stable**      Anticoagulation Care Providers     Provider Role Specialty Phone number    José Antonio Banks MD Referring Cardiology [phone]          Clinic Interview:  Patient Findings     Positives:   Extra doses    Negatives:   Signs/symptoms of thrombosis, Signs/symptoms of bleeding,   Laboratory test error suspected, Change in health, Change in alcohol use,   Change in activity, Upcoming invasive procedure, Emergency department   visit, Upcoming dental procedure, Missed doses, Change in medications,   Change in diet/appetite, Hospital admission, Bruising, Other complaints    Comments:   Reports taking 7.5 mg past two days rather than 5 mg.       Clinical Outcomes     Negatives:   Major bleeding event, Thromboembolic event,   Anticoagulation-related hospital admission, Anticoagulation-related ED   visit, Anticoagulation-related fatality    Comments:   Reports taking 7.5 mg past two days rather than 5 mg.         INR History:  Anticoagulation Monitoring 2019 2019 11/15/2019   INR 1.5 1.50 2.2   INR Date 2019 11/10/2019 11/15/2019   INR Goal 2.5-3.5 2.5-3.5 2.5-3.5   Trend Same Same Up   Last Week Total 50 mg 45 mg 50 mg   Next Week Total 45 mg 45 mg 52.5 mg   Sun 7.5 mg - 7.5  mg   Mon - 10 mg (11/11) -   Tue - 7.5 mg (11/12) -   Wed - - -   Thu - - -   Fri 10 mg (11/8) - 7.5 mg   Sat 7.5 mg (11/9) - 7.5 mg   Visit Report - - -   Some recent data might be hidden       Plan:  1. INR is Subtherapeutic today- see above in Anticoagulation Summary.  Will instruct Galdino Dallas to Increase their warfarin regimen- see above in Anticoagulation Summary.  2. Continue enoxaparin 90 mg q12h until INR therapeutic  3. Follow up in 3 days  4. Patient declines warfarin refills.  5. Verbal and written information provided. Patient expresses understanding and has no further questions at this time.    Joe De [facility]

## 2019-11-18 ENCOUNTER — ANTICOAGULATION VISIT (OUTPATIENT)
Dept: PHARMACY | Facility: HOSPITAL | Age: 34
End: 2019-11-18

## 2019-11-18 DIAGNOSIS — I63.9 ACUTE CEREBROVASCULAR ACCIDENT (CVA) (HCC): ICD-10-CM

## 2019-11-18 DIAGNOSIS — Z95.2 S/P AVR: ICD-10-CM

## 2019-11-18 DIAGNOSIS — Z79.01 ANTICOAGULANT LONG-TERM USE: ICD-10-CM

## 2019-11-18 LAB — INR PPP: 1.6

## 2019-11-18 NOTE — PROGRESS NOTES
Anticoagulation Clinic Progress Note    Anticoagulation Summary  As of 2019    INR goal:   2.5-3.5   TTR:   25.4 % (11.2 mo)   INR used for dosin.60! (2019)   Warfarin maintenance plan:   7.5 mg every day   Weekly warfarin total:   52.5 mg   Plan last modified:   Joe De [facility] (11/15/2019)   Next INR check:   2019   Priority:   Critical   Target end date:       Indications    Anticoagulant long-term use [Z79.01]  S/P AVR [Z95.2]  Acute cerebrovascular accident (CVA) (CMS/HCC) [I63.9]             Anticoagulation Episode Summary     INR check location:       Preferred lab:       Send INR reminders to:    [facility]    Comments:   [employer] **Call every time - may reconsider once stable**      Anticoagulation Care Providers     Provider Role Specialty Phone number    José Antonio Banks MD Referring Cardiology [phone]            Clinic Interview:  Patient Findings     Negatives:   Signs/symptoms of thrombosis, Signs/symptoms of bleeding,   Laboratory test error suspected, Change in health, Change in alcohol use,   Change in activity, Upcoming invasive procedure, Emergency department   visit, Upcoming dental procedure, Missed doses, Extra doses, Change in   medications, Change in diet/appetite, Hospital admission, Bruising, Other   complaints      Clinical Outcomes     Negatives:   Major bleeding event, Thromboembolic event,   Anticoagulation-related hospital admission, Anticoagulation-related ED   visit, Anticoagulation-related fatality        INR History:  Anticoagulation Monitoring 2019 11/15/2019 2019   INR 1.50 2.2 1.60   INR Date 11/10/2019 11/15/2019 2019   INR Goal 2.5-3.5 2.5-3.5 2.5-3.5   Trend Same Up Same   Last Week Total 45 mg 50 mg 55 mg   Next Week Total 45 mg 52.5 mg 57.5 mg   Sun - 7.5 mg -   Mon 10 mg () - 10 mg ()   Tue 7.5 mg () - 10 mg ()   Wed - - -   Thu - - -   Fri - 7.5 mg -   Sat - 7.5 mg -   Visit Report  - - -   Some recent data might be hidden       Plan:  1. INR is Subtherapeutic today- see above in Anticoagulation Summary.   Will instruct Galdino [place] to Change their warfarin regimen- see above in Anticoagulation Summary.  2. Continue enoxaparin 90 mg q12h.   3. Follow up in 2 days  4. They have been instructed to call if any changes in medications, doses, concerns, etc. Patient expresses understanding and has no further questions at this time.    Joe De, [facility]

## 2019-11-20 ENCOUNTER — ANTICOAGULATION VISIT (OUTPATIENT)
Dept: PHARMACY | Facility: HOSPITAL | Age: 34
End: 2019-11-20

## 2019-11-20 DIAGNOSIS — I63.9 ACUTE CEREBROVASCULAR ACCIDENT (CVA) (HCC): ICD-10-CM

## 2019-11-20 DIAGNOSIS — Z79.01 ANTICOAGULANT LONG-TERM USE: ICD-10-CM

## 2019-11-20 DIAGNOSIS — Z95.2 S/P AVR: ICD-10-CM

## 2019-11-20 LAB — INR PPP: 3.1

## 2019-11-20 NOTE — PROGRESS NOTES
Anticoagulation Clinic Progress Note    Anticoagulation Summary  As of 11/20/2019    INR goal:   2.5-3.5   TTR:   25.5 % (11.2 mo)   INR used for dosing:   3.10 (11/20/2019)   Warfarin maintenance plan:   7.5 mg every day   Weekly warfarin total:   52.5 mg   Plan last modified:   Joe De RPH (11/15/2019)   Next INR check:   11/22/2019   Priority:   Critical   Target end date:       Indications    Anticoagulant long-term use [Z79.01]  S/P AVR [Z95.2]  Acute cerebrovascular accident (CVA) (CMS/[facility]) [I63.9]             Anticoagulation Episode Summary     INR check location:       Preferred lab:       Send INR reminders to:    YANDY POLANCO [facility]    Comments:   [employer] **Call every time - may reconsider once stable**      Anticoagulation Care Providers     Provider Role Specialty Phone number    José Antonio Banks MD Referring Cardiology [phone]          Clinic Interview:  Patient Findings     Positives:   Change in medications    Negatives:   Signs/symptoms of thrombosis, Signs/symptoms of bleeding,   Laboratory test error suspected, Change in health, Change in alcohol use,   Change in activity, Upcoming invasive procedure, Emergency department   visit, Upcoming dental procedure, Missed doses, Extra doses, Change in   diet/appetite, Hospital admission, Bruising, Other complaints    Comments:   Pt taking antibiotic for ~1 week for wound on toe; unsure of   the abx but he thinks it may be clindamycin       Clinical Outcomes     Negatives:   Major bleeding event, Thromboembolic event,   Anticoagulation-related hospital admission, Anticoagulation-related ED   visit, Anticoagulation-related fatality    Comments:   Pt taking antibiotic for ~1 week for wound on toe; unsure of   the abx but he thinks it may be clindamycin         INR History:  Anticoagulation Monitoring 11/15/2019 11/18/2019 11/20/2019   INR 2.2 1.60 3.10   INR Date 11/15/2019 11/18/2019 11/20/2019   INR Goal 2.5-3.5 2.5-3.5 2.5-3.5    Trend Up Same Same   Last Week Total 50 mg 55 mg 57.5 mg   Next Week Total 52.5 mg 57.5 mg 47.5 mg   Sun 7.5 mg - -   Mon - 10 mg (11/18) -   Tue - 10 mg (11/19) -   Wed - - 5 mg (11/20)   Thu - - 5 mg (11/21)   Fri 7.5 mg - -   Sat 7.5 mg - -   Visit Report - - -   Some recent data might be hidden       Plan:  1. INR is Therapeutic today- see above in Anticoagulation Summary.   Will instruct Galdino Dallas to Change their warfarin regimen to 5 mg on 11/20 and 11/21 seeing as the INR likely hasn't risen from the 10 mg the pt took on 11/18 and 11/19- see above in Anticoagulation Summary.  2. Follow up in 2 days  3.  Pt has agreed to only be called if INR out of range. They have been instructed to call if any changes in medications, doses, concerns, etc. Patient expresses understanding and has no further questions at this time.    Ahsan Driscoll [facility]

## 2019-11-21 ENCOUNTER — LAB (OUTPATIENT)
Dept: LAB | Facility: HOSPITAL | Age: 34
End: 2019-11-21

## 2019-11-21 DIAGNOSIS — Z79.01 ANTICOAGULANT LONG-TERM USE: ICD-10-CM

## 2019-11-21 DIAGNOSIS — Z95.2 S/P AVR: ICD-10-CM

## 2019-11-21 LAB
INR PPP: 3.06 (ref 2–3)
PROTHROMBIN TIME: 28.8 SECONDS (ref 19.4–28.5)

## 2019-11-21 PROCEDURE — 36415 COLL VENOUS BLD VENIPUNCTURE: CPT

## 2019-11-21 PROCEDURE — 85610 PROTHROMBIN TIME: CPT

## 2019-11-22 ENCOUNTER — ANTICOAGULATION VISIT (OUTPATIENT)
Dept: PHARMACY | Facility: HOSPITAL | Age: 34
End: 2019-11-22

## 2019-11-22 DIAGNOSIS — Z95.2 S/P AVR: ICD-10-CM

## 2019-11-22 DIAGNOSIS — Z79.01 ANTICOAGULANT LONG-TERM USE: ICD-10-CM

## 2019-11-22 DIAGNOSIS — I63.9 ACUTE CEREBROVASCULAR ACCIDENT (CVA) (HCC): ICD-10-CM

## 2019-11-22 NOTE — PROGRESS NOTES
Anticoagulation Clinic Progress Note    Anticoagulation Summary  As of 11/22/2019    INR goal:   2.5-3.5   TTR:   25.8 % (11.3 mo)   INR used for dosing:   3.06 (11/21/2019)   Warfarin maintenance plan:   7.5 mg every day   Weekly warfarin total:   52.5 mg   Plan last modified:   Joe De RPH (11/15/2019)   Next INR check:   11/25/2019   Priority:   Critical   Target end date:       Indications    Anticoagulant long-term use [Z79.01]  S/P AVR [Z95.2]  Acute cerebrovascular accident (CVA) (CMS/[facility]) [I63.9]             Anticoagulation Episode Summary     INR check location:       Preferred lab:       Send INR reminders to:    YANDY POLANCO [facility]INICAL POOL    Comments:   [employer] **Call every time - may reconsider once stable**      Anticoagulation Care Providers     Provider Role Specialty Phone number    José Antonio Banks MD Referring Cardiology [phone]            Clinic Interview:  Patient Findings     Positives:   Change in medications    Negatives:   Signs/symptoms of thrombosis, Signs/symptoms of bleeding,   Laboratory test error suspected, Change in health, Change in alcohol use,   Change in activity, Upcoming invasive procedure, Emergency department   visit, Upcoming dental procedure, Missed doses, Extra doses, Change in   diet/appetite, Hospital admission, Bruising, Other complaints    Comments:   On clindamycin (10 day course, a couple days left).      Clinical Outcomes     Negatives:   Major bleeding event, Thromboembolic event,   Anticoagulation-related hospital admission, Anticoagulation-related ED   visit, Anticoagulation-related fatality    Comments:   On clindamycin (10 day course, a couple days left).        INR History:  Anticoagulation Monitoring 11/18/2019 11/20/2019 11/22/2019   INR 1.60 3.10 3.06   INR Date 11/18/2019 11/20/2019 11/21/2019   INR Goal 2.5-3.5 2.5-3.5 2.5-3.5   Trend Same Same Same   Last Week Total 55 mg 57.5 mg 52.5 mg   Next Week Total 57.5 mg 47.5 mg 55 mg   Sun - -  7.5 mg   Mon 10 mg (11/18) - -   Tue 10 mg (11/19) - -   Wed - 5 mg (11/20) -   Thu - 5 mg (11/21) -   Fri - - 7.5 mg   Sat - - 10 mg (11/23)   Visit Report - - -   Some recent data might be hidden       Plan:  1. INR is Therapeutic today- see above in Anticoagulation Summary.   Will instruct Galdino [place] to Change their warfarin regimen- see above in Anticoagulation Summary.  2. Follow up in 3 days  3. They have been instructed to call if any changes in medications, doses, concerns, etc. Patient expresses understanding and has no further questions at this time.    Joe De [facility]

## 2019-11-25 ENCOUNTER — ANTICOAGULATION VISIT (OUTPATIENT)
Dept: PHARMACY | Facility: HOSPITAL | Age: 34
End: 2019-11-25

## 2019-11-25 DIAGNOSIS — I63.9 ACUTE CEREBROVASCULAR ACCIDENT (CVA) (HCC): ICD-10-CM

## 2019-11-25 DIAGNOSIS — Z79.01 ANTICOAGULANT LONG-TERM USE: ICD-10-CM

## 2019-11-25 DIAGNOSIS — Z95.2 S/P AVR: ICD-10-CM

## 2019-11-25 LAB
INR PPP: 3.1 (ref 0.91–1.09)
PROTHROMBIN TIME: 37.7 SECONDS (ref 10–13.8)

## 2019-11-25 PROCEDURE — 36416 COLLJ CAPILLARY BLOOD SPEC: CPT

## 2019-11-25 PROCEDURE — 85610 PROTHROMBIN TIME: CPT

## 2019-11-25 NOTE — PROGRESS NOTES
Anticoagulation Clinic Progress Note    Anticoagulation Summary  As of 11/25/2019    INR goal:   2.5-3.5   TTR:   26.7 % (11.4 mo)   INR used for dosing:   3.1 (11/25/2019)   Warfarin maintenance plan:   7.5 mg every day   Weekly warfarin total:   52.5 mg   No change documented:   Joe De RPH   Plan last modified:   Joe De RPH (11/15/2019)   Next INR check:   11/27/2019   Priority:   Critical   Target end date:       Indications    Anticoagulant long-term use [Z79.01]  S/P AVR [Z95.2]  Acute cerebrovascular accident (CVA) (CMS/HCC) [I63.9]             Anticoagulation Episode Summary     INR check location:       Preferred lab:       Send INR reminders to:   ALE[facility]DR[facility]ANCO [facility]INICAL POOL    Comments:   [employer] **Call every time - may reconsider once stable**      Anticoagulation Care Providers     Provider Role Specialty Phone number    José Antonio Banks MD Referring Cardiology [phone]          Clinic Interview:  Patient Findings     Negatives:   Signs/symptoms of thrombosis, Signs/symptoms of bleeding,   Laboratory test error suspected, Change in health, Change in alcohol use,   Change in activity, Upcoming invasive procedure, Emergency department   visit, Upcoming dental procedure, Missed doses, Extra doses, Change in   medications, Change in diet/appetite, Hospital admission, Bruising, Other   complaints      Clinical Outcomes     Negatives:   Major bleeding event, Thromboembolic event,   Anticoagulation-related hospital admission, Anticoagulation-related ED   visit, Anticoagulation-related fatality        INR History:  Anticoagulation Monitoring 11/20/2019 11/22/2019 11/25/2019   INR 3.10 3.06 3.1   INR Date 11/20/2019 11/21/2019 11/25/2019   INR Goal 2.5-3.5 2.5-3.5 2.5-3.5   Trend Same Same Same   Last Week Total 57.5 mg 52.5 mg 55 mg   Next Week Total 47.5 mg 55 mg 52.5 mg   Sun - 7.5 mg -   Mon - - 7.5 mg   Tue - - 7.5 mg   Wed 5 mg (11/20) - -   Thu 5 mg (11/21) - -   Fri - 7.5 mg -    Sat - 10 mg (11/23) -   Visit Report - - -   Some recent data might be hidden       Plan:  1. INR is Therapeutic today- see above in Anticoagulation Summary.  Will instruct Galdino [place] to Continue their warfarin regimen- see above in Anticoagulation Summary.  2. Follow up in 2 days  3. Patient declines warfarin refills.  4. Verbal and written information provided. Patient expresses understanding and has no further questions at this time.    Joe De RP

## 2019-11-27 ENCOUNTER — ANTICOAGULATION VISIT (OUTPATIENT)
Dept: PHARMACY | Facility: HOSPITAL | Age: 34
End: 2019-11-27

## 2019-11-27 DIAGNOSIS — Z79.01 ANTICOAGULANT LONG-TERM USE: ICD-10-CM

## 2019-11-27 DIAGNOSIS — Z95.2 S/P AVR: ICD-10-CM

## 2019-11-27 DIAGNOSIS — I63.9 ACUTE CEREBROVASCULAR ACCIDENT (CVA) (HCC): ICD-10-CM

## 2019-11-27 LAB — INR PPP: 2.9

## 2019-11-27 NOTE — PROGRESS NOTES
Anticoagulation Clinic Progress Note    Anticoagulation Summary  As of 2019    INR goal:   2.5-3.5   TTR:   27.0 % (11.5 mo)   INR used for dosin.90 (2019)   Warfarin maintenance plan:   7.5 mg every day   Weekly warfarin total:   52.5 mg   Plan last modified:   Joe De RPH (11/15/2019)   Next INR check:   2019   Priority:   High   Target end date:       Indications    Anticoagulant long-term use [Z79.01]  S/P AVR [Z95.2]  Acute cerebrovascular accident (CVA) (CMS/HCC) [I63.9]             Anticoagulation Episode Summary     INR check location:       Preferred lab:       Send INR reminders to:    Y[facility]    Comments:   [employer] **Call every time - may reconsider once stable**      Anticoagulation Care Providers     Provider Role Specialty Phone number    José Antonio Banks MD Referring Cardiology [phone]          Clinic Interview:      INR History:  Anticoagulation Monitoring 2019   INR 3.06 3.1 2.90   INR Date 2019   INR Goal 2.5-3.5 2.5-3.5 2.5-3.5   Trend Same Same Same   Last Week Total 52.5 mg 55 mg 50 mg   Next Week Total 55 mg 52.5 mg 55 mg   Sun 7.5 mg - 7.5 mg   Mon - 7.5 mg -   Tue - 7.5 mg -   Wed - - 7.5 mg   Thu - - 10 mg ()   Fri 7.5 mg - 7.5 mg   Sat 10 mg () - 7.5 mg   Visit Report - - -   Some recent data might be hidden       Plan:  1. INR is Therapeutic today- see above in Anticoagulation Summary.   Will instruct Galdino [place] to Continue their warfarin regimen with the exception of giving 10 mg on  seeing as the pt's INR is trending back down- see above in Anticoagulation Summary. I left HIPAA friendly insturction on both the patient's and the patient's wife's voicemails.  2. Follow up on   3.  Pt has agreed to only be called if INR out of range. They have been instructed to call if any changes in medications, doses, concerns, etc. Patient expresses understanding  and has no further questions at this time.    Ahsan Driscoll, [facility]

## 2019-12-02 ENCOUNTER — LAB (OUTPATIENT)
Dept: LAB | Facility: HOSPITAL | Age: 34
End: 2019-12-02

## 2019-12-02 ENCOUNTER — ANTICOAGULATION VISIT (OUTPATIENT)
Dept: PHARMACY | Facility: HOSPITAL | Age: 34
End: 2019-12-02

## 2019-12-02 DIAGNOSIS — Z79.01 ANTICOAGULANT LONG-TERM USE: ICD-10-CM

## 2019-12-02 DIAGNOSIS — Z95.2 S/P AVR: ICD-10-CM

## 2019-12-02 DIAGNOSIS — I63.9 ACUTE CEREBROVASCULAR ACCIDENT (CVA) (HCC): ICD-10-CM

## 2019-12-02 LAB
INR PPP: 3.12 (ref 2–3)
PROTHROMBIN TIME: 29.3 SECONDS (ref 19.4–28.5)

## 2019-12-02 PROCEDURE — 85610 PROTHROMBIN TIME: CPT

## 2019-12-02 PROCEDURE — 36415 COLL VENOUS BLD VENIPUNCTURE: CPT

## 2019-12-02 NOTE — PROGRESS NOTES
Anticoagulation Clinic Progress Note    Anticoagulation Summary  As of 12/2/2019    INR goal:   2.5-3.5   TTR:   28.1 % (11.6 mo)   INR used for dosing:   3.12 (12/2/2019)   Warfarin maintenance plan:   10 mg every Thu; 7.5 mg all other days   Weekly warfarin total:   55 mg   Plan last modified:   Joe De RPH (12/2/2019)   Next INR check:   12/9/2019   Priority:   High   Target end date:       Indications    Anticoagulant long-term use [Z79.01]  S/P AVR [Z95.2]  Acute cerebrovascular accident (CVA) (CMS/[facility]) [I63.9]             Anticoagulation Episode Summary     INR check location:       Preferred lab:       Send INR reminders to:    YANDY POLANCO CLINICAL POOL    Comments:   [facility] **Call every time - may reconsider once stable**      Anticoagulation Care Providers     Provider Role Specialty Phone number    José Antonio Banks MD Referring Cardiology [phone]            Clinic Interview:  Patient Findings     Negatives:   Signs/symptoms of thrombosis, Signs/symptoms of bleeding,   Laboratory test error suspected, Change in health, Change in alcohol use,   Change in activity, Upcoming invasive procedure, Emergency department   visit, Upcoming dental procedure, Missed doses, Extra doses, Change in   medications, Change in diet/appetite, Hospital admission, Bruising, Other   complaints      Clinical Outcomes     Negatives:   Major bleeding event, Thromboembolic event,   Anticoagulation-related hospital admission, Anticoagulation-related ED   visit, Anticoagulation-related fatality        INR History:  Anticoagulation Monitoring 11/25/2019 11/27/2019 12/2/2019   INR 3.1 2.90 3.12   INR Date 11/25/2019 11/27/2019 12/2/2019   INR Goal 2.5-3.5 2.5-3.5 2.5-3.5   Trend Same Same Up   Last Week Total 55 mg 50 mg 55 mg   Next Week Total 52.5 mg 55 mg 55 mg   Sun - 7.5 mg 7.5 mg   Mon 7.5 mg - 7.5 mg   Tue 7.5 mg - 7.5 mg   Wed - 7.5 mg 7.5 mg   Thu - 10 mg (11/28) 10 mg   Fri - 7.5 mg 7.5 mg   Sat - 7.5 mg 7.5  mg   Visit Report - - -   Some recent data might be hidden       Plan:  1. INR is Therapeutic today- see above in Anticoagulation Summary.   Will instruct Galdino [place] to Change their warfarin regimen- see above in Anticoagulation Summary.  2. Follow up in 1 week  3. They have been instructed to call if any changes in medications, doses, concerns, etc. Patient expresses understanding and has no further questions at this time.    Joe De [facility]

## 2019-12-04 RX ORDER — NITROGLYCERIN 0.4 MG/1
TABLET SUBLINGUAL
Qty: 100 TABLET | Refills: 3 | Status: SHIPPED | OUTPATIENT
Start: 2019-12-04 | End: 2020-01-08 | Stop reason: SDUPTHER

## 2019-12-11 LAB — INR PPP: 5.8

## 2019-12-12 ENCOUNTER — ANTICOAGULATION VISIT (OUTPATIENT)
Dept: PHARMACY | Facility: HOSPITAL | Age: 34
End: 2019-12-12

## 2019-12-12 DIAGNOSIS — Z79.01 ANTICOAGULANT LONG-TERM USE: ICD-10-CM

## 2019-12-12 DIAGNOSIS — Z95.2 S/P AVR: ICD-10-CM

## 2019-12-12 DIAGNOSIS — I63.9 ACUTE CEREBROVASCULAR ACCIDENT (CVA) (HCC): ICD-10-CM

## 2019-12-12 NOTE — PROGRESS NOTES
"Anticoagulation Clinic Progress Note    Anticoagulation Summary  As of 2019    INR goal:   2.5-3.5   TTR:   27.8 % (11.9 mo)   INR used for dosin.80! (2019)   Warfarin maintenance plan:   10 mg every Thu; 7.5 mg all other days   Weekly warfarin total:   55 mg   Plan last modified:   Joe De, [facility] (2019)   Next INR check:   2019   Priority:   High   Target end date:       Indications    Anticoagulant long-term use [Z79.01]  S/P AVR [Z95.2]  Acute cerebrovascular accident (CVA) (CMS/HCC) [I63.9]             Anticoagulation Episode Summary     INR check location:       Preferred lab:       Send INR reminders to:   ALEJANDRINA POLANCO CLINICAL POOL    Comments:   [facility] **Call every time - may reconsider once stable**      Anticoagulation Care Providers     Provider Role Specialty Phone number    José Antonio Banks MD Referring Cardiology [phone]            Clinic Interview:  Patient Findings     Positives:   Other complaints    Negatives:   Signs/symptoms of thrombosis, Signs/symptoms of bleeding,   Laboratory test error suspected, Change in health, Change in alcohol use,   Change in activity, Upcoming invasive procedure, Emergency department   visit, Upcoming dental procedure, Missed doses, Extra doses, Change in   medications, Change in diet/appetite, Hospital admission, Bruising    Comments:   \"Extremely stressful week\", started part-time job and still   has a lot of pain in toe. Took partial dose of 5 mg yesterday instead of   7.5 mg after seeing high INR.      Clinical Outcomes     Negatives:   Major bleeding event, Thromboembolic event,   Anticoagulation-related hospital admission, Anticoagulation-related ED   visit, Anticoagulation-related fatality    Comments:   \"Extremely stressful week\", started part-time job and still   has a lot of pain in toe. Took partial dose of 5 mg yesterday instead of   7.5 mg after seeing high INR.        INR History:  Anticoagulation Monitoring " 11/27/2019 12/2/2019 12/12/2019   INR 2.90 3.12 5.80   INR Date 11/27/2019 12/2/2019 12/11/2019   INR Goal 2.5-3.5 2.5-3.5 2.5-3.5   Trend Same Up Same   Last Week Total 50 mg 55 mg 52.5 mg   Next Week Total 55 mg 55 mg 52.5 mg   Sun 7.5 mg 7.5 mg -   Mon - 7.5 mg -   Tue - 7.5 mg -   Wed 7.5 mg 7.5 mg -   Thu 10 mg (11/28) 10 mg 7.5 mg (12/12)   Fri 7.5 mg 7.5 mg -   Sat 7.5 mg 7.5 mg -   Visit Report - - -   Some recent data might be hidden       Plan:  1. INR is Supratherapeutic today- see above in Anticoagulation Summary. Historically, INR swings with small changes.  Will instruct Galdino YRN [place] to Change their warfarin regimen (7.5 mg today rather than 10 mg)- see above in Anticoagulation Summary.  2. Follow up in 1 day  3. They have been instructed to call if any changes in medications, doses, concerns, etc.To seek immediate medical attention if s/sx of bleeding develop or fall occurs. Patient expresses understanding and has no further questions at this time.    Joe De [facility]

## 2019-12-13 ENCOUNTER — ANTICOAGULATION VISIT (OUTPATIENT)
Dept: PHARMACY | Facility: HOSPITAL | Age: 34
End: 2019-12-13

## 2019-12-13 DIAGNOSIS — I63.9 ACUTE CEREBROVASCULAR ACCIDENT (CVA) (HCC): ICD-10-CM

## 2019-12-13 DIAGNOSIS — Z95.2 S/P AVR: ICD-10-CM

## 2019-12-13 DIAGNOSIS — Z79.01 ANTICOAGULANT LONG-TERM USE: ICD-10-CM

## 2019-12-13 LAB — INR PPP: 4.2

## 2019-12-13 NOTE — PROGRESS NOTES
Anticoagulation Clinic Progress Note    Anticoagulation Summary  As of 2019    INR goal:   2.5-3.5   TTR:   27.6 % (1 y)   INR used for dosin.20! (2019)   Warfarin maintenance plan:   10 mg every Thu; 7.5 mg all other days   Weekly warfarin total:   55 mg   Plan last modified:   Joe De [facility] (2019)   Next INR check:   2019   Priority:   High   Target end date:       Indications    Anticoagulant long-term use [Z79.01]  S/P AVR [Z95.2]  Acute cerebrovascular accident (CVA) (CMS/HCC) [I63.9]             Anticoagulation Episode Summary     INR check location:       Preferred lab:       Send INR reminders to:   [facility]    Comments:   [facility] **Call every time - may reconsider once stable**      Anticoagulation Care Providers     Provider Role Specialty Phone number    José Antonio Banks MD Referring Cardiology [phone]            Clinic Interview:  Patient Findings     Negatives:   Signs/symptoms of thrombosis, Signs/symptoms of bleeding,   Laboratory test error suspected, Change in health, Change in alcohol use,   Change in activity, Upcoming invasive procedure, Emergency department   visit, Upcoming dental procedure, Missed doses, Extra doses, Change in   medications, Change in diet/appetite, Hospital admission, Bruising, Other   complaints      Clinical Outcomes     Negatives:   Major bleeding event, Thromboembolic event,   Anticoagulation-related hospital admission, Anticoagulation-related ED   visit, Anticoagulation-related fatality        INR History:  Anticoagulation Monitoring 2019   INR 3.12 5.80 4.20   INR Date 2019   INR Goal 2.5-3.5 2.5-3.5 2.5-3.5   Trend Up Same Same   Last Week Total 55 mg 52.5 mg 50 mg   Next Week Total 55 mg 52.5 mg 52.5 mg   Sun 7.5 mg - 7.5 mg   Mon 7.5 mg - -   Tue 7.5 mg - -   Wed 7.5 mg - -   Thu 10 mg 7.5 mg () -   Fri 7.5 mg - 5 mg ()   Sat 7.5 mg - 7.5 mg    Visit Report - - -   Some recent data might be hidden       Plan:  1. INR is Supratherapeutic today- see above in Anticoagulation Summary.   Will instruct Galdino Dallas to Change their warfarin regimen- see above in Anticoagulation Summary.  2. Follow up in 3 days w/ lab  3. They have been instructed to call if any changes in medications, doses, concerns, etc. Patient expresses understanding and has no further questions at this time.    Joe De RP

## 2019-12-17 ENCOUNTER — ANTICOAGULATION VISIT (OUTPATIENT)
Dept: PHARMACY | Facility: HOSPITAL | Age: 34
End: 2019-12-17

## 2019-12-17 DIAGNOSIS — Z95.2 S/P AVR: ICD-10-CM

## 2019-12-17 DIAGNOSIS — Z79.01 ANTICOAGULANT LONG-TERM USE: ICD-10-CM

## 2019-12-17 DIAGNOSIS — I63.9 ACUTE CEREBROVASCULAR ACCIDENT (CVA) (HCC): ICD-10-CM

## 2019-12-17 LAB — INR PPP: 6.3

## 2019-12-17 NOTE — PROGRESS NOTES
Anticoagulation Clinic Progress Note    Anticoagulation Summary  As of 2019    INR goal:   2.5-3.5   TTR:   27.3 % (1 y)   INR used for dosin.30! (2019)   Warfarin maintenance plan:   10 mg every Thu; 7.5 mg all other days   Weekly warfarin total:   55 mg   Plan last modified:   Joe De [facility] (2019)   Next INR check:   2019   Priority:   High   Target end date:       Indications    Anticoagulant long-term use [Z79.01]  S/P AVR [Z95.2]  Acute cerebrovascular accident (CVA) (CMS/[facility]) [I63.9]             Anticoagulation Episode Summary     INR check location:       Preferred lab:       Send INR reminders to:   [facility] POLANCO CLINICAL POOL    Comments:   [facility] **Call every time - may reconsider once stable**      Anticoagulation Care Providers     Provider Role Specialty Phone number    José Antonio Banks MD Referring Cardiology [phone]            Clinic Interview:  Patient Findings     Positives:   Extra doses    Negatives:   Signs/symptoms of thrombosis, Signs/symptoms of bleeding,   Laboratory test error suspected, Change in health, Change in alcohol use,   Change in activity, Upcoming invasive procedure, Emergency department   visit, Upcoming dental procedure, Missed doses, Change in medications,   Change in diet/appetite, Hospital admission, Bruising, Other complaints    Comments:   Pt reports he thought he forgot  evening's dose, so he   took it Monday morning. Now he believes that he may have taken an extra   7.5 mg total as a result. Historically responds quickly to small changes;   pt voices preference for avoidance of low INR at risk of INR remaining   mildly supratherapeutic      Clinical Outcomes     Negatives:   Major bleeding event, Thromboembolic event,   Anticoagulation-related hospital admission, Anticoagulation-related ED   visit, Anticoagulation-related fatality    Comments:   Pt reports he thought he forgot  evening's dose, so he   took it  Monday morning. Now he believes that he may have taken an extra   7.5 mg total as a result. Historically responds quickly to small changes;   pt voices preference for avoidance of low INR at risk of INR remaining   mildly supratherapeutic        INR History:  Anticoagulation Monitoring 12/12/2019 12/13/2019 12/17/2019   INR 5.80 4.20 6.30   INR Date 12/11/2019 12/13/2019 12/17/2019   INR Goal 2.5-3.5 2.5-3.5 2.5-3.5   Trend Same Same Same   Last Week Total 52.5 mg 50 mg 55 mg   Next Week Total 52.5 mg 52.5 mg 40 mg   Sun - 7.5 mg -   Mon - - -   Tue - - Hold (12/17)   Wed - - 5 mg (12/18)   Thu 7.5 mg (12/12) - 5 mg (12/19)   Fri - 5 mg (12/13) -   Sat - 7.5 mg -   Visit Report - - -   Some recent data might be hidden       Plan:  1. INR is Supratherapeutic today- see above in Anticoagulation Summary.   Will instruct Galdino Dallas to Change their warfarin regimen- see above in Anticoagulation Summary.  2. Follow up in 3 days  3. They have been instructed to call if any changes in medications, doses, concerns, etc. To seek immediate medical attention if s/sx of bleeding develop or fall occurs. Patient expresses understanding and has no further questions at this time.    Joe De [facility]

## 2019-12-21 LAB — INR PPP: 3.6

## 2019-12-23 ENCOUNTER — ANTICOAGULATION VISIT (OUTPATIENT)
Dept: PHARMACY | Facility: HOSPITAL | Age: 34
End: 2019-12-23

## 2019-12-23 DIAGNOSIS — I63.9 ACUTE CEREBROVASCULAR ACCIDENT (CVA) (HCC): ICD-10-CM

## 2019-12-23 DIAGNOSIS — Z79.01 ANTICOAGULANT LONG-TERM USE: ICD-10-CM

## 2019-12-23 DIAGNOSIS — Z95.2 S/P AVR: ICD-10-CM

## 2019-12-24 ENCOUNTER — TELEPHONE (OUTPATIENT)
Dept: PHARMACY | Facility: HOSPITAL | Age: 34
End: 2019-12-24

## 2019-12-27 ENCOUNTER — ANTICOAGULATION VISIT (OUTPATIENT)
Dept: PHARMACY | Facility: HOSPITAL | Age: 34
End: 2019-12-27

## 2019-12-27 ENCOUNTER — TELEPHONE (OUTPATIENT)
Dept: CARDIOLOGY | Facility: CLINIC | Age: 34
End: 2019-12-27

## 2019-12-27 DIAGNOSIS — Z95.2 S/P AVR: ICD-10-CM

## 2019-12-27 DIAGNOSIS — I63.9 ACUTE CEREBROVASCULAR ACCIDENT (CVA) (HCC): ICD-10-CM

## 2019-12-27 DIAGNOSIS — Z79.01 ANTICOAGULANT LONG-TERM USE: ICD-10-CM

## 2019-12-27 LAB
INR PPP: 2.3 (ref 0.91–1.09)
PROTHROMBIN TIME: 27 SECONDS (ref 10–13.8)

## 2019-12-27 PROCEDURE — 85610 PROTHROMBIN TIME: CPT

## 2019-12-27 PROCEDURE — 36416 COLLJ CAPILLARY BLOOD SPEC: CPT

## 2019-12-27 PROCEDURE — G0463 HOSPITAL OUTPT CLINIC VISIT: HCPCS

## 2019-12-27 RX ORDER — WARFARIN SODIUM 7.5 MG/1
TABLET ORAL
Qty: 45 TABLET | Refills: 0 | Status: SHIPPED | OUTPATIENT
Start: 2019-12-27 | End: 2020-03-17 | Stop reason: SDUPTHER

## 2019-12-27 RX ORDER — WARFARIN SODIUM 5 MG/1
TABLET ORAL
Qty: 45 TABLET | Refills: 0 | Status: SHIPPED | OUTPATIENT
Start: 2019-12-27 | End: 2020-03-17 | Stop reason: SDUPTHER

## 2019-12-27 NOTE — TELEPHONE ENCOUNTER
See below. He was in the ER at [place], records updated in Care Everywhere.  Should he bee seen sooner by a NP and would you like to put in an order for cardiac rehab.  Please advise.    Thanks,  Michelle

## 2019-12-27 NOTE — TELEPHONE ENCOUNTER
Patient called states he was in [facility] On 12/24/19 for chest pain.  States they wanted him to start cardiac rehab  He has an appt for 1/29/19 with Dr. Banks  Patient would like to talk with you    [phone]

## 2019-12-27 NOTE — PROGRESS NOTES
Anticoagulation Clinic Progress Note    Anticoagulation Summary  As of 2019    INR goal:   2.5-3.5   TTR:   27.9 % (1 y)   INR used for dosin.3! (2019)   Warfarin maintenance plan:   10 mg every Thu; 7.5 mg all other days   Weekly warfarin total:   55 mg   Plan last modified:   Joe De [facility] (2019)   Next INR check:   2019   Priority:   High   Target end date:       Indications    Anticoagulant long-term use [Z79.01]  S/P AVR [Z95.2]  Acute cerebrovascular accident (CVA) (CMS/[facility]) [I63.9]             Anticoagulation Episode Summary     INR check location:       Preferred lab:       Send INR reminders to:   [facility] POL[facility]CO [facility]INICAL POOL    Comments:   [facility] **Call every time - may reconsider once stable**      Anticoagulation Care Providers     Provider Role Specialty Phone number    José Antonio Banks MD Referring Cardiology [phone]          Clinic Interview:  Patient Findings     Positives:   Change in medications, Hospital admission, Other complaints    Negatives:   Signs/symptoms of thrombosis, Signs/symptoms of bleeding,   Laboratory test error suspected, Change in health, Change in alcohol use,   Change in activity, Upcoming invasive procedure, Emergency department   visit, Upcoming dental procedure, Missed doses, Extra doses, Change in   diet/appetite, Bruising    Comments:   Hospital admission r/t angina . HELD dose  r/t   high INR. Reports INR was 2.0 at home yesterday and he took 10 mg of   warfarin and administer enoxaparin x 1 dose (last/only dose he had left   from prev fill)      Clinical Outcomes     Negatives:   Major bleeding event, Thromboembolic event,   Anticoagulation-related hospital admission, Anticoagulation-related ED   visit, Anticoagulation-related fatality    Comments:   Hospital admission r/t angina . HELD dose  r/t   high INR. Reports INR was 2.0 at home yesterday and he took 10 mg of   warfarin and administer  enoxaparin x 1 dose (last/only dose he had left   from prev fill)        INR History:  Anticoagulation Monitoring 12/13/2019 12/17/2019 12/27/2019   INR 4.20 6.30 2.3   INR Date 12/13/2019 12/17/2019 12/27/2019   INR Goal 2.5-3.5 2.5-3.5 2.5-3.5   Trend Same Same Same   Last Week Total 50 mg 55 mg 45 mg   Next Week Total 52.5 mg 40 mg 52.5 mg   Sun 7.5 mg - 7.5 mg   Mon - - -   Tue - Hold (12/17) -   Wed - 5 mg (12/18) -   Thu - 5 mg (12/19) -   Fri 5 mg (12/13) - 7.5 mg   Sat 7.5 mg - 5 mg (12/28)   Visit Report - - -   Some recent data might be hidden       Plan:  1. INR is Subtherapeutic today- see above in Anticoagulation Summary.  Will instruct Galdino Dallas to Change their warfarin regimen- see above in Anticoagulation Summary.  2. Follow up in 3 days  3. Patient desires warfarin refills.  4. Verbal and written information provided. Patient expresses understanding and has no further questions at this time.    Joe De [facility]

## 2019-12-31 LAB — INR PPP: 3.6

## 2020-01-02 ENCOUNTER — ANTICOAGULATION VISIT (OUTPATIENT)
Dept: PHARMACY | Facility: HOSPITAL | Age: 35
End: 2020-01-02

## 2020-01-02 DIAGNOSIS — I63.9 ACUTE CEREBROVASCULAR ACCIDENT (CVA) (HCC): ICD-10-CM

## 2020-01-02 DIAGNOSIS — Z95.2 S/P AVR: ICD-10-CM

## 2020-01-02 DIAGNOSIS — Z79.01 ANTICOAGULANT LONG-TERM USE: ICD-10-CM

## 2020-01-02 LAB — INR PPP: 4.7

## 2020-01-02 NOTE — PROGRESS NOTES
Anticoagulation Clinic Progress Note    Anticoagulation Summary  As of 2020    INR goal:   2.5-3.5   TTR:   28.2 % (1 y)   INR used for dosin.70! (2020)   Warfarin maintenance plan:   10 mg every Thu; 7.5 mg all other days   Weekly warfarin total:   55 mg   Plan last modified:   Joe De [facility] (2019)   Next INR check:   2020   Priority:   High   Target end date:       Indications    Anticoagulant long-term use [Z79.01]  S/P AVR [Z95.2]  Acute cerebrovascular accident (CVA) (CMS/[facility]) [I63.9]             Anticoagulation Episode Summary     INR check location:       Preferred lab:       Send INR reminders to:   [facility] POLANCO CLINICAL POOL    Comments:   [facility] **Call every time - may reconsider once stable**      Anticoagulation Care Providers     Provider Role Specialty Phone number    José Antonio Banks MD Referring Cardiology [phone]            Clinic Interview:  Patient Findings     Positives:   Change in health, Emergency department visit, Change in   medications, Other complaints    Negatives:   Signs/symptoms of thrombosis, Signs/symptoms of bleeding,   Laboratory test error suspected, Change in alcohol use, Change in   activity, Upcoming invasive procedure, Upcoming dental procedure, Missed   doses, Extra doses, Change in diet/appetite, Hospital admission, Bruising    Comments:   Reports ED visit  with INR 2.9 (not documented in   CareEverywhere); reports fractured L1 vertebrae r/t fall secondary to   hypotension. Increased pain overall; discharged w/ Percocet for a few days   acutely then resuming Norco.      Clinical Outcomes     Negatives:   Major bleeding event, Thromboembolic event,   Anticoagulation-related hospital admission, Anticoagulation-related ED   visit, Anticoagulation-related fatality    Comments:   Reports ED visit  with INR 2.9 (not documented in   CareEverywhere); reports fractured L1 vertebrae r/t fall secondary to   hypotension. Increased pain  overall; discharged w/ Percocet for a few days   acutely then resuming Norco.        INR History:  Anticoagulation Monitoring 12/17/2019 12/27/2019 1/2/2020   INR 6.30 2.3 4.70   INR Date 12/17/2019 12/27/2019 1/2/2020   INR Goal 2.5-3.5 2.5-3.5 2.5-3.5   Trend Same Same Same   Last Week Total 55 mg 45 mg 47.5 mg   Next Week Total 40 mg 52.5 mg 42.5 mg   Sun - 7.5 mg 5 mg (1/5)   Mon - - -   Tue Hold (12/17) - -   Wed 5 mg (12/18) - -   Thu 5 mg (12/19) - 2.5 mg (1/2)   Fri - 7.5 mg 5 mg (1/3)   Sat - 5 mg (12/28) 7.5 mg   Visit Report - - -   Some recent data might be hidden       Plan:  1. INR is Supratherapeutic today- see above in Anticoagulation Summary.   Will instruct Galdino Dallas to Change their warfarin regimen- see above in Anticoagulation Summary.  2. Follow up in 4 days  3. They have been instructed to call if any changes in medications, doses, concerns, etc. To seek immediate medical attention if s/sx of bleeding develop or fall occurs. Patient expresses understanding and has no further questions at this time.    Joe De [facility]

## 2020-01-08 ENCOUNTER — OFFICE VISIT (OUTPATIENT)
Dept: CARDIOLOGY | Facility: CLINIC | Age: 35
End: 2020-01-08

## 2020-01-08 ENCOUNTER — ANTICOAGULATION VISIT (OUTPATIENT)
Dept: PHARMACY | Facility: HOSPITAL | Age: 35
End: 2020-01-08

## 2020-01-08 VITALS
DIASTOLIC BLOOD PRESSURE: 70 MMHG | WEIGHT: 188.4 LBS | BODY MASS INDEX: 29.57 KG/M2 | OXYGEN SATURATION: 98 % | HEART RATE: 86 BPM | HEIGHT: 67 IN | RESPIRATION RATE: 18 BRPM | SYSTOLIC BLOOD PRESSURE: 128 MMHG

## 2020-01-08 DIAGNOSIS — I21.4 NSTEMI (NON-ST ELEVATED MYOCARDIAL INFARCTION) (HCC): ICD-10-CM

## 2020-01-08 DIAGNOSIS — I63.9 ACUTE CEREBROVASCULAR ACCIDENT (CVA) (HCC): ICD-10-CM

## 2020-01-08 DIAGNOSIS — Z95.2 S/P AVR: ICD-10-CM

## 2020-01-08 DIAGNOSIS — Z79.01 ANTICOAGULANT LONG-TERM USE: ICD-10-CM

## 2020-01-08 DIAGNOSIS — I10 ESSENTIAL HYPERTENSION: ICD-10-CM

## 2020-01-08 DIAGNOSIS — I25.118 CORONARY ARTERY DISEASE OF NATIVE ARTERY OF NATIVE HEART WITH STABLE ANGINA PECTORIS (HCC): Primary | ICD-10-CM

## 2020-01-08 LAB
INR PPP: 3.9 (ref 0.91–1.09)
PROTHROMBIN TIME: 46.9 SECONDS (ref 10–13.8)

## 2020-01-08 PROCEDURE — 93000 ELECTROCARDIOGRAM COMPLETE: CPT | Performed by: NURSE PRACTITIONER

## 2020-01-08 PROCEDURE — G0463 HOSPITAL OUTPT CLINIC VISIT: HCPCS

## 2020-01-08 PROCEDURE — 36416 COLLJ CAPILLARY BLOOD SPEC: CPT

## 2020-01-08 PROCEDURE — 85610 PROTHROMBIN TIME: CPT

## 2020-01-08 PROCEDURE — 99214 OFFICE O/P EST MOD 30 MIN: CPT | Performed by: NURSE PRACTITIONER

## 2020-01-08 RX ORDER — LISINOPRIL 5 MG/1
5 TABLET ORAL 2 TIMES DAILY
Qty: 60 TABLET | Refills: 11 | Status: SHIPPED | OUTPATIENT
Start: 2020-01-08

## 2020-01-08 RX ORDER — ATORVASTATIN CALCIUM 80 MG/1
80 TABLET, FILM COATED ORAL DAILY
COMMUNITY
Start: 2019-12-18 | End: 2020-01-08 | Stop reason: SDUPTHER

## 2020-01-08 RX ORDER — CARVEDILOL 6.25 MG/1
6.25 TABLET ORAL 2 TIMES DAILY
Qty: 60 TABLET | Refills: 11 | Status: SHIPPED | OUTPATIENT
Start: 2020-01-08 | End: 2021-01-27

## 2020-01-08 RX ORDER — LISINOPRIL 5 MG/1
5 TABLET ORAL 2 TIMES DAILY
COMMUNITY
End: 2020-01-08 | Stop reason: SDUPTHER

## 2020-01-08 RX ORDER — ASPIRIN 81 MG/1
81 TABLET ORAL DAILY
COMMUNITY
Start: 2019-11-02

## 2020-01-08 RX ORDER — ATORVASTATIN CALCIUM 80 MG/1
80 TABLET, FILM COATED ORAL DAILY
Qty: 30 TABLET | Refills: 11 | Status: SHIPPED | OUTPATIENT
Start: 2020-01-08

## 2020-01-08 RX ORDER — NITROGLYCERIN 0.4 MG/1
TABLET SUBLINGUAL
Qty: 100 TABLET | Refills: 3 | Status: SHIPPED | OUTPATIENT
Start: 2020-01-08 | End: 2020-05-06

## 2020-01-08 RX ORDER — CARVEDILOL 6.25 MG/1
6.25 TABLET ORAL 2 TIMES DAILY
COMMUNITY
Start: 2020-01-02 | End: 2020-01-08 | Stop reason: SDUPTHER

## 2020-01-08 NOTE — PROGRESS NOTES
Anticoagulation Clinic Progress Note    Anticoagulation Summary  As of 1/8/2020    INR goal:   2.5-3.5   TTR:   27.7 % (1.1 y)   INR used for dosing:   3.9! (1/8/2020)   Warfarin maintenance plan:   5 mg every day   Weekly warfarin total:   35 mg   Plan last modified:   Joe De RPH (1/8/2020)   Next INR check:   1/10/2020   Priority:   High   Target end date:       Indications    Anticoagulant long-term use [Z79.01]  S/P AVR [Z95.2]  Acute cerebrovascular accident (CVA) (CMS/[facility]) [I63.9]             Anticoagulation Episode Summary     INR check location:       Preferred lab:       Send INR reminders to:    YAND[facility] POLANCO [facility]INICAL POOL    Comments:   [facility]cel[facility] **Call every time - may reconsider once stable**      Anticoagulation Care Providers     Provider Role Specialty Phone number    José Antonio Banks MD Referring Cardiology [phone]          Clinic Interview:  Patient Findings     Positives:   Emergency department visit, Missed doses, Other complaints    Negatives:   Signs/symptoms of thrombosis, Signs/symptoms of bleeding,   Laboratory test error suspected, Change in health, Change in alcohol use,   Change in activity, Upcoming invasive procedure, Upcoming dental   procedure, Extra doses, Change in medications, Change in diet/appetite,   Hospital admission, Bruising    Comments:   ED visit yesterday r/t increased pain r/t back fracture. INR   4.8 yesterday in ED. Warfarin was held yesterday.      Clinical Outcomes     Negatives:   Major bleeding event, Thromboembolic event,   Anticoagulation-related hospital admission, Anticoagulation-related ED   visit, Anticoagulation-related fatality    Comments:   ED visit yesterday r/t increased pain r/t back fracture. INR   4.8 yesterday in ED. Warfarin was held yesterday.        INR History:  Anticoagulation Monitoring 12/27/2019 1/2/2020 1/8/2020   INR 2.3 4.70 3.9   INR Date 12/27/2019 1/2/2020 1/8/2020   INR Goal 2.5-3.5 2.5-3.5 2.5-3.5   Trend Same Same  Down   Last Week Total 45 mg 47.5 mg 35 mg   Next Week Total 52.5 mg 42.5 mg 35 mg   Sun 7.5 mg 5 mg (1/5) -   Mon - - -   Tue - - -   Wed - - 5 mg   Thu - 2.5 mg (1/2) 5 mg   Fri 7.5 mg 5 mg (1/3) -   Sat 5 mg (12/28) 7.5 mg -   Visit Report - - -   Some recent data might be hidden       Plan:  1. INR is Supratherapeutic today- see above in Anticoagulation Summary.  Will instruct Galdino Dallas to Change their warfarin regimen- see above in Anticoagulation Summary.  2. Follow up in 2 days  3. Patient declines warfarin refills.  4. Verbal and written information provided. Patient expresses understanding and has no further questions at this time.    Joe De [facility]

## 2020-01-08 NOTE — PROGRESS NOTES
Date of Office Visit: 2020  Encounter Provider: COSMO Childress  Place of Service: [facility] CARDIOLOGY  Patient Name: Galdino Dallas  :1985    Chief Complaint   Patient presents with   • Coronary Artery Disease     1 WK HOSP FOLLOW UP   :     HPI: Galdino Dallas is a 34 y.o. male, new to me, who presents today for follow-up.  Old records have been obtained and reviewed by me.  He is a patient of Dr. Banks's with a past cardiac history significant for bicuspid aortic valve status post mechanical replacement, coronary artery disease status post placement in the RPL, OM, and a 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  His last cardiac catheterization was in  revealing 50 to 60% mid LAD stenosis, chronic total occlusion of the distal LAD, apical LAD filled via right to left collaterals, OM 2 and OM 3 chronically occluded and L3 filled via collaterals as well.  He had an FFR of the mid LAD and diagonal as well as an FFR of the RPL that were not hemodynamically significant.  His chronic total occlusions are not revascularization candidate.  He was last seen in the office by Dr. Villalta on 2018 at which time he continued to have stable angina which had reportedly improved.  Blood pressure was poorly controlled and Dr. Banks restarted his lisinopril and isosorbide mononitrate.  He also doubled his carvedilol.  He was instructed to follow-up in 6 months.   In 2019 he presented with left arm numbness and weakness with associated dizziness and blurred vision.  MRI showed occlusion of the proximal right posterior cerebral artery with no reconstitution.  He had a large area of restricted diffusion identified with the medial right temporal lobe with no hemorrhagic transformation.  This was indicative of an acute CVA involving the right PCA vascular distribution.  His INR was subtherapeutic; as such, this was felt to be cardioembolic from his mechanical  valve.  Echocardiogram at that time revealed an EF of 49%, grade 1 diastolic dysfunction, mechanical aortic valve with mildly elevated peak and mean gradients.  He was maintained on a heparin drip.  He had serial head CTs that did not show any evidence of hemorrhagic conversion.  He restarted warfarin with transition to Lovenox for bridging.  On 9/2/2019, he was stable and ready for discharge.    On 12/25/2019, he presented to the ED at [place] [facility] with chest pain.  His initial troponin was elevated but his ECG showed no significant change.  He underwent nuclear stress testing which revealed anterolateral and lateral ischemia, a fixed small segment of mild inferior hypoperfusion suggestive of a previous infarction, and normal LV function with inferior hypokinesis.  Medical therapy was recommended and he was started on Imdur 30 mg daily.  He was instructed to follow-up in our office, and I am seeing him today.   He has had a rough month.  After being discharged from the ED at [place] [facility], he developed low blood pressure and subsequently experienced a fall and broke his back.  He is now wearing a back brace.  He states that the isosorbide always makes his blood pressure drop.  Evidently he went to the ER at [facility] yesterday due to severe back pain.  He reportedly had positive orthostatic blood pressures.  His isosorbide was subsequently discontinued.  His lisinopril was also decreased to 5 mg twice daily.  His chest pain/stable angina has returned to baseline.  He denies any palpitations, shortness of breath, edema, dizziness, syncope, bleeding difficulties or melena.  His INRs have been therapeutic.      Past Medical History:   Diagnosis Date   • Anxiety    • Diverticulosis    • GERD (gastroesophageal reflux disease)    • Hyperlipidemia    • Hypertension    • IBS (irritable bowel syndrome)    • Migraines        Past Surgical History:   Procedure Laterality Date   • AORTIC VALVE REPAIR/REPLACEMENT  2005    open   • CARDIAC  CATHETERIZATION N/A 12/19/2017    Procedure: Coronary angiography;  Surgeon: José Antonio Banks MD;  Location:  [facility];  Service:    • CARDIAC CATHETERIZATION Right 12/19/2017    Procedure: Functional Flow Sobieski;  Surgeon: José Antonio Banks MD;  Location:  [facility];  Service:    • COLONOSCOPY N/A 3/14/2018    Procedure: COLONOSCOPY TO CECUM AND TERMINAL ILEUM;  Surgeon: Nnamdi Davis MD;  Location[facility] ENDOSCOPY;  Service: Gastroenterology   • CORONARY ANGIOPLASTY WITH STENT PLACEMENT N/A 2010    x3   • TONSILLECTOMY     • WISDOM TOOTH EXTRACTION         Social History     Socioeconomic History   • Marital status:      Spouse name: Not on file   • Number of children: Not on file   • Years of education: Not on file   • Highest education level: Not on file   Tobacco Use   • Smoking status: Never Smoker   • Smokeless tobacco: Never Used   • Tobacco comment: CAFFEINE USE: 2 CUPS COFFEE/ ICE TEA DAILY   Substance and Sexual Activity   • Alcohol use: Not Currently   • Drug use: No   • Sexual activity: Defer       Family History   Problem Relation Age of Onset   • Heart disease Mother         mitral valve   • Colon polyps Mother    • Heart attack Paternal Grandfather    • Multiple sclerosis Father        Review of Systems   Constitution: Negative for chills, fever and malaise/fatigue.   Cardiovascular: Positive for chest pain. Negative for dyspnea on exertion, leg swelling, near-syncope, orthopnea, palpitations, paroxysmal nocturnal dyspnea and syncope.   Respiratory: Negative for cough and shortness of breath.    Musculoskeletal: Positive for back pain. Negative for joint pain, joint swelling and myalgias.   Gastrointestinal: Negative for abdominal pain, diarrhea, melena, nausea and vomiting.   Genitourinary: Negative for frequency and hematuria.   Neurological: Negative for light-headedness, numbness, paresthesias and seizures.   Allergic/Immunologic:  "Negative.    All other systems reviewed and are negative.      Allergies   Allergen Reactions   • Penicillins Rash     Rash on legs when he was an infant         Current Outpatient Medications:   •  aspirin 81 MG EC tablet, Take 81 mg by mouth Daily., Disp: , Rfl:   •  atorvastatin (LIPITOR) 80 MG tablet, Take 1 tablet by mouth Daily., Disp: 30 tablet, Rfl: 11  •  carvedilol (COREG) 6.25 MG tablet, Take 1 tablet by mouth 2 (Two) Times a Day., Disp: 60 tablet, Rfl: 11  •  escitalopram (LEXAPRO) 20 MG tablet, Take 1 tablet by mouth Daily., Disp: 90 tablet, Rfl: 3  •  HYDROcodone-acetaminophen (NORCO) 7.5-325 MG per tablet, TK 1 T PO TID PRN, Disp: , Rfl: 0  •  lisinopril (PRINIVIL,ZESTRIL) 5 MG tablet, Take 1 tablet by mouth 2 (Two) Times a Day., Disp: 60 tablet, Rfl: 11  •  nitroglycerin (NITROSTAT) 0.4 MG SL tablet, Take no more than 3 doses in 15 minutes., Disp: 100 tablet, Rfl: 3  •  warfarin (COUMADIN) 5 MG tablet, Take one tablet (5 mg) by mouth every other day or as directed by the [facility]. Alternate with 7.5 mg tablet., Disp: 45 tablet, Rfl: 0  •  warfarin (COUMADIN) 7.5 MG tablet, Take one tablet (7.5 mg) by mouth every other day or as directed by the [facility]. Alternate with 5 mg tablet., Disp: 45 tablet, Rfl: 0  •  ALPRAZolam (XANAX) 0.25 MG tablet, Take 1 tablet by mouth Daily As Needed for Anxiety., Disp: 30 tablet, Rfl: 0      Objective:     Vitals:    01/08/20 1505 01/08/20 1516   BP: 134/74 128/70   BP Location: Right arm Left arm   Patient Position: Sitting Sitting   Pulse: 86    Resp: 18    SpO2: 98%    Weight: 85.5 kg (188 lb 6.4 oz)    Height: 170.2 cm (67\")      Body mass index is 29.51 kg/m².    PHYSICAL EXAM:    Physical Exam   Constitutional: He is oriented to person, place, and time. He appears well-developed and well-nourished. No distress.   HENT:   Head: Normocephalic and atraumatic.   Eyes: Pupils are equal, round, and reactive to light.   Neck: No " JVD present. No thyromegaly present.   Cardiovascular: Normal rate, regular rhythm, normal heart sounds and intact distal pulses.   No murmur heard.  Crisp valve click   Pulmonary/Chest: Effort normal and breath sounds normal. No respiratory distress.   Abdominal: Soft. Bowel sounds are normal. He exhibits no distension. There is no splenomegaly or hepatomegaly. There is no tenderness.   Musculoskeletal: Normal range of motion. He exhibits no edema.   Neurological: He is alert and oriented to person, place, and time.   Skin: Skin is warm and dry. He is not diaphoretic. No erythema.   Psychiatric: He has a normal mood and affect. His behavior is normal. Judgment normal.         ECG 12 Lead  Date/Time: 1/8/2020 3:26 PM  Performed by: Alejandra Ly APRN  Authorized by: Alejandra Ly APRN   Comparison: compared with previous ECG from 8/26/2019  Similar to previous ECG  Rhythm: sinus rhythm  Rate: normal  BPM: 85  Q waves: V1    T inversion: II, aVF, V5 and V6  T flattening: III    Clinical impression: abnormal EKG  Comments: Indication: CAD              Assessment:       Diagnosis Plan   1. Coronary artery disease of native artery of native heart with stable angina pectoris (CMS/[facility])  ECG 12 Lead   2. Essential hypertension     3. S/P AVR     4. Anticoagulant long-term use     5. NSTEMI (non-ST elevated myocardial infarction) (CMS/[facility])  Ambulatory Referral to Cardiac Rehab     Orders Placed This Encounter   Procedures   • Ambulatory Referral to Cardiac Rehab     Referral Priority:   Routine     Referral Type:   Rehabilitation - Outpatient     Number of Visits Requested:   1   • ECG 12 Lead     This order was created via procedure documentation          Plan:       1.  Coronary artery disease.  He has class II stable angina.  The abnormalities of his stress test at [place] were consistent with his known disease.  I am going to place an order for cardiac rehab.  Continue current medical therapy.      2.   Hypertension.  His blood pressure today is actually well controlled.  I told him if his blood pressure remained stable on the new dosing of lisinopril, that was fine.  He will continue monitoring his blood pressures at home and touch base with me should he notice them starting to trend up.      3.  Status post mechanical AVR.  He remains on chronic warfarin therapy.      Overall I think he is stable from a cardiac standpoint.  He denies any symptoms of heart failure.  He has chronic stable angina.  I did discuss plan of care with Dr. Villalta and he is in agreement.  He will follow-up with Dr. Villalta in 3 months or sooner if needed.      As always, it has been a pleasure to participate in your patient's care.      Sincerely,         COSMO Rojo

## 2020-01-10 ENCOUNTER — ANTICOAGULATION VISIT (OUTPATIENT)
Dept: PHARMACY | Facility: HOSPITAL | Age: 35
End: 2020-01-10

## 2020-01-10 DIAGNOSIS — I63.9 ACUTE CEREBROVASCULAR ACCIDENT (CVA) (HCC): ICD-10-CM

## 2020-01-10 DIAGNOSIS — Z79.01 ANTICOAGULANT LONG-TERM USE: ICD-10-CM

## 2020-01-10 DIAGNOSIS — Z95.2 S/P AVR: ICD-10-CM

## 2020-01-10 LAB — INR PPP: 2.3

## 2020-01-10 NOTE — PROGRESS NOTES
Anticoagulation Clinic Progress Note    Anticoagulation Summary  As of 1/10/2020    INR goal:   2.5-3.5   TTR:   27.9 % (1.1 y)   INR used for dosin.30! (1/10/2020)   Warfarin maintenance plan:   5 mg every day   Weekly warfarin total:   35 mg   Plan last modified:   Joe De RPH (2020)   Next INR check:   2020   Priority:   High   Target end date:       Indications    Anticoagulant long-term use [Z79.01]  S/P AVR [Z95.2]  Acute cerebrovascular accident (CVA) (CMS/[facility]) [I63.9]             Anticoagulation Episode Summary     INR check location:       Preferred lab:       Send INR reminders to:    [facility]    Comments:   [facility] **Call every time - may reconsider once stable**      Anticoagulation Care Providers     Provider Role Specialty Phone number    José Antonio Banks MD Referring Cardiology [phone]            Clinic Interview:  Patient Findings     Positives:   Change in health    Negatives:   Signs/symptoms of thrombosis, Signs/symptoms of bleeding,   Laboratory test error suspected, Change in alcohol use, Change in   activity, Upcoming invasive procedure, Emergency department visit,   Upcoming dental procedure, Missed doses, Extra doses, Change in   medications, Change in diet/appetite, Hospital admission, Bruising, Other   complaints    Comments:   Pain slightly improved.      Clinical Outcomes     Negatives:   Major bleeding event, Thromboembolic event,   Anticoagulation-related hospital admission, Anticoagulation-related ED   visit, Anticoagulation-related fatality    Comments:   Pain slightly improved.        INR History:  Anticoagulation Monitoring 2020 2020 1/10/2020   INR 4.70 3.9 2.30   INR Date 2020 2020 1/10/2020   INR Goal 2.5-3.5 2.5-3.5 2.5-3.5   Trend Same Down Same   Last Week Total 47.5 mg 35 mg 35 mg   Next Week Total 42.5 mg 35 mg 40 mg   Sun 5 mg () - 7.5 mg ()   Mon - - -   Tue - - -   Wed - 5 mg -   Thu 2.5 mg () 5  mg -   Fri 5 mg (1/3) - 7.5 mg (1/10)   Sat 7.5 mg - 5 mg   Visit Report - - -   Some recent data might be hidden       Plan:  1. INR is Subtherapeutic today- see above in Anticoagulation Summary.   Will instruct Galdino Dallas to Change their warfarin regimen- see above in Anticoagulation Summary.  2. Follow up in 3 days  3. They have been instructed to call if any changes in medications, doses, concerns, etc. Patient expresses understanding and has no further questions at this time.    Joe De [facility]

## 2020-01-13 ENCOUNTER — TELEPHONE (OUTPATIENT)
Dept: CARDIAC REHAB | Facility: HOSPITAL | Age: 35
End: 2020-01-13

## 2020-01-14 ENCOUNTER — TELEPHONE (OUTPATIENT)
Dept: CARDIAC REHAB | Facility: HOSPITAL | Age: 35
End: 2020-01-14

## 2020-01-14 NOTE — TELEPHONE ENCOUNTER
Referral for outpatient cardiac rehab:  Called pt and left VM to contact us at [phone] when he feels ready to schedule. Reviewed his chart.  Pt has, in addition to his nonstemi, acute CVA, a fall, resulting in a fracture in his back.  He may not be ready for outpatient exercise yet.

## 2020-01-17 ENCOUNTER — ANTICOAGULATION VISIT (OUTPATIENT)
Dept: PHARMACY | Facility: HOSPITAL | Age: 35
End: 2020-01-17

## 2020-01-17 DIAGNOSIS — Z79.01 ANTICOAGULANT LONG-TERM USE: ICD-10-CM

## 2020-01-17 DIAGNOSIS — Z95.2 S/P AVR: ICD-10-CM

## 2020-01-17 DIAGNOSIS — I63.9 ACUTE CEREBROVASCULAR ACCIDENT (CVA) (HCC): ICD-10-CM

## 2020-01-17 LAB — INR PPP: 2.2

## 2020-01-17 NOTE — PROGRESS NOTES
Anticoagulation Clinic Progress Note    Anticoagulation Summary  As of 2020    INR goal:   2.5-3.5   TTR:   27.4 % (1.1 y)   INR used for dosin.20! (2020)   Warfarin maintenance plan:   5 mg every day   Weekly warfarin total:   35 mg   Plan last modified:   Joe De RP (2020)   Next INR check:   2020   Priority:   High   Target end date:       Indications    Anticoagulant long-term use [Z79.01]  S/P AVR [Z95.2]  Acute cerebrovascular accident (CVA) (CMS/HCC) [I63.9]             Anticoagulation Episode Summary     INR check location:       Preferred lab:       Send INR reminders to:   [facility]    Comments:   [facility] **Call every time - may reconsider once stable**      Anticoagulation Care Providers     Provider Role Specialty Phone number    José Antonio Banks MD Referring Cardiology [phone]          Drug interactions: has remained unchanged.  Diet: has remained unchanged.    Clinic Interview:      INR History:  Anticoagulation Monitoring 2020 1/10/2020 2020   INR 3.9 2.30 2.20   INR Date 2020 1/10/2020 2020   INR Goal 2.5-3.5 2.5-3.5 2.5-3.5   Trend Down Same Same   Last Week Total 35 mg 35 mg 45 mg   Next Week Total 35 mg 40 mg 47.5 mg   Sun - 7.5 mg () 7.5 mg ()   Mon - - 5 mg   Tue - - 7.5 mg ()   Wed 5 mg - -   Thu 5 mg - -   Fri - 7.5 mg (1/10) 10 mg ()   Sat - 5 mg 5 mg   Visit Report - - -   Some recent data might be hidden       Plan:  1. INR is Subtherapeutic today- see above in Anticoagulation Summary.   Will instruct Galdino Dallas to Increase their warfarin regimen- see above in Anticoagulation Summary.  2. Follow up in 5 days  3. Spoke with pt today. They have been instructed to call if any changes in medications, doses, concerns, etc. Patient expresses understanding and has no further questions at this time.    Paulette Martin [facility]

## 2020-01-23 ENCOUNTER — ANTICOAGULATION VISIT (OUTPATIENT)
Dept: PHARMACY | Facility: HOSPITAL | Age: 35
End: 2020-01-23

## 2020-01-23 DIAGNOSIS — I63.9 ACUTE CEREBROVASCULAR ACCIDENT (CVA) (HCC): ICD-10-CM

## 2020-01-23 DIAGNOSIS — Z95.2 S/P AVR: ICD-10-CM

## 2020-01-23 DIAGNOSIS — Z79.01 ANTICOAGULANT LONG-TERM USE: ICD-10-CM

## 2020-01-23 LAB — INR PPP: 6.1

## 2020-01-23 NOTE — PROGRESS NOTES
Anticoagulation Clinic Progress Note    Anticoagulation Summary  As of 2020    INR goal:   2.5-3.5   TTR:   27.3 % (1.1 y)   INR used for dosin.10! (2020)   Warfarin maintenance plan:   5 mg every day   Weekly warfarin total:   35 mg   Plan last modified:   Joe De RPH (2020)   Next INR check:   2020   Priority:   High   Target end date:       Indications    Anticoagulant long-term use [Z79.01]  S/P AVR [Z95.2]  Acute cerebrovascular accident (CVA) (CMS/[facility]) [I63.9]             Anticoagulation Episode Summary     INR check location:       Preferred lab:       Send INR reminders to:    [facility]    Comments:   [facility] **Call every time - may reconsider once stable**      Anticoagulation Care Providers     Provider Role Specialty Phone number    José Antonio Banks MD Referring Cardiology [phone]            Clinic Interview:  Patient Findings     Positives:   Change in health, Change in medications    Negatives:   Signs/symptoms of thrombosis, Signs/symptoms of bleeding,   Laboratory test error suspected, Change in alcohol use, Change in   activity, Upcoming invasive procedure, Emergency department visit,   Upcoming dental procedure, Missed doses, Extra doses, Change in   diet/appetite, Hospital admission, Bruising, Other complaints    Comments:   Taking hydrocodone/APAP now (ultimately ~1300 mg/day of APAP)   for pain. Cold sx past few wks, but now nearly gone.      Clinical Outcomes     Negatives:   Major bleeding event, Thromboembolic event,   Anticoagulation-related hospital admission, Anticoagulation-related ED   visit, Anticoagulation-related fatality    Comments:   Taking hydrocodone/APAP now (ultimately ~1300 mg/day of APAP)   for pain. Cold sx past few wks, but now nearly gone.        INR History:  Anticoagulation Monitoring 1/10/2020 2020 2020   INR 2.30 2.20 6.10   INR Date 1/10/2020 2020 2020   INR Goal 2.5-3.5 2.5-3.5 2.5-3.5    Trend Same Same Same   Last Week Total 35 mg 45 mg 50 mg   Next Week Total 40 mg 47.5 mg 32.5 mg   Sun 7.5 mg (1/12) 7.5 mg (1/19) 5 mg   Mon - 5 mg 5 mg   Tue - 7.5 mg (1/21) -   Wed - - -   Thu - - Hold (1/23)   Fri 7.5 mg (1/10) 10 mg (1/17) 5 mg   Sat 5 mg 5 mg 7.5 mg (1/25)   Visit Report - - -   Some recent data might be hidden       Plan:  1. INR is Supratherapeutic today- see above in Anticoagulation Summary.   Will instruct Galdino Dallas to Change their warfarin regimen- see above in Anticoagulation Summary. Cautiously reducing dose as pt voices preference for higher end of the therapeutic range and has historically responded quickly to small dose changes.   2. Follow up in 4 days  3. Pt has agreed to only be called if INR out of range. They have been instructed to call if any changes in medications, doses, concerns, etc. Patient expresses understanding and has no further questions at this time.    Joe De, [facility]

## 2020-01-29 ENCOUNTER — ANTICOAGULATION VISIT (OUTPATIENT)
Dept: PHARMACY | Facility: HOSPITAL | Age: 35
End: 2020-01-29

## 2020-01-29 DIAGNOSIS — I63.9 ACUTE CEREBROVASCULAR ACCIDENT (CVA) (HCC): ICD-10-CM

## 2020-01-29 DIAGNOSIS — Z79.01 ANTICOAGULANT LONG-TERM USE: ICD-10-CM

## 2020-01-29 DIAGNOSIS — Z95.2 S/P AVR: ICD-10-CM

## 2020-01-29 LAB — INR PPP: 3.5

## 2020-01-30 NOTE — PROGRESS NOTES
Anticoagulation Clinic Progress Note    Anticoagulation Summary  As of 1/29/2020    INR goal:   2.5-3.5   TTR:   26.9 % (1.1 y)   INR used for dosing:   3.50 (1/29/2020)   Warfarin maintenance plan:   7.5 mg every Sat; 5 mg all other days   Weekly warfarin total:   37.5 mg   Plan last modified:   Joe De RPH (1/30/2020)   Next INR check:   2/3/2020   Priority:   High   Target end date:       Indications    Anticoagulant long-term use [Z79.01]  S/P AVR [Z95.2]  Acute cerebrovascular accident (CVA) (CMS/HCC) [I63.9]             Anticoagulation Episode Summary     INR check location:       Preferred lab:       Send INR reminders to:    [facility]    Comments:   [facility] **Call every time - may reconsider once stable**      Anticoagulation Care Providers     Provider Role Specialty Phone number    José Antonio Banks MD Referring Cardiology [phone]            Clinic Interview:  Patient Findings     Negatives:   Signs/symptoms of thrombosis, Signs/symptoms of bleeding,   Laboratory test error suspected, Change in health, Change in alcohol use,   Change in activity, Upcoming invasive procedure, Emergency department   visit, Upcoming dental procedure, Missed doses, Extra doses, Change in   medications, Change in diet/appetite, Hospital admission, Bruising, Other   complaints      Clinical Outcomes     Negatives:   Major bleeding event, Thromboembolic event,   Anticoagulation-related hospital admission, Anticoagulation-related ED   visit, Anticoagulation-related fatality        INR History:  Anticoagulation Monitoring 1/17/2020 1/23/2020 1/29/2020   INR 2.20 6.10 3.50   INR Date 1/17/2020 1/23/2020 1/29/2020   INR Goal 2.5-3.5 2.5-3.5 2.5-3.5   Trend Same Same Up   Last Week Total 45 mg 50 mg 35 mg   Next Week Total 47.5 mg 32.5 mg 37.5 mg   Sun 7.5 mg (1/19) 5 mg 5 mg   Mon 5 mg 5 mg -   Tue 7.5 mg (1/21) - -   Wed - - 5 mg   Thu - Hold (1/23) 5 mg   Fri 10 mg (1/17) 5 mg 5 mg   Sat 5 mg 7.5  mg (1/25) 7.5 mg   Visit Report - - -   Some recent data might be hidden       Plan:  1. INR is Therapeutic today- see above in Anticoagulation Summary.   Will instruct Galdino Dallas to Change their warfarin regimen- see above in Anticoagulation Summary.  2. Follow up in 4 days  3. They have been instructed to call if any changes in medications, doses, concerns, etc. Patient expresses understanding and has no further questions at this time.    Joe De RP

## 2020-02-10 ENCOUNTER — ANTICOAGULATION VISIT (OUTPATIENT)
Dept: PHARMACY | Facility: HOSPITAL | Age: 35
End: 2020-02-10

## 2020-02-10 DIAGNOSIS — Z79.01 ANTICOAGULANT LONG-TERM USE: ICD-10-CM

## 2020-02-10 DIAGNOSIS — Z95.2 S/P AVR: ICD-10-CM

## 2020-02-10 DIAGNOSIS — I63.9 ACUTE CEREBROVASCULAR ACCIDENT (CVA) (HCC): ICD-10-CM

## 2020-02-10 LAB — INR PPP: 2.5

## 2020-02-10 NOTE — PROGRESS NOTES
Anticoagulation Clinic Progress Note    Anticoagulation Summary  As of 2/10/2020    INR goal:   2.5-3.5   TTR:   28.7 % (1.1 y)   INR used for dosin.50 (2020)   Warfarin maintenance plan:   7.5 mg every Mon, Thu, Sat; 5 mg all other days   Weekly warfarin total:   42.5 mg   Plan last modified:   Joe De [facility] (2/10/2020)   Next INR check:   2020   Priority:   High   Target end date:       Indications    Anticoagulant long-term use [Z79.01]  S/P AVR [Z95.2]  Acute cerebrovascular accident (CVA) (CMS/HCC) [I63.9]             Anticoagulation Episode Summary     INR check location:       Preferred lab:       Send INR reminders to:   [facility]    Comments:   [facility] **Call every time - may reconsider once stable**      Anticoagulation Care Providers     Provider Role Specialty Phone number    José Antonio Banks MD Referring Cardiology [phone]            Clinic Interview:  Patient Findings     Positives:   Extra doses    Negatives:   Signs/symptoms of thrombosis, Signs/symptoms of bleeding,   Laboratory test error suspected, Change in health, Change in alcohol use,   Change in activity, Upcoming invasive procedure, Emergency department   visit, Upcoming dental procedure, Missed doses, Change in medications,   Change in diet/appetite, Hospital admission, Bruising, Other complaints    Comments:   Reports taking 7.5 mg every 3rd day last wk.      Clinical Outcomes     Negatives:   Major bleeding event, Thromboembolic event,   Anticoagulation-related hospital admission, Anticoagulation-related ED   visit, Anticoagulation-related fatality    Comments:   Reports taking 7.5 mg every 3rd day last wk.        INR History:  Anticoagulation Monitoring 2020 2020 2/10/2020   INR 6.10 3.50 2.50   INR Date 2020   INR Goal 2.5-3.5 2.5-3.5 2.5-3.5   Trend Same Up Up   Last Week Total 50 mg 35 mg 40 mg   Next Week Total 32.5 mg 37.5 mg 42.5 mg   Sun 5 mg 5 mg -    Mon 5 mg - 7.5 mg   Tue - - 5 mg   Wed - 5 mg 5 mg   Thu Hold (1/23) 5 mg 7.5 mg   Fri 5 mg 5 mg -   Sat 7.5 mg (1/25) 7.5 mg -   Visit Report - - -   Some recent data might be hidden       Plan:  1. INR is Therapeutic today- see above in Anticoagulation Summary.   Will instruct Galdinotez Dallas to Change their warfarin regimen- see above in Anticoagulation Summary.  2. Follow up in 4 days  3. They have been instructed to call if any changes in medications, doses, concerns, etc. Patient expresses understanding and has no further questions at this time.    Joe De [facility]

## 2020-02-10 NOTE — PROGRESS NOTES
Anticoagulation Clinic Progress Note    Anticoagulation Summary  As of 2/10/2020    INR goal:   2.5-3.5   TTR:   28.7 % (1.1 y)   INR used for dosin.50 (2020)   Warfarin maintenance plan:   7.5 mg every Sat; 5 mg all other days   Weekly warfarin total:   37.5 mg   No change documented:   Jocelynn Carbajal   Plan last modified:   Joe De [facility] (2020)   Next INR check:   2020   Priority:   High   Target end date:       Indications    Anticoagulant long-term use [Z79.01]  S/P AVR [Z95.2]  Acute cerebrovascular accident (CVA) (CMS/HCC) [I63.9]             Anticoagulation Episode Summary     INR check location:       Preferred lab:       Send INR reminders to:    [facility]    Comments:   [facility] **Call every time - may reconsider once stable**      Anticoagulation Care Providers     Provider Role Specialty Phone number    José Antonio Banks MD Referring Cardiology [phone]          Clinic Interview:  No pertinent clinical findings have been reported.    INR History:  Anticoagulation Monitoring 2020 2020 2/10/2020   INR 6.10 3.50 2.50   INR Date 2020   INR Goal 2.5-3.5 2.5-3.5 2.5-3.5   Trend Same Up Same   Last Week Total 50 mg 35 mg 37.5 mg   Next Week Total 32.5 mg 37.5 mg 37.5 mg   Sun 5 mg 5 mg 5 mg   Mon 5 mg - 5 mg   Tue - - 5 mg   Wed - 5 mg 5 mg   Thu Hold () 5 mg 5 mg   Fri 5 mg 5 mg 5 mg   Sat 7.5 mg () 7.5 mg 7.5 mg   Visit Report - - -   Some recent data might be hidden       Plan:  1. INR is therapeutic today- see above in Anticoagulation Summary.    Galdino Dallas to continue their warfarin regimen- see above in Anticoagulation Summary.  2. Follow up in 1 week  3. Pt has agreed to only be called if INR out of range. They have been instructed to call if any changes in medications, doses, concerns, etc. Patient expresses understanding and has no further questions at this time.    Jocelynn Carbajal

## 2020-02-14 ENCOUNTER — ANTICOAGULATION VISIT (OUTPATIENT)
Dept: PHARMACY | Facility: HOSPITAL | Age: 35
End: 2020-02-14

## 2020-02-14 DIAGNOSIS — Z95.2 S/P AVR: ICD-10-CM

## 2020-02-14 DIAGNOSIS — I63.9 ACUTE CEREBROVASCULAR ACCIDENT (CVA) (HCC): ICD-10-CM

## 2020-02-14 DIAGNOSIS — Z79.01 ANTICOAGULANT LONG-TERM USE: ICD-10-CM

## 2020-02-14 LAB
INR PPP: 3.5 (ref 0.91–1.09)
PROTHROMBIN TIME: 42 SECONDS (ref 10–13.8)

## 2020-02-14 PROCEDURE — 36416 COLLJ CAPILLARY BLOOD SPEC: CPT

## 2020-02-14 PROCEDURE — 85610 PROTHROMBIN TIME: CPT

## 2020-02-14 PROCEDURE — G0463 HOSPITAL OUTPT CLINIC VISIT: HCPCS

## 2020-02-14 NOTE — PROGRESS NOTES
Anticoagulation Clinic Progress Note    Anticoagulation Summary  As of 2/14/2020    INR goal:   2.5-3.5   TTR:   29.8 % (1.2 y)   INR used for dosing:   3.5 (2/14/2020)   Warfarin maintenance plan:   7.5 mg every Mon, Thu, Sat; 5 mg all other days   Weekly warfarin total:   42.5 mg   Plan last modified:   Joe De RP (2/10/2020)   Next INR check:   2/18/2020   Priority:   High   Target end date:       Indications    Anticoagulant long-term use [Z79.01]  S/P AVR [Z95.2]  Acute cerebrovascular accident (CVA) (CMS/HCC) [I63.9]             Anticoagulation Episode Summary     INR check location:       Preferred lab:       Send INR reminders to:   [facility] POOL    Comments:   [facility] **Call every time - may reconsider once stable**      Anticoagulation Care Providers     Provider Role Specialty Phone number    José Antonio Banks MD Referring Cardiology [phone]          Clinic Interview:  Patient Findings     Positives:   Change in health, Change in medications, Change in   diet/appetite    Negatives:   Signs/symptoms of thrombosis, Signs/symptoms of bleeding,   Laboratory test error suspected, Change in alcohol use, Change in   activity, Upcoming invasive procedure, Emergency department visit,   Upcoming dental procedure, Missed doses, Extra doses, Hospital admission,   Bruising, Other complaints    Comments:   Transitioning towards fully plant-based diet (~50% now).   Started azithromycin yesterday. Has vomited r/t gagging while coughing.      Clinical Outcomes     Negatives:   Major bleeding event, Thromboembolic event,   Anticoagulation-related hospital admission, Anticoagulation-related ED   visit, Anticoagulation-related fatality    Comments:   Transitioning towards fully plant-based diet (~50% now).   Started azithromycin yesterday. Has vomited r/t gagging while coughing.        INR History:  Anticoagulation Monitoring 1/29/2020 2/10/2020 2/14/2020   INR 3.50 2.50 3.5   INR Date  1/29/2020 2/8/2020 2/14/2020   INR Goal 2.5-3.5 2.5-3.5 2.5-3.5   Trend Up Up Same   Last Week Total 35 mg 40 mg 42.5 mg   Next Week Total 37.5 mg 42.5 mg 37.5 mg   Sun 5 mg - 5 mg   Mon - 7.5 mg 5 mg (2/17)   Tue - 5 mg -   Wed 5 mg 5 mg -   Thu 5 mg 7.5 mg -   Fri 5 mg - 5 mg   Sat 7.5 mg - 5 mg (2/15)   Visit Report - - -   Some recent data might be hidden       Plan:  1. INR is Therapeutic today- see above in Anticoagulation Summary.  Will instruct Galdino Dallas to Change their warfarin regimen- see above in Anticoagulation Summary.  2. Follow up in 4 days  3. Patient declines warfarin refills.  4. Verbal and written information provided. Patient expresses understanding and has no further questions at this time.    Joe De [facility]

## 2020-02-17 ENCOUNTER — ANTICOAGULATION VISIT (OUTPATIENT)
Dept: PHARMACY | Facility: HOSPITAL | Age: 35
End: 2020-02-17

## 2020-02-17 ENCOUNTER — LAB (OUTPATIENT)
Dept: LAB | Facility: HOSPITAL | Age: 35
End: 2020-02-17

## 2020-02-17 DIAGNOSIS — Z79.01 ANTICOAGULANT LONG-TERM USE: ICD-10-CM

## 2020-02-17 DIAGNOSIS — Z95.2 S/P AVR: ICD-10-CM

## 2020-02-17 DIAGNOSIS — I63.9 ACUTE CEREBROVASCULAR ACCIDENT (CVA) (HCC): ICD-10-CM

## 2020-02-17 LAB
INR PPP: 3.46 (ref 2–3)
PROTHROMBIN TIME: 32.5 SECONDS (ref 19.4–28.5)

## 2020-02-17 PROCEDURE — 85610 PROTHROMBIN TIME: CPT

## 2020-02-17 PROCEDURE — 36415 COLL VENOUS BLD VENIPUNCTURE: CPT

## 2020-02-17 NOTE — PROGRESS NOTES
Anticoagulation Clinic Progress Note    Anticoagulation Summary  As of 2/17/2020    INR goal:   2.5-3.5   TTR:   30.3 % (1.2 y)   INR used for dosing:   3.46 (2/17/2020)   Warfarin maintenance plan:   7.5 mg every Mon, Thu, Sat; 5 mg all other days   Weekly warfarin total:   42.5 mg   Plan last modified:   Joe De [facility] (2/10/2020)   Next INR check:   2/21/2020   Priority:   High   Target end date:       Indications    Anticoagulant long-term use [Z79.01]  S/P AVR [Z95.2]  Acute cerebrovascular accident (CVA) (CMS/HCC) [I63.9]             Anticoagulation Episode Summary     INR check location:       Preferred lab:       Send INR reminders to:    [facility] PO[facility]    Comments:   [facility] **Call every time - may reconsider once stable**      Anticoagulation Care Providers     Provider Role Specialty Phone number    José Antonio Banks MD Referring Cardiology [phone]          Clinic Interview:      INR History:  Anticoagulation Monitoring 2/10/2020 2/14/2020 2/17/2020   INR 2.50 3.5 3.46   INR Date 2/8/2020 2/14/2020 2/17/2020   INR Goal 2.5-3.5 2.5-3.5 2.5-3.5   Trend Up Same Same   Last Week Total 40 mg 42.5 mg 40 mg   Next Week Total 42.5 mg 37.5 mg 40 mg   Sun - 5 mg -   Mon 7.5 mg 5 mg (2/17) 5 mg (2/17)   Tue 5 mg - 5 mg   Wed 5 mg - 5 mg   Thu 7.5 mg - 7.5 mg   Fri - 5 mg -   Sat - 5 mg (2/15) -   Visit Report - - -   Some recent data might be hidden       Plan:  1. INR is Therapeutic today- see above in Anticoagulation Summary.   Will instruct Galdino Dallas to Change their warfarin regimen- see above in Anticoagulation Summary.  2. Follow up in 4 days  3. Left VM today. They have been instructed to call if any changes in medications, doses, concerns, etc.  Paulette Martin [facility]

## 2020-02-21 ENCOUNTER — ANTICOAGULATION VISIT (OUTPATIENT)
Dept: PHARMACY | Facility: HOSPITAL | Age: 35
End: 2020-02-21

## 2020-02-21 DIAGNOSIS — Z79.01 ANTICOAGULANT LONG-TERM USE: ICD-10-CM

## 2020-02-21 DIAGNOSIS — I63.9 ACUTE CEREBROVASCULAR ACCIDENT (CVA) (HCC): ICD-10-CM

## 2020-02-21 DIAGNOSIS — Z95.2 S/P AVR: ICD-10-CM

## 2020-02-21 LAB
INR PPP: 5.8 (ref 0.91–1.09)
PROTHROMBIN TIME: 69.7 SECONDS (ref 10–13.8)

## 2020-02-21 PROCEDURE — 36416 COLLJ CAPILLARY BLOOD SPEC: CPT

## 2020-02-21 PROCEDURE — G0463 HOSPITAL OUTPT CLINIC VISIT: HCPCS

## 2020-02-21 PROCEDURE — 85610 PROTHROMBIN TIME: CPT

## 2020-02-21 NOTE — PROGRESS NOTES
Anticoagulation Clinic Progress Note    Anticoagulation Summary  As of 2020    INR goal:   2.5-3.5   TTR:   30.0 % (1.2 y)   INR used for dosin.8! (2020)   Warfarin maintenance plan:   7.5 mg every Mon, Thu; 5 mg all other days   Weekly warfarin total:   40 mg   Plan last modified:   Paulette Martin RPH (2020)   Next INR check:   2020   Priority:   High   Target end date:       Indications    Anticoagulant long-term use [Z79.01]  S/P AVR [Z95.2]  Acute cerebrovascular accident (CVA) (CMS/HCC) [I63.9]             Anticoagulation Episode Summary     INR check location:       Preferred lab:       Send INR reminders to:    [facility] PO[facility]    Comments:   [facility] **Call every time - may reconsider once stable**      Anticoagulation Care Providers     Provider Role Specialty Phone number    José Antonio Bakns MD Referring Cardiology [phone]          Clinic Interview:      INR History:  Anticoagulation Monitoring 2020   INR 3.5 3.46 5.8   INR Date 2020   INR Goal 2.5-3.5 2.5-3.5 2.5-3.5   Trend Same Same Down   Last Week Total 42.5 mg 40 mg 37.5 mg   Next Week Total 37.5 mg 40 mg 35 mg   Sun 5 mg - 5 mg   Mon 5 mg () 5 mg () 7.5 mg   Tue - 5 mg -   Wed - 5 mg -   Thu - 7.5 mg -   Fri 5 mg - Hold ()   Sat 5 mg (2/15) - 5 mg   Visit Report - - -   Some recent data might be hidden       Plan:  1. INR is Supratherapeutic today likely d/t interaction with azithromycin completed earlier this week- see above in Anticoagulation Summary. Refused venous lab INR confirmation of POC test.  Will instruct Galdino Dallas to HOLD warfarin dose today, then continue their warfarin regimen- see above in Anticoagulation Summary.  2. Follow up in 5 days  3. Patient declines warfarin refills.  4. Verbal and written information provided. Patient expresses understanding and has no further questions at this time.    Paulette Martin RPH

## 2020-02-26 ENCOUNTER — TELEPHONE (OUTPATIENT)
Dept: CARDIOLOGY | Facility: CLINIC | Age: 35
End: 2020-02-26

## 2020-02-26 ENCOUNTER — LAB (OUTPATIENT)
Dept: LAB | Facility: HOSPITAL | Age: 35
End: 2020-02-26

## 2020-02-26 DIAGNOSIS — Z95.2 S/P AVR: ICD-10-CM

## 2020-02-26 DIAGNOSIS — Z79.01 ANTICOAGULANT LONG-TERM USE: ICD-10-CM

## 2020-02-26 LAB
INR PPP: 7.83 (ref 2–3)
PROTHROMBIN TIME: 74.6 SECONDS (ref 19.4–28.5)

## 2020-02-26 PROCEDURE — 85610 PROTHROMBIN TIME: CPT

## 2020-02-26 PROCEDURE — 36415 COLL VENOUS BLD VENIPUNCTURE: CPT

## 2020-02-26 NOTE — TELEPHONE ENCOUNTER
Received call from [facility] with critical INR results of 7.8    Reviewed coa clinic notes from 2/21, INR elevated at that time, instructed to hold and resume next day as directed    Called patient, no answer, left message that he needed to hold warfarin for now until he spoke with someone from our office or coag clinic, ask him to call our office number [phone].     I did confirm with lab at [facility] that patient had already left, he had lab drawn at 4pm she said.     If I speak with patient this evening if he calls back I will put another note in chart, otherwise, can someone in coag clinic reach out to him tomorrow and if you can not then let Dr. Banks know so our office can reach out to him.

## 2020-02-27 ENCOUNTER — ANTICOAGULATION VISIT (OUTPATIENT)
Dept: PHARMACY | Facility: HOSPITAL | Age: 35
End: 2020-02-27

## 2020-02-27 DIAGNOSIS — I63.9 ACUTE CEREBROVASCULAR ACCIDENT (CVA) (HCC): ICD-10-CM

## 2020-02-27 DIAGNOSIS — Z95.2 S/P AVR: ICD-10-CM

## 2020-02-27 DIAGNOSIS — Z79.01 ANTICOAGULANT LONG-TERM USE: ICD-10-CM

## 2020-02-27 NOTE — PROGRESS NOTES
Anticoagulation Clinic Progress Note    Anticoagulation Summary  As of 2020    INR goal:   2.5-3.5   TTR:   29.6 % (1.2 y)   INR used for dosin.83! (2020)   Warfarin maintenance plan:   7.5 mg every Mon, Thu; 5 mg all other days   Weekly warfarin total:   40 mg   Plan last modified:   Paulette Martin RPH (2020)   Next INR check:   3/2/2020   Priority:   High   Target end date:       Indications    Anticoagulant long-term use [Z79.01]  S/P AVR [Z95.2]  Acute cerebrovascular accident (CVA) (CMS/HCC) [I63.9]             Anticoagulation Episode Summary     INR check location:       Preferred lab:       Send INR reminders to:    [facility] PO[facility]    Comments:   [facility] **Call every time - may reconsider once stable**      Anticoagulation Care Providers     Provider Role Specialty Phone number    José Antonio Banks MD Referring Cardiology [phone]          INR History:  Anticoagulation Monitoring 2020   INR 3.46 5.8 7.83   INR Date 2020   INR Goal 2.5-3.5 2.5-3.5 2.5-3.5   Trend Same Down Same   Last Week Total 40 mg 37.5 mg 32.5 mg   Next Week Total 40 mg 35 mg 27.5 mg   Sun - 5 mg 5 mg   Mon 5 mg () 7.5 mg -   Tue 5 mg - -   Wed 5 mg - -   Thu 7.5 mg - Hold ()   Fri - Hold () Hold ()   Sat - 5 mg 5 mg   Visit Report - - -   Some recent data might be hidden       Plan:  1. INR is Supratherapeutic today- see above in Anticoagulation Summary.   Will instruct Galdino Dallas to HOLD warfarin x 2 days, then restart their warfarin regimen for 2 days- see above in Anticoagulation Summary.  2. Follow up in 4 days  3. Spoke with pt today. They have been instructed to call if any changes in medications, doses, concerns, etc. Patient expresses understanding and has no further questions at this time.    Paulette Martin RPH

## 2020-02-27 NOTE — TELEPHONE ENCOUNTER
We spoke with patient this am.  No reason noted for INR elevation. No bleeding noted. He will hold warfarin for 2 days and then recheck INR on Monday.

## 2020-03-02 ENCOUNTER — LAB (OUTPATIENT)
Dept: LAB | Facility: HOSPITAL | Age: 35
End: 2020-03-02

## 2020-03-02 DIAGNOSIS — Z95.2 S/P AVR: ICD-10-CM

## 2020-03-02 DIAGNOSIS — Z79.01 ANTICOAGULANT LONG-TERM USE: ICD-10-CM

## 2020-03-02 LAB
INR PPP: 2.29 (ref 2–3)
PROTHROMBIN TIME: 21.8 SECONDS (ref 19.4–28.5)

## 2020-03-02 PROCEDURE — 36415 COLL VENOUS BLD VENIPUNCTURE: CPT

## 2020-03-02 PROCEDURE — 85610 PROTHROMBIN TIME: CPT

## 2020-03-03 ENCOUNTER — ANTICOAGULATION VISIT (OUTPATIENT)
Dept: PHARMACY | Facility: HOSPITAL | Age: 35
End: 2020-03-03

## 2020-03-03 DIAGNOSIS — Z79.01 ANTICOAGULANT LONG-TERM USE: ICD-10-CM

## 2020-03-03 DIAGNOSIS — Z95.2 S/P AVR: ICD-10-CM

## 2020-03-03 DIAGNOSIS — I63.9 ACUTE CEREBROVASCULAR ACCIDENT (CVA) (HCC): ICD-10-CM

## 2020-03-03 NOTE — PROGRESS NOTES
Anticoagulation Clinic Progress Note    Anticoagulation Summary  As of 3/3/2020    INR goal:   2.5-3.5   TTR:   29.5 % (1.2 y)   INR used for dosin.29! (3/2/2020)   Warfarin maintenance plan:   7.5 mg every Mon, Thu; 5 mg all other days   Weekly warfarin total:   40 mg   Plan last modified:   Paulette Martin RPH (2020)   Next INR check:   3/6/2020   Priority:   High   Target end date:       Indications    Anticoagulant long-term use [Z79.01]  S/P AVR [Z95.2]  Acute cerebrovascular accident (CVA) (CMS/HCC) [I63.9]             Anticoagulation Episode Summary     INR check location:       Preferred lab:       Send INR reminders to:   [facility] PO[facility]    Comments:   [facility] **Call every time - may reconsider once stable**      Anticoagulation Care Providers     Provider Role Specialty Phone number    José Antonio Banks MD Referring Cardiology [phone]            Clinic Interview:  Patient Findings     Positives:   Other complaints    Negatives:   Signs/symptoms of thrombosis, Signs/symptoms of bleeding,   Laboratory test error suspected, Change in health, Change in alcohol use,   Change in activity, Upcoming invasive procedure, Emergency department   visit, Upcoming dental procedure, Missed doses, Extra doses, Change in   medications, Change in diet/appetite, Hospital admission, Bruising    Comments:   Took 5 mg yesterday.      Clinical Outcomes     Negatives:   Major bleeding event, Thromboembolic event,   Anticoagulation-related hospital admission, Anticoagulation-related ED   visit, Anticoagulation-related fatality    Comments:   Took 5 mg yesterday.        INR History:  Anticoagulation Monitoring 2020 2020 3/3/2020   INR 5.8 7.83 2.29   INR Date 2020 2020 3/2/2020   INR Goal 2.5-3.5 2.5-3.5 2.5-3.5   Trend Down Same Same   Last Week Total 37.5 mg 32.5 mg 25 mg   Next Week Total 35 mg 27.5 mg 40 mg   Sun 5 mg 5 mg -   Mon 7.5 mg - -   Tue - - 7.5 mg (3/3)   Wed - - 5  mg   Thu - Hold (2/27) 5 mg (3/5)   Fri Hold (2/21) Hold (2/28) -   Sat 5 mg 5 mg -   Visit Report - - -   Some recent data might be hidden       Plan:  1. INR is Subtherapeutic today- see above in Anticoagulation Summary.   Will instruct Galdino Dallas to Change their warfarin regimen- see above in Anticoagulation Summary.  2. Follow up in 3 days  3. They have been instructed to call if any changes in medications, doses, concerns, etc. Patient expresses understanding and has no further questions at this time.    Joe De [facility]

## 2020-03-06 ENCOUNTER — LAB (OUTPATIENT)
Dept: LAB | Facility: HOSPITAL | Age: 35
End: 2020-03-06

## 2020-03-06 ENCOUNTER — ANTICOAGULATION VISIT (OUTPATIENT)
Dept: PHARMACY | Facility: HOSPITAL | Age: 35
End: 2020-03-06

## 2020-03-06 DIAGNOSIS — Z79.01 ANTICOAGULANT LONG-TERM USE: ICD-10-CM

## 2020-03-06 DIAGNOSIS — Z95.2 S/P AVR: ICD-10-CM

## 2020-03-06 DIAGNOSIS — I63.9 ACUTE CEREBROVASCULAR ACCIDENT (CVA) (HCC): ICD-10-CM

## 2020-03-06 LAB
INR PPP: 4.24 (ref 2–3)
PROTHROMBIN TIME: 39.8 SECONDS (ref 19.4–28.5)

## 2020-03-06 PROCEDURE — 85610 PROTHROMBIN TIME: CPT

## 2020-03-06 PROCEDURE — 36415 COLL VENOUS BLD VENIPUNCTURE: CPT

## 2020-03-06 NOTE — PROGRESS NOTES
Anticoagulation Clinic Progress Note    Anticoagulation Summary  As of 3/6/2020    INR goal:   2.5-3.5   TTR:   29.7 % (1.2 y)   INR used for dosin.24! (3/6/2020)   Warfarin maintenance plan:   7.5 mg every Mon, Thu; 5 mg all other days   Weekly warfarin total:   40 mg   Plan last modified:   Paulette Martin RPH (2020)   Next INR check:   3/10/2020   Priority:   High   Target end date:       Indications    Anticoagulant long-term use [Z79.01]  S/P AVR [Z95.2]  Acute cerebrovascular accident (CVA) (CMS/HCC) [I63.9]             Anticoagulation Episode Summary     INR check location:       Preferred lab:       Send INR reminders to:   [facility] POOL    Comments:   [facility] **Call every time - may reconsider once stable**      Anticoagulation Care Providers     Provider Role Specialty Phone number    José Antonio Banks MD Referring Cardiology [phone]            Clinic Interview:  Patient Findings     Negatives:   Signs/symptoms of thrombosis, Signs/symptoms of bleeding,   Laboratory test error suspected, Change in health, Change in alcohol use,   Change in activity, Upcoming invasive procedure, Emergency department   visit, Upcoming dental procedure, Missed doses, Extra doses, Change in   medications, Change in diet/appetite, Hospital admission, Bruising, Other   complaints      Clinical Outcomes     Negatives:   Major bleeding event, Thromboembolic event,   Anticoagulation-related hospital admission, Anticoagulation-related ED   visit, Anticoagulation-related fatality        INR History:  Anticoagulation Monitoring 2020 3/3/2020 3/6/2020   INR 7.83 2.29 4.24   INR Date 2020 3/2/2020 3/6/2020   INR Goal 2.5-3.5 2.5-3.5 2.5-3.5   Trend Same Same Same   Last Week Total 32.5 mg 25 mg 32.5 mg   Next Week Total 27.5 mg 40 mg 35 mg   Sun 5 mg - 5 mg   Mon - - 5 mg (3/9)   Tue - 7.5 mg (3/3) -   Wed - 5 mg -   Thu Hold () 5 mg (3/5) -   Fri Hold () - 2.5 mg (3/6)   Sat 5 mg - 5  mg   Visit Report - - -   Some recent data might be hidden       Plan:  1. INR is Supratherapeutic today- see above in Anticoagulation Summary.   Will instruct Galdino Dallas to Change their warfarin regimen- see above in Anticoagulation Summary.  2. Follow up in 4 days  3. They have been instructed to call if any changes in medications, doses, concerns, etc. Patient expresses understanding and has no further questions at this time.    Joe De RP

## 2020-03-11 ENCOUNTER — ANTICOAGULATION VISIT (OUTPATIENT)
Dept: PHARMACY | Facility: HOSPITAL | Age: 35
End: 2020-03-11

## 2020-03-11 ENCOUNTER — APPOINTMENT (OUTPATIENT)
Dept: LAB | Facility: HOSPITAL | Age: 35
End: 2020-03-11

## 2020-03-11 ENCOUNTER — TELEPHONE (OUTPATIENT)
Dept: PHARMACY | Facility: HOSPITAL | Age: 35
End: 2020-03-11

## 2020-03-11 DIAGNOSIS — I63.9 ACUTE CEREBROVASCULAR ACCIDENT (CVA) (HCC): ICD-10-CM

## 2020-03-11 DIAGNOSIS — Z95.2 S/P AVR: Primary | ICD-10-CM

## 2020-03-11 DIAGNOSIS — Z79.01 ANTICOAGULANT LONG-TERM USE: ICD-10-CM

## 2020-03-11 DIAGNOSIS — Z95.2 S/P AVR: ICD-10-CM

## 2020-03-11 LAB
INR PPP: >=10 (ref 2–3)
PROTHROMBIN TIME: 111.8 SECONDS (ref 19.4–28.5)

## 2020-03-11 PROCEDURE — 36415 COLL VENOUS BLD VENIPUNCTURE: CPT

## 2020-03-11 PROCEDURE — 85610 PROTHROMBIN TIME: CPT | Performed by: INTERNAL MEDICINE

## 2020-03-11 NOTE — TELEPHONE ENCOUNTER
Burt called from [facility] to report that his standing INR order has . New order entered for co-signing by Dr. Banks.

## 2020-03-11 NOTE — PROGRESS NOTES
Anticoagulation Clinic Progress Note    Anticoagulation Summary  As of 3/11/2020    INR goal:   2.5-3.5   TTR:   29.7 % (1.2 y)   INR used for dosing:   >=10.00! (3/11/2020)   Warfarin maintenance plan:   7.5 mg every Mon, Thu; 5 mg all other days   Weekly warfarin total:   40 mg   Plan last modified:   Paulette Martin RPH (2/21/2020)   Next INR check:   3/12/2020   Priority:   High   Target end date:       Indications    Anticoagulant long-term use [Z79.01]  S/P AVR [Z95.2]  Acute cerebrovascular accident (CVA) (CMS/HCC) [I63.9]             Anticoagulation Episode Summary     INR check location:       Preferred lab:       Send INR reminders to:   [facility] PO[facility]    Comments:   [facility] **Call every time - may reconsider once stable**      Anticoagulation Care Providers     Provider Role Specialty Phone number    José Antonio Banks MD Referring Cardiology [phone]            Clinic Interview:  Patient Findings     Positives:   Change in medications, Other complaints    Negatives:   Signs/symptoms of thrombosis, Signs/symptoms of bleeding,   Laboratory test error suspected, Change in health, Change in alcohol use,   Change in activity, Upcoming invasive procedure, Emergency department   visit, Upcoming dental procedure, Missed doses, Extra doses, Change in   diet/appetite, Hospital admission, Bruising    Comments:   Lisinopril being increased today; Lexapro being weaned while   Wellbutrin is being started/increased. Reports recent wt loss (20-lb) in   past 30 days. Pt refused ED visit (too busy); pt unable to visit clinic to   receive dose of vit k. Denies s/sx of bleeding. Denies HA, change in   vision, or dizziness.       Clinical Outcomes     Negatives:   Major bleeding event, Thromboembolic event,   Anticoagulation-related hospital admission, Anticoagulation-related ED   visit, Anticoagulation-related fatality    Comments:   Lisinopril being increased today; Lexapro being weaned while    Wellbutrin is being started/increased. Reports recent wt loss (20-lb) in   past 30 days. Pt refused ED visit (too busy); pt unable to visit clinic to   receive dose of vit k. Denies s/sx of bleeding. Denies HA, change in   vision, or dizziness.         INR History:  Anticoagulation Monitoring 3/3/2020 3/6/2020 3/11/2020   INR 2.29 4.24 >=10.00   INR Date 3/2/2020 3/6/2020 3/11/2020   INR Goal 2.5-3.5 2.5-3.5 2.5-3.5   Trend Same Same Same   Last Week Total 25 mg 32.5 mg 32.5 mg   Next Week Total 40 mg 35 mg 35 mg   Sun - 5 mg -   Mon - 5 mg (3/9) -   Tue 7.5 mg (3/3) - -   Wed 5 mg - Hold (3/11)   Thu 5 mg (3/5) - -   Fri - 2.5 mg (3/6) -   Sat - 5 mg -   Visit Report - - -   Some recent data might be hidden       Plan:  1. INR is Supratherapeutic today- see above in Anticoagulation Summary. Mr. Dallas was advised to visit clinic today to receive vitamin k; however, he responded in saying he was unable to do so today. Vitamin k not generally stocked in outpatient pharmacies. Mr. Dallas was advised to visit ED for evaluation and administration of vitamin k; however, he refused.  Mr. Galdino Dallas was instructed to HOLD their warfarin regimen - see above in Anticoagulation Summary. Mr. Dallas was urged to eat large serving of vitamin k tonight to help INR decrease sooner.   2. Follow up in 1 day   3. Mr. Dallas has been instructed to call if any changes in medications, doses, concerns, etc. Mr. Dallas was strongly urged to visit ED if s/sx of bleeding, severe HA, changes in vision, or dizziness/balance develop or fall occurs. Patient expressed understanding and has no further questions at this time.    Joe De [facility]

## 2020-03-12 ENCOUNTER — LAB (OUTPATIENT)
Dept: LAB | Facility: HOSPITAL | Age: 35
End: 2020-03-12

## 2020-03-12 ENCOUNTER — ANTICOAGULATION VISIT (OUTPATIENT)
Dept: PHARMACY | Facility: HOSPITAL | Age: 35
End: 2020-03-12

## 2020-03-12 ENCOUNTER — TELEPHONE (OUTPATIENT)
Dept: CARDIOLOGY | Facility: CLINIC | Age: 35
End: 2020-03-12

## 2020-03-12 DIAGNOSIS — Z79.01 ANTICOAGULANT LONG-TERM USE: ICD-10-CM

## 2020-03-12 DIAGNOSIS — Z95.2 S/P AVR: ICD-10-CM

## 2020-03-12 DIAGNOSIS — I63.9 ACUTE CEREBROVASCULAR ACCIDENT (CVA) (HCC): ICD-10-CM

## 2020-03-12 LAB
INR PPP: 7.77 (ref 2–3)
PROTHROMBIN TIME: 73.9 SECONDS (ref 19.4–28.5)

## 2020-03-12 PROCEDURE — 85610 PROTHROMBIN TIME: CPT

## 2020-03-12 NOTE — TELEPHONE ENCOUNTER
Critical INT 7.77 called to office  Will forward to the [facility]  Thanks  Jocelyne Escalante RN  Triage nurse

## 2020-03-12 NOTE — PROGRESS NOTES
Anticoagulation Clinic Progress Note    Anticoagulation Summary  As of 3/12/2020    INR goal:   2.5-3.5   TTR:   29.3 % (1.2 y)   INR used for dosin.77! (3/12/2020)   Warfarin maintenance plan:   7.5 mg every Mon, Thu; 5 mg all other days   Weekly warfarin total:   40 mg   Plan last modified:   Paulette Martin RPH (2020)   Next INR check:   3/13/2020   Priority:   High   Target end date:       Indications    Anticoagulant long-term use [Z79.01]  S/P AVR [Z95.2]  Acute cerebrovascular accident (CVA) (CMS/HCC) [I63.9]             Anticoagulation Episode Summary     INR check location:       Preferred lab:       Send INR reminders to:   [facility] PO[facility]    Comments:   [facility] **Call every time - may reconsider once stable**      Anticoagulation Care Providers     Provider Role Specialty Phone number    José Antonio Banks MD Referring Cardiology [phone]            Clinic Interview:  Patient Findings     Positives:   Missed doses, Change in diet/appetite    Negatives:   Signs/symptoms of thrombosis, Signs/symptoms of bleeding,   Laboratory test error suspected, Change in health, Change in alcohol use,   Change in activity, Upcoming invasive procedure, Emergency department   visit, Upcoming dental procedure, Extra doses, Change in medications,   Hospital admission, Bruising, Other complaints    Comments:   Confirmed increased vit k last night. HELD last night as   instructed.  Denies s/sx of bleeding.     Clinical Outcomes     Negatives:   Major bleeding event, Thromboembolic event,   Anticoagulation-related hospital admission, Anticoagulation-related ED   visit, Anticoagulation-related fatality    Comments:   Confirmed increased vit k last night. HELD last night as   instructed.  Denies s/sx of bleeding.       INR History:  Anticoagulation Monitoring 3/6/2020 3/11/2020 3/12/2020   INR 4.24 >=10.00 7.77   INR Date 3/6/2020 3/11/2020 3/12/2020   INR Goal 2.5-3.5 2.5-3.5 2.5-3.5   Trend Same  Same Same   Last Week Total 32.5 mg 32.5 mg 27.5 mg   Next Week Total 35 mg 35 mg 32.5 mg   Sun 5 mg - -   Mon 5 mg (3/9) - -   Tue - - -   Wed - Hold (3/11) -   Thu - - Hold (3/12)   Fri 2.5 mg (3/6) - -   Sat 5 mg - -   Visit Report - - -   Some recent data might be hidden       Plan:  1. INR is Supratherapeutic today- see above in Anticoagulation Summary.   Will instruct Galdino Dallas to HOLD their warfarin regimen- see above in Anticoagulation Summary.  2. Follow up in 1 day  3. They have been instructed to call if any changes in medications, doses, concerns, etc. To seek immediate medical attention if s/sx of bleeding develop or fall occurs. Patient expresses understanding and has no further questions at this time.    Joe De [facility]

## 2020-03-13 ENCOUNTER — ANTICOAGULATION VISIT (OUTPATIENT)
Dept: PHARMACY | Facility: HOSPITAL | Age: 35
End: 2020-03-13

## 2020-03-13 DIAGNOSIS — Z95.2 S/P AVR: ICD-10-CM

## 2020-03-13 DIAGNOSIS — I63.9 ACUTE CEREBROVASCULAR ACCIDENT (CVA) (HCC): ICD-10-CM

## 2020-03-13 DIAGNOSIS — Z79.01 ANTICOAGULANT LONG-TERM USE: ICD-10-CM

## 2020-03-13 LAB
INR PPP: 2.6 (ref 0.91–1.09)
PROTHROMBIN TIME: 31.4 SECONDS (ref 10–13.8)

## 2020-03-13 PROCEDURE — G0463 HOSPITAL OUTPT CLINIC VISIT: HCPCS

## 2020-03-13 PROCEDURE — 85610 PROTHROMBIN TIME: CPT

## 2020-03-13 PROCEDURE — 36416 COLLJ CAPILLARY BLOOD SPEC: CPT

## 2020-03-13 NOTE — TELEPHONE ENCOUNTER
Mr. Dallas was contact by phone yesterday afternoon. May refer to 3/12/20 [facility] visit for details. He was instructed to hold his warfarin last night and recheck his INR today. He denied s/sx of bleeding, and he was agreeable to seek immediate medical attention if s/sx of bleeding were to develop or fall were to occur.

## 2020-03-13 NOTE — PROGRESS NOTES
Anticoagulation Clinic Progress Note    Anticoagulation Summary  As of 3/13/2020    INR goal:   2.5-3.5   TTR:   29.3 % (1.2 y)   INR used for dosin.6 (3/13/2020)   Warfarin maintenance plan:   7.5 mg every Mon, Thu; 5 mg all other days   Weekly warfarin total:   40 mg   Plan last modified:   Paulette Martin RPH (2020)   Next INR check:   3/17/2020   Priority:   High   Target end date:       Indications    Anticoagulant long-term use [Z79.01]  S/P AVR [Z95.2]  Acute cerebrovascular accident (CVA) (CMS/HCC) [I63.9]             Anticoagulation Episode Summary     INR check location:       Preferred lab:       Send INR reminders to:    YANDY POLANCO [facility]    Comments:   [facility] **Call every time - may reconsider once stable**      Anticoagulation Care Providers     Provider Role Specialty Phone number    José Antonio Banks MD Referring Cardiology [phone]          Clinic Interview:      INR History:  Anticoagulation Monitoring 3/11/2020 3/12/2020 3/13/2020   INR >=10.00 7.77 2.6   INR Date 3/11/2020 3/12/2020 3/13/2020   INR Goal 2.5-3.5 2.5-3.5 2.5-3.5   Trend Same Same Same   Last Week Total 32.5 mg 27.5 mg 22.5 mg   Next Week Total 35 mg 32.5 mg 35 mg   Sun - - 2.5 mg (3/15)   Mon - - 5 mg (3/16)   Tue - - -   Wed Hold (3/11) - -   Thu - Hold (3/12) -   Fri - - 5 mg   Sat - - 5 mg   Visit Report - - -   Some recent data might be hidden       Plan:  1. INR is Therapeutic today- see above in Anticoagulation Summary.  Will instruct Galdino Dallas to Change their warfarin regimen- see above in Anticoagulation Summary.  2. Follow up in 4 days  3. Patient declines warfarin refills.  4. Verbal and written information provided. Patient expresses understanding and has no further questions at this time.    Paulette Martin RPH

## 2020-03-17 ENCOUNTER — ANTICOAGULATION VISIT (OUTPATIENT)
Dept: PHARMACY | Facility: HOSPITAL | Age: 35
End: 2020-03-17

## 2020-03-17 DIAGNOSIS — I63.9 ACUTE CEREBROVASCULAR ACCIDENT (CVA) (HCC): ICD-10-CM

## 2020-03-17 DIAGNOSIS — Z95.2 S/P AVR: ICD-10-CM

## 2020-03-17 DIAGNOSIS — Z79.01 ANTICOAGULANT LONG-TERM USE: ICD-10-CM

## 2020-03-17 LAB
INR PPP: 5 (ref 0.91–1.09)
PROTHROMBIN TIME: 59.7 SECONDS (ref 10–13.8)

## 2020-03-17 PROCEDURE — G0463 HOSPITAL OUTPT CLINIC VISIT: HCPCS

## 2020-03-17 PROCEDURE — 85610 PROTHROMBIN TIME: CPT

## 2020-03-17 PROCEDURE — 36416 COLLJ CAPILLARY BLOOD SPEC: CPT

## 2020-03-17 RX ORDER — WARFARIN SODIUM 5 MG/1
TABLET ORAL
Qty: 90 TABLET | Refills: 1 | Status: SHIPPED | OUTPATIENT
Start: 2020-03-17 | End: 2020-06-25 | Stop reason: SDUPTHER

## 2020-03-17 NOTE — PROGRESS NOTES
Anticoagulation Clinic Progress Note    Anticoagulation Summary  As of 3/17/2020    INR goal:   2.5-3.5   TTR:   29.4 % (1.2 y)   INR used for dosin.0! (3/17/2020)   Warfarin maintenance plan:   7.5 mg every Mon, Thu; 5 mg all other days   Weekly warfarin total:   40 mg   Plan last modified:   Paulette Martin RPH (2020)   Next INR check:   3/20/2020   Priority:   High   Target end date:       Indications    Anticoagulant long-term use [Z79.01]  S/P AVR [Z95.2]  Acute cerebrovascular accident (CVA) (CMS/HCC) [I63.9]             Anticoagulation Episode Summary     INR check location:       Preferred lab:       Send INR reminders to:   ALE[facility]ANDR[facility] POLANCO [facility]    Comments:   [facility] **Call every time - may reconsider once stable**      Anticoagulation Care Providers     Provider Role Specialty Phone number    José Antonio Banks MD Referring Cardiology [phone]          Clinic Interview:  Patient Findings     Positives:   Change in medications    Negatives:   Signs/symptoms of thrombosis, Signs/symptoms of bleeding,   Laboratory test error suspected, Change in health, Change in alcohol use,   Change in activity, Upcoming invasive procedure, Emergency department   visit, Upcoming dental procedure, Missed doses, Extra doses, Change in   diet/appetite, Hospital admission, Bruising, Other complaints    Comments:   APAP ~2600 mg/day recently.      Clinical Outcomes     Negatives:   Major bleeding event, Thromboembolic event,   Anticoagulation-related hospital admission, Anticoagulation-related ED   visit, Anticoagulation-related fatality    Comments:   APAP ~2600 mg/day recently.        INR History:  Anticoagulation Monitoring 3/12/2020 3/13/2020 3/17/2020   INR 7.77 2.6 5.0   INR Date 3/12/2020 3/13/2020 3/17/2020   INR Goal 2.5-3.5 2.5-3.5 2.5-3.5   Trend Same Same Same   Last Week Total 27.5 mg 22.5 mg 22.5 mg   Next Week Total 32.5 mg 35 mg 30 mg   Sun - 2.5 mg (3/15) -   Mon - 5 mg (3/16) -   Tue -  - Hold (3/17)   Wed - - 2.5 mg (3/18)   Thu Hold (3/12) - 5 mg (3/19)   Fri - 5 mg -   Sat - 5 mg -   Visit Report - - -   Some recent data might be hidden       Plan:  1. INR is Supratherapeutic today- see above in Anticoagulation Summary.  Will instruct Galdino Dallas to Change their warfarin regimen- see above in Anticoagulation Summary.  2. Follow up in 3 days  3. Patient desires warfarin refills - 5 mg tablet.  4. Verbal and written information provided. To seek immediate medical attention if s/sx of bleeding develop. Patient expresses understanding and has no further questions at this time.    Joe De [facility]

## 2020-03-20 ENCOUNTER — ANTICOAGULATION VISIT (OUTPATIENT)
Dept: PHARMACY | Facility: HOSPITAL | Age: 35
End: 2020-03-20

## 2020-03-20 DIAGNOSIS — Z95.2 S/P AVR: ICD-10-CM

## 2020-03-20 DIAGNOSIS — I63.9 ACUTE CEREBROVASCULAR ACCIDENT (CVA) (HCC): ICD-10-CM

## 2020-03-20 DIAGNOSIS — Z79.01 ANTICOAGULANT LONG-TERM USE: ICD-10-CM

## 2020-03-20 LAB
INR PPP: 1.4 (ref 0.91–1.09)
PROTHROMBIN TIME: 16.5 SECONDS (ref 10–13.8)

## 2020-03-20 PROCEDURE — 36416 COLLJ CAPILLARY BLOOD SPEC: CPT

## 2020-03-20 PROCEDURE — G0463 HOSPITAL OUTPT CLINIC VISIT: HCPCS

## 2020-03-20 PROCEDURE — 85610 PROTHROMBIN TIME: CPT

## 2020-03-20 NOTE — PROGRESS NOTES
Anticoagulation Clinic Progress Note    Anticoagulation Summary  As of 3/20/2020    INR goal:   2.5-3.5   TTR:   29.3 % (1.3 y)   INR used for dosin.4! (3/20/2020)   Warfarin maintenance plan:   7.5 mg every Mon, Thu; 5 mg all other days   Weekly warfarin total:   40 mg   No change documented:   Joe De RPH   Plan last modified:   Paulette Martin RPH (2020)   Next INR check:   3/23/2020   Priority:   High   Target end date:       Indications    Anticoagulant long-term use [Z79.01]  S/P AVR [Z95.2]  Acute cerebrovascular accident (CVA) (CMS/[facility]) [I63.9]             Anticoagulation Episode Summary     INR check location:       Preferred lab:       Send INR reminders to:   ALE[facility]ANDR[facility] POLANCO [facility]    Comments:   [facility] **Call every time - may reconsider once stable**      Anticoagulation Care Providers     Provider Role Specialty Phone number    José Antonio Banks MD Referring Cardiology [phone]          Clinic Interview:  Patient Findings     Positives:   Change in medications    Negatives:   Signs/symptoms of thrombosis, Signs/symptoms of bleeding,   Laboratory test error suspected, Change in health, Change in alcohol use,   Change in activity, Upcoming invasive procedure, Emergency department   visit, Upcoming dental procedure, Missed doses, Extra doses, Change in   diet/appetite, Hospital admission, Bruising, Other complaints    Comments:   Decreased APAP from 2600 mg/day to 2000 mg/day.       Clinical Outcomes     Negatives:   Major bleeding event, Thromboembolic event,   Anticoagulation-related hospital admission, Anticoagulation-related ED   visit, Anticoagulation-related fatality    Comments:   Decreased APAP from 2600 mg/day to 2000 mg/day.         INR History:  Anticoagulation Monitoring 3/13/2020 3/17/2020 3/20/2020   INR 2.6 5.0 1.4   INR Date 3/13/2020 3/17/2020 3/20/2020   INR Goal 2.5-3.5 2.5-3.5 2.5-3.5   Trend Same Same Same   Last Week Total 22.5 mg 22.5 mg 25 mg   Next  Week Total 35 mg 30 mg 40 mg   Sun 2.5 mg (3/15) - 5 mg   Mon 5 mg (3/16) - -   Tue - Hold (3/17) -   Wed - 2.5 mg (3/18) -   Thu - 5 mg (3/19) -   Fri 5 mg - 5 mg   Sat 5 mg - 5 mg   Visit Report - - -   Some recent data might be hidden       Plan:  1. INR is Subtherapeutic today- see above in Anticoagulation Summary.  Will instruct Galdino Dallas to Change their warfarin regimen- see above in Anticoagulation Summary.   2. Inject enoxaparin 80 mg q12h (reported current wt 174 lbs, 79 kg; CrCl >30 mL/min).   3. Follow up in 3 days in clinic  4. Patient declines warfarin refills. Enoxaparin 80 mg syringes sent to [facility] on [address] in [place], [place].   5. Verbal and written information provided. Patient expresses understanding and has no further questions at this time.    Joe De [facility]

## 2020-03-23 ENCOUNTER — ANTICOAGULATION VISIT (OUTPATIENT)
Dept: PHARMACY | Facility: HOSPITAL | Age: 35
End: 2020-03-23

## 2020-03-23 DIAGNOSIS — Z79.01 ANTICOAGULANT LONG-TERM USE: ICD-10-CM

## 2020-03-23 DIAGNOSIS — I63.9 ACUTE CEREBROVASCULAR ACCIDENT (CVA) (HCC): ICD-10-CM

## 2020-03-23 DIAGNOSIS — Z95.2 S/P AVR: ICD-10-CM

## 2020-03-23 LAB
INR PPP: 1.5 (ref 0.91–1.09)
PROTHROMBIN TIME: 18.2 SECONDS (ref 10–13.8)

## 2020-03-23 PROCEDURE — 36416 COLLJ CAPILLARY BLOOD SPEC: CPT

## 2020-03-23 PROCEDURE — 85610 PROTHROMBIN TIME: CPT

## 2020-03-23 PROCEDURE — G0463 HOSPITAL OUTPT CLINIC VISIT: HCPCS

## 2020-03-23 NOTE — PROGRESS NOTES
Anticoagulation Clinic Progress Note    Anticoagulation Summary  As of 3/23/2020    INR goal:   2.5-3.5   TTR:   29.2 % (1.3 y)   INR used for dosin.5! (3/23/2020)   Warfarin maintenance plan:   7.5 mg every Mon, Thu; 5 mg all other days   Weekly warfarin total:   40 mg   Plan last modified:   Paulette Martin RPH (2020)   Next INR check:   3/26/2020   Priority:   High   Target end date:       Indications    Anticoagulant long-term use [Z79.01]  S/P AVR [Z95.2]  Acute cerebrovascular accident (CVA) (CMS/HCC) [I63.9]             Anticoagulation Episode Summary     INR check location:       Preferred lab:       Send INR reminders to:    YANDY POLANCO [facility]    Comments:   [facility] **Call every time - may reconsider once stable**      Anticoagulation Care Providers     Provider Role Specialty Phone number    José Antonio Banks MD Referring Cardiology [phone]          Clinic Interview:      INR History:  Anticoagulation Monitoring 3/17/2020 3/20/2020 3/23/2020   INR 5.0 1.4 1.5   INR Date 3/17/2020 3/20/2020 3/23/2020   INR Goal 2.5-3.5 2.5-3.5 2.5-3.5   Trend Same Same Same   Last Week Total 22.5 mg 25 mg 27.5 mg   Next Week Total 30 mg 40 mg 40 mg   Sun - 5 mg -   Mon - - 7.5 mg   Tue Hold (3/17) - 5 mg   Wed 2.5 mg (3/18) - 5 mg   Thu 5 mg (3/19) - -   Fri - 5 mg -   Sat - 5 mg -   Visit Report - - -   Some recent data might be hidden       Plan:  1. INR is Subtherapeutic today- see above in Anticoagulation Summary.  Will instruct Galdino Dallas to Change their warfarin regimen- see above in Anticoagulation Summary.  He will continue to use enoxaparin injections through next appointment since INR is subtherapeutic.  2. Follow up in 3 days  3. Patient declines warfarin refills & has refill at [facility] for enoxaparin.  4. Verbal and written information provided. Patient expresses understanding and has no further questions at this time.    Paulette Martin RPH

## 2020-03-26 ENCOUNTER — ANTICOAGULATION VISIT (OUTPATIENT)
Dept: PHARMACY | Facility: HOSPITAL | Age: 35
End: 2020-03-26

## 2020-03-26 DIAGNOSIS — Z79.01 ANTICOAGULANT LONG-TERM USE: ICD-10-CM

## 2020-03-26 DIAGNOSIS — I63.9 ACUTE CEREBROVASCULAR ACCIDENT (CVA) (HCC): ICD-10-CM

## 2020-03-26 DIAGNOSIS — Z95.2 S/P AVR: ICD-10-CM

## 2020-03-26 LAB
INR PPP: 1.6 (ref 0.91–1.09)
PROTHROMBIN TIME: 19.3 SECONDS (ref 10–13.8)

## 2020-03-26 PROCEDURE — 36416 COLLJ CAPILLARY BLOOD SPEC: CPT

## 2020-03-26 PROCEDURE — 85610 PROTHROMBIN TIME: CPT

## 2020-03-26 PROCEDURE — G0463 HOSPITAL OUTPT CLINIC VISIT: HCPCS

## 2020-03-26 NOTE — PROGRESS NOTES
Anticoagulation Clinic Progress Note    Anticoagulation Summary  As of 3/26/2020    INR goal:   2.5-3.5   TTR:   29.0 % (1.3 y)   INR used for dosin.6! (3/26/2020)   Warfarin maintenance plan:   7.5 mg every Mon, Thu; 5 mg all other days   Weekly warfarin total:   40 mg   Plan last modified:   Paulette Martin RPH (2020)   Next INR check:   3/30/2020   Priority:   High   Target end date:       Indications    Anticoagulant long-term use [Z79.01]  S/P AVR [Z95.2]  Acute cerebrovascular accident (CVA) (CMS/HCC) [I63.9]             Anticoagulation Episode Summary     INR check location:       Preferred lab:       Send INR reminders to:    YANDY POLANCO [facility]    Comments:   [facility] **Call every time - may reconsider once stable**      Anticoagulation Care Providers     Provider Role Specialty Phone number    José Antonio Banks MD Referring Cardiology [phone]          Clinic Interview:      INR History:  Anticoagulation Monitoring 3/20/2020 3/23/2020 3/26/2020   INR 1.4 1.5 1.6   INR Date 3/20/2020 3/23/2020 3/26/2020   INR Goal 2.5-3.5 2.5-3.5 2.5-3.5   Trend Same Same Same   Last Week Total 25 mg 27.5 mg 37.5 mg   Next Week Total 40 mg 40 mg 42.5 mg   Sun 5 mg - 5 mg   Mon - 7.5 mg -   Tue - 5 mg -   Wed - 5 mg -   Thu - - 7.5 mg   Fri 5 mg - 7.5 mg (3/27)   Sat 5 mg - 5 mg   Visit Report - - -   Some recent data might be hidden       Plan:  1. INR is Subtherapeutic today- see above in Anticoagulation Summary.  Will instruct Galdino Dallas to Increase their warfarin regimen- see above in Anticoagulation Summary.  Continue enoxaparin injections until INR is 2.2 or greater.  2. Follow up in 4 days  3. Patient declines warfarin refills but sent enoxaparin Rx to [facility].  4. Verbal and written information provided. Patient expresses understanding and has no further questions at this time.    Paulette Martin RPH

## 2020-03-26 NOTE — PATIENT INSTRUCTIONS
Continue enoxaparin injections until INR 2.2 or greater.   refill today.  Follow warfarin dosing calendar and recheck INR on Monday 3/30 in clinic or contact us if you are able to do home INR test.

## 2020-03-30 ENCOUNTER — APPOINTMENT (OUTPATIENT)
Dept: PHARMACY | Facility: HOSPITAL | Age: 35
End: 2020-03-30

## 2020-03-31 ENCOUNTER — ANTICOAGULATION VISIT (OUTPATIENT)
Dept: PHARMACY | Facility: HOSPITAL | Age: 35
End: 2020-03-31

## 2020-03-31 DIAGNOSIS — Z95.2 S/P AVR: ICD-10-CM

## 2020-03-31 DIAGNOSIS — Z79.01 ANTICOAGULANT LONG-TERM USE: ICD-10-CM

## 2020-03-31 DIAGNOSIS — I63.9 ACUTE CEREBROVASCULAR ACCIDENT (CVA) (HCC): ICD-10-CM

## 2020-03-31 LAB
INR PPP: 1.9 (ref 0.91–1.09)
PROTHROMBIN TIME: 23.2 SECONDS (ref 10–13.8)

## 2020-03-31 PROCEDURE — 85610 PROTHROMBIN TIME: CPT

## 2020-03-31 PROCEDURE — 36416 COLLJ CAPILLARY BLOOD SPEC: CPT

## 2020-03-31 PROCEDURE — G0463 HOSPITAL OUTPT CLINIC VISIT: HCPCS

## 2020-03-31 NOTE — PROGRESS NOTES
Anticoagulation Clinic Progress Note    Anticoagulation Summary  As of 3/31/2020    INR goal:   2.5-3.5   TTR:   28.7 % (1.3 y)   INR used for dosin.9! (3/31/2020)   Warfarin maintenance plan:   7.5 mg every Mon, Thu; 5 mg all other days   Weekly warfarin total:   40 mg   Plan last modified:   Paulette Martin RPH (2020)   Next INR check:   4/3/2020   Priority:   High   Target end date:       Indications    Anticoagulant long-term use [Z79.01]  S/P AVR [Z95.2]  Acute cerebrovascular accident (CVA) (CMS/HCC) [I63.9]             Anticoagulation Episode Summary     INR check location:       Preferred lab:       Send INR reminders to:    YANDY POLANCO [facility]    Comments:   [facility] **Call every time - may reconsider once stable**      Anticoagulation Care Providers     Provider Role Specialty Phone number    José Antonio Banks MD Referring Cardiology [phone]          Clinic Interview:      INR History:  Anticoagulation Monitoring 3/23/2020 3/26/2020 3/31/2020   INR 1.5 1.6 1.9   INR Date 3/23/2020 3/26/2020 3/31/2020   INR Goal 2.5-3.5 2.5-3.5 2.5-3.5   Trend Same Same Same   Last Week Total 27.5 mg 37.5 mg 37.5 mg   Next Week Total 40 mg 42.5 mg 42.5 mg   Sun - 5 mg -   Mon 7.5 mg - -   Tue 5 mg - 7.5 mg (3/31)   Wed 5 mg - 7.5 mg ()   Thu - 7.5 mg 5 mg ()   Fri - 7.5 mg (3/27) -   Sat - 5 mg -   Visit Report - - -   Some recent data might be hidden       Plan:  1. INR is Subtherapeutic today- see above in Anticoagulation Summary.  Will instruct Galdino Dallas to Increase their warfarin regimen- see above in Anticoagulation Summary.  Continue on enoxaparin injections until recheck INR on Friday 4/3.  2. Follow up in 3 days  3. Patient declines warfarin refills.  4. Verbal and written information provided. Patient expresses understanding and has no further questions at this time.    Paulette Martin RPH

## 2020-04-03 ENCOUNTER — ANTICOAGULATION VISIT (OUTPATIENT)
Dept: PHARMACY | Facility: HOSPITAL | Age: 35
End: 2020-04-03

## 2020-04-03 DIAGNOSIS — Z79.01 ANTICOAGULANT LONG-TERM USE: ICD-10-CM

## 2020-04-03 DIAGNOSIS — Z95.2 S/P AVR: ICD-10-CM

## 2020-04-03 DIAGNOSIS — I63.9 ACUTE CEREBROVASCULAR ACCIDENT (CVA) (HCC): ICD-10-CM

## 2020-04-03 LAB
INR PPP: 2 (ref 0.91–1.09)
PROTHROMBIN TIME: 24.1 SECONDS (ref 10–13.8)

## 2020-04-03 PROCEDURE — 85610 PROTHROMBIN TIME: CPT

## 2020-04-03 PROCEDURE — 36416 COLLJ CAPILLARY BLOOD SPEC: CPT

## 2020-04-03 PROCEDURE — G0463 HOSPITAL OUTPT CLINIC VISIT: HCPCS

## 2020-04-03 NOTE — PROGRESS NOTES
Anticoagulation Clinic Progress Note    Anticoagulation Summary  As of 4/3/2020    INR goal:   2.5-3.5   TTR:   28.5 % (1.3 y)   INR used for dosin.0! (4/3/2020)   Warfarin maintenance plan:   7.5 mg every Mon, Wed, Fri; 5 mg all other days   Weekly warfarin total:   42.5 mg   Plan last modified:   Paulette Martin RPH (4/3/2020)   Next INR check:   2020   Priority:   High   Target end date:       Indications    Anticoagulant long-term use [Z79.01]  S/P AVR [Z95.2]  Acute cerebrovascular accident (CVA) (CMS/HCC) [I63.9]             Anticoagulation Episode Summary     INR check location:       Preferred lab:       Send INR reminders to:    YAND[facility] POLANCO [facility]    Comments:   [facility] **Call every time - may reconsider once stable**      Anticoagulation Care Providers     Provider Role Specialty Phone number    José Antonio Banks MD Referring Cardiology [phone]          Clinic Interview:      INR History:  Anticoagulation Monitoring 3/26/2020 3/31/2020 4/3/2020   INR 1.6 1.9 2.0   INR Date 3/26/2020 3/31/2020 4/3/2020   INR Goal 2.5-3.5 2.5-3.5 2.5-3.5   Trend Same Same Up   Last Week Total 37.5 mg 37.5 mg 40 mg   Next Week Total 42.5 mg 42.5 mg 42.5 mg   Sun 5 mg - 5 mg   Mon - - 7.5 mg   Tue - 7.5 mg (3/31) 5 mg   Wed - 7.5 mg () -   Thu 7.5 mg 5 mg () -   Fri 7.5 mg (3/27) - 7.5 mg   Sat 5 mg - 5 mg   Visit Report - - -   Some recent data might be hidden       Plan:  1. INR is Subtherapeutic today- see above in Anticoagulation Summary.  Will instruct Galdino Dallas to Increase their warfarin regimen- see above in Anticoagulation Summary.  He will stop enoxaparin injections  2. Follow up in 5 days  3. Patient declines warfarin refills.  4. Verbal and written information provided. Patient expresses understanding and has no further questions at this time.    Paulette Martin RPH

## 2020-04-08 ENCOUNTER — ANTICOAGULATION VISIT (OUTPATIENT)
Dept: PHARMACY | Facility: HOSPITAL | Age: 35
End: 2020-04-08

## 2020-04-08 ENCOUNTER — APPOINTMENT (OUTPATIENT)
Dept: PHARMACY | Facility: HOSPITAL | Age: 35
End: 2020-04-08

## 2020-04-08 DIAGNOSIS — I63.9 ACUTE CEREBROVASCULAR ACCIDENT (CVA) (HCC): ICD-10-CM

## 2020-04-08 DIAGNOSIS — Z95.2 S/P AVR: ICD-10-CM

## 2020-04-08 DIAGNOSIS — Z79.01 ANTICOAGULANT LONG-TERM USE: ICD-10-CM

## 2020-04-08 LAB — INR PPP: 1.5

## 2020-04-08 NOTE — PROGRESS NOTES
Anticoagulation Clinic Progress Note    Anticoagulation Summary  As of 2020    INR goal:   2.5-3.5   TTR:   28.2 % (1.3 y)   INR used for dosin.50! (2020)   Warfarin maintenance plan:   7.5 mg every Mon, Wed, Fri; 5 mg all other days   Weekly warfarin total:   42.5 mg   Plan last modified:   Paulette Martin RPH (4/3/2020)   Next INR check:   4/10/2020   Priority:   High   Target end date:       Indications    Anticoagulant long-term use [Z79.01]  S/P AVR [Z95.2]  Acute cerebrovascular accident (CVA) (CMS/HCC) [I63.9]             Anticoagulation Episode Summary     INR check location:       Preferred lab:       Send INR reminders to:   [facility]DR[facility] POLANCO [facility]    Comments:   [facility] **Call every time - may reconsider once stable**      Anticoagulation Care Providers     Provider Role Specialty Phone number    José Antonio Banks MD Referring Cardiology [phone]          Clinic Interview:  Patient Findings     Negatives:   Signs/symptoms of thrombosis, Signs/symptoms of bleeding,   Laboratory test error suspected, Change in health, Change in alcohol use,   Change in activity, Upcoming invasive procedure, Emergency department   visit, Upcoming dental procedure, Missed doses, Extra doses, Change in   medications, Change in diet/appetite, Hospital admission, Bruising, Other   complaints    Comments:   Reports less stress.       Clinical Outcomes     Negatives:   Major bleeding event, Thromboembolic event,   Anticoagulation-related hospital admission, Anticoagulation-related ED   visit, Anticoagulation-related fatality    Comments:   Reports less stress.         INR History:  Anticoagulation Monitoring 3/31/2020 4/3/2020 2020   INR 1.9 2.0 1.50   INR Date 3/31/2020 4/3/2020 2020   INR Goal 2.5-3.5 2.5-3.5 2.5-3.5   Trend Same Up Same   Last Week Total 37.5 mg 40 mg 42.5 mg   Next Week Total 42.5 mg 42.5 mg 47.5 mg   Sun - 5 mg -   Mon - 7.5 mg -   Tue 7.5 mg (3/31) 5 mg -   Wed 7.5 mg ()  - 10 mg (4/8)   Thu 5 mg (4/2) - 7.5 mg (4/9)   Fri - 7.5 mg -   Sat - 5 mg -   Visit Report - - -   Some recent data might be hidden       Plan:  1. INR is Subtherapeutic today- see above in Anticoagulation Summary.   Will instruct Galdino Jonesery to boost today with 10 mg and tomorrow with 7.5 mg. Will go back on enoxaparin 80 mg q12h for today and tomorrow and continue their warfarin regimen- see above in Anticoagulation Summary.  2. Follow up in 2 days (Friday)  3. They have been instructed to call if any changes in medications, doses, concerns, etc. Patient expresses understanding and has no further questions at this time.    Alejandra Jc [facility]

## 2020-04-09 ENCOUNTER — TELEMEDICINE (OUTPATIENT)
Dept: CARDIOLOGY | Facility: CLINIC | Age: 35
End: 2020-04-09

## 2020-04-09 VITALS
HEIGHT: 67 IN | HEART RATE: 66 BPM | BODY MASS INDEX: 27.31 KG/M2 | WEIGHT: 174 LBS | DIASTOLIC BLOOD PRESSURE: 70 MMHG | SYSTOLIC BLOOD PRESSURE: 106 MMHG

## 2020-04-09 DIAGNOSIS — I10 ESSENTIAL HYPERTENSION: ICD-10-CM

## 2020-04-09 DIAGNOSIS — Z95.2 S/P AVR: ICD-10-CM

## 2020-04-09 DIAGNOSIS — I25.118 CORONARY ARTERY DISEASE OF NATIVE ARTERY OF NATIVE HEART WITH STABLE ANGINA PECTORIS (HCC): Primary | ICD-10-CM

## 2020-04-09 DIAGNOSIS — Z79.01 ANTICOAGULANT LONG-TERM USE: ICD-10-CM

## 2020-04-09 PROCEDURE — 99214 OFFICE O/P EST MOD 30 MIN: CPT | Performed by: INTERNAL MEDICINE

## 2020-04-09 RX ORDER — LISINOPRIL AND HYDROCHLOROTHIAZIDE 12.5; 1 MG/1; MG/1
1 TABLET ORAL DAILY
COMMUNITY

## 2020-04-09 RX ORDER — RANOLAZINE 500 MG/1
500 TABLET, EXTENDED RELEASE ORAL 2 TIMES DAILY
Qty: 60 TABLET | Refills: 6 | Status: SHIPPED | OUTPATIENT
Start: 2020-04-09 | End: 2021-01-21

## 2020-04-09 RX ORDER — CHLORAL HYDRATE 500 MG
1000 CAPSULE ORAL
COMMUNITY

## 2020-04-09 RX ORDER — BUPROPION HYDROCHLORIDE 150 MG/1
1 TABLET, EXTENDED RELEASE ORAL 2 TIMES DAILY
COMMUNITY
Start: 2020-04-08

## 2020-04-09 RX ORDER — HYDROCODONE BITARTRATE AND ACETAMINOPHEN 10; 325 MG/1; MG/1
1 TABLET ORAL EVERY 6 HOURS
COMMUNITY
Start: 2020-04-08

## 2020-04-09 NOTE — PROGRESS NOTES
Date of Office Visit: 20  Encounter Provider: José Antonio Banks MD  Place of Service: [facility] CARDIOLOGY  Patient Name: Galdino Dallas  :1985       TELEHEALTH VISIT  VIDEO    Chief Complaint   Patient presents with   • Follow-up     6 month   • Coronary Artery Disease   :     HPI:  This patient has consented to a telehealth visit via video. The visit was scheduled as a video visit to comply with patient safety concerns in accordance with CDC recommendations.  All vitals recorded within this visit are reported by the patient.  I spent 25 minutes in total including but not limited to the 15 minutes spent in direct conversation with this patient.    Galdino Dallas is a 34 y.o. male, new to me, who presents today for follow-up. He has a past cardiac history significant for bicuspid aortic valve status post mechanical replacement, coronary artery disease status post placement in the RPL, OM, and a 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  His last cardiac catheterization was in  revealing 50 to 60% mid LAD stenosis, chronic total occlusion of the distal LAD, apical LAD filled via right to left collaterals, OM 2 and OM 3 chronically occluded and L3 filled via collaterals as well.  He had an FFR of the mid LAD and diagonal as well as an FFR of the RPL that were not hemodynamically significant.  His chronic total occlusions are not revascularization candidate.      He was last seen in the office by me on 2018 at which time he continued to have stable angina which had reportedly improved.  Blood pressure was poorly controlled and Dr. Banks restarted his lisinopril and isosorbide mononitrate.  He also doubled his carvedilol.  He was instructed to follow-up in 6 months.    In 2019 he presented with left arm numbness and weakness with associated dizziness and blurred vision.  MRI showed occlusion of the proximal right posterior cerebral artery with no  reconstitution.  He had a large area of restricted diffusion identified with the medial right temporal lobe with no hemorrhagic transformation.  This was indicative of an acute CVA involving the right PCA vascular distribution.  His INR was subtherapeutic; as such, this was felt to be cardioembolic from his mechanical valve.  Echocardiogram at that time revealed an EF of 49%, grade 1 diastolic dysfunction, mechanical aortic valve with mildly elevated peak and mean gradients.  He was maintained on a heparin drip.  He had serial head CTs that did not show any evidence of hemorrhagic conversion.  He restarted warfarin with transition to Lovenox for bridging.  On 9/2/2019, he was stable and ready for discharge.    On 12/25/2019, he presented to the ED at [place] with chest pain.  His initial troponin was elevated but his ECG showed no significant change.  He underwent nuclear stress testing which revealed anterolateral and lateral ischemia, a fixed small segment of mild inferior hypoperfusion suggestive of a previous infarction, and normal LV function with inferior hypokinesis.  Medical therapy was recommended and he was started on Imdur 30 mg daily.  He was instructed to follow-up in our office, and I am seeing him today.    He has had a rough month.  After being discharged from the ED at [place], he developed low blood pressure and subsequently experienced a fall and broke his back.  He is now wearing a back brace.  He states that the isosorbide always makes his blood pressure drop.  Evidently he went to the ER at [place] yesterday due to severe back pain.  He reportedly had positive orthostatic blood pressures.  His isosorbide was subsequently discontinued.  His lisinopril was also decreased to 5 mg twice daily.     He states that the closure of the courts has been very helpful for him as his stress level has been lower. He has been walking with his wife and does occasionally have angina but this is not happening every  time he walks. He is really struggling with his INR and it is all over the place. He has lost about 20 pounds and he is working with diet stability.           Past Medical History:   Diagnosis Date   • Anxiety    • Diverticulosis    • GERD (gastroesophageal reflux disease)    • Hyperlipidemia    • Hypertension    • IBS (irritable bowel syndrome)    • Migraines        Past Surgical History:   Procedure Laterality Date   • AORTIC VALVE REPAIR/REPLACEMENT  2005    open   • CARDIAC CATHETERIZATION N/A 12/19/2017    Procedure: Coronary angiography;  Surgeon: José Antonio Banks MD;  Location: [facility] CATH INVASIVE LOCATION;  Service:    • CARDIAC CATHETERIZATION Right 12/19/2017    Procedure: Functional Flow [place];  Surgeon: José Antonio Banks MD;  Location: [facility] CATH INVASIVE LOCATION;  Service:    • COLONOSCOPY N/A 3/14/2018    Procedure: COLONOSCOPY TO CECUM AND TERMINAL ILEUM;  Surgeon: Nnamdi Davis MD;  Location: [facility] ENDOSCOPY;  Service: Gastroenterology   • CORONARY ANGIOPLASTY WITH STENT PLACEMENT N/A 2010    x3   • TONSILLECTOMY     • WISDOM TOOTH EXTRACTION         Social History     Socioeconomic History   • Marital status:      Spouse name: Not on file   • Number of children: Not on file   • Years of education: Not on file   • Highest education level: Not on file   Tobacco Use   • Smoking status: Never Smoker   • Smokeless tobacco: Never Used   • Tobacco comment: CAFFEINE USE: 2 CUPS COFFEE/ ICE TEA DAILY   Substance and Sexual Activity   • Alcohol use: Not Currently   • Drug use: No   • Sexual activity: Defer       Family History   Problem Relation Age of Onset   • Heart disease Mother         mitral valve   • Colon polyps Mother    • Heart attack Paternal Grandfather    • Multiple sclerosis Father        Review of Systems   Constitution: Negative for chills, fever and malaise/fatigue.   Cardiovascular: Positive for chest pain. Negative for dyspnea on exertion, leg swelling,  "near-syncope, orthopnea, palpitations, paroxysmal nocturnal dyspnea and syncope.   Respiratory: Negative for cough and shortness of breath.    Musculoskeletal: Positive for back pain. Negative for joint pain, joint swelling and myalgias.   Gastrointestinal: Negative for abdominal pain, diarrhea, melena, nausea and vomiting.   Genitourinary: Negative for frequency and hematuria.   Neurological: Negative for light-headedness, numbness, paresthesias and seizures.   Allergic/Immunologic: Negative.    All other systems reviewed and are negative.      Allergies   Allergen Reactions   • Penicillins Rash     Rash on legs when he was an infant         Current Outpatient Medications:   •  ALPRAZolam (XANAX) 0.25 MG tablet, Take 1 tablet by mouth Daily As Needed for Anxiety., Disp: 30 tablet, Rfl: 0  •  aspirin 81 MG EC tablet, Take 81 mg by mouth Daily., Disp: , Rfl:   •  atorvastatin (LIPITOR) 80 MG tablet, Take 1 tablet by mouth Daily., Disp: 30 tablet, Rfl: 11  •  carvedilol (COREG) 6.25 MG tablet, Take 1 tablet by mouth 2 (Two) Times a Day., Disp: 60 tablet, Rfl: 11  •  enoxaparin (LOVENOX) 80 MG/0.8ML solution syringe, Inject 0.8 mL under the skin into the appropriate area as directed Every 12 (Twelve) Hours., Disp: 6 syringe, Rfl: 1  •  escitalopram (LEXAPRO) 20 MG tablet, Take 1 tablet by mouth Daily., Disp: 90 tablet, Rfl: 3  •  HYDROcodone-acetaminophen (NORCO) 7.5-325 MG per tablet, TK 1 T PO TID PRN, Disp: , Rfl: 0  •  lisinopril (PRINIVIL,ZESTRIL) 5 MG tablet, Take 1 tablet by mouth 2 (Two) Times a Day., Disp: 60 tablet, Rfl: 11  •  nitroglycerin (NITROSTAT) 0.4 MG SL tablet, Take no more than 3 doses in 15 minutes., Disp: 100 tablet, Rfl: 3  •  warfarin (Coumadin) 5 MG tablet, Take one tablet by mouth daily or as directed by the [facility]., Disp: 90 tablet, Rfl: 1      Objective:     Vitals:    04/09/20 1016   Height: 170.2 cm (67\")     Body mass index is 29.51 kg/m².    PHYSICAL EXAM:  Appears " well  No additional exam  Telehealth visit secondary     Procedures      Assessment:      No diagnosis found.  No orders of the defined types were placed in this encounter.         Plan:    1.  Coronary artery disease.  He has class II stable angina.  The abnormalities of his stress test at [place] were consistent with his known disease.   -I have recommended Ranexa and called in.  -Intolerant to Imdur  -Continue carvedilol at current dose    2.  Hypertension.  Controlled. Continue current dose      3.  Status post mechanical AVR.  He remains on chronic warfarin therapy.    Difficult situation. His INR is all over the place and he states that he has been trying to do better with stability in his diet. He is a sia that we may end up eventually having to remove his mechanical aortic valve but we are not at that point yet. I have added Ranexa to his current regimen to see if this helps with his angina. He will give me a call back in a couple of weeks.

## 2020-04-10 ENCOUNTER — ANTICOAGULATION VISIT (OUTPATIENT)
Dept: PHARMACY | Facility: HOSPITAL | Age: 35
End: 2020-04-10

## 2020-04-10 DIAGNOSIS — Z95.2 S/P AVR: ICD-10-CM

## 2020-04-10 DIAGNOSIS — I63.9 ACUTE CEREBROVASCULAR ACCIDENT (CVA) (HCC): ICD-10-CM

## 2020-04-10 DIAGNOSIS — Z79.01 ANTICOAGULANT LONG-TERM USE: ICD-10-CM

## 2020-04-10 LAB — INR PPP: 1.3

## 2020-04-10 NOTE — PROGRESS NOTES
Anticoagulation Clinic Progress Note    Anticoagulation Summary  As of 4/10/2020    INR goal:   2.5-3.5   TTR:   28.1 % (1.3 y)   INR used for dosin.30! (4/10/2020)   Warfarin maintenance plan:   7.5 mg every Mon, Wed, Fri; 5 mg all other days   Weekly warfarin total:   42.5 mg   Plan last modified:   Paulette Martin RPH (4/3/2020)   Next INR check:   2020   Priority:   High   Target end date:       Indications    Anticoagulant long-term use [Z79.01]  S/P AVR [Z95.2]  Acute cerebrovascular accident (CVA) (CMS/HCC) [I63.9]             Anticoagulation Episode Summary     INR check location:       Preferred lab:       Send INR reminders to:   [facility]ICAL POOL    Comments:   [facility] **Call every time - may reconsider once stable**      Anticoagulation Care Providers     Provider Role Specialty Phone number    José Antonio Banks MD Referring Cardiology [phone]          Clinic Interview:  Patient Findings     Positives:   Missed doses    Negatives:   Signs/symptoms of thrombosis, Signs/symptoms of bleeding,   Laboratory test error suspected, Change in health, Change in alcohol use,   Change in activity, Upcoming invasive procedure, Emergency department   visit, Upcoming dental procedure, Extra doses, Change in medications,   Change in diet/appetite, Hospital admission, Bruising, Other complaints    Comments:   Believes he missed 10 mg dose .       Clinical Outcomes     Negatives:   Major bleeding event, Thromboembolic event,   Anticoagulation-related hospital admission, Anticoagulation-related ED   visit, Anticoagulation-related fatality    Comments:   Believes he missed 10 mg dose .         INR History:  Anticoagulation Monitoring 4/3/2020 2020 4/10/2020   INR 2.0 1.50 1.30   INR Date 4/3/2020 2020 4/10/2020   INR Goal 2.5-3.5 2.5-3.5 2.5-3.5   Trend Up Same Same   Last Week Total 40 mg 42.5 mg 37.5 mg   Next Week Total 42.5 mg 47.5 mg 50 mg   Sun 5 mg - 7.5 mg ()   Mon 7.5  mg - -   Tue 5 mg - -   Wed - 10 mg (4/8) -   Thu - 7.5 mg (4/9) -   Fri 7.5 mg - 10 mg (4/10)   Sat 5 mg - 7.5 mg (4/11)   Visit Report - - -   Some recent data might be hidden       Plan:  1. INR is Subtherapeutic today- see above in Anticoagulation Summary.   Will instruct Galdino Dallas to Change their warfarin regimen- see above in Anticoagulation Summary.  2. Continue enoxaparin 80 mg q12h  3. Follow up in 3 days  4. They have been instructed to call if any changes in medications, doses, concerns, etc. Patient expresses understanding and has no further questions at this time.    Joe De [facility]

## 2020-04-11 LAB — INR PPP: 2.4

## 2020-04-13 ENCOUNTER — ANTICOAGULATION VISIT (OUTPATIENT)
Dept: PHARMACY | Facility: HOSPITAL | Age: 35
End: 2020-04-13

## 2020-04-13 DIAGNOSIS — I63.9 ACUTE CEREBROVASCULAR ACCIDENT (CVA) (HCC): ICD-10-CM

## 2020-04-13 DIAGNOSIS — Z95.2 S/P AVR: ICD-10-CM

## 2020-04-13 DIAGNOSIS — Z79.01 ANTICOAGULANT LONG-TERM USE: ICD-10-CM

## 2020-04-13 LAB — INR PPP: 4.6

## 2020-04-13 NOTE — PROGRESS NOTES
Anticoagulation Clinic Progress Note    Anticoagulation Summary  As of 2020    INR goal:   2.5-3.5   TTR:   28.1 % (1.3 y)   INR used for dosin.60! (2020)   Warfarin maintenance plan:   7.5 mg every Mon, Wed, Fri; 5 mg all other days   Weekly warfarin total:   42.5 mg   Plan last modified:   Paulette Martin RPH (4/3/2020)   Next INR check:   2020   Priority:   High   Target end date:       Indications    Anticoagulant long-term use [Z79.01]  S/P AVR [Z95.2]  Acute cerebrovascular accident (CVA) (CMS/HCC) [I63.9]             Anticoagulation Episode Summary     INR check location:       Preferred lab:       Send INR reminders to:   [facility]ICAL POOL    Comments:   [facility] **Call every time - may reconsider once stable**      Anticoagulation Care Providers     Provider Role Specialty Phone number    José Antonio Banks MD Referring Cardiology [phone]          Clinic Interview:      INR History:  Anticoagulation Monitoring 2020 4/10/2020 2020   INR 1.50 1.30 4.60   INR Date 2020 4/10/2020 2020   INR Goal 2.5-3.5 2.5-3.5 2.5-3.5   Trend Same Same Same   Last Week Total 42.5 mg 37.5 mg 45 mg   Next Week Total 47.5 mg 50 mg 35 mg   Sun - 7.5 mg () -   Mon - - 2.5 mg ()   Tue - - 5 mg   Wed 10 mg () - 5 mg (4/15)   Thu 7.5 mg () - 5 mg   Fri - 10 mg (4/10) -   Sat - 7.5 mg () -   Visit Report - - -   Some recent data might be hidden       Plan:  1. INR is Supratherapeutic today- see above in Anticoagulation Summary.   Will instruct Galdino Dallas to reduce to warfarin  2.5mg today, 5mg x 3d then recheck INR- see above in Anticoagulation Summary.  2. Follow up in 5 days  3.Left VM to call with updates/issues. They have been instructed to call if any changes in medications, doses, concerns, etc. Patient expresses understanding and has no further questions at this time.    Paulette Martin, [facility]

## 2020-04-17 ENCOUNTER — ANTICOAGULATION VISIT (OUTPATIENT)
Dept: PHARMACY | Facility: HOSPITAL | Age: 35
End: 2020-04-17

## 2020-04-17 DIAGNOSIS — Z79.01 ANTICOAGULANT LONG-TERM USE: ICD-10-CM

## 2020-04-17 DIAGNOSIS — Z95.2 S/P AVR: ICD-10-CM

## 2020-04-17 DIAGNOSIS — I63.9 ACUTE CEREBROVASCULAR ACCIDENT (CVA) (HCC): ICD-10-CM

## 2020-04-17 LAB — INR PPP: 2.9

## 2020-04-17 NOTE — PROGRESS NOTES
Anticoagulation Clinic Progress Note    Anticoagulation Summary  As of 2020    INR goal:   2.5-3.5   TTR:   28.2 % (1.3 y)   INR used for dosin.90 (2020)   Warfarin maintenance plan:   7.5 mg every Mon, Fri; 5 mg all other days   Weekly warfarin total:   40 mg   Plan last modified:   Harjeet Celaya RPH (2020)   Next INR check:   2020   Priority:   High   Target end date:       Indications    Anticoagulant long-term use [Z79.01]  S/P AVR [Z95.2]  Acute cerebrovascular accident (CVA) (CMS/HCC) [I63.9]             Anticoagulation Episode Summary     INR check location:       Preferred lab:       Send INR reminders to:    [facility]INICAL POOL    Comments:   [facility] **Call every time - may reconsider once stable**      Anticoagulation Care Providers     Provider Role Specialty Phone number    José Antonio Banks MD Referring Cardiology [phone]          Clinic Interview:      INR History:  Anticoagulation Monitoring 4/10/2020 2020 2020   INR 1.30 4.60 2.90   INR Date 4/10/2020 2020 2020   INR Goal 2.5-3.5 2.5-3.5 2.5-3.5   Trend Same Same Down   Last Week Total 37.5 mg 45 mg 42.5 mg   Next Week Total 50 mg 35 mg 40 mg   Sun 7.5 mg () - 5 mg   Mon - 2.5 mg () 7.5 mg   Tue - 5 mg -   Wed - 5 mg (4/15) -   Thu - 5 mg -   Fri 10 mg (4/10) - 7.5 mg   Sat 7.5 mg () - 5 mg   Visit Report - - -   Some recent data might be hidden       Plan:  1. INR is Therapeutic today- see above in Anticoagulation Summary.   Will instruct Galdino Dallas to Change their warfarin regimen- see above in Anticoagulation Summary.  2. Follow up in 4 days  3. Will contact patient with every INR result for the time being as we are still trying to determine a stable dose for the patient.    Harjeet Celaya RPH

## 2020-04-22 LAB — INR PPP: 6.1

## 2020-04-23 ENCOUNTER — ANTICOAGULATION VISIT (OUTPATIENT)
Dept: PHARMACY | Facility: HOSPITAL | Age: 35
End: 2020-04-23

## 2020-04-23 DIAGNOSIS — Z79.01 ANTICOAGULANT LONG-TERM USE: ICD-10-CM

## 2020-04-23 DIAGNOSIS — Z95.2 S/P AVR: ICD-10-CM

## 2020-04-23 DIAGNOSIS — I63.9 ACUTE CEREBROVASCULAR ACCIDENT (CVA) (HCC): ICD-10-CM

## 2020-04-23 NOTE — PROGRESS NOTES
Anticoagulation Clinic Progress Note    Anticoagulation Summary  As of 2020    INR goal:   2.5-3.5   TTR:   28.1 % (1.3 y)   INR used for dosin.10! (2020)   Warfarin maintenance plan:   7.5 mg every Mon, Fri; 5 mg all other days   Weekly warfarin total:   40 mg   Plan last modified:   Harjeet Celaya [facility] (2020)   Next INR check:   2020   Priority:   High   Target end date:       Indications    Anticoagulant long-term use [Z79.01]  S/P AVR [Z95.2]  Acute cerebrovascular accident (CVA) (CMS/HCC) [I63.9]             Anticoagulation Episode Summary     INR check location:       Preferred lab:       Send INR reminders to:    [facility] POOL    Comments:   [facility] **Call every time - may reconsider once stable**      Anticoagulation Care Providers     Provider Role Specialty Phone number    José Antonio Banks MD Referring Cardiology [phone]          Clinic Interview:      INR History:  Anticoagulation Monitoring 2020   INR 4.60 2.90 6.10   INR Date 2020   INR Goal 2.5-3.5 2.5-3.5 2.5-3.5   Trend Same Down Same   Last Week Total 45 mg 42.5 mg 35 mg   Next Week Total 35 mg 40 mg 35 mg   Sun - 5 mg 5 mg   Mon 2.5 mg () 7.5 mg -   Tue 5 mg - -   Wed 5 mg (4/15) - -   Thu 5 mg - 2.5 mg ()   Fri - 7.5 mg 5 mg ()   Sat - 5 mg 5 mg   Visit Report - - -   Some recent data might be hidden       Plan:  1. INR is Supratherapeutic yesterday and warfarin dose was HELD last night-- see above in Anticoagulation Summary.   Will instruct Galdino Dallas to Change their warfarin regimen- see above in Anticoagulation Summary.  2. Follow up in 4 days  3. Spoke with pt today. They have been instructed to call if any changes in medications, doses, concerns, etc. Patient expresses understanding and has no further questions at this time.    Paulette Martin [facility]

## 2020-04-25 LAB — INR PPP: 1.5

## 2020-04-27 ENCOUNTER — ANTICOAGULATION VISIT (OUTPATIENT)
Dept: PHARMACY | Facility: HOSPITAL | Age: 35
End: 2020-04-27

## 2020-04-27 DIAGNOSIS — Z79.01 ANTICOAGULANT LONG-TERM USE: ICD-10-CM

## 2020-04-27 DIAGNOSIS — Z95.2 S/P AVR: ICD-10-CM

## 2020-04-27 DIAGNOSIS — I63.9 ACUTE CEREBROVASCULAR ACCIDENT (CVA) (HCC): ICD-10-CM

## 2020-04-27 LAB — INR PPP: 1.7

## 2020-04-27 NOTE — PROGRESS NOTES
Anticoagulation Clinic Progress Note    Anticoagulation Summary  As of 2020    INR goal:   2.5-3.5   TTR:   27.9 % (1.4 y)   INR used for dosin.70! (2020)   Warfarin maintenance plan:   7.5 mg every Mon, Fri; 5 mg all other days   Weekly warfarin total:   40 mg   Plan last modified:   Harjeet Celaya RPH (2020)   Next INR check:   2020   Priority:   High   Target end date:       Indications    Anticoagulant long-term use [Z79.01]  S/P AVR [Z95.2]  Acute cerebrovascular accident (CVA) (CMS/HCC) [I63.9]             Anticoagulation Episode Summary     INR check location:       Preferred lab:       Send INR reminders to:   [facility] CLINICAL POOL    Comments:   [facility] **Call every time - may reconsider once stable**      Anticoagulation Care Providers     Provider Role Specialty Phone number    José Antonio Banks MD Referring Cardiology [phone]          Clinic Interview:  Patient Findings     Positives:   Change in medications    Negatives:   Signs/symptoms of thrombosis, Signs/symptoms of bleeding,   Laboratory test error suspected, Change in health, Change in alcohol use,   Change in activity, Upcoming invasive procedure, Emergency department   visit, Upcoming dental procedure, Missed doses, Extra doses, Change in   diet/appetite, Hospital admission, Bruising, Other complaints    Comments:   Started taking enoxaparin on Saturday ()      Clinical Outcomes     Negatives:   Major bleeding event, Thromboembolic event,   Anticoagulation-related hospital admission, Anticoagulation-related ED   visit, Anticoagulation-related fatality    Comments:   Started taking enoxaparin on Saturday ()        INR History:  Anticoagulation Monitoring 2020   INR 2.90 6.10 1.70   INR Date 2020   INR Goal 2.5-3.5 2.5-3.5 2.5-3.5   Trend Down Same Same   Last Week Total 42.5 mg 35 mg 35 mg   Next Week Total 40 mg 35 mg 40 mg   Sun 5 mg 5 mg -    Mon 7.5 mg - 7.5 mg   Tue - - 5 mg   Wed - - -   Thu - 2.5 mg (4/23) -   Fri 7.5 mg 5 mg (4/24) -   Sat 5 mg 5 mg -   Visit Report - - -   Some recent data might be hidden       Plan:  1. INR is Subtherapeutic today- see above in Anticoagulation Summary. Pt started taking enoxaparin Saturday (4/25) due to subtherapeutic INR, and adjusted weekend doses to be 7.5mg on both days. Will instruct Galdino Dallas to Continue their warfarin regimen- see above in Anticoagulation Summary. Also will instruct patient to inject enoxaparin 80mg subcutaneous q12h until next INR.  2. Follow up in 3 days  3. They have been instructed to call if any changes in medications, doses, concerns, etc. Patient expresses understanding and has no further questions at this time.    Alejandra Jc [facility]

## 2020-04-29 ENCOUNTER — ANTICOAGULATION VISIT (OUTPATIENT)
Dept: PHARMACY | Facility: HOSPITAL | Age: 35
End: 2020-04-29

## 2020-04-29 DIAGNOSIS — I63.9 ACUTE CEREBROVASCULAR ACCIDENT (CVA) (HCC): ICD-10-CM

## 2020-04-29 DIAGNOSIS — Z95.2 S/P AVR: ICD-10-CM

## 2020-04-29 DIAGNOSIS — Z79.01 ANTICOAGULANT LONG-TERM USE: ICD-10-CM

## 2020-04-29 LAB — INR PPP: 2.1

## 2020-04-29 NOTE — PROGRESS NOTES
Anticoagulation Clinic Progress Note    Anticoagulation Summary  As of 2020    INR goal:   2.5-3.5   TTR:   27.8 % (1.4 y)   INR used for dosin.10! (2020)   Warfarin maintenance plan:   7.5 mg every Mon, Fri; 5 mg all other days   Weekly warfarin total:   40 mg   Plan last modified:   Harjeet Celaya [facility] (2020)   Next INR check:   2020   Priority:   High   Target end date:       Indications    Anticoagulant long-term use [Z79.01]  S/P AVR [Z95.2]  Acute cerebrovascular accident (CVA) (CMS/HCC) [I63.9]             Anticoagulation Episode Summary     INR check location:       Preferred lab:       Send INR reminders to:   [facility] CLINICAL POOL    Comments:   [facility] **Call every time - may reconsider once stable**      Anticoagulation Care Providers     Provider Role Specialty Phone number    José Antonio Banks MD Referring Cardiology [phone]          Clinic Interview:  Patient Findings     Negatives:   Signs/symptoms of thrombosis, Signs/symptoms of bleeding,   Laboratory test error suspected, Change in health, Change in alcohol use,   Change in activity, Upcoming invasive procedure, Emergency department   visit, Upcoming dental procedure, Missed doses, Extra doses, Change in   medications, Change in diet/appetite, Hospital admission, Bruising, Other   complaints      Clinical Outcomes     Negatives:   Major bleeding event, Thromboembolic event,   Anticoagulation-related hospital admission, Anticoagulation-related ED   visit, Anticoagulation-related fatality        INR History:  Anticoagulation Monitoring 2020   INR 6.10 1.70 2.10   INR Date 2020   INR Goal 2.5-3.5 2.5-3.5 2.5-3.5   Trend Same Same Same   Last Week Total 35 mg 35 mg 35 mg   Next Week Total 35 mg 40 mg 40 mg   Sun 5 mg - -   Mon - 7.5 mg -   Tue - 5 mg -   Wed - - 5 mg   Thu 2.5 mg () - 5 mg   Fri 5 mg () - -   Sat 5 mg - -   Visit Report - - -   Some  recent data might be hidden       Plan:  1. INR is Subtherapeutic today- see above in Anticoagulation Summary.   Will instruct Galdino YRN Burt to Continue their warfarin regimen as well as the enoxaparin 80mg SQ q12h- see above in Anticoagulation Summary.  2. Follow up in 2 days - Friday  3. They have been instructed to call if any changes in medications, doses, concerns, etc. Patient expresses understanding and has no further questions at this time.    Alejandra Jc [facility]

## 2020-05-01 ENCOUNTER — ANTICOAGULATION VISIT (OUTPATIENT)
Dept: PHARMACY | Facility: HOSPITAL | Age: 35
End: 2020-05-01

## 2020-05-01 DIAGNOSIS — I63.9 ACUTE CEREBROVASCULAR ACCIDENT (CVA) (HCC): ICD-10-CM

## 2020-05-01 DIAGNOSIS — Z79.01 ANTICOAGULANT LONG-TERM USE: ICD-10-CM

## 2020-05-01 DIAGNOSIS — Z95.2 S/P AVR: ICD-10-CM

## 2020-05-01 LAB — INR PPP: 2.5

## 2020-05-01 NOTE — PROGRESS NOTES
Anticoagulation Clinic Progress Note    Anticoagulation Summary  As of 2020    INR goal:   2.5-3.5   TTR:   27.7 % (1.4 y)   INR used for dosin.50 (2020)   Warfarin maintenance plan:   7.5 mg every Mon, Fri; 5 mg all other days   Weekly warfarin total:   40 mg   No change documented:   Joe De RPH   Plan last modified:   Harjeet Celaya RPH (2020)   Next INR check:   2020   Priority:   High   Target end date:       Indications    Anticoagulant long-term use [Z79.01]  S/P AVR [Z95.2]  Acute cerebrovascular accident (CVA) (CMS/HCC) [I63.9]             Anticoagulation Episode Summary     INR check location:       Preferred lab:       Send INR reminders to:    [facility]INICAL POOL    Comments:   [facility] **Call every time - may reconsider once stable**      Anticoagulation Care Providers     Provider Role Specialty Phone number    José Antonio Banks MD Referring Cardiology [phone]            INR History:  Anticoagulation Monitoring 2020   INR 1.70 2.10 2.50   INR Date 2020   INR Goal 2.5-3.5 2.5-3.5 2.5-3.5   Trend Same Same Same   Last Week Total 35 mg 35 mg 42.5 mg   Next Week Total 40 mg 40 mg 40 mg   Sun - - 5 mg   Mon 7.5 mg - -   Tue 5 mg - -   Wed - 5 mg -   Thu - 5 mg -   Fri - - 7.5 mg   Sat - - 5 mg   Visit Report - - -   Some recent data might be hidden       Plan:  1. INR is Therapeutic today- see above in Anticoagulation Summary.   Will instruct Galdino YRN Burt to Change their warfarin regimen- see above in Anticoagulation Summary.  2. Follow up in 3 days  3. They have been instructed to call if any changes in medications, doses, concerns, etc. Patient expresses understanding and has no further questions at this time.    Joe De RPH

## 2020-05-04 LAB — INR PPP: 5.7

## 2020-05-05 ENCOUNTER — ANTICOAGULATION VISIT (OUTPATIENT)
Dept: PHARMACY | Facility: HOSPITAL | Age: 35
End: 2020-05-05

## 2020-05-05 DIAGNOSIS — Z79.01 ANTICOAGULANT LONG-TERM USE: ICD-10-CM

## 2020-05-05 DIAGNOSIS — I63.9 ACUTE CEREBROVASCULAR ACCIDENT (CVA) (HCC): ICD-10-CM

## 2020-05-05 DIAGNOSIS — Z95.2 S/P AVR: ICD-10-CM

## 2020-05-05 NOTE — PROGRESS NOTES
Anticoagulation Clinic Progress Note    Anticoagulation Summary  As of 2020    INR goal:   2.5-3.5   TTR:   27.7 % (1.4 y)   INR used for dosin.70! (2020)   Warfarin maintenance plan:   7.5 mg every Mon, Fri; 5 mg all other days   Weekly warfarin total:   40 mg   No change documented:   Joe De RPH   Plan last modified:   Harjeet Celaya RPH (2020)   Next INR check:   2020   Priority:   High   Target end date:       Indications    Anticoagulant long-term use [Z79.01]  S/P AVR [Z95.2]  Acute cerebrovascular accident (CVA) (CMS/[facility]) [I63.9]             Anticoagulation Episode Summary     INR check location:       Preferred lab:       Send INR reminders to:   [facility]    Comments:   [facility] **Call every time - may reconsider once stable**      Anticoagulation Care Providers     Provider Role Specialty Phone number    José Antonio Banks MD Referring Cardiology [phone]          Clinic Interview:  Patient Findings     Positives:   Change in health, Missed doses, Change in medications, Other   complaints    Negatives:   Signs/symptoms of thrombosis, Signs/symptoms of bleeding,   Laboratory test error suspected, Change in alcohol use, Change in   activity, Upcoming invasive procedure, Emergency department visit,   Upcoming dental procedure, Extra doses, Change in diet/appetite, Hospital   admission, Bruising    Comments:   Increased anxiety recently; buspirone increased last wk. APAP   - no more than 1500 mg/wk. Self-held warfarin yesterday.      Clinical Outcomes     Negatives:   Major bleeding event, Thromboembolic event,   Anticoagulation-related hospital admission, Anticoagulation-related ED   visit, Anticoagulation-related fatality    Comments:   Increased anxiety recently; buspirone increased last wk. APAP   - no more than 1500 mg/wk. Self-held warfarin yesterday.        INR History:  Anticoagulation Monitoring 2020   INR 2.10 2.50 5.70    INR Date 4/29/2020 5/1/2020 5/4/2020   INR Goal 2.5-3.5 2.5-3.5 2.5-3.5   Trend Same Same Same   Last Week Total 35 mg 42.5 mg 32.5 mg   Next Week Total 40 mg 40 mg 40 mg   Sun - 5 mg -   Mon - - -   Tue - - 5 mg   Wed 5 mg - 5 mg   Thu 5 mg - 5 mg   Fri - 7.5 mg -   Sat - 5 mg -   Visit Report - - -   Some recent data might be hidden       Plan:  1. INR was Supratherapeutic yesterday- see above in Anticoagulation Summary.   Will instruct Galdino YRN Burt to Change their warfarin regimen (self-held warfarin yesterday- see above in Anticoagulation Summary.  2. Follow up in 3 days  3. They have been instructed to call if any changes in medications, doses, concerns, etc. To seek immediate medical attention if s/sx of bleeding develop or fall occurs. Patient expresses understanding and has no further questions at this time.    Joe De [facility]

## 2020-05-06 RX ORDER — NITROGLYCERIN 0.4 MG/1
TABLET SUBLINGUAL
Qty: 100 TABLET | Refills: 2 | Status: SHIPPED | OUTPATIENT
Start: 2020-05-06 | End: 2020-11-12

## 2020-05-08 ENCOUNTER — ANTICOAGULATION VISIT (OUTPATIENT)
Dept: PHARMACY | Facility: HOSPITAL | Age: 35
End: 2020-05-08

## 2020-05-08 DIAGNOSIS — I63.9 ACUTE CEREBROVASCULAR ACCIDENT (CVA) (HCC): ICD-10-CM

## 2020-05-08 DIAGNOSIS — Z95.2 S/P AVR: ICD-10-CM

## 2020-05-08 DIAGNOSIS — Z79.01 ANTICOAGULANT LONG-TERM USE: ICD-10-CM

## 2020-05-08 LAB — INR PPP: 2.4

## 2020-05-08 NOTE — PROGRESS NOTES
Anticoagulation Clinic Progress Note    Anticoagulation Summary  As of 2020    INR goal:   2.5-3.5   TTR:   27.7 % (1.4 y)   INR used for dosin.40! (2020)   Warfarin maintenance plan:   7.5 mg every Mon, Fri; 5 mg all other days   Weekly warfarin total:   40 mg   Plan last modified:   Harjeet Celaya [facility] (2020)   Next INR check:   2020   Priority:   High   Target end date:       Indications    Anticoagulant long-term use [Z79.01]  S/P AVR [Z95.2]  Acute cerebrovascular accident (CVA) (CMS/HCC) [I63.9]             Anticoagulation Episode Summary     INR check location:       Preferred lab:       Send INR reminders to:    [facility]    Comments:   [employer] **Call every time - may reconsider once stable**      Anticoagulation Care Providers     Provider Role Specialty Phone number    José Antonio Banks MD Referring Cardiology [phone]          Clinic Interview:      INR History:  Anticoagulation Monitoring 2020   INR 2.50 5.70 2.40   INR Date 2020   INR Goal 2.5-3.5 2.5-3.5 2.5-3.5   Trend Same Same Same   Last Week Total 42.5 mg 32.5 mg 32.5 mg   Next Week Total 40 mg 40 mg 40 mg   Sun 5 mg - 5 mg   Mon - - -   Tue - 5 mg -   Wed - 5 mg -   Thu - 5 mg -   Fri 7.5 mg - 7.5 mg   Sat 5 mg - 5 mg   Visit Report - - -   Some recent data might be hidden       Plan:  1. INR is Subtherapeutic today- see above in Anticoagulation Summary.   Will instruct Galdino [place] to Continue their warfarin regimen- see above in Anticoagulation Summary.  2. Follow up in 3 days  3. Spoke with pt today They have been instructed to call if any changes in medications, doses, concerns, etc. Patient expresses understanding and has no further questions at this time.    Paulette Martin [facility]

## 2020-05-11 ENCOUNTER — ANTICOAGULATION VISIT (OUTPATIENT)
Dept: PHARMACY | Facility: HOSPITAL | Age: 35
End: 2020-05-11

## 2020-05-11 ENCOUNTER — LAB (OUTPATIENT)
Dept: LAB | Facility: HOSPITAL | Age: 35
End: 2020-05-11

## 2020-05-11 DIAGNOSIS — Z95.2 S/P AVR: ICD-10-CM

## 2020-05-11 DIAGNOSIS — Z79.01 ANTICOAGULANT LONG-TERM USE: ICD-10-CM

## 2020-05-11 DIAGNOSIS — I63.9 ACUTE CEREBROVASCULAR ACCIDENT (CVA) (HCC): ICD-10-CM

## 2020-05-11 LAB
INR PPP: 7.04 (ref 2–3)
PROTHROMBIN TIME: 64.9 SECONDS (ref 19.4–28.5)

## 2020-05-11 PROCEDURE — 85610 PROTHROMBIN TIME: CPT

## 2020-05-11 PROCEDURE — 36415 COLL VENOUS BLD VENIPUNCTURE: CPT

## 2020-05-11 NOTE — PROGRESS NOTES
Anticoagulation Clinic Progress Note    Anticoagulation Summary  As of 2020    INR goal:   2.5-3.5   TTR:   27.7 % (1.4 y)   INR used for dosin.04! (2020)   Warfarin maintenance plan:   7.5 mg every Mon, Fri; 5 mg all other days   Weekly warfarin total:   40 mg   Plan last modified:   Harjeet Celaya [facility] (2020)   Next INR check:   2020   Priority:   High   Target end date:       Indications    Anticoagulant long-term use [Z79.01]  S/P AVR [Z95.2]  Acute cerebrovascular accident (CVA) (CMS/HCC) [I63.9]             Anticoagulation Episode Summary     INR check location:       Preferred lab:       Send INR reminders to:    [facility] PO[facility]    Comments:   [employer] **Call every time - may reconsider once stable**      Anticoagulation Care Providers     Provider Role Specialty Phone number    José Antonio Banks MD Referring Cardiology [phone]          Clinic Interview:      INR History:  Anticoagulation Monitoring 2020   INR 5.70 2.40 7.04   INR Date 2020   INR Goal 2.5-3.5 2.5-3.5 2.5-3.5   Trend Same Same Same   Last Week Total 32.5 mg 32.5 mg 32.5 mg   Next Week Total 40 mg 40 mg 30 mg   Sun - 5 mg -   Mon - - Hold ()   Tue 5 mg - 2.5 mg ()   Wed 5 mg - 5 mg   Thu 5 mg - -   Fri - 7.5 mg -   Sat - 5 mg -   Visit Report - - -   Some recent data might be hidden       Plan:  1. INR is Supratherapeutic today- see above in Anticoagulation Summary.   Will instruct Galdino [place] to Change their warfarin regimen- see above in Anticoagulation Summary.  2. Follow up in 3 days--either lab, home or clinic  3. Left VM with instructions, to watch for bleeding & call with issues/updates. Pt has agreed to only be called if INR out of range. They have been instructed to call if any changes in medications, doses, concerns, etc.     Paulette Martin [facility]

## 2020-05-15 ENCOUNTER — ANTICOAGULATION VISIT (OUTPATIENT)
Dept: PHARMACY | Facility: HOSPITAL | Age: 35
End: 2020-05-15

## 2020-05-15 DIAGNOSIS — I63.9 ACUTE CEREBROVASCULAR ACCIDENT (CVA) (HCC): ICD-10-CM

## 2020-05-15 DIAGNOSIS — Z79.01 ANTICOAGULANT LONG-TERM USE: ICD-10-CM

## 2020-05-15 DIAGNOSIS — Z95.2 S/P AVR: ICD-10-CM

## 2020-05-15 LAB — INR PPP: 1.9

## 2020-05-15 NOTE — PROGRESS NOTES
Anticoagulation Clinic Progress Note    Anticoagulation Summary  As of 5/15/2020    INR goal:   2.5-3.5   TTR:   27.6 % (1.4 y)   INR used for dosin.90! (5/15/2020)   Warfarin maintenance plan:   7.5 mg every Mon, Fri; 5 mg all other days   Weekly warfarin total:   40 mg   No change documented:   Harjeet Celaya RPH   Plan last modified:   Harjeet Celaya RPH (2020)   Next INR check:   2020   Priority:   High   Target end date:       Indications    Anticoagulant long-term use [Z79.01]  S/P AVR [Z95.2]  Acute cerebrovascular accident (CVA) (CMS/HCC) [I63.9]             Anticoagulation Episode Summary     INR check location:       Preferred lab:       Send INR reminders to:   [facility] PO[facility]    Comments:   [facility] **Call every time - may reconsider once stable**      Anticoagulation Care Providers     Provider Role Specialty Phone number    José Antonio Banks MD Referring Cardiology [phone]          Clinic Interview:      INR History:  Anticoagulation Monitoring 2020 2020 5/15/2020   INR 2.40 7.04 1.90   INR Date 2020 2020 5/15/2020   INR Goal 2.5-3.5 2.5-3.5 2.5-3.5   Trend Same Same Same   Last Week Total 32.5 mg 32.5 mg 30 mg   Next Week Total 40 mg 30 mg 40 mg   Sun 5 mg - 5 mg   Mon - Hold () -   Tue - 2.5 mg () -   Wed - 5 mg -   Thu - - -   Fri 7.5 mg - 7.5 mg   Sat 5 mg - 5 mg   Visit Report - - -   Some recent data might be hidden       Plan:  1. INR is Subtherapeutic today- see above in Anticoagulation Summary.   Will instruct Galdino [place] to Change their warfarin regimen- see above in Anticoagulation Summary.  2. Follow up in 3 days  3. Pt to be contacted with each INR result.    Harjeet Celaay RPH

## 2020-05-18 ENCOUNTER — ANTICOAGULATION VISIT (OUTPATIENT)
Dept: PHARMACY | Facility: HOSPITAL | Age: 35
End: 2020-05-18

## 2020-05-18 DIAGNOSIS — Z79.01 ANTICOAGULANT LONG-TERM USE: ICD-10-CM

## 2020-05-18 DIAGNOSIS — I63.9 ACUTE CEREBROVASCULAR ACCIDENT (CVA) (HCC): ICD-10-CM

## 2020-05-18 DIAGNOSIS — Z95.2 S/P AVR: ICD-10-CM

## 2020-05-18 LAB — INR PPP: 5.1

## 2020-05-18 NOTE — PROGRESS NOTES
Anticoagulation Clinic Progress Note    Anticoagulation Summary  As of 2020    INR goal:   2.5-3.5   TTR:   27.7 % (1.4 y)   INR used for dosin.10! (2020)   Warfarin maintenance plan:   5 mg every day   Weekly warfarin total:   35 mg   Plan last modified:   Joe De RPH (2020)   Next INR check:   2020   Priority:   High   Target end date:       Indications    Anticoagulant long-term use [Z79.01]  S/P AVR [Z95.2]  Acute cerebrovascular accident (CVA) (CMS/[facility]) [I63.9]             Anticoagulation Episode Summary     INR check location:       Preferred lab:       Send INR reminders to:    [facility] PO[facility]    Comments:   [employer] **Call every time - may reconsider once stable**      Anticoagulation Care Providers     Provider Role Specialty Phone number    José Antonio Banks MD Referring Cardiology [phone]          Clinic Interview:  Patient Findings     Positives:   Other complaints    Negatives:   Signs/symptoms of thrombosis, Signs/symptoms of bleeding,   Laboratory test error suspected, Change in health, Change in alcohol use,   Change in activity, Upcoming invasive procedure, Emergency department   visit, Upcoming dental procedure, Missed doses, Extra doses, Change in   medications, Change in diet/appetite, Hospital admission, Bruising    Comments:   Somewhat increased back pain past couple days; staying <2000   mg/day of APAP.      Clinical Outcomes     Negatives:   Major bleeding event, Thromboembolic event,   Anticoagulation-related hospital admission, Anticoagulation-related ED   visit, Anticoagulation-related fatality    Comments:   Somewhat increased back pain past couple days; staying <2000   mg/day of APAP.        INR History:  Anticoagulation Monitoring 2020 5/15/2020 2020   INR 7.04 1.90 5.10   INR Date 2020 5/15/2020 2020   INR Goal 2.5-3.5 2.5-3.5 2.5-3.5   Trend Same Same Down   Last Week Total 32.5 mg 30 mg 30 mg   Next Week Total  30 mg 40 mg 32.5 mg   Sun - 5 mg -   Mon Hold (5/11) - 2.5 mg (5/18)   Tue 2.5 mg (5/12) - 5 mg   Wed 5 mg - 5 mg   Thu - - 5 mg   Fri - 7.5 mg -   Sat - 5 mg -   Visit Report - - -   Some recent data might be hidden       Plan:  1. INR is Supratherapeutic today- see above in Anticoagulation Summary.   Will instruct Galdino [place] to Change their warfarin regimen- see above in Anticoagulation Summary.  2. Follow up in 4 days  3. They have been instructed to call if any changes in medications, doses, concerns, etc. To seek immediate medical attention if s/sx of bleeding develop or fall occurs. Patient expresses understanding and has no further questions at this time.    Joe De [facility]

## 2020-05-22 ENCOUNTER — ANTICOAGULATION VISIT (OUTPATIENT)
Dept: PHARMACY | Facility: HOSPITAL | Age: 35
End: 2020-05-22

## 2020-05-22 DIAGNOSIS — Z79.01 ANTICOAGULANT LONG-TERM USE: ICD-10-CM

## 2020-05-22 DIAGNOSIS — Z95.2 S/P AVR: ICD-10-CM

## 2020-05-22 DIAGNOSIS — I63.9 ACUTE CEREBROVASCULAR ACCIDENT (CVA) (HCC): ICD-10-CM

## 2020-05-22 LAB — INR PPP: 4.7

## 2020-05-22 NOTE — PROGRESS NOTES
Anticoagulation Clinic Progress Note    Anticoagulation Summary  As of 2020    INR goal:   2.5-3.5   TTR:   27.4 % (1.4 y)   INR used for dosin.70! (2020)   Warfarin maintenance plan:   2.5 mg every Mon, Fri; 5 mg all other days   Weekly warfarin total:   30 mg   Plan last modified:   Joe De RPH (2020)   Next INR check:   2020   Priority:   High   Target end date:       Indications    Anticoagulant long-term use [Z79.01]  S/P AVR [Z95.2]  Acute cerebrovascular accident (CVA) (CMS/HCC) [I63.9]             Anticoagulation Episode Summary     INR check location:       Preferred lab:       Send INR reminders to:   [facility] PO[facility]    Comments:   [facility] **Call every time - may reconsider once stable**      Anticoagulation Care Providers     Provider Role Specialty Phone number    José Antonio Banks MD Referring Cardiology [phone]          Clinic Interview:  Patient Findings     Positives:   Change in medications    Negatives:   Signs/symptoms of thrombosis, Signs/symptoms of bleeding,   Laboratory test error suspected, Change in health, Change in alcohol use,   Change in activity, Upcoming invasive procedure, Emergency department   visit, Upcoming dental procedure, Missed doses, Extra doses, Change in   diet/appetite, Hospital admission, Bruising, Other complaints    Comments:   Started testosterone injections q2wks on ; dose will be   re-evaluated on 6/3 by prescriber.       Clinical Outcomes     Negatives:   Major bleeding event, Thromboembolic event,   Anticoagulation-related hospital admission, Anticoagulation-related ED   visit, Anticoagulation-related fatality    Comments:   Started testosterone injections q2wks on ; dose will be   re-evaluated on 6/3 by prescriber.         INR History:  Anticoagulation Monitoring 5/15/2020 2020 2020   INR 1.90 5.10 4.70   INR Date 5/15/2020 2020 2020   INR Goal 2.5-3.5 2.5-3.5 2.5-3.5   Trend Same  Down Down   Last Week Total 30 mg 30 mg 35 mg   Next Week Total 40 mg 32.5 mg 30 mg   Sun 5 mg - 5 mg   Mon - 2.5 mg (5/18) 2.5 mg   Tue - 5 mg -   Wed - 5 mg -   Thu - 5 mg -   Fri 7.5 mg - 2.5 mg   Sat 5 mg - 5 mg   Visit Report - - -   Some recent data might be hidden       Plan:  1. INR is Supratherapeutic today- see above in Anticoagulation Summary.   Will instruct Galdino [place] to Change their warfarin regimen- see above in Anticoagulation Summary.  2. Follow up in 4 days  3. They have been instructed to call if any changes in medications, doses, concerns, etc. To seek immediate medical attention if s/sx of bleeding develop or fall occurs. Patient expresses understanding and has no further questions at this time.    Joe De [facility]

## 2020-05-25 LAB — INR PPP: 6

## 2020-05-26 ENCOUNTER — ANTICOAGULATION VISIT (OUTPATIENT)
Dept: PHARMACY | Facility: HOSPITAL | Age: 35
End: 2020-05-26

## 2020-05-26 DIAGNOSIS — I63.9 ACUTE CEREBROVASCULAR ACCIDENT (CVA) (HCC): ICD-10-CM

## 2020-05-26 DIAGNOSIS — Z79.01 ANTICOAGULANT LONG-TERM USE: ICD-10-CM

## 2020-05-26 DIAGNOSIS — Z95.2 S/P AVR: ICD-10-CM

## 2020-05-26 NOTE — PROGRESS NOTES
Anticoagulation Clinic Progress Note    Anticoagulation Summary  As of 2020    INR goal:   2.5-3.5   TTR:   27.3 % (1.4 y)   INR used for dosin.00! (2020)   Warfarin maintenance plan:   2.5 mg every Mon, Wed, Fri; 5 mg all other days   Weekly warfarin total:   27.5 mg   Plan last modified:   Joe De RP (2020)   Next INR check:   2020   Priority:   High   Target end date:       Indications    Anticoagulant long-term use [Z79.01]  S/P AVR [Z95.2]  Acute cerebrovascular accident (CVA) (CMS/HCC) [I63.9]             Anticoagulation Episode Summary     INR check location:       Preferred lab:       Send INR reminders to:   [facility]    Comments:   [facility] **Call every time - may reconsider once stable**      Anticoagulation Care Providers     Provider Role Specialty Phone number    José Antonio Banks MD Referring Cardiology [phone]          Clinic Interview:  Patient Findings     Positives:   Missed doses, Change in medications    Negatives:   Signs/symptoms of thrombosis, Signs/symptoms of bleeding,   Laboratory test error suspected, Change in health, Change in alcohol use,   Change in activity, Upcoming invasive procedure, Emergency department   visit, Upcoming dental procedure, Extra doses, Change in diet/appetite,   Hospital admission, Bruising, Other complaints    Comments:   Self-held dose yesterday after seeing high INR. Recently   started testosterone ~3 wks ago.       Clinical Outcomes     Negatives:   Major bleeding event, Thromboembolic event,   Anticoagulation-related hospital admission, Anticoagulation-related ED   visit, Anticoagulation-related fatality    Comments:   Self-held dose yesterday after seeing high INR. Recently   started testosterone ~3 wks ago.         INR History:  Anticoagulation Monitoring 2020   INR 5.10 4.70 6.00   INR Date 2020   INR Goal 2.5-3.5 2.5-3.5 2.5-3.5   Trend Down Down  Down   Last Week Total 30 mg 35 mg 27.5 mg   Next Week Total 32.5 mg 30 mg 27.5 mg   Sun - 5 mg -   Mon 2.5 mg (5/18) 2.5 mg -   Tue 5 mg - 5 mg   Wed 5 mg - 2.5 mg   Thu 5 mg - -   Fri - 2.5 mg -   Sat - 5 mg -   Visit Report - - -   Some recent data might be hidden       Plan:  1. INR was Supratherapeutic yesterday- see above in Anticoagulation Summary.   Will instruct Galdino [place] to Change their warfarin regimen- see above in Anticoagulation Summary.  2. Follow up in 2 days  3. They have been instructed to call if any changes in medications, doses, concerns, etc. To seek immediate medical attention if s/sx of bleeding develop. Patient expresses understanding and has no further questions at this time.    Joe De [facility]

## 2020-05-28 ENCOUNTER — ANTICOAGULATION VISIT (OUTPATIENT)
Dept: PHARMACY | Facility: HOSPITAL | Age: 35
End: 2020-05-28

## 2020-05-28 DIAGNOSIS — Z79.01 ANTICOAGULANT LONG-TERM USE: ICD-10-CM

## 2020-05-28 DIAGNOSIS — Z95.2 S/P AVR: ICD-10-CM

## 2020-05-28 DIAGNOSIS — I63.9 ACUTE CEREBROVASCULAR ACCIDENT (CVA) (HCC): ICD-10-CM

## 2020-05-28 LAB — INR PPP: 2.2

## 2020-05-28 NOTE — PROGRESS NOTES
Anticoagulation Clinic Progress Note    Anticoagulation Summary  As of 2020    INR goal:   2.5-3.5   TTR:   27.3 % (1.4 y)   INR used for dosin.20! (2020)   Warfarin maintenance plan:   2.5 mg every Mon, Wed, Fri; 5 mg all other days   Weekly warfarin total:   27.5 mg   Plan last modified:   Joe eD [facility] (2020)   Next INR check:   2020   Priority:   High   Target end date:       Indications    Anticoagulant long-term use [Z79.01]  S/P AVR [Z95.2]  Acute cerebrovascular accident (CVA) (CMS/HCC) [I63.9]             Anticoagulation Episode Summary     INR check location:       Preferred lab:       Send INR reminders to:   [facility] POOL    Comments:   [employer] **Call every time - may reconsider once stable**      Anticoagulation Care Providers     Provider Role Specialty Phone number    José Antonio Banks MD Referring Cardiology [phone]          Drug interactions: has remained unchanged.  Diet: has remained unchanged.    Clinic Interview:  No pertinent clinical findings have been reported.    INR History:  Anticoagulation Monitoring 2020   INR 4.70 6.00 2.20   INR Date 2020   INR Goal 2.5-3.5 2.5-3.5 2.5-3.5   Trend Down Down Same   Last Week Total 35 mg 27.5 mg 25 mg   Next Week Total 30 mg 27.5 mg 30 mg   Sun 5 mg - 5 mg   Mon 2.5 mg - -   Tue - 5 mg -   Wed - 2.5 mg -   Thu - - 7.5 mg ()   Fri 2.5 mg - 2.5 mg   Sat 5 mg - 5 mg   Visit Report - - -   Some recent data might be hidden       Plan:  1. INR is 2.2 today- see above in Anticoagulation Summary.   Spoke with patient, will take 7.5mg today, then continue current regimen and retest on 20   2. Follow up 20  3. Pt has agreed to only be called if INR out of range. They have been instructed to call if any changes in medications, doses, concerns, etc. Patient expresses understanding and has no further questions at this time.    Patricia Polanco,  RPH

## 2020-06-01 ENCOUNTER — LAB (OUTPATIENT)
Dept: LAB | Facility: HOSPITAL | Age: 35
End: 2020-06-01

## 2020-06-01 ENCOUNTER — ANTICOAGULATION VISIT (OUTPATIENT)
Dept: PHARMACY | Facility: HOSPITAL | Age: 35
End: 2020-06-01

## 2020-06-01 DIAGNOSIS — Z79.01 ANTICOAGULANT LONG-TERM USE: ICD-10-CM

## 2020-06-01 DIAGNOSIS — I63.9 ACUTE CEREBROVASCULAR ACCIDENT (CVA) (HCC): ICD-10-CM

## 2020-06-01 DIAGNOSIS — Z95.2 S/P AVR: ICD-10-CM

## 2020-06-01 LAB
INR PPP: 6.52 (ref 2–3)
PROTHROMBIN TIME: 60.5 SECONDS (ref 19.4–28.5)

## 2020-06-01 PROCEDURE — 36415 COLL VENOUS BLD VENIPUNCTURE: CPT

## 2020-06-01 PROCEDURE — 85610 PROTHROMBIN TIME: CPT

## 2020-06-01 NOTE — PROGRESS NOTES
Anticoagulation Clinic Progress Note    Anticoagulation Summary  As of 2020    INR goal:   2.5-3.5   TTR:   27.2 % (1.5 y)   INR used for dosin.52! (2020)   Warfarin maintenance plan:   2.5 mg every Mon, Wed, Fri; 5 mg all other days   Weekly warfarin total:   27.5 mg   Plan last modified:   Joe De [facility] (2020)   Next INR check:   2020   Priority:   High   Target end date:       Indications    Anticoagulant long-term use [Z79.01]  S/P AVR [Z95.2]  Acute cerebrovascular accident (CVA) (CMS/HCC) [I63.9]             Anticoagulation Episode Summary     INR check location:       Preferred lab:       Send INR reminders to:   [facility] POOL    Comments:   [facility] **Call every time - may reconsider once stable**      Anticoagulation Care Providers     Provider Role Specialty Phone number    José Antonio Banks MD Referring Cardiology [phone]          Clinic Interview:  Patient Findings     Negatives:   Signs/symptoms of thrombosis, Signs/symptoms of bleeding,   Laboratory test error suspected, Change in health, Change in alcohol use,   Change in activity, Upcoming invasive procedure, Emergency department   visit, Upcoming dental procedure, Missed doses, Extra doses, Change in   medications, Change in diet/appetite, Hospital admission, Bruising, Other   complaints    Comments:   Testosterone inj tomorrow.      Clinical Outcomes     Negatives:   Major bleeding event, Thromboembolic event,   Anticoagulation-related hospital admission, Anticoagulation-related ED   visit, Anticoagulation-related fatality    Comments:   Testosterone inj tomorrow.        INR History:  Anticoagulation Monitoring 2020   INR 6.00 2.20 6.52   INR Date 2020   INR Goal 2.5-3.5 2.5-3.5 2.5-3.5   Trend Down Same Same   Last Week Total 27.5 mg 25 mg 27.5 mg   Next Week Total 27.5 mg 30 mg 25 mg   Sun - 5 mg -   Mon - - Hold ()   Tue 5 mg - 5 mg   Wed 2.5 mg  - 2.5 mg   Thu - 7.5 mg (5/28) 5 mg   Fri - 2.5 mg -   Sat - 5 mg -   Visit Report - - -   Some recent data might be hidden       Plan:  1. INR is Supratherapeutic today- see above in Anticoagulation Summary. Boost dose last wk likely contributed to elevated INR today. Single held dose following INR 6.0 last wk led to subtx INR of 2.2 three days later.  Will instruct Galdino [place] to Change their warfarin regimen- see above in Anticoagulation Summary.  2. Follow up in 4 days  3. They have been instructed to call if any changes in medications, doses, concerns, etc. To seek immediate medical attention if s/sx of bleeding develop or fall occurs. Patient expresses understanding and has no further questions at this time.    Joe De RP

## 2020-06-05 ENCOUNTER — ANTICOAGULATION VISIT (OUTPATIENT)
Dept: PHARMACY | Facility: HOSPITAL | Age: 35
End: 2020-06-05

## 2020-06-05 ENCOUNTER — LAB (OUTPATIENT)
Dept: LAB | Facility: HOSPITAL | Age: 35
End: 2020-06-05

## 2020-06-05 DIAGNOSIS — I63.9 ACUTE CEREBROVASCULAR ACCIDENT (CVA) (HCC): ICD-10-CM

## 2020-06-05 DIAGNOSIS — Z79.01 ANTICOAGULANT LONG-TERM USE: ICD-10-CM

## 2020-06-05 DIAGNOSIS — Z95.2 S/P AVR: ICD-10-CM

## 2020-06-05 LAB
INR PPP: 3.07 (ref 2–3)
PROTHROMBIN TIME: 28.9 SECONDS (ref 19.4–28.5)

## 2020-06-05 PROCEDURE — 85610 PROTHROMBIN TIME: CPT

## 2020-06-05 PROCEDURE — 36415 COLL VENOUS BLD VENIPUNCTURE: CPT

## 2020-06-05 NOTE — PROGRESS NOTES
Anticoagulation Clinic Progress Note    Anticoagulation Summary  As of 6/5/2020    INR goal:   2.5-3.5   TTR:   27.1 % (1.5 y)   INR used for dosing:   3.07 (6/5/2020)   Warfarin maintenance plan:   2.5 mg every Mon, Wed, Fri; 5 mg all other days   Weekly warfarin total:   27.5 mg   No change documented:   Joe De RPH   Plan last modified:   Joe De RPH (5/26/2020)   Next INR check:   6/10/2020   Priority:   High   Target end date:       Indications    Anticoagulant long-term use [Z79.01]  S/P AVR [Z95.2]  Acute cerebrovascular accident (CVA) (CMS/[facility]) [I63.9]             Anticoagulation Episode Summary     INR check location:       Preferred lab:       Send INR reminders to:   [facility] POOL    Comments:   [facility] **Call every time - may reconsider once stable**      Anticoagulation Care Providers     Provider Role Specialty Phone number    José Antonio Banks MD Referring Cardiology [phone]          Clinic Interview:  Patient Findings     Positives:   Change in medications    Negatives:   Signs/symptoms of thrombosis, Signs/symptoms of bleeding,   Laboratory test error suspected, Change in health, Change in alcohol use,   Change in activity, Upcoming invasive procedure, Emergency department   visit, Upcoming dental procedure, Missed doses, Extra doses, Change in   diet/appetite, Hospital admission, Bruising, Other complaints    Comments:   Testosterone inj yesterday - freq increased from q14d to   q10d.       Clinical Outcomes     Negatives:   Major bleeding event, Thromboembolic event,   Anticoagulation-related hospital admission, Anticoagulation-related ED   visit, Anticoagulation-related fatality    Comments:   Testosterone inj yesterday - freq increased from q14d to   q10d.         INR History:  Anticoagulation Monitoring 5/28/2020 6/1/2020 6/5/2020   INR 2.20 6.52 3.07   INR Date 5/28/2020 6/1/2020 6/5/2020   INR Goal 2.5-3.5 2.5-3.5 2.5-3.5   Trend Same Same Same   Last Week  Total 25 mg 27.5 mg 25 mg   Next Week Total 30 mg 25 mg 27.5 mg   Sun 5 mg - 5 mg   Mon - Hold (6/1) 2.5 mg   Tue - 5 mg 5 mg   Wed - 2.5 mg -   Thu 7.5 mg (5/28) 5 mg -   Fri 2.5 mg - 2.5 mg   Sat 5 mg - 5 mg   Visit Report - - -   Some recent data might be hidden       Plan:  1. INR is Therapeutic today- see above in Anticoagulation Summary.   Will instruct Galdino [place] to Continue their warfarin regimen- see above in Anticoagulation Summary.  2. Follow up in 5 days  3. They have been instructed to call if any changes in medications, doses, concerns, etc. Patient expresses understanding and has no further questions at this time.    Joe De [facility]

## 2020-06-10 ENCOUNTER — ANTICOAGULATION VISIT (OUTPATIENT)
Dept: PHARMACY | Facility: HOSPITAL | Age: 35
End: 2020-06-10

## 2020-06-10 ENCOUNTER — LAB (OUTPATIENT)
Dept: LAB | Facility: HOSPITAL | Age: 35
End: 2020-06-10

## 2020-06-10 DIAGNOSIS — Z79.01 ANTICOAGULANT LONG-TERM USE: ICD-10-CM

## 2020-06-10 DIAGNOSIS — I63.9 ACUTE CEREBROVASCULAR ACCIDENT (CVA) (HCC): ICD-10-CM

## 2020-06-10 DIAGNOSIS — Z95.2 S/P AVR: ICD-10-CM

## 2020-06-10 LAB
INR PPP: 8
INR PPP: >=10 (ref 2–3)
PROTHROMBIN TIME: 113.8 SECONDS (ref 19.4–28.5)

## 2020-06-10 PROCEDURE — 36415 COLL VENOUS BLD VENIPUNCTURE: CPT

## 2020-06-10 PROCEDURE — 85610 PROTHROMBIN TIME: CPT

## 2020-06-10 NOTE — PROGRESS NOTES
Anticoagulation Clinic Progress Note    Anticoagulation Summary  As of 6/10/2020    INR goal:   2.5-3.5   TTR:   27.0 % (1.5 y)   INR used for dosing:   >=10.00! (6/10/2020)   Warfarin maintenance plan:   2.5 mg every Mon, Wed, Fri; 5 mg all other days   Weekly warfarin total:   27.5 mg   Plan last modified:   Joe De RPH (5/26/2020)   Next INR check:   6/11/2020   Priority:   High   Target end date:       Indications    Anticoagulant long-term use [Z79.01]  S/P AVR [Z95.2]  Acute cerebrovascular accident (CVA) (CMS/HCC) [I63.9]             Anticoagulation Episode Summary     INR check location:       Preferred lab:       Send INR reminders to:   [facility]    Comments:   [facility] **Call every time - may reconsider once stable**      Anticoagulation Care Providers     Provider Role Specialty Phone number    José Antonio Banks MD Referring Cardiology [phone]          Clinic Interview:  Patient Findings     Positives:   Other complaints    Negatives:   Signs/symptoms of thrombosis, Signs/symptoms of bleeding,   Laboratory test error suspected, Change in health, Change in alcohol use,   Change in activity, Upcoming invasive procedure, Emergency department   visit, Upcoming dental procedure, Missed doses, Extra doses, Change in   medications, Change in diet/appetite, Hospital admission, Bruising    Comments:   Denies s/sx of bleeding. Confirmed dose. Denies med changes.   Testosterone freq increasing from q14d to q10d beginning with next inj.   Resistant to ED visit/vit k administration due to previously having INR   decrease dramatically with only 1-2 held doses of warfarin.       Clinical Outcomes     Negatives:   Major bleeding event, Thromboembolic event,   Anticoagulation-related hospital admission, Anticoagulation-related ED   visit, Anticoagulation-related fatality    Comments:   Denies s/sx of bleeding. Confirmed dose. Denies med changes.   Testosterone freq increasing from q14d  to q10d beginning with next inj.   Resistant to ED visit/vit k administration due to previously having INR   decrease dramatically with only 1-2 held doses of warfarin.         INR History:  Anticoagulation Monitoring 6/1/2020 6/5/2020 6/10/2020   INR 6.52 3.07 >=10.00   INR Date 6/1/2020 6/5/2020 6/10/2020   INR Goal 2.5-3.5 2.5-3.5 2.5-3.5   Trend Same Same Same   Last Week Total 27.5 mg 25 mg 27.5 mg   Next Week Total 25 mg 27.5 mg 25 mg   Sun - 5 mg -   Mon Hold (6/1) 2.5 mg -   Tue 5 mg 5 mg -   Wed 2.5 mg - Hold (6/10)   Thu 5 mg - -   Fri - 2.5 mg -   Sat - 5 mg -   Visit Report - - -   Some recent data might be hidden       Plan:  1. INR is Supratherapeutic today- see above in Anticoagulation Summary. Of note, INR >10 on 3/11/20 and decreased to 2.6 very quickly 2 days later after only 2 held doses w/o any vit k administered.  Will instruct Galdino Dallas to HOLD their warfarin regimen- see above in Anticoagulation Summary. Advised to eat a high vitamin k meal today.   2. Follow up tomorrow at lab  3. They have been instructed to call if any changes in medications, doses, concerns, etc. To seek immediate medical attention if s/sx of bleeding develop (e.g., blood in stool, nosebleed, changes in vision/balance, severe headache, etc.). Patient expresses understanding and has no further questions at this time.    Joe De [facility]

## 2020-06-11 ENCOUNTER — LAB (OUTPATIENT)
Dept: LAB | Facility: HOSPITAL | Age: 35
End: 2020-06-11

## 2020-06-11 ENCOUNTER — ANTICOAGULATION VISIT (OUTPATIENT)
Dept: PHARMACY | Facility: HOSPITAL | Age: 35
End: 2020-06-11

## 2020-06-11 DIAGNOSIS — Z79.01 ANTICOAGULANT LONG-TERM USE: ICD-10-CM

## 2020-06-11 DIAGNOSIS — I63.9 ACUTE CEREBROVASCULAR ACCIDENT (CVA) (HCC): ICD-10-CM

## 2020-06-11 DIAGNOSIS — Z95.2 S/P AVR: ICD-10-CM

## 2020-06-11 LAB
INR PPP: 6.12 (ref 2–3)
PROTHROMBIN TIME: 58 SECONDS (ref 19.4–28.5)

## 2020-06-11 PROCEDURE — 85610 PROTHROMBIN TIME: CPT

## 2020-06-11 NOTE — PROGRESS NOTES
Anticoagulation Clinic Progress Note    Anticoagulation Summary  As of 2020    INR goal:   2.5-3.5   TTR:   26.9 % (1.5 y)   INR used for dosin.12! (2020)   Warfarin maintenance plan:   2.5 mg every Mon, Wed, Fri; 5 mg all other days   Weekly warfarin total:   27.5 mg   Plan last modified:   Joe De [facility] (2020)   Next INR check:   2020   Priority:   High   Target end date:       Indications    Anticoagulant long-term use [Z79.01]  S/P AVR [Z95.2]  Acute cerebrovascular accident (CVA) (CMS/HCC) [I63.9]             Anticoagulation Episode Summary     INR check location:       Preferred lab:       Send INR reminders to:   [facility]    Comments:   [facility] **Call every time - may reconsider once stable**      Anticoagulation Care Providers     Provider Role Specialty Phone number    José Antonio Banks MD Referring Cardiology [phone]          Clinic Interview:  Patient Findings     Positives:   Change in diet/appetite    Negatives:   Signs/symptoms of thrombosis, Signs/symptoms of bleeding,   Laboratory test error suspected, Change in health, Change in alcohol use,   Change in activity, Upcoming invasive procedure, Emergency department   visit, Upcoming dental procedure, Missed doses, Extra doses, Change in   medications, Hospital admission, Bruising, Other complaints    Comments:   2 cups of broccoli last night, 1 green smoothie w/ kale last   night.       Clinical Outcomes     Negatives:   Major bleeding event, Thromboembolic event,   Anticoagulation-related hospital admission, Anticoagulation-related ED   visit, Anticoagulation-related fatality    Comments:   2 cups of broccoli last night, 1 green smoothie w/ kale last   night.         INR History:  Anticoagulation Monitoring 2020 6/10/2020 2020   INR 3.07 >=10.00 6.12   INR Date 2020 6/10/2020 2020   INR Goal 2.5-3.5 2.5-3.5 2.5-3.5   Trend Same Same Same   Last Week Total 25 mg 27.5 mg 25 mg    Next Week Total 27.5 mg 25 mg 22.5 mg   Sun 5 mg - -   Mon 2.5 mg - -   Tue 5 mg - -   Wed - Hold (6/10) -   Thu - - Hold (6/11)   Fri 2.5 mg - -   Sat 5 mg - -   Visit Report - - -   Some recent data might be hidden       Plan:  1. INR is Supratherapeutic today- see above in Anticoagulation Summary.   Will instruct Galdino YRN JonesBurt to HOLD their warfarin regimen- see above in Anticoagulation Summary.  2. Follow up tomorrow w/ home test  3. They have been instructed to call if any changes in medications, doses, concerns, etc. To seek immediate medical attention if s/sx of bleeding develop. Patient expresses understanding and has no further questions at this time.    Joe De RP

## 2020-06-12 ENCOUNTER — ANTICOAGULATION VISIT (OUTPATIENT)
Dept: PHARMACY | Facility: HOSPITAL | Age: 35
End: 2020-06-12

## 2020-06-12 DIAGNOSIS — I63.9 ACUTE CEREBROVASCULAR ACCIDENT (CVA) (HCC): ICD-10-CM

## 2020-06-12 DIAGNOSIS — Z79.01 ANTICOAGULANT LONG-TERM USE: ICD-10-CM

## 2020-06-12 DIAGNOSIS — Z95.2 S/P AVR: ICD-10-CM

## 2020-06-12 LAB — INR PPP: 2.2

## 2020-06-12 NOTE — PROGRESS NOTES
Anticoagulation Clinic Progress Note    Anticoagulation Summary  As of 2020    INR goal:   2.5-3.5   TTR:   26.9 % (1.5 y)   INR used for dosin.20! (2020)   Warfarin maintenance plan:   2.5 mg every Mon, Wed, Fri; 5 mg all other days   Weekly warfarin total:   27.5 mg   Plan last modified:   Joe De [facility] (2020)   Next INR check:   6/15/2020   Priority:   High   Target end date:       Indications    Anticoagulant long-term use [Z79.01]  S/P AVR [Z95.2]  Acute cerebrovascular accident (CVA) (CMS/HCC) [I63.9]             Anticoagulation Episode Summary     INR check location:       Preferred lab:       Send INR reminders to:   [facility]    Comments:   [facility] **Call every time - may reconsider once stable**      Anticoagulation Care Providers     Provider Role Specialty Phone number    José Antonio Banks MD Referring Cardiology [phone]          Clinic Interview:  Patient Findings     Negatives:   Signs/symptoms of thrombosis, Signs/symptoms of bleeding,   Laboratory test error suspected, Change in health, Change in alcohol use,   Change in activity, Upcoming invasive procedure, Emergency department   visit, Upcoming dental procedure, Missed doses, Extra doses, Change in   medications, Change in diet/appetite, Hospital admission, Bruising, Other   complaints      Clinical Outcomes     Negatives:   Major bleeding event, Thromboembolic event,   Anticoagulation-related hospital admission, Anticoagulation-related ED   visit, Anticoagulation-related fatality        INR History:  Anticoagulation Monitoring 6/10/2020 2020 2020   INR >=10.00 6.12 2.20   INR Date 6/10/2020 2020 2020   INR Goal 2.5-3.5 2.5-3.5 2.5-3.5   Trend Same Same Same   Last Week Total 27.5 mg 25 mg 20 mg   Next Week Total 25 mg 22.5 mg 25 mg   Sun - - 2.5 mg ()   Mon - - -   Tue - - -   Wed Hold (6/10) - -   Thu - Hold () -   Fri - - 5 mg ()   Sat - - 2.5 mg ()    Visit Report - - -   Some recent data might be hidden       Plan:  1. INR is Subtherapeutic today- see above in Anticoagulation Summary.   Will instruct Galdino Dallas to Change their warfarin regimen- see above in Anticoagulation Summary.  2. Follow up in 3 days  3. They have been instructed to call if any changes in medications, doses, concerns, etc. Patient expresses understanding and has no further questions at this time.    Joe De RP

## 2020-06-15 ENCOUNTER — ANTICOAGULATION VISIT (OUTPATIENT)
Dept: PHARMACY | Facility: HOSPITAL | Age: 35
End: 2020-06-15

## 2020-06-15 ENCOUNTER — LAB (OUTPATIENT)
Dept: LAB | Facility: HOSPITAL | Age: 35
End: 2020-06-15

## 2020-06-15 DIAGNOSIS — I63.9 ACUTE CEREBROVASCULAR ACCIDENT (CVA) (HCC): ICD-10-CM

## 2020-06-15 DIAGNOSIS — Z95.2 S/P AVR: ICD-10-CM

## 2020-06-15 DIAGNOSIS — Z79.01 ANTICOAGULANT LONG-TERM USE: ICD-10-CM

## 2020-06-15 LAB
INR PPP: 2.51 (ref 2–3)
PROTHROMBIN TIME: 23.8 SECONDS (ref 19.4–28.5)

## 2020-06-15 PROCEDURE — 36415 COLL VENOUS BLD VENIPUNCTURE: CPT

## 2020-06-15 PROCEDURE — 85610 PROTHROMBIN TIME: CPT

## 2020-06-15 NOTE — PROGRESS NOTES
Anticoagulation Clinic Progress Note    Anticoagulation Summary  As of 6/15/2020    INR goal:   2.5-3.5   TTR:   26.8 % (1.5 y)   INR used for dosin.51 (6/15/2020)   Warfarin maintenance plan:   2.5 mg every Mon, Wed, Fri; 5 mg all other days   Weekly warfarin total:   27.5 mg   Plan last modified:   Joe De RPH (2020)   Next INR check:   2020   Priority:   High   Target end date:       Indications    Anticoagulant long-term use [Z79.01]  S/P AVR [Z95.2]  Acute cerebrovascular accident (CVA) (CMS/HCC) [I63.9]             Anticoagulation Episode Summary     INR check location:       Preferred lab:       Send INR reminders to:   [facility]    Comments:   [facility] **Call every time - may reconsider once stable**      Anticoagulation Care Providers     Provider Role Specialty Phone number    José Antonio Banks MD Referring Cardiology [phone]          Clinic Interview:  Patient Findings     Positives:   Change in medications    Negatives:   Signs/symptoms of thrombosis, Signs/symptoms of bleeding,   Laboratory test error suspected, Change in health, Change in alcohol use,   Change in activity, Upcoming invasive procedure, Emergency department   visit, Upcoming dental procedure, Missed doses, Extra doses, Change in   diet/appetite, Hospital admission, Bruising, Other complaints    Comments:   Received testosterone inj today      Clinical Outcomes     Negatives:   Major bleeding event, Thromboembolic event,   Anticoagulation-related hospital admission, Anticoagulation-related ED   visit, Anticoagulation-related fatality    Comments:   Received testosterone inj today        INR History:  Anticoagulation Monitoring 2020 2020 6/15/2020   INR 6.12 2.20 2.51   INR Date 2020 2020 6/15/2020   INR Goal 2.5-3.5 2.5-3.5 2.5-3.5   Trend Same Same Same   Last Week Total 25 mg 20 mg 17.5 mg   Next Week Total 22.5 mg 25 mg 25 mg   Sun - 2.5 mg () -   Mon - - 2.5 mg    Tue - - 2.5 mg (6/16)   Wed - - 2.5 mg   Thu Hold (6/11) - -   Fri - 5 mg (6/12) -   Sat - 2.5 mg (6/13) -   Visit Report - - -   Some recent data might be hidden       Plan:  1. INR is Therapeutic today- see above in Anticoagulation Summary.   Will instruct Galdino Dallas to Change their warfarin regimen- see above in Anticoagulation Summary.  2. Follow up in 3 days  3. They have been instructed to call if any changes in medications, doses, concerns, etc. Patient expresses understanding and has no further questions at this time.    Joe De [facility]

## 2020-06-18 ENCOUNTER — ANTICOAGULATION VISIT (OUTPATIENT)
Dept: PHARMACY | Facility: HOSPITAL | Age: 35
End: 2020-06-18

## 2020-06-18 DIAGNOSIS — I63.9 ACUTE CEREBROVASCULAR ACCIDENT (CVA) (HCC): ICD-10-CM

## 2020-06-18 DIAGNOSIS — Z95.2 S/P AVR: ICD-10-CM

## 2020-06-18 DIAGNOSIS — Z79.01 ANTICOAGULANT LONG-TERM USE: ICD-10-CM

## 2020-06-18 LAB — INR PPP: 1.3

## 2020-06-18 NOTE — PROGRESS NOTES
Anticoagulation Clinic Progress Note    Anticoagulation Summary  As of 2020    INR goal:   2.5-3.5   TTR:   26.6 % (1.5 y)   INR used for dosin.30! (2020)   Warfarin maintenance plan:   2.5 mg every day   Weekly warfarin total:   17.5 mg   Plan last modified:   Joe De RPH (6/15/2020)   Next INR check:   2020   Priority:   High   Target end date:       Indications    Anticoagulant long-term use [Z79.01]  S/P AVR [Z95.2]  Acute cerebrovascular accident (CVA) (CMS/HCC) [I63.9]             Anticoagulation Episode Summary     INR check location:       Preferred lab:       Send INR reminders to:   [facility]    Comments:   [facility] **Call every time - may reconsider once stable**      Anticoagulation Care Providers     Provider Role Specialty Phone number    José Antonio Banks MD Referring Cardiology [phone]          Clinic Interview:  Patient Findings     Negatives:   Signs/symptoms of thrombosis, Signs/symptoms of bleeding,   Laboratory test error suspected, Change in health, Change in alcohol use,   Change in activity, Upcoming invasive procedure, Emergency department   visit, Upcoming dental procedure, Missed doses, Extra doses, Change in   medications, Change in diet/appetite, Hospital admission, Bruising, Other   complaints      Clinical Outcomes     Negatives:   Major bleeding event, Thromboembolic event,   Anticoagulation-related hospital admission, Anticoagulation-related ED   visit, Anticoagulation-related fatality        INR History:  Anticoagulation Monitoring 2020 6/15/2020 2020   INR 2.20 2.51 1.30   INR Date 2020 6/15/2020 2020   INR Goal 2.5-3.5 2.5-3.5 2.5-3.5   Trend Same Down Same   Last Week Total 20 mg 17.5 mg 17.5 mg   Next Week Total 25 mg 17.5 mg 22.5 mg   Sun 2.5 mg () - 2.5 mg   Mon - 2.5 mg -   Tue - 2.5 mg -   Wed - 2.5 mg -   Thu - - 5 mg ()   Fri 5 mg () - 5 mg ()   Sat 2.5 mg () - 2.5 mg   Visit  Report - - -   Some recent data might be hidden       Plan:  1. INR is Subtherapeutic today- see above in Anticoagulation Summary.   Will instruct Galdino Dallas to Change their warfarin regimen- see above in Anticoagulation Summary.  2. Initiated enoxaparin 80 mg q12h  3. Follow up in 4 days  4. They have been instructed to call if any changes in medications, doses, concerns, etc. Patient expresses understanding and has no further questions at this time.    Joe De, [facility]

## 2020-06-20 LAB — INR PPP: 1.7

## 2020-06-22 ENCOUNTER — ANTICOAGULATION VISIT (OUTPATIENT)
Dept: PHARMACY | Facility: HOSPITAL | Age: 35
End: 2020-06-22

## 2020-06-22 DIAGNOSIS — I63.9 ACUTE CEREBROVASCULAR ACCIDENT (CVA) (HCC): ICD-10-CM

## 2020-06-22 DIAGNOSIS — Z79.01 ANTICOAGULANT LONG-TERM USE: ICD-10-CM

## 2020-06-22 DIAGNOSIS — Z95.2 S/P AVR: ICD-10-CM

## 2020-06-22 LAB — INR PPP: 1.7

## 2020-06-22 NOTE — PROGRESS NOTES
Anticoagulation Clinic Progress Note    Anticoagulation Summary  As of 2020    INR goal:   2.5-3.5   TTR:   26.4 % (1.5 y)   INR used for dosin.70! (2020)   Warfarin maintenance plan:   2.5 mg every day   Weekly warfarin total:   17.5 mg   Plan last modified:   Joe De RPH (6/15/2020)   Next INR check:   2020   Priority:   High   Target end date:       Indications    Anticoagulant long-term use [Z79.01]  S/P AVR [Z95.2]  Acute cerebrovascular accident (CVA) (CMS/HCC) [I63.9]             Anticoagulation Episode Summary     INR check location:       Preferred lab:       Send INR reminders to:    [facility]    Comments:   [facility] **Call every time - may reconsider once stable**      Anticoagulation Care Providers     Provider Role Specialty Phone number    José Antonio Bansk MD Referring Cardiology [phone]          Clinic Interview:  Patient Findings     Negatives:   Signs/symptoms of thrombosis, Signs/symptoms of bleeding,   Laboratory test error suspected, Change in health, Change in alcohol use,   Change in activity, Upcoming invasive procedure, Emergency department   visit, Upcoming dental procedure, Missed doses, Extra doses, Change in   medications, Change in diet/appetite, Hospital admission, Bruising, Other   complaints      Clinical Outcomes     Negatives:   Major bleeding event, Thromboembolic event,   Anticoagulation-related hospital admission, Anticoagulation-related ED   visit, Anticoagulation-related fatality        INR History:  Anticoagulation Monitoring 6/15/2020 2020 2020   INR 2.51 1.30 1.70   INR Date 6/15/2020 2020 2020   INR Goal 2.5-3.5 2.5-3.5 2.5-3.5   Trend Down Same Same   Last Week Total 17.5 mg 17.5 mg 22.5 mg   Next Week Total 17.5 mg 22.5 mg 22.5 mg   Sun - 2.5 mg -   Mon 2.5 mg - 5 mg ()   Tue 2.5 mg - 5 mg ()   Wed 2.5 mg - -   Thu - 5 mg () -   Fri - 5 mg () -   Sat - 2.5 mg -   Visit Report - - -    Some recent data might be hidden       Plan:  1. INR is Subtherapeutic today- see above in Anticoagulation Summary.   Will instruct Galdino Dallas to Change their warfarin regimen- see above in Anticoagulation Summary.  2. Continue enoxaparin SQ 80 mg q12h.  3. Follow up in 2 days  4. They have been instructed to call if any changes in medications, doses, concerns, etc. Patient expresses understanding and has no further questions at this time.    Joe De [facility]

## 2020-06-24 ENCOUNTER — ANTICOAGULATION VISIT (OUTPATIENT)
Dept: PHARMACY | Facility: HOSPITAL | Age: 35
End: 2020-06-24

## 2020-06-24 DIAGNOSIS — Z95.2 S/P AVR: ICD-10-CM

## 2020-06-24 DIAGNOSIS — I63.9 ACUTE CEREBROVASCULAR ACCIDENT (CVA) (HCC): ICD-10-CM

## 2020-06-24 DIAGNOSIS — Z79.01 ANTICOAGULANT LONG-TERM USE: ICD-10-CM

## 2020-06-24 LAB — INR PPP: 1.9

## 2020-06-24 NOTE — PROGRESS NOTES
Anticoagulation Clinic Progress Note    Anticoagulation Summary  As of 2020    INR goal:   2.5-3.5   TTR:   26.4 % (1.5 y)   INR used for dosin.90! (2020)   Warfarin maintenance plan:   2.5 mg every day   Weekly warfarin total:   17.5 mg   Plan last modified:   Joe De RPH (6/15/2020)   Next INR check:   2020   Priority:   High   Target end date:       Indications    Anticoagulant long-term use [Z79.01]  S/P AVR [Z95.2]  Acute cerebrovascular accident (CVA) (CMS/HCC) [I63.9]             Anticoagulation Episode Summary     INR check location:       Preferred lab:       Send INR reminders to:    [facility]    Comments:   [facility] **Call every time - may reconsider once stable**      Anticoagulation Care Providers     Provider Role Specialty Phone number    José Antonio Banks MD Referring Cardiology [phone]          Clinic Interview:      INR History:  Anticoagulation Monitoring 2020   INR 1.30 1.70 1.90   INR Date 2020   INR Goal 2.5-3.5 2.5-3.5 2.5-3.5   Trend Same Same Same   Last Week Total 17.5 mg 22.5 mg 27.5 mg   Next Week Total 22.5 mg 22.5 mg 20 mg   Sun 2.5 mg - -   Mon - 5 mg () -   Tue - 5 mg () -   Wed - - 5 mg ()   Thu 5 mg () - 2.5 mg   Fri 5 mg () - -   Sat 2.5 mg - -   Visit Report - - -   Some recent data might be hidden       Plan:  1. INR is Subtherapeutic today- see above in Anticoagulation Summary.   Will instruct Galdino Dallas to Increase their warfarin regimen to 5mg today, 2.5mg tomorrow and continue enoxaparin injections- see above in Anticoagulation Summary.  2. Follow up in 2 days---reevaluate dosing and enoxaparin injections then.  3. Left VM with instructions to call us if any questions--he called back and confirmed plan.  Sent Rx to [facility] in case he needs enoxaparin injection refill. They have been instructed to call if any changes in medications, doses, concerns,  etc.     Paulette Martin [facility]

## 2020-06-25 RX ORDER — WARFARIN SODIUM 5 MG/1
TABLET ORAL
Qty: 90 TABLET | Refills: 1 | Status: SHIPPED | OUTPATIENT
Start: 2020-06-25 | End: 2020-10-02 | Stop reason: SDUPTHER

## 2020-06-25 RX ORDER — WARFARIN SODIUM 7.5 MG/1
TABLET ORAL
Qty: 45 TABLET | Refills: 0 | OUTPATIENT
Start: 2020-06-25

## 2020-06-26 ENCOUNTER — ANTICOAGULATION VISIT (OUTPATIENT)
Dept: PHARMACY | Facility: HOSPITAL | Age: 35
End: 2020-06-26

## 2020-06-26 DIAGNOSIS — Z95.2 S/P AVR: ICD-10-CM

## 2020-06-26 DIAGNOSIS — I63.9 ACUTE CEREBROVASCULAR ACCIDENT (CVA) (HCC): ICD-10-CM

## 2020-06-26 DIAGNOSIS — Z79.01 ANTICOAGULANT LONG-TERM USE: ICD-10-CM

## 2020-06-26 LAB — INR PPP: 1.6

## 2020-06-26 NOTE — PROGRESS NOTES
Anticoagulation Clinic Progress Note    Anticoagulation Summary  As of 2020    INR goal:   2.5-3.5   TTR:   26.3 % (1.5 y)   INR used for dosin.60! (2020)   Warfarin maintenance plan:   2.5 mg every day   Weekly warfarin total:   17.5 mg   Plan last modified:   Joe De RP (6/15/2020)   Next INR check:   2020   Priority:   High   Target end date:       Indications    Anticoagulant long-term use [Z79.01]  S/P AVR [Z95.2]  Acute cerebrovascular accident (CVA) (CMS/HCC) [I63.9]             Anticoagulation Episode Summary     INR check location:       Preferred lab:       Send INR reminders to:    YANDY POLANCO [facility]    Comments:   [facility] **Call every time - may reconsider once stable**      Anticoagulation Care Providers     Provider Role Specialty Phone number    José Antonio Banks MD Referring Cardiology [phone]          Clinic Interview:      INR History:  Anticoagulation Monitoring 2020   INR 1.70 1.90 1.60   INR Date 2020   INR Goal 2.5-3.5 2.5-3.5 2.5-3.5   Trend Same Same Same   Last Week Total 22.5 mg 27.5 mg 22.5 mg   Next Week Total 22.5 mg 20 mg 25 mg   Sun - - 5 mg ()   Mon 5 mg () - -   Tue 5 mg () - -   Wed - 5 mg () -   Thu - 2.5 mg -   Fri - - 5 mg ()   Sat - - 5 mg ()   Visit Report - - -   Some recent data might be hidden       Plan:  1. INR is Subtherapeutic today- see above in Anticoagulation Summary. Likely did miss dose earlier this week.  Will instruct Galdino Dallas to Increase their warfarin regimen- see above in Anticoagulation Summary.  He will continue on enoxaparin injections until INR in therapeutic range.  2. Follow up in 3 days  3. Spoke with pt today. They have been instructed to call if any changes in medications, doses, concerns, etc. Patient expresses understanding and has no further questions at this time.    Paulette Martin [facility]

## 2020-06-29 ENCOUNTER — ANTICOAGULATION VISIT (OUTPATIENT)
Dept: PHARMACY | Facility: HOSPITAL | Age: 35
End: 2020-06-29

## 2020-06-29 DIAGNOSIS — I63.9 ACUTE CEREBROVASCULAR ACCIDENT (CVA) (HCC): ICD-10-CM

## 2020-06-29 DIAGNOSIS — Z79.01 ANTICOAGULANT LONG-TERM USE: ICD-10-CM

## 2020-06-29 DIAGNOSIS — Z95.2 S/P AVR: ICD-10-CM

## 2020-06-29 LAB — INR PPP: 2

## 2020-06-29 NOTE — PROGRESS NOTES
Anticoagulation Clinic Progress Note    Anticoagulation Summary  As of 2020    INR goal:   2.5-3.5   TTR:   26.1 % (1.5 y)   INR used for dosin.00! (2020)   Warfarin maintenance plan:   2.5 mg every day   Weekly warfarin total:   17.5 mg   Plan last modified:   Joe De RPH (6/15/2020)   Next INR check:   2020   Priority:   High   Target end date:       Indications    Anticoagulant long-term use [Z79.01]  S/P AVR [Z95.2]  Acute cerebrovascular accident (CVA) (CMS/HCC) [I63.9]             Anticoagulation Episode Summary     INR check location:       Preferred lab:       Send INR reminders to:   [facility]    Comments:   [facility] **Call every time - may reconsider once stable**      Anticoagulation Care Providers     Provider Role Specialty Phone number    José Antonio Banks MD Referring Cardiology [phone]          Clinic Interview:  Patient Findings     Positives:   Other complaints    Negatives:   Signs/symptoms of thrombosis, Signs/symptoms of bleeding,   Laboratory test error suspected, Change in health, Change in alcohol use,   Change in activity, Upcoming invasive procedure, Emergency department   visit, Upcoming dental procedure, Missed doses, Extra doses, Change in   medications, Change in diet/appetite, Hospital admission, Bruising    Comments:   Had to put dog down..       Clinical Outcomes     Negatives:   Major bleeding event, Thromboembolic event,   Anticoagulation-related hospital admission, Anticoagulation-related ED   visit, Anticoagulation-related fatality    Comments:   Had to put dog down..         INR History:  Anticoagulation Monitoring 2020   INR 1.90 1.60 2.00   INR Date 2020   INR Goal 2.5-3.5 2.5-3.5 2.5-3.5   Trend Same Same Same   Last Week Total 27.5 mg 22.5 mg 27.5 mg   Next Week Total 20 mg 25 mg 20 mg   Sun - 5 mg () -   Mon - - 5 mg ()   Tue - - 2.5 mg   Wed 5 mg () - -   Thu  2.5 mg - -   Fri - 5 mg (6/26) -   Sat - 5 mg (6/27) -   Visit Report - - -   Some recent data might be hidden       Plan:  1. INR is Subtherapeutic today- see above in Anticoagulation Summary.   Will instruct Galdino [place] to Change their warfarin regimen- see above in Anticoagulation Summary.  2. Continue enoxaparin 80 mg q12h through tonight (expect INR to continue upwards following 4 straight 5-mg doses).  3. Follow up in 2 days  4. They have been instructed to call if any changes in medications, doses, concerns, etc. Patient expresses understanding and has no further questions at this time.    Joe De [facility]

## 2020-07-01 ENCOUNTER — ANTICOAGULATION VISIT (OUTPATIENT)
Dept: PHARMACY | Facility: HOSPITAL | Age: 35
End: 2020-07-01

## 2020-07-01 DIAGNOSIS — Z95.2 S/P AVR: ICD-10-CM

## 2020-07-01 DIAGNOSIS — Z79.01 ANTICOAGULANT LONG-TERM USE: ICD-10-CM

## 2020-07-01 DIAGNOSIS — I63.9 ACUTE CEREBROVASCULAR ACCIDENT (CVA) (HCC): ICD-10-CM

## 2020-07-01 LAB — INR PPP: 1.5

## 2020-07-01 NOTE — PROGRESS NOTES
Anticoagulation Clinic Progress Note    Anticoagulation Summary  As of 2020    INR goal:   2.5-3.5   TTR:   26.0 % (1.5 y)   INR used for dosin.50! (2020)   Warfarin maintenance plan:   2.5 mg every day   Weekly warfarin total:   17.5 mg   Plan last modified:   Joe De RPH (6/15/2020)   Next INR check:   7/3/2020   Priority:   High   Target end date:       Indications    Anticoagulant long-term use [Z79.01]  S/P AVR [Z95.2]  Acute cerebrovascular accident (CVA) (CMS/HCC) [I63.9]             Anticoagulation Episode Summary     INR check location:       Preferred lab:       Send INR reminders to:    [facility]Y POL[facility]    Comments:   [facility] **Call every time - may reconsider once stable**      Anticoagulation Care Providers     Provider Role Specialty Phone number    José Antonio Banks MD Referring Cardiology [phone]          Clinic Interview:      INR History:  Anticoagulation Monitoring 2020   INR 1.60 2.00 1.50   INR Date 2020   INR Goal 2.5-3.5 2.5-3.5 2.5-3.5   Trend Same Same Same   Last Week Total 22.5 mg 27.5 mg 25 mg   Next Week Total 25 mg 20 mg 25 mg   Sun 5 mg () - -   Mon - 5 mg () -   Tue - 2.5 mg -   Wed - - 7.5 mg ()   Thu - - 5 mg ()   Fri 5 mg () - -   Sat 5 mg () - -   Visit Report - - -   Some recent data might be hidden       Plan:  1. INR is Subtherapeutic today- see above in Anticoagulation Summary.   Will instruct Galdino [place] to Increase their warfarin regimen- see above in Anticoagulation Summary.  2. Follow up in 2 days  3. Spoke with pt today. They have been instructed to call if any changes in medications, doses, concerns, etc. Patient expresses understanding and has no further questions at this time.    Paulette Martin [facility]

## 2020-07-03 ENCOUNTER — ANTICOAGULATION VISIT (OUTPATIENT)
Dept: PHARMACY | Facility: HOSPITAL | Age: 35
End: 2020-07-03

## 2020-07-03 DIAGNOSIS — I63.9 ACUTE CEREBROVASCULAR ACCIDENT (CVA) (HCC): ICD-10-CM

## 2020-07-03 DIAGNOSIS — Z95.2 S/P AVR: ICD-10-CM

## 2020-07-03 DIAGNOSIS — Z79.01 ANTICOAGULANT LONG-TERM USE: ICD-10-CM

## 2020-07-03 LAB — INR PPP: 1.6

## 2020-07-03 NOTE — PROGRESS NOTES
Anticoagulation Clinic Progress Note    Anticoagulation Summary  As of 7/3/2020    INR goal:   2.5-3.5   TTR:   25.9 % (1.5 y)   INR used for dosin.60! (7/3/2020)   Warfarin maintenance plan:   2.5 mg every day   Weekly warfarin total:   17.5 mg   Plan last modified:   Joe De RPH (6/15/2020)   Next INR check:   2020   Priority:   High   Target end date:       Indications    Anticoagulant long-term use [Z79.01]  S/P AVR [Z95.2]  Acute cerebrovascular accident (CVA) (CMS/HCC) [I63.9]             Anticoagulation Episode Summary     INR check location:       Preferred lab:       Send INR reminders to:    [facility]    Comments:   [facility] **Call every time - may reconsider once stable**      Anticoagulation Care Providers     Provider Role Specialty Phone number    José Antonio Banks MD Referring Cardiology [phone]          Clinic Interview:      INR History:  Anticoagulation Monitoring 2020 2020 7/3/2020   INR 2.00 1.50 1.60   INR Date 2020 2020 7/3/2020   INR Goal 2.5-3.5 2.5-3.5 2.5-3.5   Trend Same Same Same   Last Week Total 27.5 mg 25 mg 35 mg   Next Week Total 20 mg 25 mg 22.5 mg   Sun - - 2.5 mg   Mon 5 mg () - -   Tue 2.5 mg - -   Wed - 7.5 mg () -   Thu - 5 mg () -   Fri - - 5 mg (7/3)   Sat - - 5 mg ()   Visit Report - - -   Some recent data might be hidden       Plan:  1. INR is Subtherapeutic today- see above in Anticoagulation Summary.   Will instruct Galdino Dallas to Change their warfarin regimen and give 5 mg of warfarin on 7/3 and ; couldn't get a hold of patient so left HIPAA friendly VM on the patient's cell phone- see above in Anticoagulation Summary.  2. Follow up in 3 days  3.  Pt has agreed to only be called if INR out of range. They have been instructed to call if any changes in medications, doses, concerns, etc. Patient expresses understanding and has no further questions at this time.    Ahsan Driscoll, [facility]

## 2020-07-06 ENCOUNTER — ANTICOAGULATION VISIT (OUTPATIENT)
Dept: PHARMACY | Facility: HOSPITAL | Age: 35
End: 2020-07-06

## 2020-07-06 DIAGNOSIS — I63.9 ACUTE CEREBROVASCULAR ACCIDENT (CVA) (HCC): ICD-10-CM

## 2020-07-06 DIAGNOSIS — Z95.2 S/P AVR: ICD-10-CM

## 2020-07-06 DIAGNOSIS — Z79.01 ANTICOAGULANT LONG-TERM USE: ICD-10-CM

## 2020-07-06 LAB — INR PPP: 2.1

## 2020-07-06 NOTE — PROGRESS NOTES
Anticoagulation Clinic Progress Note    Anticoagulation Summary  As of 2020    INR goal:   2.5-3.5   TTR:   25.8 % (1.6 y)   INR used for dosin.10! (2020)   Warfarin maintenance plan:   2.5 mg every day   Weekly warfarin total:   17.5 mg   Plan last modified:   Joe De RPH (6/15/2020)   Next INR check:   2020   Priority:   High   Target end date:       Indications    Anticoagulant long-term use [Z79.01]  S/P AVR [Z95.2]  Acute cerebrovascular accident (CVA) (CMS/HCC) [I63.9]             Anticoagulation Episode Summary     INR check location:       Preferred lab:       Send INR reminders to:    [facility]    Comments:   [facility] **Call every time - may reconsider once stable**      Anticoagulation Care Providers     Provider Role Specialty Phone number    José Antonio Banks MD Referring Cardiology [phone]          Clinic Interview:  Patient Findings     Positives:   Extra doses    Negatives:   Signs/symptoms of thrombosis, Signs/symptoms of bleeding,   Laboratory test error suspected, Change in health, Change in alcohol use,   Change in activity, Upcoming invasive procedure, Emergency department   visit, Upcoming dental procedure, Missed doses, Change in medications,   Change in diet/appetite, Hospital admission, Bruising, Other complaints    Comments:   Reports taking 7.5 mg Fri rather than 5 mg.      Clinical Outcomes     Negatives:   Major bleeding event, Thromboembolic event,   Anticoagulation-related hospital admission, Anticoagulation-related ED   visit, Anticoagulation-related fatality    Comments:   Reports taking 7.5 mg Fri rather than 5 mg.        INR History:  Anticoagulation Monitoring 2020 7/3/2020 2020   INR 1.50 1.60 2.10   INR Date 2020 7/3/2020 2020   INR Goal 2.5-3.5 2.5-3.5 2.5-3.5   Trend Same Same Same   Last Week Total 25 mg 32.5 mg 32.5 mg   Next Week Total 25 mg 22.5 mg 25 mg   Sun - 2.5 mg -   Mon - - 7.5 mg ()   Tue - - 5 mg  (7/7)   Wed 7.5 mg (7/1) - -   Thu 5 mg (7/2) - -   Fri - 5 mg (7/3) -   Sat - 5 mg (7/4) -   Visit Report - - -   Some recent data might be hidden       Plan:  1. INR is Subtherapeutic today- see above in Anticoagulation Summary.   Will instruct Galdino Dallas to Change their warfarin regimen- see above in Anticoagulation Summary.  2. Continue enoxaparin SQ 80 mg q12h.   3. Follow up in 2 days  4. They have been instructed to call if any changes in medications, doses, concerns, etc. Patient expresses understanding and has no further questions at this time.    Joe De [facility]

## 2020-07-08 ENCOUNTER — ANTICOAGULATION VISIT (OUTPATIENT)
Dept: PHARMACY | Facility: HOSPITAL | Age: 35
End: 2020-07-08

## 2020-07-08 DIAGNOSIS — I63.9 ACUTE CEREBROVASCULAR ACCIDENT (CVA) (HCC): ICD-10-CM

## 2020-07-08 DIAGNOSIS — Z95.2 S/P AVR: ICD-10-CM

## 2020-07-08 DIAGNOSIS — Z79.01 ANTICOAGULANT LONG-TERM USE: ICD-10-CM

## 2020-07-08 LAB — INR PPP: 2.6

## 2020-07-08 NOTE — PROGRESS NOTES
Anticoagulation Clinic Progress Note    Anticoagulation Summary  As of 2020    INR goal:   2.5-3.5   TTR:   25.8 % (1.6 y)   INR used for dosin.60 (2020)   Warfarin maintenance plan:   2.5 mg every day   Weekly warfarin total:   17.5 mg   Plan last modified:   Joe De RP (6/15/2020)   Next INR check:   7/10/2020   Priority:   High   Target end date:       Indications    Anticoagulant long-term use [Z79.01]  S/P AVR [Z95.2]  Acute cerebrovascular accident (CVA) (CMS/HCC) [I63.9]             Anticoagulation Episode Summary     INR check location:       Preferred lab:       [facility]:    [facility]    Comments:   [facility] **Call every time - may reconsider once stable**      Anticoagulation Care Providers     Provider Role Specialty Phone number    José Antonio Banks MD Referring Cardiology [phone]          Clinic Interview:      INR History:  Anticoagulation Monitoring 7/3/2020 2020 2020   INR 1.60 2.10 2.60   INR Date 7/3/2020 2020 2020   INR Goal 2.5-3.5 2.5-3.5 2.5-3.5   Trend Same Same Same   Last Week Total 32.5 mg 32.5 mg 37.5 mg   Next Week Total 22.5 mg 25 mg 22.5 mg   Sun 2.5 mg - -   Mon - 7.5 mg () -   Tue - 5 mg () -   Wed - - 5 mg ()   Thu - - 5 mg ()   Fri 5 mg (7/3) - -   Sat 5 mg () - -   Visit Report - - -   Some recent data might be hidden       Plan:  1. INR is Therapeutic today- see above in Anticoagulation Summary.   Will instruct Galdino Dallas to take warfarin 5mg x 2 days and stop enoxaparin injections- see above in Anticoagulation Summary.  2. Follow up in 2 days  3. Spoke with pt today. They have been instructed to call if any changes in medications, doses, concerns, etc. Patient expresses understanding and has no further questions at this time.    Paulette Matrin [facility]

## 2020-07-10 ENCOUNTER — ANTICOAGULATION VISIT (OUTPATIENT)
Dept: PHARMACY | Facility: HOSPITAL | Age: 35
End: 2020-07-10

## 2020-07-10 DIAGNOSIS — Z79.01 ANTICOAGULANT LONG-TERM USE: ICD-10-CM

## 2020-07-10 DIAGNOSIS — I63.9 ACUTE CEREBROVASCULAR ACCIDENT (CVA) (HCC): ICD-10-CM

## 2020-07-10 DIAGNOSIS — Z95.2 S/P AVR: ICD-10-CM

## 2020-07-10 LAB — INR PPP: 2

## 2020-07-13 ENCOUNTER — ANTICOAGULATION VISIT (OUTPATIENT)
Dept: PHARMACY | Facility: HOSPITAL | Age: 35
End: 2020-07-13

## 2020-07-13 DIAGNOSIS — I63.9 ACUTE CEREBROVASCULAR ACCIDENT (CVA) (HCC): ICD-10-CM

## 2020-07-13 DIAGNOSIS — Z79.01 ANTICOAGULANT LONG-TERM USE: ICD-10-CM

## 2020-07-13 DIAGNOSIS — Z95.2 S/P AVR: ICD-10-CM

## 2020-07-13 LAB — INR PPP: 2.1

## 2020-07-13 NOTE — PROGRESS NOTES
Anticoagulation Clinic Progress Note    Anticoagulation Summary  As of 2020    INR goal:   2.5-3.5   TTR:   25.6 % (1.6 y)   INR used for dosin.10! (2020)   Warfarin maintenance plan:   2.5 mg every day   Weekly warfarin total:   17.5 mg   Plan last modified:   Joe De RP (6/15/2020)   Next INR check:   7/15/2020   Priority:   High   Target end date:       Indications    Anticoagulant long-term use [Z79.01]  S/P AVR [Z95.2]  Acute cerebrovascular accident (CVA) (CMS/HCC) [I63.9]             Anticoagulation Episode Summary     INR check location:       Preferred lab:       Send INR reminders to:    YANDY POLANCO [facility]    Comments:   [facility] **Call every time - may reconsider once stable**      Anticoagulation Care Providers     Provider Role Specialty Phone number    José Antonio Banks MD Referring Cardiology [phone]          Clinic Interview:      INR History:  Anticoagulation Monitoring 2020 7/10/2020 2020   INR 2.60 2.00 2.10   INR Date 2020 7/10/2020 2020   INR Goal 2.5-3.5 2.5-3.5 2.5-3.5   Trend Same Same Same   Last Week Total 37.5 mg 37.5 mg 40 mg   Next Week Total 22.5 mg 27.5 mg 27.5 mg   Sun - 5 mg () -   Mon - - 7.5 mg ()   Tue - - 7.5 mg ()   Wed 5 mg () - -   Thu 5 mg () - -   Fri - 7.5 mg (7/10) -   Sat - 5 mg () -   Visit Report - - -   Some recent data might be hidden       Plan:  1. INR is Subtherapeutic today- see above in Anticoagulation Summary.   Will instruct Galdino Dallas to Increase their warfarin regimen to 7.5mg x 2 days and continue enoxaparin injections- see above in Anticoagulation Summary.  2. Follow up in 3 days  3. Spoke with pt today. They have been instructed to call if any changes in medications, doses, concerns, etc. Patient expresses understanding and has no further questions at this time.    Paulette Martin [facility]

## 2020-07-15 ENCOUNTER — ANTICOAGULATION VISIT (OUTPATIENT)
Dept: PHARMACY | Facility: HOSPITAL | Age: 35
End: 2020-07-15

## 2020-07-15 DIAGNOSIS — Z95.2 S/P AVR: ICD-10-CM

## 2020-07-15 DIAGNOSIS — Z79.01 ANTICOAGULANT LONG-TERM USE: ICD-10-CM

## 2020-07-15 DIAGNOSIS — I63.9 ACUTE CEREBROVASCULAR ACCIDENT (CVA) (HCC): ICD-10-CM

## 2020-07-15 LAB — INR PPP: 3.4

## 2020-07-15 NOTE — PROGRESS NOTES
Anticoagulation Clinic Progress Note    Anticoagulation Summary  As of 7/15/2020    INR goal:   2.5-3.5   TTR:   25.8 % (1.6 y)   INR used for dosing:   3.40 (7/15/2020)   Warfarin maintenance plan:   2.5 mg every day   Weekly warfarin total:   17.5 mg   Plan last modified:   Joe De RPH (6/15/2020)   Next INR check:   7/17/2020   Priority:   High   Target end date:       Indications    Anticoagulant long-term use [Z79.01]  S/P AVR [Z95.2]  Acute cerebrovascular accident (CVA) (CMS/HCC) [I63.9]             Anticoagulation Episode Summary     INR check location:       Preferred lab:       Send INR reminders to:    [facility]    Comments:   [facility] **Call every time - may reconsider once stable**      Anticoagulation Care Providers     Provider Role Specialty Phone number    José Antonio Banks MD Referring Cardiology [phone]          Clinic Interview:  Patient Findings     Negatives:   Signs/symptoms of thrombosis, Signs/symptoms of bleeding,   Laboratory test error suspected, Change in health, Change in alcohol use,   Change in activity, Upcoming invasive procedure, Emergency department   visit, Upcoming dental procedure, Missed doses, Extra doses, Change in   medications, Change in diet/appetite, Hospital admission, Bruising, Other   complaints      Clinical Outcomes     Negatives:   Major bleeding event, Thromboembolic event,   Anticoagulation-related hospital admission, Anticoagulation-related ED   visit, Anticoagulation-related fatality        INR History:  Anticoagulation Monitoring 7/10/2020 7/13/2020 7/15/2020   INR 2.00 2.10 3.40   INR Date 7/10/2020 7/13/2020 7/15/2020   INR Goal 2.5-3.5 2.5-3.5 2.5-3.5   Trend Same Same Same   Last Week Total 37.5 mg 40 mg 42.5 mg   Next Week Total 27.5 mg 27.5 mg 22.5 mg   Sun 5 mg (7/12) - -   Mon - 7.5 mg (7/13) -   Tue - 7.5 mg (7/14) -   Wed - - 5 mg (7/15)   Thu - - 5 mg (7/16)   Fri 7.5 mg (7/10) - -   Sat 5 mg (7/11) - -   Visit  Report - - -   Some recent data might be hidden       Plan:  1. INR is Therapeutic today- see above in Anticoagulation Summary.   Will instruct Galdino YRN Burt to Change their warfarin regimen- see above in Anticoagulation Summary.  2. Follow up in 2 days  3. They have been instructed to call if any changes in medications, doses, concerns, etc. Patient expresses understanding and has no further questions at this time.    Joe De [facility]

## 2020-07-17 ENCOUNTER — ANTICOAGULATION VISIT (OUTPATIENT)
Dept: PHARMACY | Facility: HOSPITAL | Age: 35
End: 2020-07-17

## 2020-07-17 DIAGNOSIS — I63.9 ACUTE CEREBROVASCULAR ACCIDENT (CVA) (HCC): ICD-10-CM

## 2020-07-17 DIAGNOSIS — Z95.2 S/P AVR: ICD-10-CM

## 2020-07-17 DIAGNOSIS — Z79.01 ANTICOAGULANT LONG-TERM USE: ICD-10-CM

## 2020-07-17 LAB — INR PPP: 2.4

## 2020-07-17 NOTE — PROGRESS NOTES
Anticoagulation Clinic Progress Note    Anticoagulation Summary  As of 2020    INR goal:   2.5-3.5   TTR:   26.0 % (1.6 y)   INR used for dosin.40! (2020)   Warfarin maintenance plan:   2.5 mg every day   Weekly warfarin total:   17.5 mg   Plan last modified:   Joe De RPH (6/15/2020)   Next INR check:   2020   Priority:   High   Target end date:       Indications    Anticoagulant long-term use [Z79.01]  S/P AVR [Z95.2]  Acute cerebrovascular accident (CVA) (CMS/[facility]) [I63.9]             Anticoagulation Episode Summary     INR check location:       Preferred lab:       Send INR reminders to:   ALE[facility]ANCO [facility] POOL    Comments:   [facility] **Call every time - may reconsider once stable**      Anticoagulation Care Providers     Provider Role Specialty Phone number    José Antonio Banks MD Referring Cardiology [phone]          Clinic Interview:  Patient Findings     Positives:   Other complaints    Negatives:   Signs/symptoms of thrombosis, Signs/symptoms of bleeding,   Laboratory test error suspected, Change in health, Change in alcohol use,   Change in activity, Upcoming invasive procedure, Emergency department   visit, Upcoming dental procedure, Missed doses, Extra doses, Change in   medications, Change in diet/appetite, Hospital admission, Bruising    Comments:   Took last night's dose this morning (~10 hrs late).      Clinical Outcomes     Negatives:   Major bleeding event, Thromboembolic event,   Anticoagulation-related hospital admission, Anticoagulation-related ED   visit, Anticoagulation-related fatality    Comments:   Took last night's dose this morning (~10 hrs late).        INR History:  Anticoagulation Monitoring 2020 7/15/2020 2020   INR 2.10 3.40 2.40   INR Date 2020 7/15/2020 2020   INR Goal 2.5-3.5 2.5-3.5 2.5-3.5   Trend Same Same Same   Last Week Total 40 mg 42.5 mg 42.5 mg   Next Week Total 27.5 mg 22.5 mg 30 mg   Sun - - 7.5 mg ()    Mon 7.5 mg (7/13) - -   Tue 7.5 mg (7/14) - -   Wed - 5 mg (7/15) -   Thu - 5 mg (7/16) -   Fri - - 7.5 mg (7/17)   Sat - - 5 mg (7/18)   Visit Report - - -   Some recent data might be hidden       Plan:  1. INR is Subtherapeutic today- see above in Anticoagulation Summary.   Will instruct Galdino Dallas to Change their warfarin regimen- see above in Anticoagulation Summary.  2. Follow up in 3 days  3. They have been instructed to call if any changes in medications, doses, concerns, etc. Patient expresses understanding and has no further questions at this time.    Joe De [facility]

## 2020-07-20 ENCOUNTER — ANTICOAGULATION VISIT (OUTPATIENT)
Dept: PHARMACY | Facility: HOSPITAL | Age: 35
End: 2020-07-20

## 2020-07-20 DIAGNOSIS — I63.9 ACUTE CEREBROVASCULAR ACCIDENT (CVA) (HCC): ICD-10-CM

## 2020-07-20 DIAGNOSIS — Z95.2 S/P AVR: ICD-10-CM

## 2020-07-20 DIAGNOSIS — Z79.01 ANTICOAGULANT LONG-TERM USE: ICD-10-CM

## 2020-07-20 LAB — INR PPP: 2.6

## 2020-07-20 NOTE — PROGRESS NOTES
Anticoagulation Clinic Progress Note    Anticoagulation Summary  As of 2020    INR goal:   2.5-3.5   TTR:   26.1 % (1.6 y)   INR used for dosin.60 (2020)   Warfarin maintenance plan:   5 mg every Tue, Thu, Sat; 7.5 mg all other days   Weekly warfarin total:   45 mg   Plan last modified:   Joe De [facility] (2020)   Next INR check:   2020   Priority:   High   Target end date:       Indications    Anticoagulant long-term use [Z79.01]  S/P AVR [Z95.2]  Acute cerebrovascular accident (CVA) (CMS/HCC) [I63.9]             Anticoagulation Episode Summary     INR check location:       Preferred lab:       Send INR reminders to:   [facility]ANCO [facility] POOL    Comments:   [facility] **Call every time - may reconsider once stable**      Anticoagulation Care Providers     Provider Role Specialty Phone number    José Antonio Banks MD Referring Cardiology [phone]          Clinic Interview:  Patient Findings     Negatives:   Signs/symptoms of thrombosis, Signs/symptoms of bleeding,   Laboratory test error suspected, Change in health, Change in alcohol use,   Change in activity, Upcoming invasive procedure, Emergency department   visit, Upcoming dental procedure, Missed doses, Extra doses, Change in   medications, Change in diet/appetite, Hospital admission, Bruising, Other   complaints      Clinical Outcomes     Negatives:   Major bleeding event, Thromboembolic event,   Anticoagulation-related hospital admission, Anticoagulation-related ED   visit, Anticoagulation-related fatality        INR History:  Anticoagulation Monitoring 7/15/2020 2020 2020   INR 3.40 2.40 2.60   INR Date 7/15/2020 2020 2020   INR Goal 2.5-3.5 2.5-3.5 2.5-3.5   Trend Same Same Up   Last Week Total 42.5 mg 42.5 mg 45 mg   Next Week Total 22.5 mg 30 mg 45 mg   Sun - 7.5 mg () -   Mon - - 7.5 mg   Tue - - 5 mg   Wed 5 mg (7/15) - 7.5 mg   Thu 5 mg () - -   Fri - 7.5 mg () -   Sat - 5 mg () -    Visit Report - - -   Some recent data might be hidden       Plan:  1. INR is Therapeutic today- see above in Anticoagulation Summary.   Will instruct Galdino Jonesery to Change their warfarin regimen- see above in Anticoagulation Summary.  2. Follow up in 3 days  3. They have been instructed to call if any changes in medications, doses, concerns, etc. Patient expresses understanding and has no further questions at this time.    Joe De [facility]

## 2020-07-23 ENCOUNTER — ANTICOAGULATION VISIT (OUTPATIENT)
Dept: PHARMACY | Facility: HOSPITAL | Age: 35
End: 2020-07-23

## 2020-07-23 DIAGNOSIS — I63.9 ACUTE CEREBROVASCULAR ACCIDENT (CVA) (HCC): ICD-10-CM

## 2020-07-23 DIAGNOSIS — Z95.2 S/P AVR: ICD-10-CM

## 2020-07-23 DIAGNOSIS — Z79.01 ANTICOAGULANT LONG-TERM USE: ICD-10-CM

## 2020-07-23 LAB — INR PPP: 3.4

## 2020-07-23 NOTE — PROGRESS NOTES
Anticoagulation Clinic Progress Note    Anticoagulation Summary  As of 7/23/2020    INR goal:   2.5-3.5   TTR:   26.5 % (1.6 y)   INR used for dosing:   3.40 (7/23/2020)   Warfarin maintenance plan:   7.5 mg every Mon, Wed, Sat; 5 mg all other days   Weekly warfarin total:   42.5 mg   Plan last modified:   Joe De RPH (7/23/2020)   Next INR check:   7/27/2020   Priority:   High   Target end date:       Indications    Anticoagulant long-term use [Z79.01]  S/P AVR [Z95.2]  Acute cerebrovascular accident (CVA) (CMS/HCC) [I63.9]             Anticoagulation Episode Summary     INR check location:       Preferred lab:       Send INR reminders to:   [facility] POLANCO [facility]ICAL POOL    Comments:   [facility] **Call every time**      Anticoagulation Care Providers     Provider Role Specialty Phone number    José Antonio Banks MD Referring Cardiology [phone]          Clinic Interview:  Patient Findings     Negatives:   Signs/symptoms of thrombosis, Signs/symptoms of bleeding,   Laboratory test error suspected, Change in health, Change in alcohol use,   Change in activity, Upcoming invasive procedure, Emergency department   visit, Upcoming dental procedure, Missed doses, Extra doses, Change in   medications, Change in diet/appetite, Hospital admission, Bruising, Other   complaints      Clinical Outcomes     Negatives:   Major bleeding event, Thromboembolic event,   Anticoagulation-related hospital admission, Anticoagulation-related ED   visit, Anticoagulation-related fatality        INR History:  Anticoagulation Monitoring 7/17/2020 7/20/2020 7/23/2020   INR 2.40 2.60 3.40   INR Date 7/17/2020 7/20/2020 7/23/2020   INR Goal 2.5-3.5 2.5-3.5 2.5-3.5   Trend Same Up Down   Last Week Total 42.5 mg 45 mg 45 mg   Next Week Total 30 mg 45 mg 42.5 mg   Sun 7.5 mg (7/19) - 5 mg   Mon - 7.5 mg -   Tue - 5 mg -   Wed - 7.5 mg -   Thu - - 5 mg   Fri 7.5 mg (7/17) - 5 mg   Sat 5 mg (7/18) - 7.5 mg   Visit Report - - -   Some recent  data might be hidden       Plan:  1. INR is Therapeutic today- see above in Anticoagulation Summary.   Will instruct Galdino [place] to Change their warfarin regimen- see above in Anticoagulation Summary.  2. Follow up in 4 days  3. They have been instructed to call if any changes in medications, doses, concerns, etc. Patient expresses understanding and has no further questions at this time.    Joe De [facility]

## 2020-07-28 ENCOUNTER — LAB (OUTPATIENT)
Dept: LAB | Facility: HOSPITAL | Age: 35
End: 2020-07-28

## 2020-07-28 ENCOUNTER — ANTICOAGULATION VISIT (OUTPATIENT)
Dept: PHARMACY | Facility: HOSPITAL | Age: 35
End: 2020-07-28

## 2020-07-28 DIAGNOSIS — Z79.01 ANTICOAGULANT LONG-TERM USE: ICD-10-CM

## 2020-07-28 DIAGNOSIS — Z95.2 S/P AVR: ICD-10-CM

## 2020-07-28 DIAGNOSIS — I63.9 ACUTE CEREBROVASCULAR ACCIDENT (CVA) (HCC): ICD-10-CM

## 2020-07-28 LAB
INR PPP: 8.94 (ref 2–3)
PROTHROMBIN TIME: 90.1 SECONDS (ref 19.4–28.5)

## 2020-07-28 PROCEDURE — 36415 COLL VENOUS BLD VENIPUNCTURE: CPT

## 2020-07-28 PROCEDURE — 85610 PROTHROMBIN TIME: CPT

## 2020-07-28 NOTE — PROGRESS NOTES
Anticoagulation Clinic Progress Note    Anticoagulation Summary  As of 2020    INR goal:   2.5-3.5   TTR:   26.3 % (1.6 y)   INR used for dosin.94! (2020)   Warfarin maintenance plan:   7.5 mg every Mon, Wed, Sat; 5 mg all other days   Weekly warfarin total:   42.5 mg   Plan last modified:   Joe De RP (2020)   Next INR check:   2020   Priority:   High   Target end date:       Indications    Anticoagulant long-term use [Z79.01]  S/P AVR [Z95.2]  Acute cerebrovascular accident (CVA) (CMS/HCC) [I63.9]             Anticoagulation Episode Summary     INR check location:       Preferred lab:       Send INR reminders to:   [facility]ANCO [facility] POOL    Comments:   [facility] **Call every time**      Anticoagulation Care Providers     Provider Role Specialty Phone number    José Antonio Banks MD Referring Cardiology [phone]          Clinic Interview:  Patient Findings     Positives:   Change in medications, Other complaints    Negatives:   Signs/symptoms of thrombosis, Signs/symptoms of bleeding,   Laboratory test error suspected, Change in health, Change in alcohol use,   Change in activity, Upcoming invasive procedure, Emergency department   visit, Upcoming dental procedure, Missed doses, Extra doses, Change in   diet/appetite, Hospital admission, Bruising    Comments:   Increased stress in past week. Increased APAP, but <2000   mg/day.      Clinical Outcomes     Negatives:   Major bleeding event, Thromboembolic event,   Anticoagulation-related hospital admission, Anticoagulation-related ED   visit, Anticoagulation-related fatality    Comments:   Increased stress in past week. Increased APAP, but <2000   mg/day.        INR History:  Anticoagulation Monitoring 2020   INR 2.60 3.40 8.94   INR Date 2020   INR Goal 2.5-3.5 2.5-3.5 2.5-3.5   Trend Up Down Same   Last Week Total 45 mg 45 mg 42.5 mg   Next Week Total 45 mg 42.5 mg 30 mg    Sun - 5 mg -   Mon 7.5 mg - -   Tue 5 mg - Hold (7/28)   Wed 7.5 mg - Hold (7/29)   Thu - 5 mg -   Fri - 5 mg -   Sat - 7.5 mg -   Visit Report - - -   Some recent data might be hidden       Plan:  1. INR is Supratherapeutic today- see above in Anticoagulation Summary.   Will instruct Galdino Dallas to HOLD their warfarin regimen- see above in Anticoagulation Summary.  2. Follow up in 2 days  3. They have been instructed to call if any changes in medications, doses, concerns, etc. To seek immediate medical attention if s/sx of bleeding develop or fall occurs. Patient expresses understanding and has no further questions at this time.    Joe De [facility]

## 2020-07-30 ENCOUNTER — ANTICOAGULATION VISIT (OUTPATIENT)
Dept: PHARMACY | Facility: HOSPITAL | Age: 35
End: 2020-07-30

## 2020-07-30 ENCOUNTER — LAB (OUTPATIENT)
Dept: LAB | Facility: HOSPITAL | Age: 35
End: 2020-07-30

## 2020-07-30 DIAGNOSIS — Z79.01 ANTICOAGULANT LONG-TERM USE: ICD-10-CM

## 2020-07-30 DIAGNOSIS — I63.9 ACUTE CEREBROVASCULAR ACCIDENT (CVA) (HCC): ICD-10-CM

## 2020-07-30 DIAGNOSIS — Z95.2 S/P AVR: ICD-10-CM

## 2020-07-30 LAB
INR PPP: 3.18 (ref 2–3)
PROTHROMBIN TIME: 29.9 SECONDS (ref 19.4–28.5)

## 2020-07-30 PROCEDURE — 36415 COLL VENOUS BLD VENIPUNCTURE: CPT

## 2020-07-30 PROCEDURE — 85610 PROTHROMBIN TIME: CPT

## 2020-07-30 NOTE — PROGRESS NOTES
Anticoagulation Clinic Progress Note    Anticoagulation Summary  As of 7/30/2020    INR goal:   2.5-3.5   TTR:   26.2 % (1.6 y)   INR used for dosing:   3.18 (7/30/2020)   Warfarin maintenance plan:   7.5 mg every Mon, Wed, Sat; 5 mg all other days   Weekly warfarin total:   42.5 mg   Plan last modified:   Joe De RP (7/23/2020)   Next INR check:   8/3/2020   Priority:   High   Target end date:       Indications    Anticoagulant long-term use [Z79.01]  S/P AVR [Z95.2]  Acute cerebrovascular accident (CVA) (CMS/HCC) [I63.9]             Anticoagulation Episode Summary     INR check location:       Preferred lab:       Send INR reminders to:   [facility]ANCO [facility] POOL    Comments:   [facility] **Call every time**      Anticoagulation Care Providers     Provider Role Specialty Phone number    José Antonio Banks MD Referring Cardiology [phone]          Clinic Interview:  Patient Findings     Negatives:   Signs/symptoms of thrombosis, Signs/symptoms of bleeding,   Laboratory test error suspected, Change in health, Change in alcohol use,   Change in activity, Upcoming invasive procedure, Emergency department   visit, Upcoming dental procedure, Missed doses, Extra doses, Change in   medications, Change in diet/appetite, Hospital admission, Bruising, Other   complaints      Clinical Outcomes     Negatives:   Major bleeding event, Thromboembolic event,   Anticoagulation-related hospital admission, Anticoagulation-related ED   visit, Anticoagulation-related fatality        INR History:  Anticoagulation Monitoring 7/23/2020 7/28/2020 7/30/2020   INR 3.40 8.94 3.18   INR Date 7/23/2020 7/28/2020 7/30/2020   INR Goal 2.5-3.5 2.5-3.5 2.5-3.5   Trend Down Same Same   Last Week Total 45 mg 42.5 mg 30 mg   Next Week Total 42.5 mg 30 mg 45 mg   Sun 5 mg - 5 mg   Mon - - -   Tue - Hold (7/28) -   Wed - Hold (7/29) -   Thu 5 mg - 7.5 mg (7/30)   Fri 5 mg - 5 mg   Sat 7.5 mg - 7.5 mg   Visit Report - - -   Some recent  data might be hidden       Plan:  1. INR is Therapeutic today- see above in Anticoagulation Summary.   Will instruct Galdino [place] to Change their warfarin regimen- see above in Anticoagulation Summary.  2. Follow up in 4 days  3. They have been instructed to call if any changes in medications, doses, concerns, etc. Patient expresses understanding and has no further questions at this time.    Joe De [facility]

## 2020-08-03 ENCOUNTER — LAB (OUTPATIENT)
Dept: LAB | Facility: HOSPITAL | Age: 35
End: 2020-08-03

## 2020-08-03 DIAGNOSIS — Z95.2 S/P AVR: ICD-10-CM

## 2020-08-03 DIAGNOSIS — Z79.01 ANTICOAGULANT LONG-TERM USE: ICD-10-CM

## 2020-08-03 LAB
INR PPP: 4.22 (ref 2–3)
PROTHROMBIN TIME: 39.6 SECONDS (ref 19.4–28.5)

## 2020-08-03 PROCEDURE — 85610 PROTHROMBIN TIME: CPT

## 2020-08-03 PROCEDURE — 36415 COLL VENOUS BLD VENIPUNCTURE: CPT

## 2020-08-04 ENCOUNTER — ANTICOAGULATION VISIT (OUTPATIENT)
Dept: PHARMACY | Facility: HOSPITAL | Age: 35
End: 2020-08-04

## 2020-08-04 DIAGNOSIS — I63.9 ACUTE CEREBROVASCULAR ACCIDENT (CVA) (HCC): ICD-10-CM

## 2020-08-04 DIAGNOSIS — Z79.01 ANTICOAGULANT LONG-TERM USE: ICD-10-CM

## 2020-08-04 DIAGNOSIS — Z95.2 S/P AVR: ICD-10-CM

## 2020-08-04 NOTE — PROGRESS NOTES
Anticoagulation Clinic Progress Note    Anticoagulation Summary  As of 2020    INR goal:   2.5-3.5   TTR:   26.2 % (1.6 y)   INR used for dosin.22! (8/3/2020)   Warfarin maintenance plan:   7.5 mg every Mon, Wed, Sat; 5 mg all other days   Weekly warfarin total:   42.5 mg   Plan last modified:   Joe De RP (2020)   Next INR check:   2020   Priority:   High   Target end date:       Indications    Anticoagulant long-term use [Z79.01]  S/P AVR [Z95.2]  Acute cerebrovascular accident (CVA) (CMS/HCC) [I63.9]             Anticoagulation Episode Summary     INR check location:       Preferred lab:       Send INR reminders to:   [facility]    Comments:   [facility] **Call every time**      Anticoagulation Care Providers     Provider Role Specialty Phone number    José Antonio Banks MD Referring Cardiology [phone]          Clinic Interview:  Patient Findings     Positives:   Change in health, Other complaints    Negatives:   Signs/symptoms of thrombosis, Signs/symptoms of bleeding,   Laboratory test error suspected, Change in alcohol use, Change in   activity, Upcoming invasive procedure, Emergency department visit,   Upcoming dental procedure, Missed doses, Extra doses, Change in   medications, Change in diet/appetite, Hospital admission, Bruising    Comments:   Diarrhea past 3 days, persistent, not improving yet. Took 5   mg yesterday.      Clinical Outcomes     Negatives:   Major bleeding event, Thromboembolic event,   Anticoagulation-related hospital admission, Anticoagulation-related ED   visit, Anticoagulation-related fatality    Comments:   Diarrhea past 3 days, persistent, not improving yet. Took 5   mg yesterday.        INR History:  Anticoagulation Monitoring 2020   INR 8.94 3.18 4.22   INR Date 2020 2020 8/3/2020   INR Goal 2.5-3.5 2.5-3.5 2.5-3.5   Trend Same Same Same   Last Week Total 42.5 mg 30 mg 30 mg   Next Week Total 30 mg  45 mg 40 mg   Sun - 5 mg -   Mon - - -   Tue Hold (7/28) - 5 mg   Wed Hold (7/29) - 5 mg (8/5)   Thu - 7.5 mg (7/30) -   Fri - 5 mg -   Sat - 7.5 mg -   Visit Report - - -   Some recent data might be hidden       Plan:  1. INR is Supratherapeutic today- see above in Anticoagulation Summary. INR recently has decreased too much when 2.5 mg dose given.  Will instruct Galdino Dallas to Change their warfarin regimen (5 mg daily until INR check in 2 days) - see above in Anticoagulation Summary.  2. Follow up in 2 days  3. They have been instructed to call if any changes in medications, doses, concerns, etc. Patient expresses understanding and has no further questions at this time.    Joe De [facility]

## 2020-08-06 ENCOUNTER — ANTICOAGULATION VISIT (OUTPATIENT)
Dept: PHARMACY | Facility: HOSPITAL | Age: 35
End: 2020-08-06

## 2020-08-06 DIAGNOSIS — I63.9 ACUTE CEREBROVASCULAR ACCIDENT (CVA) (HCC): ICD-10-CM

## 2020-08-06 DIAGNOSIS — Z79.01 ANTICOAGULANT LONG-TERM USE: ICD-10-CM

## 2020-08-06 DIAGNOSIS — Z95.2 S/P AVR: ICD-10-CM

## 2020-08-06 NOTE — PROGRESS NOTES
Anticoagulation Clinic Progress Note    Anticoagulation Summary  As of 8/6/2020    INR goal:   2.5-3.5   TTR:   26.2 % (1.6 y)   INR used for dosing:   3.7! (8/6/2020)   Warfarin maintenance plan:   7.5 mg every Mon, Wed, Sat; 5 mg all other days   Weekly warfarin total:   42.5 mg   No change documented:   Joe De RPH   Plan last modified:   Joe De RPH (7/23/2020)   Next INR check:   8/10/2020   Priority:   High   Target end date:       Indications    Anticoagulant long-term use [Z79.01]  S/P AVR [Z95.2]  Acute cerebrovascular accident (CVA) (CMS/[facility]) [I63.9]             Anticoagulation Episode Summary     INR check location:       Preferred lab:       Send INR reminders to:   [facility] PO[facility]    Comments:   [facility] **Call every time**      Anticoagulation Care Providers     Provider Role Specialty Phone number    José Antonio Banks MD Referring Cardiology [phone]          Clinic Interview:  Patient Findings     Positives:   Change in health, Extra doses, Hospital admission    Negatives:   Signs/symptoms of thrombosis, Signs/symptoms of bleeding,   Laboratory test error suspected, Change in alcohol use, Change in   activity, Upcoming invasive procedure, Emergency department visit,   Upcoming dental procedure, Missed doses, Change in medications, Change in   diet/appetite, Bruising, Other complaints    Comments:   Admitted to [place] yesterday for HA, nausea, cold sweats; sx   resolved and pt was discharged today, confirms no med changes although   discharge summary notes dc of med that had actually been dc'd awhile ago.   Higher dose of warfarin 7.5 mg given in hospital yesterday.      Clinical Outcomes     Negatives:   Major bleeding event, Thromboembolic event,   Anticoagulation-related hospital admission, Anticoagulation-related ED   visit, Anticoagulation-related fatality    Comments:   Admitted to [facility] yesterday for HA, nausea, cold sweats; sx   resolved and pt was discharged  today, confirms no med changes although   discharge summary notes dc of med that had actually been dc'd awhile ago.   Higher dose of warfarin 7.5 mg given in hospital yesterday.        INR History:  Anticoagulation Monitoring 7/30/2020 8/4/2020 8/6/2020   INR 3.18 4.22 3.7   INR Date 7/30/2020 8/3/2020 8/6/2020   INR Goal 2.5-3.5 2.5-3.5 2.5-3.5   Trend Same Same Same   Last Week Total 30 mg 30 mg 42.5 mg   Next Week Total 45 mg 40 mg 42.5 mg   Sun 5 mg - 5 mg   Mon - - -   Tue - 5 mg -   Wed - 5 mg (8/5) -   Thu 7.5 mg (7/30) - 5 mg   Fri 5 mg - 5 mg   Sat 7.5 mg - 7.5 mg   Visit Report - - -   Some recent data might be hidden       Plan:  1. INR is Supratherapeutic today- see above in Anticoagulation Summary.   Will instruct Galdino [place] to Continue their warfarin regimen- see above in Anticoagulation Summary.  2. Follow up in 4 days  3. They have been instructed to call if any changes in medications, doses, concerns, etc. Patient expresses understanding and has no further questions at this time.    Joe De [facility]

## 2020-08-10 ENCOUNTER — ANTICOAGULATION VISIT (OUTPATIENT)
Dept: PHARMACY | Facility: HOSPITAL | Age: 35
End: 2020-08-10

## 2020-08-10 ENCOUNTER — LAB (OUTPATIENT)
Dept: LAB | Facility: HOSPITAL | Age: 35
End: 2020-08-10

## 2020-08-10 DIAGNOSIS — Z95.2 S/P AVR: ICD-10-CM

## 2020-08-10 DIAGNOSIS — Z79.01 ANTICOAGULANT LONG-TERM USE: ICD-10-CM

## 2020-08-10 DIAGNOSIS — I63.9 ACUTE CEREBROVASCULAR ACCIDENT (CVA) (HCC): ICD-10-CM

## 2020-08-10 LAB
INR PPP: >=8 (ref 2–3)
PROTHROMBIN TIME: >90 SECONDS (ref 19.4–28.5)

## 2020-08-10 PROCEDURE — 85610 PROTHROMBIN TIME: CPT

## 2020-08-10 PROCEDURE — 36415 COLL VENOUS BLD VENIPUNCTURE: CPT

## 2020-08-10 NOTE — PROGRESS NOTES
Anticoagulation Clinic Progress Note    Anticoagulation Summary  As of 8/10/2020    INR goal:   2.5-3.5   TTR:   26.2 % (1.6 y)   INR used for dosing:   >=8.00! (8/10/2020)   Warfarin maintenance plan:   7.5 mg every Mon, Wed, Sat; 5 mg all other days   Weekly warfarin total:   42.5 mg   Plan last modified:   Joe De RPH (7/23/2020)   Next INR check:   8/12/2020   Priority:   High   Target end date:       Indications    Anticoagulant long-term use [Z79.01]  S/P AVR [Z95.2]  Acute cerebrovascular accident (CVA) (CMS/HCC) [I63.9]             Anticoagulation Episode Summary     INR check location:       Preferred lab:       Send INR reminders to:    [facility] PO[facility]    Comments:   [employer] **Call every time**      Anticoagulation Care Providers     Provider Role Specialty Phone number    José Antonio Banks MD Referring Cardiology [phone]          Clinic Interview:  Patient Findings     Positives:   Change in medications    Negatives:   Signs/symptoms of thrombosis, Signs/symptoms of bleeding,   Laboratory test error suspected, Change in health, Change in alcohol use,   Change in activity, Upcoming invasive procedure, Emergency department   visit, Upcoming dental procedure, Missed doses, Extra doses, Change in   diet/appetite, Hospital admission, Bruising, Other complaints    Comments:   Last testosterone inj 8/4. Denies s/sx of bleeding.      Clinical Outcomes     Negatives:   Major bleeding event, Thromboembolic event,   Anticoagulation-related hospital admission, Anticoagulation-related ED   visit, Anticoagulation-related fatality    Comments:   Last testosterone inj 8/4. Denies s/sx of bleeding.        INR History:  Anticoagulation Monitoring 8/4/2020 8/6/2020 8/10/2020   INR 4.22 3.7 >=8.00   INR Date 8/3/2020 8/6/2020 8/10/2020   INR Goal 2.5-3.5 2.5-3.5 2.5-3.5   Trend Same Same Same   Last Week Total 30 mg 42.5 mg 40 mg   Next Week Total 40 mg 42.5 mg 30 mg   Sun - 5 mg -   Mon - - Hold  (8/10)   Tue 5 mg - Hold (8/11)   Wed 5 mg (8/5) - -   Thu - 5 mg -   Fri - 5 mg -   Sat - 7.5 mg -   Visit Report - - -   Some recent data might be hidden       Plan:  1. INR is Supratherapeutic today- see above in Anticoagulation Summary.   Will instruct Galdino YRN Burt to HOLD their warfarin regimen- see above in Anticoagulation Summary.  2. Follow up in 2 days  3. They have been instructed to call if any changes in medications, doses, concerns, etc. To seek immediate medical attention if s/sx of bleeding develop or fall occurs. Patient expresses understanding and has no further questions at this time.    Joe De [facility]

## 2020-08-12 ENCOUNTER — ANTICOAGULATION VISIT (OUTPATIENT)
Dept: PHARMACY | Facility: HOSPITAL | Age: 35
End: 2020-08-12

## 2020-08-12 DIAGNOSIS — I63.9 ACUTE CEREBROVASCULAR ACCIDENT (CVA) (HCC): ICD-10-CM

## 2020-08-12 DIAGNOSIS — Z79.01 ANTICOAGULANT LONG-TERM USE: ICD-10-CM

## 2020-08-12 DIAGNOSIS — Z95.2 S/P AVR: ICD-10-CM

## 2020-08-12 LAB — INR PPP: 3

## 2020-08-12 NOTE — PROGRESS NOTES
Anticoagulation Clinic Progress Note    Anticoagulation Summary  As of 8/12/2020    INR goal:   2.5-3.5   TTR:   26.4 % (1.7 y)   INR used for dosing:   3.00 (8/12/2020)   Warfarin maintenance plan:   7.5 mg every Mon, Wed, Sat; 5 mg all other days   Weekly warfarin total:   42.5 mg   No change documented:   Mariely Alex   Plan last modified:   Joe De [facility] (7/23/2020)   Next INR check:   8/19/2020   Priority:   High   Target end date:       Indications    Anticoagulant long-term use [Z79.01]  S/P AVR [Z95.2]  Acute cerebrovascular accident (CVA) (CMS/HCC) [I63.9]             Anticoagulation Episode Summary     INR check location:       Preferred lab:       Send INR reminders to:   [facility] PO[facility]    Comments:   [facility] **Call every time**      Anticoagulation Care Providers     Provider Role Specialty Phone number    José Antonio Banks MD Referring Cardiology [phone]          Clinic Interview:  No pertinent clinical findings have been reported.    INR History:  Anticoagulation Monitoring 8/6/2020 8/10/2020 8/12/2020   INR 3.7 >=8.00 3.00   INR Date 8/6/2020 8/10/2020 8/12/2020   INR Goal 2.5-3.5 2.5-3.5 2.5-3.5   Trend Same Same Same   Last Week Total 42.5 mg 40 mg 30 mg   Next Week Total 42.5 mg 30 mg 42.5 mg   Sun 5 mg - 5 mg   Mon - Hold (8/10) 7.5 mg   Tue - Hold (8/11) 5 mg   Wed - - 7.5 mg   Thu 5 mg - 5 mg   Fri 5 mg - 5 mg   Sat 7.5 mg - 7.5 mg   Visit Report - - -   Some recent data might be hidden       Plan:  1. INR is therapeutic today- see above in Anticoagulation Summary.    Galdino [place] to change their warfarin regimen- see above in Anticoagulation Summary.  2. Follow up in 2 days with home test  3. Left VM to call us if any updates/issues. They have been instructed to call if any changes in medications, doses, concerns, etc.     Mariely Alex

## 2020-08-14 ENCOUNTER — ANTICOAGULATION VISIT (OUTPATIENT)
Dept: PHARMACY | Facility: HOSPITAL | Age: 35
End: 2020-08-14

## 2020-08-14 DIAGNOSIS — Z95.2 S/P AVR: ICD-10-CM

## 2020-08-14 DIAGNOSIS — Z79.01 ANTICOAGULANT LONG-TERM USE: ICD-10-CM

## 2020-08-14 DIAGNOSIS — I63.9 ACUTE CEREBROVASCULAR ACCIDENT (CVA) (HCC): ICD-10-CM

## 2020-08-14 LAB — INR PPP: 2.2

## 2020-08-14 NOTE — PROGRESS NOTES
Anticoagulation Clinic Progress Note    Anticoagulation Summary  As of 2020    INR goal:   2.5-3.5   TTR:   26.5 % (1.7 y)   INR used for dosin.20! (2020)   Warfarin maintenance plan:   7.5 mg every Mon, Wed, Sat; 5 mg all other days   Weekly warfarin total:   42.5 mg   Plan last modified:   Joe De [facility] (2020)   Next INR check:   2020   Priority:   High   Target end date:       Indications    Anticoagulant long-term use [Z79.01]  S/P AVR [Z95.2]  Acute cerebrovascular accident (CVA) (CMS/HCC) [I63.9]             Anticoagulation Episode Summary     INR check location:       Preferred lab:       Send INR reminders to:   [facility] POOL    Comments:   [facility] **Call every time**      Anticoagulation Care Providers     Provider Role Specialty Phone number    José Antonio Banks MD Referring Cardiology [phone]          Clinic Interview:  Patient Findings     Negatives:   Signs/symptoms of thrombosis, Signs/symptoms of bleeding,   Laboratory test error suspected, Change in health, Change in alcohol use,   Change in activity, Upcoming invasive procedure, Emergency department   visit, Upcoming dental procedure, Missed doses, Extra doses, Change in   medications, Change in diet/appetite, Hospital admission, Bruising, Other   complaints      Clinical Outcomes     Negatives:   Major bleeding event, Thromboembolic event,   Anticoagulation-related hospital admission, Anticoagulation-related ED   visit, Anticoagulation-related fatality        INR History:  Anticoagulation Monitoring 8/10/2020 2020 2020 2020   INR >=8.00 3.00 - 2.20   INR Date 8/10/2020 2020 - 2020   INR Goal 2.5-3.5 2.5-3.5 2.5-3.5 2.5-3.5   Trend Same Same - Same   Last Week Total 40 mg 30 mg - 27.5 mg   Next Week Total 30 mg 40 mg - 42.5 mg   Sun - - - 5 mg   Mon Hold (8/10) - - -   Tue Hold () - - -   Wed - 5 mg () - -   Thu - 5 mg - -   Fri - - - 7.5 mg ()   Sat - - - 5  mg (8/15)   Visit Report - - - -   Some recent data might be hidden       Plan:  1. INR is Subtherapeutic today- see above in Anticoagulation Summary.   Will instruct Galdino [place] to Change their warfarin regimen- see above in Anticoagulation Summary.  2. Follow up in 3 days  3. They have been instructed to call if any changes in medications, doses, concerns, etc. Patient expresses understanding and has no further questions at this time.    Joe De [facility]

## 2020-08-17 ENCOUNTER — ANTICOAGULATION VISIT (OUTPATIENT)
Dept: PHARMACY | Facility: HOSPITAL | Age: 35
End: 2020-08-17

## 2020-08-17 DIAGNOSIS — Z95.2 S/P AVR: ICD-10-CM

## 2020-08-17 DIAGNOSIS — Z79.01 ANTICOAGULANT LONG-TERM USE: ICD-10-CM

## 2020-08-17 DIAGNOSIS — I63.9 ACUTE CEREBROVASCULAR ACCIDENT (CVA) (HCC): ICD-10-CM

## 2020-08-17 LAB — INR PPP: 2.5

## 2020-08-17 NOTE — PROGRESS NOTES
Anticoagulation Clinic Progress Note    Anticoagulation Summary  As of 2020    INR goal:   2.5-3.5   TTR:   26.4 % (1.7 y)   INR used for dosin.50 (2020)   Warfarin maintenance plan:   7.5 mg every Mon, Wed, Sat; 5 mg all other days   Weekly warfarin total:   42.5 mg   Plan last modified:   Joe De RP (2020)   Next INR check:   2020   Priority:   High   Target end date:       Indications    Anticoagulant long-term use [Z79.01]  S/P AVR [Z95.2]  Acute cerebrovascular accident (CVA) (CMS/HCC) [I63.9]             Anticoagulation Episode Summary     INR check location:       Preferred lab:       Send INR reminders to:   [facility] PO[facility]    Comments:   [facility] **Call every time**      Anticoagulation Care Providers     Provider Role Specialty Phone number    José Antonio Banks MD Referring Cardiology [phone]          Clinic Interview:  Patient Findings     Positives:   Other complaints    Negatives:   Signs/symptoms of thrombosis, Signs/symptoms of bleeding,   Laboratory test error suspected, Change in health, Change in alcohol use,   Change in activity, Upcoming invasive procedure, Emergency department   visit, Upcoming dental procedure, Missed doses, Extra doses, Change in   medications, Change in diet/appetite, Hospital admission, Bruising    Comments:   Newly reports he had diarrhea preceding some of the high INRs   recently; now resolved.      Clinical Outcomes     Negatives:   Major bleeding event, Thromboembolic event,   Anticoagulation-related hospital admission, Anticoagulation-related ED   visit, Anticoagulation-related fatality    Comments:   Newly reports he had diarrhea preceding some of the high INRs   recently; now resolved.        INR History:  Anticoagulation Monitoring 2020   INR 3.00 - 2.20 2.50   INR Date 2020 - 2020   INR Goal 2.5-3.5 2.5-3.5 2.5-3.5 2.5-3.5   Trend Same - Same Same   Last  Week Total 30 mg - 27.5 mg 27.5 mg   Next Week Total 40 mg - 42.5 mg 40 mg   Sun - - 5 mg -   Mon - - - 7.5 mg   Tue - - - 5 mg   Wed 5 mg (8/12) - - 5 mg (8/19)   Thu 5 mg - - -   Fri - - 7.5 mg (8/14) -   Sat - - 5 mg (8/15) -   Visit Report - - - -   Some recent data might be hidden       Plan:  1. INR is Therapeutic today- see above in Anticoagulation Summary.   Will instruct Galdino [place] to Change their warfarin regimen- see above in Anticoagulation Summary.  2. Follow up in 3 days  3. They have been instructed to call if any changes in medications, doses, concerns, etc. Patient expresses understanding and has no further questions at this time.    Joe De [facility]

## 2020-08-20 ENCOUNTER — ANTICOAGULATION VISIT (OUTPATIENT)
Dept: PHARMACY | Facility: HOSPITAL | Age: 35
End: 2020-08-20

## 2020-08-20 DIAGNOSIS — Z79.01 ANTICOAGULANT LONG-TERM USE: ICD-10-CM

## 2020-08-20 DIAGNOSIS — Z95.2 S/P AVR: ICD-10-CM

## 2020-08-20 DIAGNOSIS — I63.9 ACUTE CEREBROVASCULAR ACCIDENT (CVA) (HCC): ICD-10-CM

## 2020-08-20 LAB — INR PPP: 3.1

## 2020-08-20 NOTE — PROGRESS NOTES
Anticoagulation Clinic Progress Note    Anticoagulation Summary  As of 8/20/2020    INR goal:   2.5-3.5   TTR:   26.8 % (1.7 y)   INR used for dosing:   3.10 (8/20/2020)   Warfarin maintenance plan:   7.5 mg every Mon, Fri; 5 mg all other days   Weekly warfarin total:   40 mg   Plan last modified:   Joe De RPH (8/20/2020)   Next INR check:   8/24/2020   Priority:   High   Target end date:       Indications    Anticoagulant long-term use [Z79.01]  S/P AVR [Z95.2]  Acute cerebrovascular accident (CVA) (CMS/[facility]) [I63.9]             Anticoagulation Episode Summary     INR check location:       Preferred lab:       Send INR reminders to:    [facility]IN[facility] POOL    Comments:   [facility] **Call every time**      Anticoagulation Care Providers     Provider Role Specialty Phone number    José Antonio Banks MD Referring Cardiology [phone]          Clinic Interview:  Patient Findings     Negatives:   Signs/symptoms of thrombosis, Signs/symptoms of bleeding,   Laboratory test error suspected, Change in health, Change in alcohol use,   Change in activity, Upcoming invasive procedure, Emergency department   visit, Upcoming dental procedure, Missed doses, Extra doses, Change in   medications, Change in diet/appetite, Hospital admission, Bruising, Other   complaints      Clinical Outcomes     Negatives:   Major bleeding event, Thromboembolic event,   Anticoagulation-related hospital admission, Anticoagulation-related ED   visit, Anticoagulation-related fatality        INR History:  Anticoagulation Monitoring 8/14/2020 8/17/2020 8/20/2020   INR 2.20 2.50 3.10   INR Date 8/14/2020 8/17/2020 8/20/2020   INR Goal 2.5-3.5 2.5-3.5 2.5-3.5   Trend Same Same Down   Last Week Total 27.5 mg 27.5 mg 40 mg   Next Week Total 42.5 mg 40 mg 40 mg   Sun 5 mg - 5 mg   Mon - 7.5 mg -   Tue - 5 mg -   Wed - 5 mg (8/19) -   Thu - - 5 mg   Fri 7.5 mg (8/14) - 7.5 mg   Sat 5 mg (8/15) - 5 mg   Visit Report - - -   Some recent data  might be hidden       Plan:  1. INR is Therapeutic today- see above in Anticoagulation Summary.   Will instruct Galdinotez [place] to Change their warfarin regimen - Trial 7.5 mg MF, 5 mg rest of wk - see above in Anticoagulation Summary.  2. Follow up in 4 days  3. They have been instructed to call if any changes in medications, doses, concerns, etc. Patient expresses understanding and has no further questions at this time.    Joe De RP

## 2020-08-24 LAB — INR PPP: 3.7

## 2020-08-25 ENCOUNTER — ANTICOAGULATION VISIT (OUTPATIENT)
Dept: PHARMACY | Facility: HOSPITAL | Age: 35
End: 2020-08-25

## 2020-08-25 DIAGNOSIS — Z95.2 S/P AVR: ICD-10-CM

## 2020-08-25 DIAGNOSIS — Z79.01 ANTICOAGULANT LONG-TERM USE: ICD-10-CM

## 2020-08-25 DIAGNOSIS — I63.9 ACUTE CEREBROVASCULAR ACCIDENT (CVA) (HCC): ICD-10-CM

## 2020-08-25 NOTE — PROGRESS NOTES
Anticoagulation Clinic Progress Note    Anticoagulation Summary  As of 8/25/2020    INR goal:   2.5-3.5   TTR:   27.0 % (1.7 y)   INR used for dosing:   3.70! (8/24/2020)   Warfarin maintenance plan:   7.5 mg every Fri; 5 mg all other days   Weekly warfarin total:   37.5 mg   Plan last modified:   Joe De RPH (8/25/2020)   Next INR check:   8/28/2020   Priority:   High   Target end date:       Indications    Anticoagulant long-term use [Z79.01]  S/P AVR [Z95.2]  Acute cerebrovascular accident (CVA) (CMS/HCC) [I63.9]             Anticoagulation Episode Summary     INR check location:       Preferred lab:       Send INR reminders to:   [facility] POOL    Comments:   [facility] **Call every time**      Anticoagulation Care Providers     Provider Role Specialty Phone number    José Antonio Banks MD Referring Cardiology [phone]          Clinic Interview:  Patient Findings     Positives:   Change in medications, Other complaints    Negatives:   Signs/symptoms of thrombosis, Signs/symptoms of bleeding,   Laboratory test error suspected, Change in health, Change in alcohol use,   Change in activity, Upcoming invasive procedure, Emergency department   visit, Upcoming dental procedure, Missed doses, Extra doses, Change in   diet/appetite, Hospital admission, Bruising    Comments:   Took 5 mg yesterday rather than 7.5 mg. Testosterone inj   today.       Clinical Outcomes     Negatives:   Major bleeding event, Thromboembolic event,   Anticoagulation-related hospital admission, Anticoagulation-related ED   visit, Anticoagulation-related fatality    Comments:   Took 5 mg yesterday rather than 7.5 mg. Testosterone inj   today.         INR History:  Anticoagulation Monitoring 8/17/2020 8/20/2020 8/25/2020   INR 2.50 3.10 3.70   INR Date 8/17/2020 8/20/2020 8/24/2020   INR Goal 2.5-3.5 2.5-3.5 2.5-3.5   Trend Same Down Down   Last Week Total 27.5 mg 40 mg 37.5 mg   Next Week Total 40 mg 40 mg 37.5 mg   Sun -  5 mg -   Mon 7.5 mg - -   Tue 5 mg - 5 mg   Wed 5 mg (8/19) - 5 mg   Thu - 5 mg 5 mg   Fri - 7.5 mg -   Sat - 5 mg -   Visit Report - - -   Some recent data might be hidden       Plan:  1. INR was Supratherapeutic yetserday- see above in Anticoagulation Summary.   Will instruct Galdino YRN [place] to Change their warfarin regimen- see above in Anticoagulation Summary.  2. Follow up in 3 days  3. They have been instructed to call if any changes in medications, doses, concerns, etc. Patient expresses understanding and has no further questions at this time.    Joe De [facility]

## 2020-08-28 ENCOUNTER — ANTICOAGULATION VISIT (OUTPATIENT)
Dept: PHARMACY | Facility: HOSPITAL | Age: 35
End: 2020-08-28

## 2020-08-28 DIAGNOSIS — Z95.2 S/P AVR: ICD-10-CM

## 2020-08-28 DIAGNOSIS — I63.9 ACUTE CEREBROVASCULAR ACCIDENT (CVA) (HCC): ICD-10-CM

## 2020-08-28 DIAGNOSIS — Z79.01 ANTICOAGULANT LONG-TERM USE: ICD-10-CM

## 2020-08-28 LAB — INR PPP: 3.9

## 2020-08-28 NOTE — PROGRESS NOTES
Anticoagulation Clinic Progress Note    Anticoagulation Summary  As of 8/28/2020    INR goal:   2.5-3.5   TTR:   26.9 % (1.7 y)   INR used for dosing:   3.90! (8/28/2020)   Warfarin maintenance plan:   5 mg every day   Weekly warfarin total:   35 mg   Plan last modified:   Joe De RPH (8/28/2020)   Next INR check:   9/2/2020   Priority:   High   Target end date:       Indications    Anticoagulant long-term use [Z79.01]  S/P AVR [Z95.2]  Acute cerebrovascular accident (CVA) (CMS/[facility]) [I63.9]             Anticoagulation Episode Summary     INR check location:       Preferred lab:       Send INR reminders to:    [facility]CO [facility]INICAL POOL    Comments:   [facility] **Call every time**      Anticoagulation Care Providers     Provider Role Specialty Phone number    José Antonio Banks MD Referring Cardiology [phone]          Clinic Interview:  Patient Findings     Positives:   Change in health, Change in medications    Negatives:   Signs/symptoms of thrombosis, Signs/symptoms of bleeding,   Laboratory test error suspected, Change in alcohol use, Change in   activity, Upcoming invasive procedure, Emergency department visit,   Upcoming dental procedure, Missed doses, Extra doses, Change in   diet/appetite, Hospital admission, Bruising, Other complaints    Comments:   Reports some diarrhea. Reports being prescribed anastrozole,   no intxn w/ warfarin. Reports a little more APAP than usual.      Clinical Outcomes     Negatives:   Major bleeding event, Thromboembolic event,   Anticoagulation-related hospital admission, Anticoagulation-related ED   visit, Anticoagulation-related fatality    Comments:   Reports some diarrhea. Reports being prescribed anastrozole,   no intxn w/ warfarin. Reports a little more APAP than usual.        INR History:  Anticoagulation Monitoring 8/20/2020 8/25/2020 8/28/2020   INR 3.10 3.70 3.90   INR Date 8/20/2020 8/24/2020 8/28/2020   INR Goal 2.5-3.5 2.5-3.5 2.5-3.5   Trend Down Down  Down   Last Week Total 40 mg 37.5 mg 37.5 mg   Next Week Total 40 mg 37.5 mg 35 mg   Sun 5 mg - 5 mg   Mon - - 5 mg   Tue - 5 mg 5 mg   Wed - 5 mg -   Thu 5 mg 5 mg -   Fri 7.5 mg - 5 mg   Sat 5 mg - 5 mg   Visit Report - - -   Some recent data might be hidden       Plan:  1. INR is Supratherapeutic today- see above in Anticoagulation Summary.   Will instruct Galdino [place] to Change their warfarin regimen- see above in Anticoagulation Summary.  2. Follow up in 5 days  3. They have been instructed to call if any changes in medications, doses, concerns, etc. Patient expresses understanding and has no further questions at this time.    Joe De [facility]

## 2020-09-02 ENCOUNTER — ANTICOAGULATION VISIT (OUTPATIENT)
Dept: PHARMACY | Facility: HOSPITAL | Age: 35
End: 2020-09-02

## 2020-09-02 DIAGNOSIS — Z79.01 ANTICOAGULANT LONG-TERM USE: ICD-10-CM

## 2020-09-02 DIAGNOSIS — I63.9 ACUTE CEREBROVASCULAR ACCIDENT (CVA) (HCC): ICD-10-CM

## 2020-09-02 DIAGNOSIS — Z95.2 S/P AVR: ICD-10-CM

## 2020-09-02 LAB — INR PPP: 2.8

## 2020-09-02 NOTE — PROGRESS NOTES
Anticoagulation Clinic Progress Note    Anticoagulation Summary  As of 2020    INR goal:   2.5-3.5   TTR:   27.2 % (1.7 y)   INR used for dosin.80 (2020)   Warfarin maintenance plan:   7.5 mg every Fri; 5 mg all other days   Weekly warfarin total:   37.5 mg   Plan last modified:   Joe De RPH (2020)   Next INR check:   2020   Priority:   High   Target end date:       Indications    Anticoagulant long-term use [Z79.01]  S/P AVR [Z95.2]  Acute cerebrovascular accident (CVA) (CMS/[facility]) [I63.9]             Anticoagulation Episode Summary     INR check location:       Preferred lab:       Send INR reminders to:    YANDY POLANCO [facility]    Comments:   [employer] **Call every time**      Anticoagulation Care Providers     Provider Role Specialty Phone number    José Antonio Banks MD Referring Cardiology [phone]          Clinic Interview:  Patient Findings     Positives:   Change in health, Change in medications    Negatives:   Signs/symptoms of thrombosis, Signs/symptoms of bleeding,   Laboratory test error suspected, Change in alcohol use, Change in   activity, Upcoming invasive procedure, Emergency department visit,   Upcoming dental procedure, Missed doses, Extra doses, Change in   diet/appetite, Hospital admission, Bruising, Other complaints    Comments:   Small amount of diarrhea. Testosterone inj today.       Clinical Outcomes     Negatives:   Major bleeding event, Thromboembolic event,   Anticoagulation-related hospital admission, Anticoagulation-related ED   visit, Anticoagulation-related fatality    Comments:   Small amount of diarrhea. Testosterone inj today.         INR History:  Anticoagulation Monitoring 2020   INR 3.70 3.90 2.80   INR Date 2020   INR Goal 2.5-3.5 2.5-3.5 2.5-3.5   Trend Down Down Up   Last Week Total 37.5 mg 37.5 mg 35 mg   Next Week Total 37.5 mg 35 mg 37.5 mg   Sun - 5 mg 5 mg   Mon - 5 mg 5 mg   Tue 5 mg  5 mg -   Wed 5 mg - 5 mg   Thu 5 mg - 5 mg   Fri - 5 mg 7.5 mg   Sat - 5 mg 5 mg   Visit Report - - -   Some recent data might be hidden       Plan:  1. INR is Therapeutic today- see above in Anticoagulation Summary.   Will instruct Galdino Dallas to Change their warfarin regimen- see above in Anticoagulation Summary.  2. Follow up in 6 days  3. They have been instructed to call if any changes in medications, doses, concerns, etc. Patient expresses understanding and has no further questions at this time.    Joe De [facility]

## 2020-09-08 ENCOUNTER — TELEPHONE (OUTPATIENT)
Dept: CARDIOLOGY | Facility: CLINIC | Age: 35
End: 2020-09-08

## 2020-09-08 ENCOUNTER — LAB (OUTPATIENT)
Dept: LAB | Facility: HOSPITAL | Age: 35
End: 2020-09-08

## 2020-09-08 ENCOUNTER — TRANSCRIBE ORDERS (OUTPATIENT)
Dept: ADMINISTRATIVE | Facility: HOSPITAL | Age: 35
End: 2020-09-08

## 2020-09-08 ENCOUNTER — ANTICOAGULATION VISIT (OUTPATIENT)
Dept: PHARMACY | Facility: HOSPITAL | Age: 35
End: 2020-09-08

## 2020-09-08 DIAGNOSIS — Z79.01 ANTICOAGULANT LONG-TERM USE: ICD-10-CM

## 2020-09-08 DIAGNOSIS — D84.9 IMMUNE DEFICIENCY DISORDER (HCC): ICD-10-CM

## 2020-09-08 DIAGNOSIS — D84.9 IMMUNE DEFICIENCY DISORDER (HCC): Primary | ICD-10-CM

## 2020-09-08 DIAGNOSIS — Z95.2 S/P AVR: ICD-10-CM

## 2020-09-08 DIAGNOSIS — I63.9 ACUTE CEREBROVASCULAR ACCIDENT (CVA) (HCC): ICD-10-CM

## 2020-09-08 LAB
ALBUMIN SERPL-MCNC: 4.5 G/DL (ref 3.5–5.2)
ALBUMIN/GLOB SERPL: 1.9 G/DL
ALP SERPL-CCNC: 71 U/L (ref 39–117)
ALT SERPL W P-5'-P-CCNC: 36 U/L (ref 1–41)
ANION GAP SERPL CALCULATED.3IONS-SCNC: 11.2 MMOL/L (ref 5–15)
AST SERPL-CCNC: 28 U/L (ref 1–40)
BASOPHILS # BLD AUTO: 0.08 10*3/MM3 (ref 0–0.2)
BASOPHILS NFR BLD AUTO: 0.7 % (ref 0–1.5)
BILIRUB SERPL-MCNC: 0.8 MG/DL (ref 0–1.2)
BILIRUB UR QL STRIP: NEGATIVE
BUN SERPL-MCNC: 10 MG/DL (ref 6–20)
BUN/CREAT SERPL: 9.7 (ref 7–25)
C3 SERPL-MCNC: 142 MG/DL (ref 82–167)
C4 SERPL-MCNC: 28 MG/DL (ref 14–44)
CALCIUM SPEC-SCNC: 10.1 MG/DL (ref 8.6–10.5)
CHLORIDE SERPL-SCNC: 98 MMOL/L (ref 98–107)
CLARITY UR: CLEAR
CO2 SERPL-SCNC: 25.8 MMOL/L (ref 22–29)
COLOR UR: YELLOW
CREAT SERPL-MCNC: 1.03 MG/DL (ref 0.76–1.27)
CREAT UR-MCNC: 69.8 MG/DL
CRP SERPL-MCNC: 0.38 MG/DL (ref 0–0.5)
DEPRECATED RDW RBC AUTO: 39.1 FL (ref 37–54)
EOSINOPHIL # BLD AUTO: 0.4 10*3/MM3 (ref 0–0.4)
EOSINOPHIL NFR BLD AUTO: 3.7 % (ref 0.3–6.2)
ERYTHROCYTE [DISTWIDTH] IN BLOOD BY AUTOMATED COUNT: 12.2 % (ref 12.3–15.4)
ERYTHROCYTE [SEDIMENTATION RATE] IN BLOOD: 5 MM/HR (ref 0–15)
GFR SERPL CREATININE-BSD FRML MDRD: 82 ML/MIN/1.73
GLOBULIN UR ELPH-MCNC: 2.4 GM/DL
GLUCOSE SERPL-MCNC: 75 MG/DL (ref 65–99)
GLUCOSE UR STRIP-MCNC: NEGATIVE MG/DL
HCT VFR BLD AUTO: 51.2 % (ref 37.5–51)
HGB BLD-MCNC: 17.2 G/DL (ref 13–17.7)
HGB UR QL STRIP.AUTO: NEGATIVE
IMM GRANULOCYTES # BLD AUTO: 0.06 10*3/MM3 (ref 0–0.05)
IMM GRANULOCYTES NFR BLD AUTO: 0.6 % (ref 0–0.5)
INR PPP: >=8 (ref 2–3)
KETONES UR QL STRIP: NEGATIVE
LEUKOCYTE ESTERASE UR QL STRIP.AUTO: NEGATIVE
LYMPHOCYTES # BLD AUTO: 2.51 10*3/MM3 (ref 0.7–3.1)
LYMPHOCYTES NFR BLD AUTO: 23.4 % (ref 19.6–45.3)
MCH RBC QN AUTO: 29.5 PG (ref 26.6–33)
MCHC RBC AUTO-ENTMCNC: 33.6 G/DL (ref 31.5–35.7)
MCV RBC AUTO: 87.8 FL (ref 79–97)
MONOCYTES # BLD AUTO: 0.78 10*3/MM3 (ref 0.1–0.9)
MONOCYTES NFR BLD AUTO: 7.3 % (ref 5–12)
NEUTROPHILS NFR BLD AUTO: 6.89 10*3/MM3 (ref 1.7–7)
NEUTROPHILS NFR BLD AUTO: 64.3 % (ref 42.7–76)
NITRITE UR QL STRIP: NEGATIVE
NRBC BLD AUTO-RTO: 0 /100 WBC (ref 0–0.2)
PH UR STRIP.AUTO: 5.5 [PH] (ref 5–8)
PLATELET # BLD AUTO: 227 10*3/MM3 (ref 140–450)
PMV BLD AUTO: 9.7 FL (ref 6–12)
POTASSIUM SERPL-SCNC: 4.5 MMOL/L (ref 3.5–5.2)
PROT SERPL-MCNC: 6.9 G/DL (ref 6–8.5)
PROT UR QL STRIP: NEGATIVE
PROT UR-MCNC: 5 MG/DL
PROTHROMBIN TIME: 85.5 SECONDS (ref 19.4–28.5)
RBC # BLD AUTO: 5.83 10*6/MM3 (ref 4.14–5.8)
SODIUM SERPL-SCNC: 135 MMOL/L (ref 136–145)
SP GR UR STRIP: 1.01 (ref 1–1.03)
UROBILINOGEN UR QL STRIP: NORMAL
WBC # BLD AUTO: 10.72 10*3/MM3 (ref 3.4–10.8)

## 2020-09-08 PROCEDURE — 84156 ASSAY OF PROTEIN URINE: CPT

## 2020-09-08 PROCEDURE — 86140 C-REACTIVE PROTEIN: CPT

## 2020-09-08 PROCEDURE — 85652 RBC SED RATE AUTOMATED: CPT

## 2020-09-08 PROCEDURE — 86160 COMPLEMENT ANTIGEN: CPT

## 2020-09-08 PROCEDURE — 36415 COLL VENOUS BLD VENIPUNCTURE: CPT

## 2020-09-08 PROCEDURE — 81003 URINALYSIS AUTO W/O SCOPE: CPT

## 2020-09-08 PROCEDURE — 86225 DNA ANTIBODY NATIVE: CPT

## 2020-09-08 PROCEDURE — 80053 COMPREHEN METABOLIC PANEL: CPT

## 2020-09-08 PROCEDURE — 85610 PROTHROMBIN TIME: CPT

## 2020-09-08 PROCEDURE — 82570 ASSAY OF URINE CREATININE: CPT

## 2020-09-08 PROCEDURE — 85025 COMPLETE CBC W/AUTO DIFF WBC: CPT

## 2020-09-08 NOTE — PROGRESS NOTES
Anticoagulation Clinic Progress Note    Anticoagulation Summary  As of 9/8/2020    INR goal:   2.5-3.5   TTR:   27.2 % (1.7 y)   INR used for dosing:   >=8.00! (9/8/2020)   Warfarin maintenance plan:   7.5 mg every Fri; 5 mg all other days   Weekly warfarin total:   37.5 mg   Plan last modified:   Joe De RPH (9/2/2020)   Next INR check:   9/10/2020   Priority:   High   Target end date:       Indications    Anticoagulant long-term use [Z79.01]  S/P AVR [Z95.2]  Acute cerebrovascular accident (CVA) (CMS/HCC) [I63.9]             Anticoagulation Episode Summary     INR check location:       Preferred lab:       Send INR reminders to:   ALEJANDRINA POLANCO [facility]    Comments:   [employer] **Call every time**      Anticoagulation Care Providers     Provider Role Specialty Phone number    José Antonio Banks MD Referring Cardiology [phone]          Clinic Interview:  Patient Findings     Positives:   Change in medications    Negatives:   Signs/symptoms of thrombosis, Signs/symptoms of bleeding,   Laboratory test error suspected, Change in health, Change in alcohol use,   Change in activity, Upcoming invasive procedure, Emergency department   visit, Upcoming dental procedure, Missed doses, Extra doses, Change in   diet/appetite, Hospital admission, Bruising, Other complaints    Comments:   Reports testosterone inj 9/2 ([id number]). Reports a little more   APAP use (recently 5070-9740 mg/day). Denies s/sx of bleeding.       Clinical Outcomes     Negatives:   Major bleeding event, Thromboembolic event,   Anticoagulation-related hospital admission, Anticoagulation-related ED   visit, Anticoagulation-related fatality    Comments:   Reports testosterone inj 9/2 ([id number]). Reports a little more   APAP use (recently 2044-4494 mg/day). Denies s/sx of bleeding.         INR History:  Anticoagulation Monitoring 8/28/2020 9/2/2020 9/8/2020   INR 3.90 2.80 >=8.00   INR Date 8/28/2020 9/2/2020 9/8/2020   INR Goal 2.5-3.5  2.5-3.5 2.5-3.5   Trend Down Up Same   Last Week Total 37.5 mg 35 mg 37.5 mg   Next Week Total 35 mg 37.5 mg 27.5 mg   Sun 5 mg 5 mg -   Mon 5 mg 5 mg -   Tue 5 mg - Hold (9/8)   Wed - 5 mg Hold (9/9)   Thu - 5 mg -   Fri 5 mg 7.5 mg -   Sat 5 mg 5 mg -   Visit Report - - -   Some recent data might be hidden       Plan:  1. INR is Supratherapeutic today- see above in Anticoagulation Summary.   Will instruct Galdino Dallas to HOLD their warfarin regimen- see above in Anticoagulation Summary.  2. Follow up in 2 days  3. They have been instructed to call if any changes in medications, doses, concerns, etc. To seek immediate medical attention if s/sx of bleeding develop or fall occurs. Patient expresses understanding and has no further questions at this time.    Joe De [facility]

## 2020-09-08 NOTE — TELEPHONE ENCOUNTER
Attempted to contact Mr. Dallas without success to review warfarin dosing and assess for s/sx of bleeding. Left  requesting return call. Will continue to try to reach Mr. Dallas throughout this afternoon.

## 2020-09-08 NOTE — TELEPHONE ENCOUNTER
[facility] lab called critical PT/INR values:    PT 85.5, INR > 8.0    Alma Killian RN  Triage MG

## 2020-09-09 LAB — DSDNA AB SER-ACNC: <1 IU/ML (ref 0–9)

## 2020-09-10 ENCOUNTER — ANTICOAGULATION VISIT (OUTPATIENT)
Dept: PHARMACY | Facility: HOSPITAL | Age: 35
End: 2020-09-10

## 2020-09-10 DIAGNOSIS — Z79.01 ANTICOAGULANT LONG-TERM USE: ICD-10-CM

## 2020-09-10 DIAGNOSIS — Z95.2 S/P AVR: ICD-10-CM

## 2020-09-10 DIAGNOSIS — I63.9 ACUTE CEREBROVASCULAR ACCIDENT (CVA) (HCC): ICD-10-CM

## 2020-09-10 LAB — INR PPP: 3.1

## 2020-09-10 NOTE — PROGRESS NOTES
Anticoagulation Clinic Progress Note    Anticoagulation Summary  As of 9/10/2020    INR goal:   2.5-3.5   TTR:   28.1 % (1.7 y)   INR used for dosing:   3.10 (9/10/2020)   Warfarin maintenance plan:   7.5 mg every Fri; 5 mg all other days   Weekly warfarin total:   37.5 mg   Plan last modified:   Joe De RPH (9/2/2020)   Next INR check:   9/14/2020   Priority:   High   Target end date:       Indications    Anticoagulant long-term use [Z79.01]  S/P AVR [Z95.2]  Acute cerebrovascular accident (CVA) (CMS/[facility]) [I63.9]             Anticoagulation Episode Summary     INR check location:       Preferred lab:       Send INR reminders to:    YANDY POLANCO [facility]    Comments:   [employer] **Call every time - REQUIRES PHARMACIST REVIEW**      Anticoagulation Care Providers     Provider Role Specialty Phone number    José Antonio Banks MD Referring Cardiology [phone]          Clinic Interview:  Patient Findings     Positives:   Missed doses    Negatives:   Signs/symptoms of thrombosis, Signs/symptoms of bleeding,   Laboratory test error suspected, Change in health, Change in alcohol use,   Change in activity, Upcoming invasive procedure, Emergency department   visit, Upcoming dental procedure, Extra doses, Change in medications,   Change in diet/appetite, Hospital admission, Bruising, Other complaints    Comments:   Held warfarin past 2 days as instructed      Clinical Outcomes     Negatives:   Major bleeding event, Thromboembolic event,   Anticoagulation-related hospital admission, Anticoagulation-related ED   visit, Anticoagulation-related fatality    Comments:   Held warfarin past 2 days as instructed        INR History:  Anticoagulation Monitoring 9/2/2020 9/8/2020 9/10/2020   INR 2.80 >=8.00 3.10   INR Date 9/2/2020 9/8/2020 9/10/2020   INR Goal 2.5-3.5 2.5-3.5 2.5-3.5   Trend Up Same Same   Last Week Total 35 mg 37.5 mg 27.5 mg   Next Week Total 37.5 mg 27.5 mg 37.5 mg   Sun 5 mg - 5 mg   Mon 5 mg - -    Tue - Hold (9/8) -   Wed 5 mg Hold (9/9) -   Thu 5 mg - 7.5 mg (9/10)   Fri 7.5 mg - 5 mg (9/11)   Sat 5 mg - 5 mg   Visit Report - - -   Some recent data might be hidden       Plan:  1. INR is Therapeutic today- see above in Anticoagulation Summary.   Will instruct Galdino YRN JonesBurt to Change their warfarin regimen (7.5 mg today to prevent subtherapeutic INR, then 5 mg daily) - see above in Anticoagulation Summary.  2. Follow up in 4 days  3. They have been instructed to call if any changes in medications, doses, concerns, etc. Patient expresses understanding and has no further questions at this time.    Joe De [facility]

## 2020-09-14 ENCOUNTER — ANTICOAGULATION VISIT (OUTPATIENT)
Dept: PHARMACY | Facility: HOSPITAL | Age: 35
End: 2020-09-14

## 2020-09-14 DIAGNOSIS — Z95.2 S/P AVR: ICD-10-CM

## 2020-09-14 DIAGNOSIS — I63.9 ACUTE CEREBROVASCULAR ACCIDENT (CVA) (HCC): ICD-10-CM

## 2020-09-14 DIAGNOSIS — Z79.01 ANTICOAGULANT LONG-TERM USE: ICD-10-CM

## 2020-09-14 LAB — INR PPP: 1.9

## 2020-09-14 NOTE — PROGRESS NOTES
Anticoagulation Clinic Progress Note    Anticoagulation Summary  As of 2020    INR goal:  2.5-3.5   TTR:  28.2 % (1.7 y)   INR used for dosin.90 (2020)   Warfarin maintenance plan:  7.5 mg every Fri; 5 mg all other days   Weekly warfarin total:  37.5 mg   Plan last modified:  Joe De RP (2020)   Next INR check:  2020   Priority:  High   Target end date:      Indications    Anticoagulant long-term use [Z79.01]  S/P AVR [Z95.2]  Acute cerebrovascular accident (CVA) (CMS/HCC) [I63.9]             Anticoagulation Episode Summary     INR check location:      Preferred lab:      Send INR reminders to:   YANDY POLANCO [facility]    Comments:  [employer] **Call every time - REQUIRES PHARMACIST REVIEW**      Anticoagulation Care Providers     Provider Role Specialty Phone number    José Antonio Banks MD Referring Cardiology [phone]          Clinic Interview:      INR History:  Anticoagulation Monitoring 2020 9/10/2020 2020   INR >=8.00 3.10 1.90   INR Date 2020 9/10/2020 2020   INR Goal 2.5-3.5 2.5-3.5 2.5-3.5   Trend Same Same Same   Last Week Total 37.5 mg 27.5 mg 22.5 mg   Next Week Total 27.5 mg 37.5 mg 42.5 mg   Sun - 5 mg -   Mon - - 7.5 mg ()   Tu Hold () - 7.5 mg (9/15)   Wed Hold () - -   Thu - 7.5 mg (9/10) -   Fri - 5 mg () -   Sat - 5 mg -   Visit Report - - -   Some recent data might be hidden       Plan:  1. INR is Subtherapeutic today- see above in Anticoagulation Summary.   Will instruct Galdino POOL Burt to Change their warfarin regimen- see above in Anticoagulation Summary. He started enoxaparin injections today and will continue through recheck on Wed.  2. Follow up in 3 days  3. Spoke with pt today. They have been instructed to call if any changes in medications, doses, concerns, etc. Patient expresses understanding and has no further questions at this time.    Paulette Martin [facility]

## 2020-09-16 LAB — INR PPP: 1.6

## 2020-09-17 ENCOUNTER — ANTICOAGULATION VISIT (OUTPATIENT)
Dept: PHARMACY | Facility: HOSPITAL | Age: 35
End: 2020-09-17

## 2020-09-17 DIAGNOSIS — Z95.2 S/P AVR: ICD-10-CM

## 2020-09-17 DIAGNOSIS — I63.9 ACUTE CEREBROVASCULAR ACCIDENT (CVA) (HCC): ICD-10-CM

## 2020-09-17 DIAGNOSIS — Z79.01 ANTICOAGULANT LONG-TERM USE: ICD-10-CM

## 2020-09-17 LAB — INR PPP: 1.9

## 2020-09-17 NOTE — PROGRESS NOTES
Anticoagulation Clinic Progress Note    Anticoagulation Summary  As of 2020    INR goal:  2.5-3.5   TTR:  28.1 % (1.8 y)   INR used for dosin.90 (2020)   Warfarin maintenance plan:  7.5 mg every Fri; 5 mg all other days   Weekly warfarin total:  37.5 mg   Plan last modified:  Joe De [facility] (2020)   Next INR check:  2020   Priority:  High   Target end date:      Indications    Anticoagulant long-term use [Z79.01]  S/P AVR [Z95.2]  Acute cerebrovascular accident (CVA) (CMS/HCC) [I63.9]             Anticoagulation Episode Summary     INR check location:      Preferred lab:      Send INR reminders to:   YANDY POLANCO [facility]    Comments:  [employer] **Call every time - REQUIRES PHARMACIST REVIEW**      Anticoagulation Care Providers     Provider Role Specialty Phone number    José Antonio Banks MD Referring Cardiology [phone]          Clinic Interview:      INR History:  Anticoagulation Monitoring 9/10/2020 2020 2020   INR 3.10 1.90 1.90   INR Date 9/10/2020 2020 2020   INR Goal 2.5-3.5 2.5-3.5 2.5-3.5   Trend Same Same Same   Last Week Total 27.5 mg 22.5 mg 40 mg   Next Week Total 37.5 mg 42.5 mg 40 mg   Sun 5 mg - -   Mon - 7.5 mg () -   Tue - 7.5 mg (9/15) -   Wed - - -   Thu 7.5 mg (9/10) - 7.5 mg ()   Fri 5 mg () - -   Sat 5 mg - -   Visit Report - - -   Some recent data might be hidden       Plan:  1. INR is Subtherapeutic today- see above in Anticoagulation Summary.   Will instruct Galdino YRN JonesBurt to Increase their warfarin regimen- see above in Anticoagulation Summary. He will continue enoxaparin injections until INR closer to 2.5.  2. Follow up in 1 day  3. Spoke with pt today. Sent enox refill to pharmacy.  They have been instructed to call if any changes in medications, doses, concerns, etc. Patient expresses understanding and has no further questions at this time.    Paulette Martin [facility]

## 2020-09-18 ENCOUNTER — ANTICOAGULATION VISIT (OUTPATIENT)
Dept: PHARMACY | Facility: HOSPITAL | Age: 35
End: 2020-09-18

## 2020-09-18 DIAGNOSIS — Z95.2 S/P AVR: ICD-10-CM

## 2020-09-18 DIAGNOSIS — Z79.01 ANTICOAGULANT LONG-TERM USE: ICD-10-CM

## 2020-09-18 DIAGNOSIS — I63.9 ACUTE CEREBROVASCULAR ACCIDENT (CVA) (HCC): ICD-10-CM

## 2020-09-18 LAB — INR PPP: 2.6

## 2020-09-18 NOTE — PROGRESS NOTES
Anticoagulation Clinic Progress Note    Anticoagulation Summary  As of 2020    INR goal:  2.5-3.5   TTR:  28.1 % (1.8 y)   INR used for dosin.60 (2020)   Warfarin maintenance plan:  7.5 mg every Fri; 5 mg all other days   Weekly warfarin total:  37.5 mg   Plan last modified:  Joe De [facility] (2020)   Next INR check:  2020   Priority:  High   Target end date:      Indications    Anticoagulant long-term use [Z79.01]  S/P AVR [Z95.2]  Acute cerebrovascular accident (CVA) (CMS/HCC) [I63.9]             Anticoagulation Episode Summary     INR check location:      Preferred lab:      Send INR reminders to:   YANDY POLANCO [facility]    Comments:  [employer] **Call every time - REQUIRES PHARMACIST REVIEW**      Anticoagulation Care Providers     Provider Role Specialty Phone number    José Antonio Banks MD Referring Cardiology [phone]          Clinic Interview:  No pertinent clinical findings have been reported.    INR History:  Anticoagulation Monitoring 2020   INR 1.90 1.90 2.60   INR Date 2020   INR Goal 2.5-3.5 2.5-3.5 2.5-3.5   Trend Same Same Same   Last Week Total 22.5 mg 40 mg 40 mg   Next Week Total 42.5 mg 40 mg 35 mg   Sun - - 5 mg   Mon 7.5 mg () - -   Tu 7.5 mg (9/15) - -   Wed - - -   Thu - 7.5 mg () -   Fri - - 5 mg ()   Sat - - 5 mg   Visit Report - - -   Some recent data might be hidden       Plan:  1. INR is therapeutic today- see above in Anticoagulation Summary. Instructed Galdino Dallas to take 5mg daily till next INR check on   2. Follow up in 4 days.  3. Pt has agreed to only be called if INR out of range. They have been instructed to call if any changes in medications, doses, concerns, etc. Patient expresses understanding and has no further questions at this time.    Reina Cruz [facility]

## 2020-09-21 ENCOUNTER — ANTICOAGULATION VISIT (OUTPATIENT)
Dept: PHARMACY | Facility: HOSPITAL | Age: 35
End: 2020-09-21

## 2020-09-21 DIAGNOSIS — Z79.01 ANTICOAGULANT LONG-TERM USE: ICD-10-CM

## 2020-09-21 DIAGNOSIS — I63.9 ACUTE CEREBROVASCULAR ACCIDENT (CVA) (HCC): ICD-10-CM

## 2020-09-21 DIAGNOSIS — Z95.2 S/P AVR: ICD-10-CM

## 2020-09-21 LAB — INR PPP: 2.6

## 2020-09-21 NOTE — PROGRESS NOTES
Anticoagulation Clinic Progress Note    Anticoagulation Summary  As of 2020    INR goal:  2.5-3.5   TTR:  28.4 % (1.8 y)   INR used for dosin.60 (2020)   Warfarin maintenance plan:  5 mg every day   Weekly warfarin total:  35 mg   Plan last modified:  Joe De RPH (2020)   Next INR check:  2020   Priority:  High   Target end date:      Indications    Anticoagulant long-term use [Z79.01]  S/P AVR [Z95.2]  Acute cerebrovascular accident (CVA) (CMS/[facility]) [I63.9]             Anticoagulation Episode Summary     INR check location:      Preferred lab:      Send INR reminders to:  ALEJANDRINA POLANCO [facility]INICAL POOL    Comments:  [employer] **Call every time - REQUIRES PHARMACIST REVIEW**      Anticoagulation Care Providers     Provider Role Specialty Phone number    José Antonio Banks MD Referring Cardiology [phone]          Clinic Interview:  Patient Findings     Positives:  Change in medications    Negatives:  Signs/symptoms of thrombosis, Signs/symptoms of bleeding,   Laboratory test error suspected, Change in health, Change in alcohol use,   Change in activity, Upcoming invasive procedure, Emergency department   visit, Upcoming dental procedure, Missed doses, Extra doses, Change in   diet/appetite, Hospital admission, Bruising, Other complaints    Comments:  Next testosterone inj .      Clinical Outcomes     Negatives:  Major bleeding event, Thromboembolic event,   Anticoagulation-related hospital admission, Anticoagulation-related ED   visit, Anticoagulation-related fatality    Comments:  Next testosterone inj .        INR History:  Anticoagulation Monitoring 2020   INR 1.90 2.60 2.60   INR Date 2020   INR Goal 2.5-3.5 2.5-3.5 2.5-3.5   Trend Same Same Down   Last Week Total 40 mg 40 mg 45 mg   Next Week Total 40 mg 35 mg 35 mg   Sun - 5 mg -   Mon - - 5 mg   Tue - - 5 mg   Wed - - 5 mg   Thu 7.5 mg () - -   Fri - 5 mg () -    Sat - 5 mg -   Visit Report - - -   Some recent data might be hidden       Plan:  1. INR is Therapeutic today- see above in Anticoagulation Summary.   Will instruct Galdino Dallas to Continue warfarin 5 mg daily for now until INR check in 3 days - see above in Anticoagulation Summary.  2. Follow up in 3 days  3. They have been instructed to call if any changes in medications, doses, concerns, etc. Patient expresses understanding and has no further questions at this time.    Joe De, [facility]

## 2020-09-24 ENCOUNTER — ANTICOAGULATION VISIT (OUTPATIENT)
Dept: PHARMACY | Facility: HOSPITAL | Age: 35
End: 2020-09-24

## 2020-09-24 DIAGNOSIS — Z95.2 S/P AVR: ICD-10-CM

## 2020-09-24 DIAGNOSIS — I63.9 ACUTE CEREBROVASCULAR ACCIDENT (CVA) (HCC): ICD-10-CM

## 2020-09-24 DIAGNOSIS — Z79.01 ANTICOAGULANT LONG-TERM USE: ICD-10-CM

## 2020-09-24 LAB — INR PPP: 3.3

## 2020-09-24 NOTE — PROGRESS NOTES
Anticoagulation Clinic Progress Note    Anticoagulation Summary  As of 9/24/2020    INR goal:  2.5-3.5   TTR:  28.7 % (1.8 y)   INR used for dosing:  3.30 (9/24/2020)   Warfarin maintenance plan:  5 mg every day   Weekly warfarin total:  35 mg   Plan last modified:  Joe De [facility] (9/21/2020)   Next INR check:  9/28/2020   Priority:  High   Target end date:      Indications    Anticoagulant long-term use [Z79.01]  S/P AVR [Z95.2]  Acute cerebrovascular accident (CVA) (CMS/HCC) [I63.9]             Anticoagulation Episode Summary     INR check location:      Preferred lab:      Send INR reminders to:   YANDY POLANCO CLINICAL POOL    Comments:  [employer] **Call every time - REQUIRES PHARMACIST REVIEW**      Anticoagulation Care Providers     Provider Role Specialty Phone number    José Antonio Banks MD Referring Cardiology [phone]          Clinic Interview:  Took a higher dose last night, 7.5mg    INR History:  Anticoagulation Monitoring 9/18/2020 9/21/2020 9/24/2020   INR 2.60 2.60 3.30   INR Date 9/18/2020 9/21/2020 9/24/2020   INR Goal 2.5-3.5 2.5-3.5 2.5-3.5   Trend Same Down Same   Last Week Total 40 mg 45 mg 40 mg   Next Week Total 35 mg 35 mg 35 mg   Sun 5 mg - 5 mg   Mon - 5 mg -   Tue - 5 mg -   Wed - 5 mg -   Thu - - 5 mg   Fri 5 mg (9/18) - 5 mg   Sat 5 mg - 5 mg   Visit Report - - -   Some recent data might be hidden       Plan:  1. INR is therapeutic today- see above in Anticoagulation Summary.  Spoke with  Galdino Dallas, instructed to continue with 5mg daily and recheck his INR on Monday.    2. Follow up in 4 days  3. Pt has agreed to only be called if INR out of range. They have been instructed to call if any changes in medications, doses, concerns, etc. Patient expresses understanding and has no further questions at this time.    Reina Cruz [facility]

## 2020-09-27 LAB — INR PPP: 7

## 2020-09-28 ENCOUNTER — ANTICOAGULATION VISIT (OUTPATIENT)
Dept: PHARMACY | Facility: HOSPITAL | Age: 35
End: 2020-09-28

## 2020-09-28 DIAGNOSIS — I63.9 ACUTE CEREBROVASCULAR ACCIDENT (CVA) (HCC): ICD-10-CM

## 2020-09-28 DIAGNOSIS — Z95.2 S/P AVR: ICD-10-CM

## 2020-09-28 DIAGNOSIS — Z79.01 ANTICOAGULANT LONG-TERM USE: ICD-10-CM

## 2020-09-28 LAB — INR PPP: 4

## 2020-09-28 NOTE — PROGRESS NOTES
Anticoagulation Clinic Progress Note    Anticoagulation Summary  As of 2020    INR goal:  2.5-3.5   TTR:  28.6 % (1.8 y)   INR used for dosin.00 (2020)   Warfarin maintenance plan:  5 mg every day   Weekly warfarin total:  35 mg   No change documented:  Joe De RPH   Plan last modified:  Joe De RPH (2020)   Next INR check:  2020   Priority:  High   Target end date:      Indications    Anticoagulant long-term use [Z79.01]  S/P AVR [Z95.2]  Acute cerebrovascular accident (CVA) (CMS/[facility]) [I63.9]             Anticoagulation Episode Summary     INR check location:      Preferred lab:      Send INR reminders to:   YANDY POLANCO [facility]    Comments:  [employer] **Call every time - REQUIRES PHARMACIST REVIEW**      Anticoagulation Care Providers     Provider Role Specialty Phone number    José Antonio Banks MD Referring Cardiology [phone]          Clinic Interview:  Patient Findings     Positives:  Missed doses    Negatives:  Signs/symptoms of thrombosis, Signs/symptoms of bleeding,   Laboratory test error suspected, Change in health, Change in alcohol use,   Change in activity, Upcoming invasive procedure, Emergency department   visit, Upcoming dental procedure, Extra doses, Change in medications,   Change in diet/appetite, Hospital admission, Bruising, Other complaints    Comments:  HELD warfarin yesterday after seeing high INR.      Clinical Outcomes     Negatives:  Major bleeding event, Thromboembolic event,   Anticoagulation-related hospital admission, Anticoagulation-related ED   visit, Anticoagulation-related fatality    Comments:  HELD warfarin yesterday after seeing high INR.        INR History:  Anticoagulation Monitoring 2020   INR 2.60 3.30 4.00   INR Date 2020   INR Goal 2.5-3.5 2.5-3.5 2.5-3.5   Trend Down Same Same   Last Week Total 45 mg 40 mg 32.5 mg   Next Week Total 35 mg 35 mg 35 mg   Sun - 5 mg -   Mon 5  mg - 5 mg   Tue 5 mg - 5 mg   Wed 5 mg - -   Thu - 5 mg -   Fri - 5 mg -   Sat - 5 mg -   Visit Report - - -   Some recent data might be hidden       Plan:  1. INR is Supratherapeutic today- see above in Anticoagulation Summary. Expect INR to decrease further following yesterday's held dose.   Will instruct Galdino Dallas to resume their warfarin 5 mg daily regimen- see above in Anticoagulation Summary.  2. Follow up in 2 days  3. They have been instructed to call if any changes in medications, doses, concerns, etc. Patient expresses understanding and has no further questions at this time.    Joe De [facility]

## 2020-09-30 ENCOUNTER — ANTICOAGULATION VISIT (OUTPATIENT)
Dept: PHARMACY | Facility: HOSPITAL | Age: 35
End: 2020-09-30

## 2020-09-30 DIAGNOSIS — I63.9 ACUTE CEREBROVASCULAR ACCIDENT (CVA) (HCC): ICD-10-CM

## 2020-09-30 DIAGNOSIS — Z95.2 S/P AVR: ICD-10-CM

## 2020-09-30 DIAGNOSIS — Z79.01 ANTICOAGULANT LONG-TERM USE: ICD-10-CM

## 2020-09-30 LAB — INR PPP: 2.3

## 2020-09-30 NOTE — PROGRESS NOTES
Anticoagulation Clinic Progress Note    Anticoagulation Summary  As of 2020    INR goal:  2.5-3.5   TTR:  28.7 % (1.8 y)   INR used for dosin.30 (2020)   Warfarin maintenance plan:  5 mg every day   Weekly warfarin total:  35 mg   Plan last modified:  Joe De RP (2020)   Next INR check:  10/3/2020   Priority:  High   Target end date:      Indications    Anticoagulant long-term use [Z79.01]  S/P AVR [Z95.2]  Acute cerebrovascular accident (CVA) (CMS/HCC) [I63.9]             Anticoagulation Episode Summary     INR check location:      Preferred lab:      Send INR reminders to:   YANDY POLANCO CLINICAL POOL    Comments:  [employer] **Call every time - REQUIRES PHARMACIST REVIEW**      Anticoagulation Care Providers     Provider Role Specialty Phone number    José Antonio Banks MD Referring Cardiology [phone]          Clinic Interview:      INR History:  Anticoagulation Monitoring 2020   INR 3.30 4.00 2.30   INR Date 2020   INR Goal 2.5-3.5 2.5-3.5 2.5-3.5   Trend Same Same Same   Last Week Total 40 mg 32.5 mg 32.5 mg   Next Week Total 35 mg 35 mg 37.5 mg   Sun 5 mg - -   Mon - 5 mg -   Tue - 5 mg -   Wed - - 7.5 mg ()   Thu 5 mg - 5 mg   Fri 5 mg - 5 mg   Sat 5 mg - -   Visit Report - - -   Some recent data might be hidden       Plan:  1. INR is Subtherapeutic today- see above in Anticoagulation Summary.   Will instruct Galdino Dallas to boost today to 7.5mg then continue their warfarin regimen- see above in Anticoagulation Summary.  2. Follow up in 3 days  3. Spoke with pt today. They have been instructed to call if any changes in medications, doses, concerns, etc. Patient expresses understanding and has no further questions at this time.    Paulette Martin [facility]

## 2020-10-02 RX ORDER — WARFARIN SODIUM 5 MG/1
TABLET ORAL
Qty: 90 TABLET | Refills: 1 | Status: SHIPPED | OUTPATIENT
Start: 2020-10-02 | End: 2021-09-13

## 2020-10-03 LAB — INR PPP: 5

## 2020-10-05 ENCOUNTER — ANTICOAGULATION VISIT (OUTPATIENT)
Dept: PHARMACY | Facility: HOSPITAL | Age: 35
End: 2020-10-05

## 2020-10-05 DIAGNOSIS — Z79.01 ANTICOAGULANT LONG-TERM USE: ICD-10-CM

## 2020-10-05 DIAGNOSIS — I63.9 ACUTE CEREBROVASCULAR ACCIDENT (CVA) (HCC): ICD-10-CM

## 2020-10-05 DIAGNOSIS — Z95.2 S/P AVR: ICD-10-CM

## 2020-10-05 LAB — INR PPP: 1.8

## 2020-10-05 NOTE — PROGRESS NOTES
Anticoagulation Clinic Progress Note    Anticoagulation Summary  As of 10/5/2020    INR goal:  2.5-3.5   TTR:  28.7 % (1.8 y)   INR used for dosin.80 (10/5/2020)   Warfarin maintenance plan:  5 mg every day   Weekly warfarin total:  35 mg   Plan last modified:  Joe De RPH (2020)   Next INR check:  10/7/2020   Priority:  High   Target end date:      Indications    Anticoagulant long-term use [Z79.01]  S/P AVR [Z95.2]  Acute cerebrovascular accident (CVA) (CMS/[facility]) [I63.9]             Anticoagulation Episode Summary     INR check location:      Preferred lab:      Send INR reminders to:   YANDY POLANCO [facility]INICAL POOL    Comments:  [employer] **Call every time - REQUIRES PHARMACIST REVIEW**      Anticoagulation Care Providers     Provider Role Specialty Phone number    José Antonio Banks MD Referring Cardiology [phone]          Clinic Interview:  Patient Findings     Positives:  Missed doses, Change in medications    Negatives:  Signs/symptoms of thrombosis, Signs/symptoms of bleeding,   Laboratory test error suspected, Change in health, Change in alcohol use,   Change in activity, Upcoming invasive procedure, Emergency department   visit, Upcoming dental procedure, Extra doses, Change in diet/appetite,   Hospital admission, Bruising, Other complaints    Comments:  Self-held warfarin after seeing high INR 5.0 on 10/3. Had   testosterone inj on 10/3.       Clinical Outcomes     Negatives:  Major bleeding event, Thromboembolic event,   Anticoagulation-related hospital admission, Anticoagulation-related ED   visit, Anticoagulation-related fatality    Comments:  Self-held warfarin after seeing high INR 5.0 on 10/3. Had   testosterone inj on 10/3.         INR History:  Anticoagulation Monitoring 2020 2020 10/5/2020   INR 4.00 2.30 1.80   INR Date 2020 2020 10/5/2020   INR Goal 2.5-3.5 2.5-3.5 2.5-3.5   Trend Same Same Same   Last Week Total 32.5 mg 32.5 mg 32.5 mg   Next Week Total  35 mg 37.5 mg 37.5 mg   Sun - - -   Mon 5 mg - 7.5 mg (10/5)   Tue 5 mg - 5 mg   Wed - 7.5 mg (9/30) -   Thu - 5 mg -   Fri - 5 mg -   Sat - - -   Visit Report - - -   Some recent data might be hidden       Plan:  1. INR is Subtherapeutic today- see above in Anticoagulation Summary.   Will instruct Galdino Dallas to Change their warfarin regimen- see above in Anticoagulation Summary.  2. Enoxaparin 80 mg q12h  3. Follow up in 2 days  4. They have been instructed to call if any changes in medications, doses, concerns, etc. Patient expresses understanding and has no further questions at this time.    Joe De [facility], located within [facility]

## 2020-10-07 ENCOUNTER — ANTICOAGULATION VISIT (OUTPATIENT)
Dept: PHARMACY | Facility: HOSPITAL | Age: 35
End: 2020-10-07

## 2020-10-07 DIAGNOSIS — Z79.01 ANTICOAGULANT LONG-TERM USE: ICD-10-CM

## 2020-10-07 DIAGNOSIS — Z95.2 S/P AVR: ICD-10-CM

## 2020-10-07 DIAGNOSIS — I63.9 ACUTE CEREBROVASCULAR ACCIDENT (CVA) (HCC): ICD-10-CM

## 2020-10-07 LAB — INR PPP: 2.2

## 2020-10-07 NOTE — PROGRESS NOTES
Anticoagulation Clinic Progress Note    Anticoagulation Summary  As of 10/7/2020    INR goal:  2.5-3.5   TTR:  28.6 % (1.8 y)   INR used for dosin.20 (10/7/2020)   Warfarin maintenance plan:  5 mg every day   Weekly warfarin total:  35 mg   No change documented:  Joe De RPH   Plan last modified:  Joe De RPH (2020)   Next INR check:  10/9/2020   Priority:  High   Target end date:      Indications    Anticoagulant long-term use [Z79.01]  S/P AVR [Z95.2]  Acute cerebrovascular accident (CVA) (CMS/[facility]) [I63.9]             Anticoagulation Episode Summary     INR check location:      Preferred lab:      Send INR reminders to:   YANDY POLANCO [facility]    Comments:  [employer] **Call every time - REQUIRES PHARMACIST REVIEW**      Anticoagulation Care Providers     Provider Role Specialty Phone number    José Antonio Banks MD Referring Cardiology [phone]          Clinic Interview:  Patient Findings     Positives:  Change in medications    Negatives:  Signs/symptoms of thrombosis, Signs/symptoms of bleeding,   Laboratory test error suspected, Change in health, Change in alcohol use,   Change in activity, Upcoming invasive procedure, Emergency department   visit, Upcoming dental procedure, Missed doses, Extra doses, Change in   diet/appetite, Hospital admission, Bruising, Other complaints    Comments:  Reports he delayed his testosterone until 10/6.       Clinical Outcomes     Negatives:  Major bleeding event, Thromboembolic event,   Anticoagulation-related hospital admission, Anticoagulation-related ED   visit, Anticoagulation-related fatality    Comments:  Reports he delayed his testosterone until 10/6.         INR History:  Anticoagulation Monitoring 2020 10/5/2020 10/7/2020   INR 2.30 1.80 2.20   INR Date 2020 10/5/2020 10/7/2020   INR Goal 2.5-3.5 2.5-3.5 2.5-3.5   Trend Same Same Same   Last Week Total 32.5 mg 32.5 mg 35 mg   Next Week Total 37.5 mg 37.5 mg 35 mg   Sun - - -      Mon - 7.5 mg (10/5) -   Tue - 5 mg -   Wed 7.5 mg (9/30) - 5 mg   Thu 5 mg - 5 mg   Fri 5 mg - -   Sat - - -   Visit Report - - -   Some recent data might be hidden       Plan:  1. INR is Subtherapeutic today- see above in Anticoagulation Summary.   Will instruct Galdino Dallas to Continue their warfarin regimen- see above in Anticoagulation Summary.  2. Continue enoxaparin 80 mg q12h.   3. Follow up in 2 days  4. They have been instructed to call if any changes in medications, doses, concerns, etc. Patient expresses understanding and has no further questions at this time.    Joe De, [facility]

## 2020-10-12 ENCOUNTER — ANTICOAGULATION VISIT (OUTPATIENT)
Dept: PHARMACY | Facility: HOSPITAL | Age: 35
End: 2020-10-12

## 2020-10-12 DIAGNOSIS — Z95.2 S/P AVR: ICD-10-CM

## 2020-10-12 DIAGNOSIS — Z79.01 ANTICOAGULANT LONG-TERM USE: ICD-10-CM

## 2020-10-12 DIAGNOSIS — I63.9 ACUTE CEREBROVASCULAR ACCIDENT (CVA) (HCC): ICD-10-CM

## 2020-10-12 LAB — INR PPP: 5

## 2020-10-12 NOTE — PROGRESS NOTES
Anticoagulation Clinic Progress Note    Anticoagulation Summary  As of 10/12/2020    INR goal:  2.5-3.5   TTR:  28.7 % (1.8 y)   INR used for dosin.00 (10/12/2020)   Warfarin maintenance plan:  5 mg every day   Weekly warfarin total:  35 mg   Plan last modified:  Joe De [facility] (2020)   Next INR check:  10/15/2020   Priority:  High   Target end date:      Indications    Anticoagulant long-term use [Z79.01]  S/P AVR [Z95.2]  Acute cerebrovascular accident (CVA) (CMS/[facility]) [I63.9]             Anticoagulation Episode Summary     INR check location:      Preferred lab:      Send INR reminders to:   YANDY POLANCO CLINICAL POOL    Comments:  [employer] **Call every time - REQUIRES PHARMACIST REVIEW**      Anticoagulation Care Providers     Provider Role Specialty Phone number    José Antonio Banks MD Referring Cardiology [phone]          Drug interactions: has remained unchanged.  Diet: has remained unchanged.    Clinic Interview:  No pertinent clinical findings have been reported.    INR History:  Anticoagulation Monitoring 10/5/2020 10/7/2020 10/12/2020   INR 1.80 2.20 5.00   INR Date 10/5/2020 10/7/2020 10/12/2020   INR Goal 2.5-3.5 2.5-3.5 2.5-3.5   Trend Same Same Same   Last Week Total 32.5 mg 35 mg 37.5 mg   Next Week Total 37.5 mg 35 mg 30 mg   Sun - - -   Mon 7.5 mg (10/5) - Hold (10/12)   Tue 5 mg - 5 mg   Wed - 5 mg 5 mg   Thu - 5 mg -   Fri - - -   Sat - - -   Visit Report - - -   Some recent data might be hidden       Plan:  1. INR is 5.0 today- see above in Anticoagulation Summary. Testerone injections could possibly be causing erratic INRs ~3-5 days after injection  Will instruct Galdino Dallas to Hold today's dose, continue 5mg daily - see above in Anticoagulation Summary.  2. Follow up Tues 10/15  3. Pt has agreed to only be called if INR out of range. They have been instructed to call if any changes in medications, doses, concerns, etc. Patient expresses understanding and has no  further questions at this time.    Patricia Polanco, [facility]

## 2020-10-15 ENCOUNTER — ANTICOAGULATION VISIT (OUTPATIENT)
Dept: PHARMACY | Facility: HOSPITAL | Age: 35
End: 2020-10-15

## 2020-10-15 DIAGNOSIS — Z79.01 ANTICOAGULANT LONG-TERM USE: ICD-10-CM

## 2020-10-15 DIAGNOSIS — I63.9 ACUTE CEREBROVASCULAR ACCIDENT (CVA) (HCC): ICD-10-CM

## 2020-10-15 DIAGNOSIS — Z95.2 S/P AVR: ICD-10-CM

## 2020-10-15 LAB — INR PPP: 2.5

## 2020-10-15 NOTE — PROGRESS NOTES
Anticoagulation Clinic Progress Note    Anticoagulation Summary  As of 10/15/2020    INR goal:  2.5-3.5   TTR:  28.7 % (1.8 y)   INR used for dosin.50 (10/15/2020)   Warfarin maintenance plan:  5 mg every day   Weekly warfarin total:  35 mg   Plan last modified:  Joe De [facility] (2020)   Next INR check:  10/19/2020   Priority:  High   Target end date:      Indications    Anticoagulant long-term use [Z79.01]  S/P AVR [Z95.2]  Acute cerebrovascular accident (CVA) (CMS/[facility]) [I63.9]             Anticoagulation Episode Summary     INR check location:      Preferred lab:      Send INR reminders to:   YANDY POLANCO CLINICAL POOL    Comments:  [employer] **Call every time - REQUIRES PHARMACIST REVIEW**      Anticoagulation Care Providers     Provider Role Specialty Phone number    José Antonio Banks MD Referring Cardiology [phone]          Clinic Interview:  Patient Findings     Negatives:  Signs/symptoms of thrombosis, Signs/symptoms of bleeding,   Laboratory test error suspected, Change in health, Change in alcohol use,   Change in activity, Upcoming invasive procedure, Emergency department   visit, Upcoming dental procedure, Missed doses, Extra doses, Change in   medications, Change in diet/appetite, Hospital admission, Bruising, Other   complaints      Clinical Outcomes     Negatives:  Major bleeding event, Thromboembolic event,   Anticoagulation-related hospital admission, Anticoagulation-related ED   visit, Anticoagulation-related fatality        INR History:  Anticoagulation Monitoring 10/7/2020 10/12/2020 10/15/2020   INR 2.20 5.00 2.50   INR Date 10/7/2020 10/12/2020 10/15/2020   INR Goal 2.5-3.5 2.5-3.5 2.5-3.5   Trend Same Same Same   Last Week Total 35 mg 37.5 mg 30 mg   Next Week Total 35 mg 30 mg 32.5 mg   Sun - - 2.5 mg (10/18)   Mon - Hold (10/12) -   Tue - 5 mg -   Wed 5 mg 5 mg -   Thu 5 mg - 5 mg   Fri - - 5 mg   Sat - - 5 mg   Visit Report - - -   Some recent data might be hidden        Plan:  1. INR is Therapeutic today- see above in Anticoagulation Summary.   Will instruct Galdino Dallas to Continue their warfarin regimen with the exception of giving 2.5 mg on Sunday (~3 - 4 days after testosterone injection today)-see above in Anticoagulation Summary.  2. Follow up in 4 days  3.  Pt has agreed to only be called if INR out of range. They have been instructed to call if any changes in medications, doses, concerns, etc. Patient expresses understanding and has no further questions at this time.    Ahsan Driscoll [facility]

## 2020-10-19 ENCOUNTER — ANTICOAGULATION VISIT (OUTPATIENT)
Dept: PHARMACY | Facility: HOSPITAL | Age: 35
End: 2020-10-19

## 2020-10-19 DIAGNOSIS — Z79.01 ANTICOAGULANT LONG-TERM USE: ICD-10-CM

## 2020-10-19 DIAGNOSIS — I63.9 ACUTE CEREBROVASCULAR ACCIDENT (CVA) (HCC): ICD-10-CM

## 2020-10-19 DIAGNOSIS — Z95.2 S/P AVR: ICD-10-CM

## 2020-10-19 LAB
INR PPP: 4.2 (ref 0.91–1.09)
PROTHROMBIN TIME: 50.7 SECONDS (ref 10–13.8)

## 2020-10-19 PROCEDURE — G0463 HOSPITAL OUTPT CLINIC VISIT: HCPCS

## 2020-10-19 PROCEDURE — 85610 PROTHROMBIN TIME: CPT

## 2020-10-19 PROCEDURE — 36416 COLLJ CAPILLARY BLOOD SPEC: CPT

## 2020-10-19 NOTE — PROGRESS NOTES
Anticoagulation Clinic Progress Note    Anticoagulation Summary  As of 10/19/2020    INR goal:  2.5-3.5   TTR:  28.9 % (1.8 y)   INR used for dosin.2 (10/19/2020)   Warfarin maintenance plan:  5 mg every day   Weekly warfarin total:  35 mg   Plan last modified:  Joe De RPH (2020)   Next INR check:  10/23/2020   Priority:  High   Target end date:      Indications    Anticoagulant long-term use [Z79.01]  S/P AVR [Z95.2]  Acute cerebrovascular accident (CVA) (CMS/[facility]) [I63.9]             Anticoagulation Episode Summary     INR check location:      Preferred lab:      Send INR reminders to:   YANDY POLANCO CLINICAL POOL    Comments:  [employer] **Call every time - REQUIRES PHARMACIST REVIEW**      Anticoagulation Care Providers     Provider Role Specialty Phone number    José Antonio Banks MD Referring Cardiology [phone]          Clinic Interview:  Patient Findings     Positives:  Extra doses, Change in medications    Negatives:  Signs/symptoms of thrombosis, Signs/symptoms of bleeding,   Laboratory test error suspected, Change in health, Change in alcohol use,   Change in activity, Upcoming invasive procedure, Emergency department   visit, Upcoming dental procedure, Missed doses, Change in diet/appetite,   Hospital admission, Bruising, Other complaints    Comments:  Reports testosterone inj 10/14. Took 5 mg yesterday rather   than 2.5 mg.       Clinical Outcomes     Negatives:  Major bleeding event, Thromboembolic event,   Anticoagulation-related hospital admission, Anticoagulation-related ED   visit, Anticoagulation-related fatality    Comments:  Reports testosterone inj 10/14. Took 5 mg yesterday rather   than 2.5 mg.         INR History:  Anticoagulation Monitoring 10/12/2020 10/15/2020 10/19/2020   INR 5.00 2.50 4.2   INR Date 10/12/2020 10/15/2020 10/19/2020   INR Goal 2.5-3.5 2.5-3.5 2.5-3.5   Trend Same Same Same   Last Week Total 37.5 mg 30 mg 30 mg   Next Week Total 30 mg 32.5 mg 32.5 mg    Sun - 2.5 mg (10/18) -   Mon Hold (10/12) - 2.5 mg (10/19)   Tue 5 mg - 5 mg   Wed 5 mg - 5 mg   Thu - 5 mg 5 mg   Fri - 5 mg -   Sat - 5 mg -   Visit Report - - -   Some recent data might be hidden       Plan:  1. INR is Supratherapeutic today- see above in Anticoagulation Summary.   Will instruct Galdino Dallas to Change their warfarin regimen- see above in Anticoagulation Summary.  2. Follow up in 4 days  3. They have been instructed to call if any changes in medications, doses, concerns, etc. Patient expresses understanding and has no further questions at this time.    Joe De [facility]

## 2020-10-22 ENCOUNTER — LAB (OUTPATIENT)
Dept: LAB | Facility: HOSPITAL | Age: 35
End: 2020-10-22

## 2020-10-22 ENCOUNTER — ANTICOAGULATION VISIT (OUTPATIENT)
Dept: PHARMACY | Facility: HOSPITAL | Age: 35
End: 2020-10-22

## 2020-10-22 DIAGNOSIS — Z95.2 S/P AVR: ICD-10-CM

## 2020-10-22 DIAGNOSIS — Z79.01 ANTICOAGULANT LONG-TERM USE: ICD-10-CM

## 2020-10-22 DIAGNOSIS — I63.9 ACUTE CEREBROVASCULAR ACCIDENT (CVA) (HCC): ICD-10-CM

## 2020-10-22 LAB
INR PPP: 2.2 (ref 2–3)
PROTHROMBIN TIME: 23.3 SECONDS (ref 19.4–28.5)

## 2020-10-22 PROCEDURE — 36415 COLL VENOUS BLD VENIPUNCTURE: CPT

## 2020-10-22 PROCEDURE — 85610 PROTHROMBIN TIME: CPT

## 2020-10-22 NOTE — PROGRESS NOTES
Anticoagulation Clinic Progress Note    Anticoagulation Summary  As of 10/22/2020    INR goal:  2.5-3.5   TTR:  29.0 % (1.8 y)   INR used for dosin.20 (10/22/2020)   Warfarin maintenance plan:  5 mg every day   Weekly warfarin total:  35 mg   Plan last modified:  Joe De RP (2020)   Next INR check:  10/26/2020   Priority:  High   Target end date:      Indications    Anticoagulant long-term use [Z79.01]  S/P AVR [Z95.2]  Acute cerebrovascular accident (CVA) (CMS/[facility]) [I63.9]             Anticoagulation Episode Summary     INR check location:      Preferred lab:      Send INR reminders to:   YANDY POLANCO CLINICAL POOL    Comments:  [employer] **Call every time - REQUIRES PHARMACIST REVIEW**      Anticoagulation Care Providers     Provider Role Specialty Phone number    José Antonio Banks MD Referring Cardiology [phone]          Clinic Interview:      INR History:  Anticoagulation Monitoring 10/15/2020 10/19/2020 10/22/2020   INR 2.50 4.2 2.20   INR Date 10/15/2020 10/19/2020 10/22/2020   INR Goal 2.5-3.5 2.5-3.5 2.5-3.5   Trend Same Same Same   Last Week Total 30 mg 30 mg 32.5 mg   Next Week Total 32.5 mg 32.5 mg 35 mg   Sun 2.5 mg (10/18) - 5 mg   Mon - 2.5 mg (10/19) -   Tue - 5 mg -   Wed - 5 mg -   Thu 5 mg 5 mg 5 mg   Fri 5 mg - 5 mg   Sat 5 mg - 5 mg   Visit Report - - -   Some recent data might be hidden       Plan:  1. INR is Subtherapeutic today- see above in Anticoagulation Summary.   Will instruct Galdino YRN Burt to Continue their warfarin regimen- see above in Anticoagulation Summary.  2. Follow up in 4 days  3. Spoke with pt today. They have been instructed to call if any changes in medications, doses, concerns, etc. Patient expresses understanding and has no further questions at this time.    Paulette Martin [facility]

## 2020-10-26 ENCOUNTER — LAB (OUTPATIENT)
Dept: LAB | Facility: HOSPITAL | Age: 35
End: 2020-10-26

## 2020-10-26 ENCOUNTER — ANTICOAGULATION VISIT (OUTPATIENT)
Dept: PHARMACY | Facility: HOSPITAL | Age: 35
End: 2020-10-26

## 2020-10-26 DIAGNOSIS — Z95.2 S/P AVR: ICD-10-CM

## 2020-10-26 DIAGNOSIS — Z79.01 ANTICOAGULANT LONG-TERM USE: ICD-10-CM

## 2020-10-26 DIAGNOSIS — I63.9 ACUTE CEREBROVASCULAR ACCIDENT (CVA) (HCC): ICD-10-CM

## 2020-10-26 LAB
INR PPP: 5.27 (ref 2–3)
PROTHROMBIN TIME: 53.6 SECONDS (ref 19.4–28.5)

## 2020-10-26 PROCEDURE — 85610 PROTHROMBIN TIME: CPT

## 2020-10-26 PROCEDURE — 36415 COLL VENOUS BLD VENIPUNCTURE: CPT

## 2020-10-26 RX ORDER — WARFARIN SODIUM 4 MG/1
TABLET ORAL
Qty: 30 TABLET | Refills: 1 | Status: SHIPPED | OUTPATIENT
Start: 2020-10-26 | End: 2021-09-13

## 2020-10-26 NOTE — PROGRESS NOTES
Anticoagulation Clinic Progress Note    Anticoagulation Summary  As of 10/26/2020    INR goal:  2.5-3.5   TTR:  29.0 % (1.9 y)   INR used for dosin.27 (10/26/2020)   Warfarin maintenance plan:  4 mg every Mon, Fri; 5 mg all other days   Weekly warfarin total:  33 mg   Plan last modified:  Joe De RPH (10/26/2020)   Next INR check:  10/29/2020   Priority:  High   Target end date:      Indications    Anticoagulant long-term use [Z79.01]  S/P AVR [Z95.2]  Acute cerebrovascular accident (CVA) (CMS/[facility]) [I63.9]             Anticoagulation Episode Summary     INR check location:      Preferred lab:      Send INR reminders to:  ALEJANDRINA POLANCO CLINICAL POOL    Comments:  [employer] **Call every time - REQUIRES PHARMACIST REVIEW**      Anticoagulation Care Providers     Provider Role Specialty Phone number    José Antonio Banks MD Referring Cardiology [phone]          Clinic Interview:  Patient Findings     Positives:  Change in medications    Negatives:  Signs/symptoms of thrombosis, Signs/symptoms of bleeding,   Laboratory test error suspected, Change in health, Change in alcohol use,   Change in activity, Upcoming invasive procedure, Emergency department   visit, Upcoming dental procedure, Missed doses, Extra doses, Change in   diet/appetite, Hospital admission, Bruising, Other complaints    Comments:  Testosterone inj 10/23; will begin small daily injections   beginning .      Clinical Outcomes     Negatives:  Major bleeding event, Thromboembolic event,   Anticoagulation-related hospital admission, Anticoagulation-related ED   visit, Anticoagulation-related fatality    Comments:  Testosterone inj 10/23; will begin small daily injections   beginning .        INR History:  Anticoagulation Monitoring 10/19/2020 10/22/2020 10/26/2020   INR 4.2 2.20 5.27   INR Date 10/19/2020 10/22/2020 10/26/2020   INR Goal 2.5-3.5 2.5-3.5 2.5-3.5   Trend Same Same Down   Last Week Total 30 mg 32.5 mg 32.5 mg   Next  Week Total 32.5 mg 35 mg 31.5 mg   Sun - 5 mg -   Mon 2.5 mg (10/19) - 2.5 mg (10/26)   Tue 5 mg - 5 mg   Wed 5 mg - 5 mg   Thu 5 mg 5 mg -   Fri - 5 mg -   Sat - 5 mg -   Visit Report - - -   Some recent data might be hidden       Plan:  1. INR is Supratherapeutic today- see above in Anticoagulation Summary. Consistently trending high on 5 mg daily. INR decreases too significantly following 2.5 mg dose x 1. Ordering 4-mg tablets to allow for smaller dose adjustments from 5-mg tablets.  Will instruct Galdino Dallas to Change their warfarin regimen- see above in Anticoagulation Summary.  2. Follow up in 3 days  3. They have been instructed to call if any changes in medications, doses, concerns, etc. To seek immediate medical attention if s/sx of bleeding develop or fall occurs. Patient expresses understanding and has no further questions at this time.    Joe De [facility]

## 2020-10-29 ENCOUNTER — LAB (OUTPATIENT)
Dept: LAB | Facility: HOSPITAL | Age: 35
End: 2020-10-29

## 2020-10-29 ENCOUNTER — ANTICOAGULATION VISIT (OUTPATIENT)
Dept: PHARMACY | Facility: HOSPITAL | Age: 35
End: 2020-10-29

## 2020-10-29 DIAGNOSIS — Z95.2 S/P AVR: ICD-10-CM

## 2020-10-29 DIAGNOSIS — I63.9 ACUTE CEREBROVASCULAR ACCIDENT (CVA) (HCC): ICD-10-CM

## 2020-10-29 DIAGNOSIS — Z79.01 ANTICOAGULANT LONG-TERM USE: ICD-10-CM

## 2020-10-29 LAB
INR PPP: 3.91 (ref 2–3)
PROTHROMBIN TIME: 40.3 SECONDS (ref 19.4–28.5)

## 2020-10-29 PROCEDURE — 85610 PROTHROMBIN TIME: CPT

## 2020-10-29 PROCEDURE — 36415 COLL VENOUS BLD VENIPUNCTURE: CPT

## 2020-10-29 NOTE — PROGRESS NOTES
Anticoagulation Clinic Progress Note    Anticoagulation Summary  As of 10/29/2020    INR goal:  2.5-3.5   TTR:  28.9 % (1.9 y)   INR used for dosing:  3.91 (10/29/2020)   Warfarin maintenance plan:  4 mg every Mon, Fri; 5 mg all other days   Weekly warfarin total:  33 mg   No change documented:  Joe De RPH   Plan last modified:  Joe De RPH (10/26/2020)   Next INR check:  11/2/2020   Priority:  High   Target end date:      Indications    Anticoagulant long-term use [Z79.01]  S/P AVR [Z95.2]  Acute cerebrovascular accident (CVA) (CMS/[facility]) [I63.9]             Anticoagulation Episode Summary     INR check location:      Preferred lab:      Send INR reminders to:   YANDY POLANCO CLINICAL POOL    Comments:  [employer] **Call every time - REQUIRES PHARMACIST REVIEW**      Anticoagulation Care Providers     Provider Role Specialty Phone number    José Antonio Banks MD Referring Cardiology [phone]          Clinic Interview:  Patient Findings     Negatives:  Signs/symptoms of thrombosis, Signs/symptoms of bleeding,   Laboratory test error suspected, Change in health, Change in alcohol use,   Change in activity, Upcoming invasive procedure, Emergency department   visit, Upcoming dental procedure, Missed doses, Extra doses, Change in   medications, Change in diet/appetite, Hospital admission, Bruising, Other   complaints      Clinical Outcomes     Negatives:  Major bleeding event, Thromboembolic event,   Anticoagulation-related hospital admission, Anticoagulation-related ED   visit, Anticoagulation-related fatality        INR History:  Anticoagulation Monitoring 10/22/2020 10/26/2020 10/29/2020   INR 2.20 5.27 3.91   INR Date 10/22/2020 10/26/2020 10/29/2020   INR Goal 2.5-3.5 2.5-3.5 2.5-3.5   Trend Same Down Same   Last Week Total 32.5 mg 32.5 mg 32.5 mg   Next Week Total 35 mg 31.5 mg 33 mg   Sun 5 mg - 5 mg   Mon - 2.5 mg (10/26) -   Tue - 5 mg -   Wed - 5 mg -   Thu 5 mg - 5 mg   Fri 5 mg - 4 mg   Sat 5  mg - 5 mg   Visit Report - - -   Some recent data might be hidden       Plan:  1. INR is Supratherapeutic today- see above in Anticoagulation Summary.   Will instruct Galdino Dallas to Change their warfarin regimen- see above in Anticoagulation Summary.  2. Follow up in 4 days  3. They have been instructed to call if any changes in medications, doses, concerns, etc. Patient expresses understanding and has no further questions at this time.    Joe De [facility]

## 2020-11-02 ENCOUNTER — LAB (OUTPATIENT)
Dept: LAB | Facility: HOSPITAL | Age: 35
End: 2020-11-02

## 2020-11-02 ENCOUNTER — ANTICOAGULATION VISIT (OUTPATIENT)
Dept: PHARMACY | Facility: HOSPITAL | Age: 35
End: 2020-11-02

## 2020-11-02 DIAGNOSIS — Z95.2 S/P AVR: ICD-10-CM

## 2020-11-02 DIAGNOSIS — I63.9 ACUTE CEREBROVASCULAR ACCIDENT (CVA) (HCC): ICD-10-CM

## 2020-11-02 DIAGNOSIS — Z79.01 ANTICOAGULANT LONG-TERM USE: ICD-10-CM

## 2020-11-02 LAB
INR PPP: 2.33 (ref 2–3)
PROTHROMBIN TIME: 24.6 SECONDS (ref 19.4–28.5)

## 2020-11-02 PROCEDURE — 85610 PROTHROMBIN TIME: CPT

## 2020-11-02 PROCEDURE — 36415 COLL VENOUS BLD VENIPUNCTURE: CPT

## 2020-11-03 NOTE — PROGRESS NOTES
Anticoagulation Clinic Progress Note    Anticoagulation Summary  As of 2020    INR goal:  2.5-3.5   TTR:  29.1 % (1.9 y)   INR used for dosin.33 (2020)   Warfarin maintenance plan:  4 mg every Fri; 5 mg all other days   Weekly warfarin total:  34 mg   Plan last modified:  Joe De RPH (2020)   Next INR check:  2020   Priority:  High   Target end date:      Indications    Anticoagulant long-term use [Z79.01]  S/P AVR [Z95.2]  Acute cerebrovascular accident (CVA) (CMS/[facility]) [I63.9]             Anticoagulation Episode Summary     INR check location:      Preferred lab:      Send INR reminders to:  ALEJANDRINA POLANCO CLINICAL POOL    Comments:  [employer] **Call every time - REQUIRES PHARMACIST REVIEW**      Anticoagulation Care Providers     Provider Role Specialty Phone number    José Antonio Banks MD Referring Cardiology [phone]          Clinic Interview:  Patient Findings     Positives:  Change in medications    Negatives:  Signs/symptoms of thrombosis, Signs/symptoms of bleeding,   Laboratory test error suspected, Change in health, Change in alcohol use,   Change in activity, Upcoming invasive procedure, Emergency department   visit, Upcoming dental procedure, Missed doses, Extra doses, Change in   diet/appetite, Hospital admission, Bruising, Other complaints    Comments:  He will adjust his testosterone dosing scheme to small   every-other-day doses (rather than [id number]) beginning today.      Clinical Outcomes     Negatives:  Major bleeding event, Thromboembolic event,   Anticoagulation-related hospital admission, Anticoagulation-related ED   visit, Anticoagulation-related fatality    Comments:  He will adjust his testosterone dosing scheme to small   every-other-day doses (rather than [id number]) beginning today.        INR History:  Anticoagulation Monitoring 10/26/2020 10/29/2020 2020   INR 5.27 3.91 2.33   INR Date 10/26/2020 10/29/2020 2020   INR Goal 2.5-3.5 2.5-3.5  2.5-3.5   Trend Down Same Up   Last Week Total 32.5 mg 32.5 mg 31.5 mg   Next Week Total 31.5 mg 33 mg 34 mg   Sun - 5 mg -   Mon 2.5 mg (10/26) - 5 mg   Tue 5 mg - 5 mg   Wed 5 mg - 5 mg   Thu - 5 mg -   Fri - 4 mg -   Sat - 5 mg -   Visit Report - - -   Some recent data might be hidden       Plan:  1. INR is Subtherapeutic today- see above in Anticoagulation Summary.   Will instruct Galdino Dallas to Change their warfarin regimen- see above in Anticoagulation Summary.  2. Follow up in 3 days  3. They have been instructed to call if any changes in medications, doses, concerns, etc. Patient expresses understanding and has no further questions at this time.    Joe De [facility]

## 2020-11-05 ENCOUNTER — LAB (OUTPATIENT)
Dept: LAB | Facility: HOSPITAL | Age: 35
End: 2020-11-05

## 2020-11-05 ENCOUNTER — ANTICOAGULATION VISIT (OUTPATIENT)
Dept: PHARMACY | Facility: HOSPITAL | Age: 35
End: 2020-11-05

## 2020-11-05 DIAGNOSIS — Z95.2 S/P AVR: ICD-10-CM

## 2020-11-05 DIAGNOSIS — I63.9 ACUTE CEREBROVASCULAR ACCIDENT (CVA) (HCC): ICD-10-CM

## 2020-11-05 DIAGNOSIS — Z79.01 ANTICOAGULANT LONG-TERM USE: ICD-10-CM

## 2020-11-05 LAB
INR PPP: 2.64 (ref 2–3)
PROTHROMBIN TIME: 27.7 SECONDS (ref 19.4–28.5)

## 2020-11-05 PROCEDURE — 36415 COLL VENOUS BLD VENIPUNCTURE: CPT

## 2020-11-05 PROCEDURE — 85610 PROTHROMBIN TIME: CPT

## 2020-11-05 NOTE — PROGRESS NOTES
Anticoagulation Clinic Progress Note    Anticoagulation Summary  As of 2020    INR goal:  2.5-3.5   TTR:  29.2 % (1.9 y)   INR used for dosin.64 (2020)   Warfarin maintenance plan:  4 mg every Fri; 5 mg all other days   Weekly warfarin total:  34 mg   No change documented:  Joe De RPH   Plan last modified:  Joe De RPH (2020)   Next INR check:  2020   Priority:  High   Target end date:      Indications    Anticoagulant long-term use [Z79.01]  S/P AVR [Z95.2]  Acute cerebrovascular accident (CVA) (CMS/[facility]) [I63.9]             Anticoagulation Episode Summary     INR check location:      Preferred lab:      Send INR reminders to:   [facility]AND[facility] POLANCO [facility]INICAL POOL    Comments:  [employer] **Call every time - REQUIRES PHARMACIST REVIEW**      Anticoagulation Care Providers     Provider Role Specialty Phone number    José Antonio Banks MD Referring Cardiology [phone]          Clinic Interview:  Patient Findings     Positives:  Change in health, Change in medications    Negatives:  Signs/symptoms of thrombosis, Signs/symptoms of bleeding,   Laboratory test error suspected, Change in alcohol use, Change in   activity, Upcoming invasive procedure, Emergency department visit,   Upcoming dental procedure, Missed doses, Extra doses, Change in   diet/appetite, Hospital admission, Bruising, Other complaints    Comments:  Now doing small testosterone inj every other day. Reports   diarrhea this week.      Clinical Outcomes     Negatives:  Major bleeding event, Thromboembolic event,   Anticoagulation-related hospital admission, Anticoagulation-related ED   visit, Anticoagulation-related fatality    Comments:  Now doing small testosterone inj every other day. Reports   diarrhea this week.        INR History:  Anticoagulation Monitoring 10/29/2020 2020 2020   INR 3.91 2.33 2.64   INR Date 10/29/2020 2020 2020   INR Goal 2.5-3.5 2.5-3.5 2.5-3.5   Trend Same Up Same   Last  Week Total 32.5 mg 31.5 mg 34 mg   Next Week Total 33 mg 34 mg 34 mg   Sun 5 mg - 5 mg   Mon - 5 mg -   Tue - 5 mg -   Wed - 5 mg -   Thu 5 mg - 5 mg   Fri 4 mg - 4 mg   Sat 5 mg - 5 mg   Visit Report - - -   Some recent data might be hidden       Plan:  1. INR is Therapeutic today- see above in Anticoagulation Summary.   Will instruct Galdino Dallas to Change their warfarin regimen- see above in Anticoagulation Summary.  2. Follow up in 4 days  3. They have been instructed to call if any changes in medications, doses, concerns, etc. Patient expresses understanding and has no further questions at this time.    Joe De [facility]

## 2020-11-09 ENCOUNTER — ANTICOAGULATION VISIT (OUTPATIENT)
Dept: PHARMACY | Facility: HOSPITAL | Age: 35
End: 2020-11-09

## 2020-11-09 DIAGNOSIS — Z79.01 ANTICOAGULANT LONG-TERM USE: ICD-10-CM

## 2020-11-09 DIAGNOSIS — Z95.2 S/P AVR: ICD-10-CM

## 2020-11-09 DIAGNOSIS — I63.9 ACUTE CEREBROVASCULAR ACCIDENT (CVA) (HCC): ICD-10-CM

## 2020-11-09 LAB — INR PPP: 2.2

## 2020-11-09 NOTE — PROGRESS NOTES
Anticoagulation Clinic Progress Note    Anticoagulation Summary  As of 2020    INR goal:  2.5-3.5   TTR:  29.2 % (1.9 y)   INR used for dosin.20 (2020)   Warfarin maintenance plan:  5 mg every day   Weekly warfarin total:  35 mg   Plan last modified:  Joe De [facility] (2020)   Next INR check:  2020   Priority:  High   Target end date:      Indications    Anticoagulant long-term use [Z79.01]  S/P AVR [Z95.2]  Acute cerebrovascular accident (CVA) (CMS/[facility]) [I63.9]             Anticoagulation Episode Summary     INR check location:      Preferred lab:      Send INR reminders to:   YANDY POLANCO CLINICAL POOL    Comments:  [employer] **Call every time - REQUIRES PHARMACIST REVIEW**      Anticoagulation Care Providers     Provider Role Specialty Phone number    José Antonio Banks MD Referring Cardiology [phone]          Clinic Interview:  Patient Findings     Negatives:  Signs/symptoms of thrombosis, Signs/symptoms of bleeding,   Laboratory test error suspected, Change in health, Change in alcohol use,   Change in activity, Upcoming invasive procedure, Emergency department   visit, Upcoming dental procedure, Missed doses, Extra doses, Change in   medications, Change in diet/appetite, Hospital admission, Bruising, Other   complaints      Clinical Outcomes     Negatives:  Major bleeding event, Thromboembolic event,   Anticoagulation-related hospital admission, Anticoagulation-related ED   visit, Anticoagulation-related fatality        INR History:  Anticoagulation Monitoring 2020   INR 2.33 2.64 2.20   INR Date 2020   INR Goal 2.5-3.5 2.5-3.5 2.5-3.5   Trend Up Same Up   Last Week Total 31.5 mg 34 mg 34 mg   Next Week Total 34 mg 34 mg 36 mg   Sun - 5 mg -   Mon 5 mg - 6 mg ()   Tue 5 mg - 5 mg   Wed 5 mg - 5 mg   Thu - 5 mg 5 mg   Fri - 4 mg -   Sat - 5 mg -   Visit Report - - -   Some recent data might be hidden       Plan:  1. INR is  Subtherapeutic today- see above in Anticoagulation Summary.   Will instruct Gladino Dallas to Change their warfarin regimen- see above in Anticoagulation Summary.  2. Follow up in 4 days  3. They have been instructed to call if any changes in medications, doses, concerns, etc. Patient expresses understanding and has no further questions at this time.    Joe De [facility]

## 2020-11-12 ENCOUNTER — ANTICOAGULATION VISIT (OUTPATIENT)
Dept: PHARMACY | Facility: HOSPITAL | Age: 35
End: 2020-11-12

## 2020-11-12 DIAGNOSIS — I63.9 ACUTE CEREBROVASCULAR ACCIDENT (CVA) (HCC): ICD-10-CM

## 2020-11-12 DIAGNOSIS — Z79.01 ANTICOAGULANT LONG-TERM USE: ICD-10-CM

## 2020-11-12 DIAGNOSIS — Z95.2 S/P AVR: ICD-10-CM

## 2020-11-12 LAB — INR PPP: 2

## 2020-11-12 RX ORDER — NITROGLYCERIN 0.4 MG/1
TABLET SUBLINGUAL
Qty: 50 TABLET | Refills: 2 | Status: SHIPPED | OUTPATIENT
Start: 2020-11-12 | End: 2021-01-21

## 2020-11-12 NOTE — PROGRESS NOTES
Anticoagulation Clinic Progress Note    Anticoagulation Summary  As of 2020    INR goal:  2.5-3.5   TTR:  29.1 % (1.9 y)   INR used for dosin.00 (2020)   Warfarin maintenance plan:  5 mg every day   Weekly warfarin total:  35 mg   Plan last modified:  Joe De [facility] (2020)   Next INR check:  2020   Priority:  High   Target end date:      Indications    Anticoagulant long-term use [Z79.01]  S/P AVR [Z95.2]  Acute cerebrovascular accident (CVA) (CMS/[facility]) [I63.9]             Anticoagulation Episode Summary     INR check location:      Preferred lab:      Send INR reminders to:   YANDY POLANCO CLINICAL POOL    Comments:  [employer] **Call every time - REQUIRES PHARMACIST REVIEW**      Anticoagulation Care Providers     Provider Role Specialty Phone number    José Antonio Banks MD Referring Cardiology [phone]          Clinic Interview:  Patient Findings     Negatives:  Signs/symptoms of thrombosis, Signs/symptoms of bleeding,   Laboratory test error suspected, Change in health, Change in alcohol use,   Change in activity, Upcoming invasive procedure, Emergency department   visit, Upcoming dental procedure, Missed doses, Extra doses, Change in   medications, Change in diet/appetite, Hospital admission, Bruising, Other   complaints      Clinical Outcomes     Negatives:  Major bleeding event, Thromboembolic event,   Anticoagulation-related hospital admission, Anticoagulation-related ED   visit, Anticoagulation-related fatality        INR History:  Anticoagulation Monitoring 2020   INR 2.64 2.20 2.00   INR Date 2020   INR Goal 2.5-3.5 2.5-3.5 2.5-3.5   Trend Same Up Same   Last Week Total 34 mg 34 mg 35 mg   Next Week Total 34 mg 36 mg 38 mg   Sun 5 mg - 5 mg   Mon - 6 mg () -   Tue - 5 mg -   Wed - 5 mg -   Thu 5 mg 5 mg 8 mg ()   Fri 4 mg - 5 mg   Sat 5 mg - 5 mg   Visit Report - - -   Some recent data might be hidden              Plan:  1. INR is Subtherapeutic today- see above in Anticoagulation Summary.   Will instruct Galdino YRN JonesBurt to Change their warfarin regimen- see above in Anticoagulation Summary.  2. Enoxaparin 80 mg q12h at least through tomorrow night (has supply at home; pt plans to check INR Sat evening to make sure ok to dc enoxaparin).   3. Follow up in 4 days when clinic reopens  4. They have been instructed to call if any changes in medications, doses, concerns, etc. Patient expresses understanding and has no further questions at this time.    Joe De [facility]

## 2020-11-14 LAB — INR PPP: 2.8

## 2020-11-16 ENCOUNTER — ANTICOAGULATION VISIT (OUTPATIENT)
Dept: PHARMACY | Facility: HOSPITAL | Age: 35
End: 2020-11-16

## 2020-11-16 DIAGNOSIS — Z95.2 S/P AVR: ICD-10-CM

## 2020-11-16 DIAGNOSIS — I63.9 ACUTE CEREBROVASCULAR ACCIDENT (CVA) (HCC): ICD-10-CM

## 2020-11-16 DIAGNOSIS — Z79.01 ANTICOAGULANT LONG-TERM USE: ICD-10-CM

## 2020-11-16 LAB — INR PPP: 2.7

## 2020-11-16 NOTE — PROGRESS NOTES
Anticoagulation Clinic Progress Note    Anticoagulation Summary  As of 2020    INR goal:  2.5-3.5   TTR:  29.3 % (1.9 y)   INR used for dosin.70 (2020)   Warfarin maintenance plan:  7.5 mg every Mon; 5 mg all other days   Weekly warfarin total:  37.5 mg   Plan last modified:  Joe De RPH (2020)   Next INR check:  2020   Priority:  High   Target end date:      Indications    Anticoagulant long-term use [Z79.01]  S/P AVR [Z95.2]  Acute cerebrovascular accident (CVA) (CMS/HCC) [I63.9]             Anticoagulation Episode Summary     INR check location:      Preferred lab:      Send INR reminders to:   YANDY POLANCO [facility]    Comments:  [employer] **Call every time - REQUIRES PHARMACIST REVIEW**      Anticoagulation Care Providers     Provider Role Specialty Phone number    José Antonio Banks MD Referring Cardiology [phone]          Clinic Interview:  Patient Findings     Positives:  Change in medications    Negatives:  Signs/symptoms of thrombosis, Signs/symptoms of bleeding,   Laboratory test error suspected, Change in health, Change in alcohol use,   Change in activity, Upcoming invasive procedure, Emergency department   visit, Upcoming dental procedure, Missed doses, Extra doses, Change in   diet/appetite, Hospital admission, Bruising, Other complaints    Comments:  INRs since last encounter: 2.7 (10/16), 2.8 (10/14). Stopped   enoxaparin after seeing therapeutic INR 2.8 .      Clinical Outcomes     Negatives:  Major bleeding event, Thromboembolic event,   Anticoagulation-related hospital admission, Anticoagulation-related ED   visit, Anticoagulation-related fatality    Comments:  INRs since last encounter: 2.7 (10/16), 2.8 (10/14). Stopped   enoxaparin after seeing therapeutic INR 2.8 .        INR History:  Anticoagulation Monitoring 2020   INR 2.20 2.00 2.70   INR Date 2020   INR Goal 2.5-3.5 2.5-3.5  2.5-3.5   Trend Up Same Up   Last Week Total 34 mg 35 mg 39 mg   Next Week Total 36 mg 38 mg 37.5 mg   Sun - 5 mg -   Mon 6 mg (11/9) - 7.5 mg   Tue 5 mg - 5 mg   Wed 5 mg - 5 mg   Thu 5 mg 8 mg (11/12) -   Fri - 5 mg -   Sat - 5 mg -   Visit Report - - -   Some recent data might be hidden       Plan:  1. INR is Therapeutic today- see above in Anticoagulation Summary.   Will instruct Galdino Dallas to Change their warfarin regimen- see above in Anticoagulation Summary.  2. Follow up in 3 days  3. They have been instructed to call if any changes in medications, doses, concerns, etc. Patient expresses understanding and has no further questions at this time.    Joe De [facility]

## 2020-11-19 ENCOUNTER — ANTICOAGULATION VISIT (OUTPATIENT)
Dept: PHARMACY | Facility: HOSPITAL | Age: 35
End: 2020-11-19

## 2020-11-19 DIAGNOSIS — I63.9 ACUTE CEREBROVASCULAR ACCIDENT (CVA) (HCC): ICD-10-CM

## 2020-11-19 DIAGNOSIS — Z95.2 S/P AVR: ICD-10-CM

## 2020-11-19 DIAGNOSIS — Z79.01 ANTICOAGULANT LONG-TERM USE: ICD-10-CM

## 2020-11-19 LAB — INR PPP: 6

## 2020-11-19 NOTE — PROGRESS NOTES
"Anticoagulation Clinic Progress Note    Anticoagulation Summary  As of 2020    INR goal:  2.5-3.5   TTR:  29.3 % (1.9 y)   INR used for dosin.00 (2020)   Warfarin maintenance plan:  5 mg every day   Weekly warfarin total:  35 mg   Plan last modified:  Joe De RPH (2020)   Next INR check:  2020   Priority:  High   Target end date:      Indications    Anticoagulant long-term use [Z79.01]  S/P AVR [Z95.2]  Acute cerebrovascular accident (CVA) (CMS/[facility]) [I63.9]             Anticoagulation Episode Summary     INR check location:      Preferred lab:      Send INR reminders to:   YANDY POLANCO CLINICAL POOL    Comments:  [employer] **Call every time - REQUIRES PHARMACIST REVIEW**      Anticoagulation Care Providers     Provider Role Specialty Phone number    José Antonio Banks MD Referring Cardiology [phone]          Clinic Interview:  Patient Findings     Positives:  Change in health, Extra doses    Negatives:  Signs/symptoms of thrombosis, Signs/symptoms of bleeding,   Laboratory test error suspected, Change in alcohol use, Change in   activity, Upcoming invasive procedure, Emergency department visit,   Upcoming dental procedure, Missed doses, Change in medications, Change in   diet/appetite, Hospital admission, Bruising, Other complaints    Comments:  Pt reports he took dose yesterday morning after believing he   may have missed his Tues dose (possible double dose yesterday as a   result). Reports \"a little\" diarrhea this week.      Clinical Outcomes     Negatives:  Major bleeding event, Thromboembolic event,   Anticoagulation-related hospital admission, Anticoagulation-related ED   visit, Anticoagulation-related fatality    Comments:  Pt reports he took dose yesterday morning after believing he   may have missed his Tues dose (possible double dose yesterday as a   result). Reports \"a little\" diarrhea this week.        INR History:  Anticoagulation Monitoring 2020 " 11/16/2020 11/19/2020   INR 2.00 2.70 6.00   INR Date 11/12/2020 11/16/2020 11/19/2020   INR Goal 2.5-3.5 2.5-3.5 2.5-3.5   Trend Same Up Down   Last Week Total 35 mg 39 mg 45.5 mg   Next Week Total 38 mg 37.5 mg 30 mg   Sun 5 mg - 5 mg   Mon - 7.5 mg -   Tue - 5 mg -   Wed - 5 mg -   Thu 8 mg (11/12) - Hold (11/19)   Fri 5 mg - 5 mg   Sat 5 mg - 5 mg   Visit Report - - -   Some recent data might be hidden       Plan:  1. INR is Supratherapeutic today- see above in Anticoagulation Summary.   Will instruct Galdino Dallas to Change their warfarin regimen- see above in Anticoagulation Summary.  2. Follow up in 4 days  3. They have been instructed to call if any changes in medications, doses, concerns, etc. To seek immediate medical attention if s/sx of bleeding develop or fall occurs. Patient expresses understanding and has no further questions at this time.    Joe De [facility]

## 2020-11-23 ENCOUNTER — ANTICOAGULATION VISIT (OUTPATIENT)
Dept: PHARMACY | Facility: HOSPITAL | Age: 35
End: 2020-11-23

## 2020-11-23 DIAGNOSIS — I63.9 ACUTE CEREBROVASCULAR ACCIDENT (CVA) (HCC): ICD-10-CM

## 2020-11-23 DIAGNOSIS — Z79.01 ANTICOAGULANT LONG-TERM USE: ICD-10-CM

## 2020-11-23 DIAGNOSIS — Z95.2 S/P AVR: ICD-10-CM

## 2020-11-23 LAB — INR PPP: 4.6

## 2020-11-24 ENCOUNTER — ANTICOAGULATION VISIT (OUTPATIENT)
Dept: PHARMACY | Facility: HOSPITAL | Age: 35
End: 2020-11-24

## 2020-11-24 DIAGNOSIS — I63.9 ACUTE CEREBROVASCULAR ACCIDENT (CVA) (HCC): ICD-10-CM

## 2020-11-24 DIAGNOSIS — Z79.01 ANTICOAGULANT LONG-TERM USE: ICD-10-CM

## 2020-11-24 DIAGNOSIS — Z95.2 S/P AVR: ICD-10-CM

## 2020-11-24 LAB — INR PPP: 3.9

## 2020-11-24 NOTE — PROGRESS NOTES
Anticoagulation Clinic Progress Note    Anticoagulation Summary  As of 11/24/2020    INR goal:  2.5-3.5   TTR:  29.1 % (1.9 y)   INR used for dosing:  3.90 (11/24/2020)   Warfarin maintenance plan:  5 mg every day   Weekly warfarin total:  35 mg   Plan last modified:  Joe De [facility] (11/19/2020)   Next INR check:     Priority:  High   Target end date:      Indications    Anticoagulant long-term use [Z79.01]  S/P AVR [Z95.2]  Acute cerebrovascular accident (CVA) (CMS/HCC) [I63.9]             Anticoagulation Episode Summary     INR check location:      Preferred lab:      Send INR reminders to:   YANDY POLANCO CLINICAL POOL    Comments:  [employer] **Call every time - REQUIRES PHARMACIST REVIEW**      Anticoagulation Care Providers     Provider Role Specialty Phone number    José Antonio Banks MD Referring Cardiology [phone]          Clinic Interview:  Patient Findings     Negatives:  Signs/symptoms of thrombosis, Signs/symptoms of bleeding,   Laboratory test error suspected, Change in health, Change in alcohol use,   Change in activity, Upcoming invasive procedure, Emergency department   visit, Upcoming dental procedure, Missed doses, Extra doses, Change in   medications, Change in diet/appetite, Hospital admission, Bruising, Other   complaints      Clinical Outcomes     Negatives:  Major bleeding event, Thromboembolic event,   Anticoagulation-related hospital admission, Anticoagulation-related ED   visit, Anticoagulation-related fatality        INR History:  Anticoagulation Monitoring 11/16/2020 11/19/2020 11/24/2020   INR 2.70 6.00 3.90   INR Date 11/16/2020 11/19/2020 11/24/2020   INR Goal 2.5-3.5 2.5-3.5 2.5-3.5   Trend Up Down Same   Last Week Total 39 mg 45.5 mg 32.5 mg   Next Week Total 37.5 mg 30 mg 35 mg   Sun - 5 mg 5 mg   Mon 7.5 mg - 5 mg   Tue 5 mg - 5 mg   Wed 5 mg - 5 mg   Thu - Hold (11/19) 5 mg   Fri - 5 mg 5 mg   Sat - 5 mg 5 mg   Visit Report - - -   Some recent data might be hidden              Plan:  1. INR is Supratherapeutic today- see above in Anticoagulation Summary.   Will instruct Galdino Jonesery to Change their warfarin regimen- see above in Anticoagulation Summary.  2. Follow up in 3 days  3. Spoke with patient. They have been instructed to call if any changes in medications, doses, concerns, etc. Patient expresses understanding and has no further questions at this time.    Ritu Field [facility]

## 2020-11-24 NOTE — PROGRESS NOTES
Attempted to contact patient and was unsuccessful. Received new INR on 11/24, see encounter from this date for updated information.

## 2020-11-27 ENCOUNTER — ANTICOAGULATION VISIT (OUTPATIENT)
Dept: PHARMACY | Facility: HOSPITAL | Age: 35
End: 2020-11-27

## 2020-11-27 DIAGNOSIS — Z95.2 S/P AVR: ICD-10-CM

## 2020-11-27 DIAGNOSIS — Z79.01 ANTICOAGULANT LONG-TERM USE: ICD-10-CM

## 2020-11-27 DIAGNOSIS — I63.9 ACUTE CEREBROVASCULAR ACCIDENT (CVA) (HCC): ICD-10-CM

## 2020-11-27 LAB
INR PPP: 3.7 (ref 0.91–1.09)
PROTHROMBIN TIME: 44.3 SECONDS (ref 10–13.8)

## 2020-11-27 PROCEDURE — 36416 COLLJ CAPILLARY BLOOD SPEC: CPT

## 2020-11-27 PROCEDURE — G0463 HOSPITAL OUTPT CLINIC VISIT: HCPCS

## 2020-11-27 PROCEDURE — 85610 PROTHROMBIN TIME: CPT

## 2020-11-27 NOTE — PROGRESS NOTES
Anticoagulation Clinic Progress Note    Anticoagulation Summary  As of 11/27/2020    INR goal:  2.5-3.5   TTR:  28.9 % (1.9 y)   INR used for dosing:  3.7 (11/27/2020)   Warfarin maintenance plan:  5 mg every day   Weekly warfarin total:  35 mg   No change documented:  Joe De RPH   Plan last modified:  Joe De RPH (11/19/2020)   Next INR check:  12/1/2020   Priority:  High   Target end date:      Indications    Anticoagulant long-term use [Z79.01]  S/P AVR [Z95.2]  Acute cerebrovascular accident (CVA) (CMS/HCC) [I63.9]             Anticoagulation Episode Summary     INR check location:      Preferred lab:      Send INR reminders to:   YANDY POLANCO CLINICAL POOL    Comments:  [employer] **Call every time - REQUIRES PHARMACIST REVIEW**      Anticoagulation Care Providers     Provider Role Specialty Phone number    José Antonio Banks MD Referring Cardiology [phone]          Clinic Interview:  Patient Findings     Negatives:  Signs/symptoms of thrombosis, Signs/symptoms of bleeding,   Laboratory test error suspected, Change in health, Change in alcohol use,   Change in activity, Upcoming invasive procedure, Emergency department   visit, Upcoming dental procedure, Missed doses, Extra doses, Change in   medications, Change in diet/appetite, Hospital admission, Bruising, Other   complaints      Clinical Outcomes     Negatives:  Major bleeding event, Thromboembolic event,   Anticoagulation-related hospital admission, Anticoagulation-related ED   visit, Anticoagulation-related fatality        INR History:  Anticoagulation Monitoring 11/19/2020 11/24/2020 11/27/2020   INR 6.00 3.90 3.7   INR Date 11/19/2020 11/24/2020 11/27/2020   INR Goal 2.5-3.5 2.5-3.5 2.5-3.5   Trend Down Same Same   Last Week Total 45.5 mg 32.5 mg 32.5 mg   Next Week Total 30 mg 35 mg 35 mg   Sun 5 mg - 5 mg   Mon - - 5 mg   Tue - 5 mg -   Wed - 5 mg -   Thu Hold (11/19) 5 mg -   Fri 5 mg - 5 mg   Sat 5 mg - 5 mg   Visit Report - - -     Some recent data might be hidden       Plan:  1. INR is Supratherapeutic today- see above in Anticoagulation Summary.  Will instruct Galdino YRN Burt to Continue their warfarin regimen- see above in Anticoagulation Summary.  2. Follow up in 4 days  3. Patient declines warfarin refills.  4. Verbal and written information provided. Patient expresses understanding and has no further questions at this time.    Joe De [facility]

## 2020-12-01 ENCOUNTER — ANTICOAGULATION VISIT (OUTPATIENT)
Dept: PHARMACY | Facility: HOSPITAL | Age: 35
End: 2020-12-01

## 2020-12-01 DIAGNOSIS — Z95.2 S/P AVR: ICD-10-CM

## 2020-12-01 DIAGNOSIS — Z79.01 ANTICOAGULANT LONG-TERM USE: ICD-10-CM

## 2020-12-01 DIAGNOSIS — I63.9 ACUTE CEREBROVASCULAR ACCIDENT (CVA) (HCC): ICD-10-CM

## 2020-12-01 LAB — INR PPP: 1.7

## 2020-12-01 NOTE — PROGRESS NOTES
Anticoagulation Clinic Progress Note    Anticoagulation Summary  As of 2020    INR goal:  2.5-3.5   TTR:  29.0 % (2 y)   INR used for dosin.70 (2020)   Warfarin maintenance plan:  7.5 mg every Wed; 5 mg all other days   Weekly warfarin total:  37.5 mg   Plan last modified:  Ritu Field RPH (2020)   Next INR check:  2020   Priority:  High   Target end date:      Indications    Anticoagulant long-term use [Z79.01]  S/P AVR [Z95.2]  Acute cerebrovascular accident (CVA) (CMS/[facility]) [I63.9]             Anticoagulation Episode Summary     INR check location:      Preferred lab:      Send INR reminders to:  ALEJANDRINA POLANCO CLINICAL POOL    Comments:  [employer] **Call every time - REQUIRES PHARMACIST REVIEW**      Anticoagulation Care Providers     Provider Role Specialty Phone number    José Antonio Banks MD Referring Cardiology [phone]          Clinic Interview:  Patient Findings     Positives:  Missed doses    Negatives:  Signs/symptoms of thrombosis, Signs/symptoms of bleeding,   Laboratory test error suspected, Change in health, Change in alcohol use,   Change in activity, Upcoming invasive procedure, Emergency department   visit, Upcoming dental procedure, Extra doses, Change in medications,   Change in diet/appetite, Hospital admission, Bruising, Other complaints    Comments:  Patient reports missing doses  & . Already took 5 mg this morning.       Clinical Outcomes     Negatives:  Major bleeding event, Thromboembolic event,   Anticoagulation-related hospital admission, Anticoagulation-related ED   visit, Anticoagulation-related fatality    Comments:  Patient reports missing doses  & . Already took 5 mg   this morning.         INR History:  Anticoagulation Monitoring 2020   INR 3.90 3.7 1.70   INR Date 2020   INR Goal 2.5-3.5 2.5-3.5 2.5-3.5   Trend Same Same Up   Last Week Total 32.5 mg 32.5 mg 25 mg   Next  Week Total 35 mg 35 mg 42.5 mg   Sun - 5 mg -   Mon - 5 mg -   Tue 5 mg - 10 mg (12/1)   Wed 5 mg - 7.5 mg (12/2)   Thu 5 mg - 5 mg   Fri - 5 mg -   Sat - 5 mg -   Visit Report - - -   Some recent data might be hidden       Plan:  1. INR is Subtherapeutic today - see above in Anticoagulation Summary. Likely due to two missed doses.   Will instruct Galdino Dallas to take extra 5 mg dose this evening (already took 5 mg dose this morning), 7.5 mg tomorrow 12/2, and 5 mg 12/3. Additionally, patient will bridge with lovenox 80 mg BID until therapeutic INR  2. Follow up in 3 days  3. Spoke with patient. They have been instructed to call if any changes in medications, doses, concerns, etc. Patient expresses understanding and has no further questions at this time.    Ritu Field formerly [facility]

## 2020-12-04 ENCOUNTER — ANTICOAGULATION VISIT (OUTPATIENT)
Dept: PHARMACY | Facility: HOSPITAL | Age: 35
End: 2020-12-04

## 2020-12-04 DIAGNOSIS — Z95.2 S/P AVR: ICD-10-CM

## 2020-12-04 DIAGNOSIS — I63.9 ACUTE CEREBROVASCULAR ACCIDENT (CVA) (HCC): ICD-10-CM

## 2020-12-04 DIAGNOSIS — Z79.01 ANTICOAGULANT LONG-TERM USE: ICD-10-CM

## 2020-12-04 LAB — INR PPP: 2.5

## 2020-12-04 NOTE — PROGRESS NOTES
Anticoagulation Clinic Progress Note    Anticoagulation Summary  As of 2020    INR goal:  2.5-3.5   TTR:  28.9 % (2 y)   INR used for dosin.50 (2020)   Warfarin maintenance plan:  7.5 mg every Wed; 5 mg all other days   Weekly warfarin total:  37.5 mg   No change documented:  Joe De RPH   Plan last modified:  Ritu Field RPH (2020)   Next INR check:  2020   Priority:  High   Target end date:      Indications    Anticoagulant long-term use [Z79.01]  S/P AVR [Z95.2]  Acute cerebrovascular accident (CVA) (CMS/HCC) [I63.9]             Anticoagulation Episode Summary     INR check location:      Preferred lab:      Send INR reminders to:   YANDY POLANCO CLINICAL POOL    Comments:  [employer] **Call every time - REQUIRES PHARMACIST REVIEW**      Anticoagulation Care Providers     Provider Role Specialty Phone number    José Antonio Banks MD Referring Cardiology [phone]          Clinic Interview:  Patient Findings     Negatives:  Signs/symptoms of thrombosis, Signs/symptoms of bleeding,   Laboratory test error suspected, Change in health, Change in alcohol use,   Change in activity, Upcoming invasive procedure, Emergency department   visit, Upcoming dental procedure, Missed doses, Extra doses, Change in   medications, Change in diet/appetite, Hospital admission, Bruising, Other   complaints      Clinical Outcomes     Negatives:  Major bleeding event, Thromboembolic event,   Anticoagulation-related hospital admission, Anticoagulation-related ED   visit, Anticoagulation-related fatality        INR History:  Anticoagulation Monitoring 2020   INR 3.7 1.70 2.50   INR Date 2020   INR Goal 2.5-3.5 2.5-3.5 2.5-3.5   Trend Same Up Same   Last Week Total 32.5 mg 25 mg 32.5 mg   Next Week Total 35 mg 42.5 mg 37.5 mg   Sun 5 mg - 5 mg   Mon 5 mg - -   Tue - 10 mg () -   Wed - 7.5 mg () -   Thu - 5 mg -   Fri 5 mg - 5 mg   Sat 5 mg  - 5 mg   Visit Report - - -   Some recent data might be hidden       Plan:  1. INR is Therapeutic today- see above in Anticoagulation Summary.   Will instruct Galdino Dallas to Continue their warfarin regimen- see above in Anticoagulation Summary.  2. DISCONTINUE enoxaparin.   3. Follow up in 3 days  4. They have been instructed to call if any changes in medications, doses, concerns, etc. Patient expresses understanding and has no further questions at this time.    Joe De [facility]

## 2020-12-07 ENCOUNTER — ANTICOAGULATION VISIT (OUTPATIENT)
Dept: PHARMACY | Facility: HOSPITAL | Age: 35
End: 2020-12-07

## 2020-12-07 DIAGNOSIS — Z95.2 S/P AVR: ICD-10-CM

## 2020-12-07 DIAGNOSIS — I63.9 ACUTE CEREBROVASCULAR ACCIDENT (CVA) (HCC): ICD-10-CM

## 2020-12-07 DIAGNOSIS — Z79.01 ANTICOAGULANT LONG-TERM USE: ICD-10-CM

## 2020-12-07 LAB — INR PPP: 2.8

## 2020-12-07 NOTE — PROGRESS NOTES
Anticoagulation Clinic Progress Note    Anticoagulation Summary  As of 2020    INR goal:  2.5-3.5   TTR:  29.2 % (2 y)   INR used for dosin.80 (2020)   Warfarin maintenance plan:  7.5 mg every Wed; 5 mg all other days   Weekly warfarin total:  37.5 mg   No change documented:  Joe De RPH   Plan last modified:  Ritu Field RPH (2020)   Next INR check:  2020   Priority:  High   Target end date:      Indications    Anticoagulant long-term use [Z79.01]  S/P AVR [Z95.2]  Acute cerebrovascular accident (CVA) (CMS/HCC) [I63.9]             Anticoagulation Episode Summary     INR check location:      Preferred lab:      Send INR reminders to:   YANDY POLANCO [facility]INICAL POOL    Comments:  [employer] **Call every time - REQUIRES PHARMACIST REVIEW**      Anticoagulation Care Providers     Provider Role Specialty Phone number    José Antonio Banks MD Referring Cardiology [phone]          Clinic Interview:  Patient Findings     Negatives:  Signs/symptoms of thrombosis, Signs/symptoms of bleeding,   Laboratory test error suspected, Change in health, Change in alcohol use,   Change in activity, Upcoming invasive procedure, Emergency department   visit, Upcoming dental procedure, Missed doses, Extra doses, Change in   medications, Change in diet/appetite, Hospital admission, Bruising, Other   complaints      Clinical Outcomes     Negatives:  Major bleeding event, Thromboembolic event,   Anticoagulation-related hospital admission, Anticoagulation-related ED   visit, Anticoagulation-related fatality        INR History:  Anticoagulation Monitoring 2020   INR 1.70 2.50 2.80   INR Date 2020   INR Goal 2.5-3.5 2.5-3.5 2.5-3.5   Trend Up Same Same   Last Week Total 25 mg 32.5 mg 37.5 mg   Next Week Total 42.5 mg 37.5 mg 37.5 mg   Sun - 5 mg -   Mon - - 5 mg   Tue 10 mg () - 5 mg   Wed 7.5 mg () - 7.5 mg   Thu 5 mg - 5 mg   Fri - 5 mg -    Sat - 5 mg -   Visit Report - - -   Some recent data might be hidden       Plan:  1. INR is Therapeutic today- see above in Anticoagulation Summary.   Will instruct Galdino Dallas to Continue their warfarin regimen- see above in Anticoagulation Summary.  2. Follow up in 4 days  3. They have been instructed to call if any changes in medications, doses, concerns, etc. Patient expresses understanding and has no further questions at this time.    Joe De RP

## 2020-12-11 ENCOUNTER — LAB (OUTPATIENT)
Dept: LAB | Facility: HOSPITAL | Age: 35
End: 2020-12-11

## 2020-12-11 ENCOUNTER — TELEPHONE (OUTPATIENT)
Dept: CARDIOLOGY | Facility: CLINIC | Age: 35
End: 2020-12-11

## 2020-12-11 ENCOUNTER — ANTICOAGULATION VISIT (OUTPATIENT)
Dept: PHARMACY | Facility: HOSPITAL | Age: 35
End: 2020-12-11

## 2020-12-11 DIAGNOSIS — Z95.2 S/P AVR: ICD-10-CM

## 2020-12-11 DIAGNOSIS — Z79.01 ANTICOAGULANT LONG-TERM USE: ICD-10-CM

## 2020-12-11 DIAGNOSIS — I63.9 ACUTE CEREBROVASCULAR ACCIDENT (CVA) (HCC): ICD-10-CM

## 2020-12-11 LAB
INR PPP: 5.19 (ref 2–3)
PROTHROMBIN TIME: 52.8 SECONDS (ref 19.4–28.5)

## 2020-12-11 PROCEDURE — 85610 PROTHROMBIN TIME: CPT

## 2020-12-11 PROCEDURE — 36415 COLL VENOUS BLD VENIPUNCTURE: CPT

## 2020-12-11 NOTE — TELEPHONE ENCOUNTER
Spoke with Mr. Dallas by phone. May refer to today's [facility] Visit. Thank you for the warm hand-off.

## 2020-12-11 NOTE — TELEPHONE ENCOUNTER
Critical INR of 5.19.  I will forward to the anti-coag clinic.    I have called to check on the pt and he said he has not had any issues with bleeding.  I have asked him to hold his dose until he hears back from the anti coag clinic, and he verbalized understanding  Thanks  Jocelyne Escalante RN  Triage nurse

## 2020-12-11 NOTE — PROGRESS NOTES
Anticoagulation Clinic Progress Note    Anticoagulation Summary  As of 2020    INR goal:  2.5-3.5   TTR:  29.2 % (2 y)   INR used for dosin.19 (2020)   Warfarin maintenance plan:  7.5 mg every Wed; 5 mg all other days   Weekly warfarin total:  37.5 mg   Plan last modified:  Ritu Field RPH (2020)   Next INR check:  2020   Priority:  High   Target end date:      Indications    Anticoagulant long-term use [Z79.01]  S/P AVR [Z95.2]  Acute cerebrovascular accident (CVA) (CMS/[facility]) [I63.9]             Anticoagulation Episode Summary     INR check location:      Preferred lab:      Send INR reminders to:   YANDY POLANCO CLINICAL POOL    Comments:  [employer] **Call every time - REQUIRES PHARMACIST REVIEW**      Anticoagulation Care Providers     Provider Role Specialty Phone number    José Antonio Banks MD Referring Cardiology [phone]          Clinic Interview:  Patient Findings     Negatives:  Signs/symptoms of thrombosis, Signs/symptoms of bleeding,   Laboratory test error suspected, Change in health, Change in alcohol use,   Change in activity, Upcoming invasive procedure, Emergency department   visit, Upcoming dental procedure, Missed doses, Extra doses, Change in   medications, Change in diet/appetite, Hospital admission, Bruising, Other   complaints      Clinical Outcomes     Negatives:  Major bleeding event, Thromboembolic event,   Anticoagulation-related hospital admission, Anticoagulation-related ED   visit, Anticoagulation-related fatality        INR History:  Anticoagulation Monitoring 2020   INR 2.50 2.80 5.19   INR Date 2020   INR Goal 2.5-3.5 2.5-3.5 2.5-3.5   Trend Same Same Same   Last Week Total 32.5 mg 37.5 mg 37.5 mg   Next Week Total 37.5 mg 37.5 mg 32.5 mg   Sun 5 mg - 5 mg   Mon - 5 mg -   Tue - 5 mg -   Wed - 7.5 mg -   Thu - 5 mg -   Fri 5 mg - Hold ()   Sat 5 mg - 5 mg   Visit Report - - -   Some  recent data might be hidden       Plan:  1. INR is Supratherapeutic today- see above in Anticoagulation Summary.   Will instruct Galdino Dallas to Change their warfarin regimen- see above in Anticoagulation Summary.  2. Follow up in 3 days  3. They have been instructed to call if any changes in medications, doses, concerns, etc. To seek immediate medical attention if s/sx of bleeding develop or fall occurs. Patient expresses understanding and has no further questions at this time.    Joe De [facility]

## 2020-12-15 ENCOUNTER — TELEPHONE (OUTPATIENT)
Dept: PHARMACY | Facility: HOSPITAL | Age: 35
End: 2020-12-15

## 2020-12-15 ENCOUNTER — ANTICOAGULATION VISIT (OUTPATIENT)
Dept: PHARMACY | Facility: HOSPITAL | Age: 35
End: 2020-12-15

## 2020-12-15 ENCOUNTER — LAB (OUTPATIENT)
Dept: LAB | Facility: HOSPITAL | Age: 35
End: 2020-12-15

## 2020-12-15 DIAGNOSIS — Z79.01 ANTICOAGULANT LONG-TERM USE: ICD-10-CM

## 2020-12-15 DIAGNOSIS — I63.9 ACUTE CEREBROVASCULAR ACCIDENT (CVA) (HCC): ICD-10-CM

## 2020-12-15 DIAGNOSIS — Z95.2 S/P AVR: Primary | ICD-10-CM

## 2020-12-15 DIAGNOSIS — Z95.2 S/P AVR: ICD-10-CM

## 2020-12-15 LAB
INR PPP: 2.11 (ref 2–3)
PROTHROMBIN TIME: 22.4 SECONDS (ref 19.4–28.5)

## 2020-12-15 PROCEDURE — 36415 COLL VENOUS BLD VENIPUNCTURE: CPT

## 2020-12-15 PROCEDURE — 85610 PROTHROMBIN TIME: CPT

## 2020-12-15 NOTE — TELEPHONE ENCOUNTER
Received call from Yosef from [facility] requesting new standing INR order. Order entered.

## 2020-12-15 NOTE — PROGRESS NOTES
Anticoagulation Clinic Progress Note    Anticoagulation Summary  As of 12/15/2020    INR goal:  2.5-3.5   TTR:  29.2 % (2 y)   INR used for dosin.11 (12/15/2020)   Warfarin maintenance plan:  7.5 mg every Wed; 5 mg all other days   Weekly warfarin total:  37.5 mg   Plan last modified:  Ritu Field RP (2020)   Next INR check:  2020   Priority:  High   Target end date:      Indications    Anticoagulant long-term use [Z79.01]  S/P AVR [Z95.2]  Acute cerebrovascular accident (CVA) (CMS/HCC) [I63.9]             Anticoagulation Episode Summary     INR check location:      Preferred lab:      Send INR reminders to:   YANDY POLANCO [facility]    Comments:  [employer] **Call every time - REQUIRES PHARMACIST REVIEW**      Anticoagulation Care Providers     Provider Role Specialty Phone number    José Antonio Banks MD Referring Cardiology [phone]          Clinic Interview:      INR History:  Anticoagulation Monitoring 2020 2020 12/15/2020   INR 2.80 5.19 2.11   INR Date 2020 2020 12/15/2020   INR Goal 2.5-3.5 2.5-3.5 2.5-3.5   Trend Same Same Same   Last Week Total 37.5 mg 37.5 mg 32.5 mg   Next Week Total 37.5 mg 32.5 mg 36 mg   Sun - 5 mg 5 mg   Mon 5 mg - -   Tue 5 mg - 6 mg (12/15)   Wed 7.5 mg - 5 mg ()   Thu 5 mg - 5 mg   Fri - Hold () 5 mg   Sat - 5 mg 5 mg   Visit Report - - -   Some recent data might be hidden       Plan:  1. INR is Subtherapeutic today- see above in Anticoagulation Summary.   Will instruct Galdino Dallas to Change their warfarin regimen- see above in Anticoagulation Summary.  He will do a dose of enoxaparin injection x 1 as expect INR is increasing toward goal range.  2. Follow up in 1 week  3. Spoke with patient today. They have been instructed to call if any changes in medications, doses, concerns, etc. Patient expresses understanding and has no further questions at this time.    Paulette Martin [facility]

## 2020-12-18 ENCOUNTER — ANTICOAGULATION VISIT (OUTPATIENT)
Dept: PHARMACY | Facility: HOSPITAL | Age: 35
End: 2020-12-18

## 2020-12-18 ENCOUNTER — LAB (OUTPATIENT)
Dept: LAB | Facility: HOSPITAL | Age: 35
End: 2020-12-18

## 2020-12-18 DIAGNOSIS — Z95.2 S/P AVR: ICD-10-CM

## 2020-12-18 DIAGNOSIS — Z79.01 ANTICOAGULANT LONG-TERM USE: ICD-10-CM

## 2020-12-18 DIAGNOSIS — I63.9 ACUTE CEREBROVASCULAR ACCIDENT (CVA) (HCC): ICD-10-CM

## 2020-12-18 LAB
INR PPP: 2.4 (ref 2–3)
PROTHROMBIN TIME: 25.3 SECONDS (ref 19.4–28.5)

## 2020-12-18 PROCEDURE — 36415 COLL VENOUS BLD VENIPUNCTURE: CPT

## 2020-12-18 PROCEDURE — 85610 PROTHROMBIN TIME: CPT

## 2020-12-18 NOTE — PROGRESS NOTES
Anticoagulation Clinic Progress Note    Anticoagulation Summary  As of 2020    INR goal:  2.5-3.5   TTR:  29.1 % (2 y)   INR used for dosin.40 (2020)   Warfarin maintenance plan:  7.5 mg every Wed; 5 mg all other days   Weekly warfarin total:  37.5 mg   Plan last modified:  Ritu Field [facility] (2020)   Next INR check:  2020   Priority:  High   Target end date:      Indications    Anticoagulant long-term use [Z79.01]  S/P AVR [Z95.2]  Acute cerebrovascular accident (CVA) (CMS/HCC) [I63.9]             Anticoagulation Episode Summary     INR check location:      Preferred lab:      Send INR reminders to:   YANDY POLANCO [facility]    Comments:  [employer] **Call every time - REQUIRES PHARMACIST REVIEW**      Anticoagulation Care Providers     Provider Role Specialty Phone number    José Antonio Banks MD Referring Cardiology [phone]            INR History:  Anticoagulation Monitoring 2020 12/15/2020 2020   INR 5.19 2.11 2.40   INR Date 2020 12/15/2020 2020   INR Goal 2.5-3.5 2.5-3.5 2.5-3.5   Trend Same Same Same   Last Week Total 37.5 mg 32.5 mg 31 mg   Next Week Total 32.5 mg 36 mg 38.5 mg   Sun 5 mg 5 mg 5 mg   Mon - - -   Tue - 6 mg (12/15) -   Wed - 5 mg () -   Thu - 5 mg -    () 5 mg 6 mg ()   Sat 5 mg 5 mg 5 mg   Visit Report - - -   Some recent data might be hidden       Plan:  1. INR is Subtherapeutic today- see above in Anticoagulation Summary.   Will instruct Galdino Dallas to Change their warfarin regimen- see above in Anticoagulation Summary.  2. Follow up in 3 days  3. Left secure VM w/ instructions. They have been instructed to call if any changes in medications, doses, concerns, etc. Patient expresses understanding and has no further questions at this time.    Joe De [facility]

## 2020-12-21 ENCOUNTER — ANTICOAGULATION VISIT (OUTPATIENT)
Dept: PHARMACY | Facility: HOSPITAL | Age: 35
End: 2020-12-21

## 2020-12-21 ENCOUNTER — LAB (OUTPATIENT)
Dept: LAB | Facility: HOSPITAL | Age: 35
End: 2020-12-21

## 2020-12-21 DIAGNOSIS — Z79.01 ANTICOAGULANT LONG-TERM USE: ICD-10-CM

## 2020-12-21 DIAGNOSIS — I63.9 ACUTE CEREBROVASCULAR ACCIDENT (CVA) (HCC): ICD-10-CM

## 2020-12-21 DIAGNOSIS — Z95.2 S/P AVR: ICD-10-CM

## 2020-12-21 LAB
INR PPP: 1.97 (ref 2–3)
PROTHROMBIN TIME: 21 SECONDS (ref 19.4–28.5)

## 2020-12-21 PROCEDURE — 36415 COLL VENOUS BLD VENIPUNCTURE: CPT

## 2020-12-21 PROCEDURE — 85610 PROTHROMBIN TIME: CPT

## 2020-12-21 NOTE — PROGRESS NOTES
Anticoagulation Clinic Progress Note    Anticoagulation Summary  As of 2020    INR goal:  2.5-3.5   TTR:  29.0 % (2 y)   INR used for dosin.97 (2020)   Warfarin maintenance plan:  7.5 mg every Wed; 5 mg all other days   Weekly warfarin total:  37.5 mg   Plan last modified:  Ritu Field RPH (2020)   Next INR check:  2020   Priority:  High   Target end date:      Indications    Anticoagulant long-term use [Z79.01]  S/P AVR [Z95.2]  Acute cerebrovascular accident (CVA) (CMS/[facility]) [I63.9]             Anticoagulation Episode Summary     INR check location:      Preferred lab:      Send INR reminders to:  ALEJANDRINA POLANCO CLINICAL POOL    Comments:  [employer] **Call every time - REQUIRES PHARMACIST REVIEW**      Anticoagulation Care Providers     Provider Role Specialty Phone number    José Antonio Banks MD Referring Cardiology [phone]          Clinic Interview:  Patient Findings     Positives:  Missed doses    Negatives:  Signs/symptoms of thrombosis, Signs/symptoms of bleeding,   Laboratory test error suspected, Change in health, Change in alcohol use,   Change in activity, Upcoming invasive procedure, Emergency department   visit, Upcoming dental procedure, Extra doses, Change in medications,   Change in diet/appetite, Hospital admission, Bruising, Other complaints    Comments:  Reports missed last night's dose.       Clinical Outcomes     Negatives:  Major bleeding event, Thromboembolic event,   Anticoagulation-related hospital admission, Anticoagulation-related ED   visit, Anticoagulation-related fatality    Comments:  Reports missed last night's dose.         INR History:  Anticoagulation Monitoring 12/15/2020 2020 2020   INR 2.11 2.40 1.97   INR Date 12/15/2020 2020 2020   INR Goal 2.5-3.5 2.5-3.5 2.5-3.5   Trend Same Same Same   Last Week Total 32.5 mg 31 mg 32 mg   Next Week Total 36 mg 38.5 mg 40 mg   Sun 5 mg 5 mg -   Mon - - 7.5 mg ()   Tu 6  mg (12/15) - 5 mg   Wed 5 mg (12/16) - -   Thu 5 mg - -   Fri 5 mg 6 mg (12/18) -   Sat 5 mg 5 mg -   Visit Report - - -   Some recent data might be hidden       Plan:  1. INR is Subtherapeutic today- see above in Anticoagulation Summary.   Will instruct Galdino Dallas to Change their warfarin regimen- see above in Anticoagulation Summary.  2. Administer enoxaparin 80 mg SQ q12h until INR approaches therapeutic range.   3. Follow up in 2 days  4. They have been instructed to call if any changes in medications, doses, concerns, etc. Patient expresses understanding and has no further questions at this time.    Joe De [facility]

## 2020-12-23 ENCOUNTER — ANTICOAGULATION VISIT (OUTPATIENT)
Dept: PHARMACY | Facility: HOSPITAL | Age: 35
End: 2020-12-23

## 2020-12-23 DIAGNOSIS — I63.9 ACUTE CEREBROVASCULAR ACCIDENT (CVA) (HCC): ICD-10-CM

## 2020-12-23 DIAGNOSIS — Z95.2 S/P AVR: ICD-10-CM

## 2020-12-23 DIAGNOSIS — Z79.01 ANTICOAGULANT LONG-TERM USE: ICD-10-CM

## 2020-12-23 LAB
INR PPP: 1.9 (ref 0.91–1.09)
PROTHROMBIN TIME: 22.9 SECONDS (ref 10–13.8)

## 2020-12-23 PROCEDURE — 36416 COLLJ CAPILLARY BLOOD SPEC: CPT

## 2020-12-23 PROCEDURE — G0463 HOSPITAL OUTPT CLINIC VISIT: HCPCS

## 2020-12-23 PROCEDURE — 85610 PROTHROMBIN TIME: CPT

## 2020-12-23 NOTE — PROGRESS NOTES
Anticoagulation Clinic Progress Note    Anticoagulation Summary  As of 2020    INR goal:  2.5-3.5   TTR:  28.9 % (2 y)   INR used for dosin.9 (2020)   Warfarin maintenance plan:  7.5 mg every Wed; 5 mg all other days   Weekly warfarin total:  37.5 mg   Plan last modified:  Ritu Field RPH (2020)   Next INR check:  2020   Priority:  High   Target end date:      Indications    Anticoagulant long-term use [Z79.01]  S/P AVR [Z95.2]  Acute cerebrovascular accident (CVA) (CMS/HCC) [I63.9]             Anticoagulation Episode Summary     INR check location:      Preferred lab:      Send INR reminders to:   AYNDY POLANCO [facility]    Comments:  [employer] **Call every time - REQUIRES PHARMACIST REVIEW**      Anticoagulation Care Providers     Provider Role Specialty Phone number    José Antonio Banks MD Referring Cardiology [phone]          Clinic Interview:  Patient Findings     Positives:  Missed doses, Change in medications    Comments:  Medrol dose jony started   Missed dose noted previously from       Clinical Outcomes     Comments:  Medrol dose jony started   Missed dose noted previously from         INR History:  Anticoagulation Monitoring 2020   INR 2.40 1.97 1.9   INR Date 2020   INR Goal 2.5-3.5 2.5-3.5 2.5-3.5   Trend Same Same Same   Last Week Total 31 mg 32 mg 33.5 mg   Next Week Total 38.5 mg 40 mg 37.5 mg   Sun 5 mg - 5 mg   Mon - 7.5 mg () -   Tue - 5 mg -   Wed - - 7.5 mg   Thu - - 5 mg   Fri 6 mg () - 5 mg   Sat 5 mg - 5 mg   Visit Report - - -   Some recent data might be hidden       Plan:  1. INR is Subtherapeutic today- see above in Anticoagulation Summary.  Will instruct Galdino Dallas to Continue their warfarin regimen- see above in Anticoagulation Summary.  He will continue enoxaparin injections through 12/25 PM.  2. Follow up in 5 days---he does not have home test strips  yet so will go to lab or walk in/call clinic  3. Patient declines warfarin refills. He will  enoxaparin injection sent to [facility] on 12/21.  4. Verbal and written information provided. Patient expresses understanding and has no further questions at this time.    Paulette Martin [facility]

## 2020-12-28 ENCOUNTER — ANTICOAGULATION VISIT (OUTPATIENT)
Dept: PHARMACY | Facility: HOSPITAL | Age: 35
End: 2020-12-28

## 2020-12-28 DIAGNOSIS — Z79.01 ANTICOAGULANT LONG-TERM USE: ICD-10-CM

## 2020-12-28 DIAGNOSIS — I63.9 ACUTE CEREBROVASCULAR ACCIDENT (CVA) (HCC): ICD-10-CM

## 2020-12-28 DIAGNOSIS — Z95.2 S/P AVR: ICD-10-CM

## 2020-12-28 LAB
INR PPP: 2.6 (ref 0.91–1.09)
PROTHROMBIN TIME: 31.7 SECONDS (ref 10–13.8)

## 2020-12-28 PROCEDURE — 36416 COLLJ CAPILLARY BLOOD SPEC: CPT

## 2020-12-28 PROCEDURE — 85610 PROTHROMBIN TIME: CPT

## 2020-12-28 RX ORDER — VARENICLINE TARTRATE 1 MG/1
1 TABLET, FILM COATED ORAL 2 TIMES DAILY
COMMUNITY

## 2020-12-28 NOTE — PROGRESS NOTES
Anticoagulation Clinic Progress Note    Anticoagulation Summary  As of 2020    INR goal:  2.5-3.5   TTR:  28.8 % (2 y)   INR used for dosin.6 (2020)   Warfarin maintenance plan:  7.5 mg every Wed; 5 mg all other days   Weekly warfarin total:  37.5 mg   Plan last modified:  Ritu Field RPH (2020)   Next INR check:  2021   Priority:  High   Target end date:      Indications    Anticoagulant long-term use [Z79.01]  S/P AVR [Z95.2]  Acute cerebrovascular accident (CVA) (CMS/HCC) [I63.9]             Anticoagulation Episode Summary     INR check location:      Preferred lab:      Send INR reminders to:   YANDY POLANCO [facility]    Comments:  [employer] **Call every time - REQUIRES PHARMACIST REVIEW**      Anticoagulation Care Providers     Provider Role Specialty Phone number    José Antonio Banks MD Referring Cardiology [phone]          Clinic Interview:  Patient Findings     Positives:  Change in medications    Comments:  Stopped nicotine gum and switched to Chantix in past week      Clinical Outcomes     Comments:  Stopped nicotine gum and switched to Chantix in past week        INR History:  Anticoagulation Monitoring 2020   INR 1.97 1.9 2.6   INR Date 2020   INR Goal 2.5-3.5 2.5-3.5 2.5-3.5   Trend Same Same Same   Last Week Total 32 mg 33.5 mg 40 mg   Next Week Total 40 mg 37.5 mg 37.5 mg   Sun - 5 mg 5 mg   Mon 7.5 mg () - 5 mg   Tue 5 mg - 5 mg   Wed - 7.5 mg 7.5 mg   Thu - 5 mg 5 mg   Fri - 5 mg 5 mg   Sat - 5 mg 5 mg   Visit Report - - -   Some recent data might be hidden       Plan:  1. INR is Therapeutic today- see above in Anticoagulation Summary.  Will instruct Galdino Dallas to Continue their warfarin regimen- see above in Anticoagulation Summary.  2. Follow up in 1 week  3. Patient declines warfarin refills.  4. Verbal and written information provided. Patient expresses understanding and has no further  questions at this time.    Paulette Martin RPH

## 2021-01-04 ENCOUNTER — APPOINTMENT (OUTPATIENT)
Dept: PHARMACY | Facility: HOSPITAL | Age: 36
End: 2021-01-04

## 2021-01-04 ENCOUNTER — ANTICOAGULATION VISIT (OUTPATIENT)
Dept: PHARMACY | Facility: HOSPITAL | Age: 36
End: 2021-01-04

## 2021-01-04 DIAGNOSIS — Z79.01 ANTICOAGULANT LONG-TERM USE: ICD-10-CM

## 2021-01-04 DIAGNOSIS — I63.9 ACUTE CEREBROVASCULAR ACCIDENT (CVA) (HCC): ICD-10-CM

## 2021-01-04 DIAGNOSIS — Z95.2 S/P AVR: ICD-10-CM

## 2021-01-04 LAB — INR PPP: 3.6

## 2021-01-04 NOTE — PROGRESS NOTES
Anticoagulation Clinic Progress Note    Anticoagulation Summary  As of 1/4/2021    INR goal:  2.5-3.5   TTR:  29.3 % (2 y)   INR used for dosing:  3.60 (1/4/2021)   Warfarin maintenance plan:  6 mg every Wed; 5 mg all other days   Weekly warfarin total:  36 mg   Plan last modified:  Joe De RPH (1/4/2021)   Next INR check:  1/8/2021   Priority:  High   Target end date:      Indications    Anticoagulant long-term use [Z79.01]  S/P AVR [Z95.2]  Acute cerebrovascular accident (CVA) (CMS/[facility]) [I63.9]             Anticoagulation Episode Summary     INR check location:      Preferred lab:      Send INR reminders to:  ALEJANDRINA POLANCO CLINICAL POOL    Comments:  [employer] **Call every time - REQUIRES PHARMACIST REVIEW**      Anticoagulation Care Providers     Provider Role Specialty Phone number    José Antonio Banks MD Referring Cardiology [phone]          Clinic Interview:  Patient Findings     Negatives:  Signs/symptoms of thrombosis, Signs/symptoms of bleeding,   Laboratory test error suspected, Change in health, Change in alcohol use,   Change in activity, Upcoming invasive procedure, Emergency department   visit, Upcoming dental procedure, Missed doses, Extra doses, Change in   medications, Change in diet/appetite, Hospital admission, Bruising, Other   complaints      Clinical Outcomes     Negatives:  Major bleeding event, Thromboembolic event,   Anticoagulation-related hospital admission, Anticoagulation-related ED   visit, Anticoagulation-related fatality        INR History:  Anticoagulation Monitoring 12/23/2020 12/28/2020 1/4/2021   INR 1.9 2.6 3.60   INR Date 12/23/2020 12/28/2020 1/4/2021   INR Goal 2.5-3.5 2.5-3.5 2.5-3.5   Trend Same Same Down   Last Week Total 33.5 mg 40 mg 37.5 mg   Next Week Total 37.5 mg 37.5 mg 36 mg   Sun 5 mg 5 mg -   Mon - 5 mg 5 mg   Tue - 5 mg 5 mg   Wed 7.5 mg 7.5 mg 6 mg   Thu 5 mg 5 mg 5 mg   Fri 5 mg 5 mg -   Sat 5 mg 5 mg -   Visit Report - - -   Some recent data  might be hidden       Plan:  1. INR is Supratherapeutic today- see above in Anticoagulation Summary.   Will instruct Galdino Dallas to Change their warfarin regimen- see above in Anticoagulation Summary.  2. Follow up in 4 days  3. They have been instructed to call if any changes in medications, doses, concerns, etc. Patient expresses understanding and has no further questions at this time.    Joe De [facility]

## 2021-01-09 LAB — INR PPP: 4.8

## 2021-01-11 ENCOUNTER — ANTICOAGULATION VISIT (OUTPATIENT)
Dept: PHARMACY | Facility: HOSPITAL | Age: 36
End: 2021-01-11

## 2021-01-11 DIAGNOSIS — I63.9 ACUTE CEREBROVASCULAR ACCIDENT (CVA) (HCC): ICD-10-CM

## 2021-01-11 DIAGNOSIS — Z95.2 S/P AVR: ICD-10-CM

## 2021-01-11 DIAGNOSIS — Z79.01 ANTICOAGULANT LONG-TERM USE: ICD-10-CM

## 2021-01-11 LAB — INR PPP: 2.7

## 2021-01-11 NOTE — PROGRESS NOTES
Anticoagulation Clinic Progress Note    Anticoagulation Summary  As of 2021    INR goal:  2.5-3.5   TTR:  29.2 % (2.1 y)   INR used for dosin.70 (2021)   Warfarin maintenance plan:  6 mg every Wed; 5 mg all other days   Weekly warfarin total:  36 mg   Plan last modified:  Joe De [facility] (2021)   Next INR check:  2021   Priority:  High   Target end date:      Indications    Anticoagulant long-term use [Z79.01]  S/P AVR [Z95.2]  Acute cerebrovascular accident (CVA) (CMS/[facility]) [I63.9]             Anticoagulation Episode Summary     INR check location:      Preferred lab:      Send INR reminders to:   YANDY POLANCO [facility]    Comments:  [employer] **Call every time - REQUIRES PHARMACIST REVIEW**      Anticoagulation Care Providers     Provider Role Specialty Phone number    José Antonio Banks MD Referring Cardiology [phone]          Clinic Interview:  Patient Findings     Positives:  Missed doses    Negatives:  Signs/symptoms of thrombosis, Signs/symptoms of bleeding,   Laboratory test error suspected, Change in health, Change in alcohol use,   Change in activity, Upcoming invasive procedure, Emergency department   visit, Upcoming dental procedure, Extra doses, Change in medications,   Change in diet/appetite, Hospital admission, Bruising, Other complaints    Comments:  Pt HELD warfarin 21 after seeing high INR 4.8 (21). Pt   is very concerned that INR will continue to fall.       Clinical Outcomes     Negatives:  Major bleeding event, Thromboembolic event,   Anticoagulation-related hospital admission, Anticoagulation-related ED   visit, Anticoagulation-related fatality    Comments:  Pt HELD warfarin 21 after seeing high INR 4.8 (21). Pt   is very concerned that INR will continue to fall.         INR History:  Anticoagulation Monitoring 2020   INR 2.6 3.60 2.70   INR Date 2020   INR Goal 2.5-3.5 2.5-3.5 2.5-3.5    Trend Same Down Same   Last Week Total 40 mg 37.5 mg 31 mg   Next Week Total 37.5 mg 36 mg 36 mg   Sun 5 mg - -   Mon 5 mg 5 mg 6 mg (1/11)   Tue 5 mg 5 mg 5 mg   Wed 7.5 mg 6 mg 5 mg (1/13)   Thu 5 mg 5 mg -   Fri 5 mg - -   Sat 5 mg - -   Visit Report - - -   Some recent data might be hidden       Plan:  1. INR is Therapeutic today after supratherapeutic INR on Sat, 1/9/21 - see above in Anticoagulation Summary.   Will instruct Galdino Dallas to Change their warfarin regimen- see above in Anticoagulation Summary.  2. Follow up in 3 days  3. They have been instructed to call if any changes in medications, doses, concerns, etc. Patient expresses understanding and has no further questions at this time.    Joe De [facility]

## 2021-01-14 ENCOUNTER — ANTICOAGULATION VISIT (OUTPATIENT)
Dept: PHARMACY | Facility: HOSPITAL | Age: 36
End: 2021-01-14

## 2021-01-14 DIAGNOSIS — Z95.2 S/P AVR: ICD-10-CM

## 2021-01-14 DIAGNOSIS — I63.9 ACUTE CEREBROVASCULAR ACCIDENT (CVA) (HCC): ICD-10-CM

## 2021-01-14 DIAGNOSIS — Z79.01 ANTICOAGULANT LONG-TERM USE: ICD-10-CM

## 2021-01-14 LAB — INR PPP: 5.3

## 2021-01-14 NOTE — PROGRESS NOTES
Anticoagulation Clinic Progress Note    Anticoagulation Summary  As of 2021    INR goal:  2.5-3.5   TTR:  29.2 % (2.1 y)   INR used for dosin.30 (2021)   Warfarin maintenance plan:  6 mg every Wed; 5 mg all other days   Weekly warfarin total:  36 mg   Plan last modified:  Joe De RPH (2021)   Next INR check:  2021   Priority:  High   Target end date:      Indications    Anticoagulant long-term use [Z79.01]  S/P AVR [Z95.2]  Acute cerebrovascular accident (CVA) (CMS/HCC) [I63.9]             Anticoagulation Episode Summary     INR check location:      Preferred lab:      Send INR reminders to:   YANDY POLANCO [facility]    Comments:  [employer] **Call every time - REQUIRES PHARMACIST REVIEW**      Anticoagulation Care Providers     Provider Role Specialty Phone number    José Antonio Banks MD Referring Cardiology [phone]          Clinic Interview:  Patient Findings     Positives:  Change in health    Negatives:  Signs/symptoms of thrombosis, Signs/symptoms of bleeding,   Laboratory test error suspected, Change in alcohol use, Change in   activity, Upcoming invasive procedure, Emergency department visit,   Upcoming dental procedure, Missed doses, Extra doses, Change in   medications, Change in diet/appetite, Hospital admission, Bruising, Other   complaints    Comments:  Transient diarrhea now resolved      Clinical Outcomes     Negatives:  Major bleeding event, Thromboembolic event,   Anticoagulation-related hospital admission, Anticoagulation-related ED   visit, Anticoagulation-related fatality    Comments:  Transient diarrhea now resolved        INR History:  Anticoagulation Monitoring 2021   INR 3.60 2.70 5.30   INR Date 2021   INR Goal 2.5-3.5 2.5-3.5 2.5-3.5   Trend Down Same Same   Last Week Total 37.5 mg 31 mg 31 mg   Next Week Total 36 mg 36 mg 33.5 mg   Sun - - 5 mg   Mon 5 mg 6 mg () -   Tue 5 mg 5 mg -   Wed 6 mg 5 mg  (1/13) -   Thu 5 mg - 2.5 mg (1/14)   Fri - - 5 mg   Sat - - 5 mg   Visit Report - - -   Some recent data might be hidden       Plan:  1. INR is Supratherapeutic today- see above in Anticoagulation Summary.   Will instruct Galdino Dallas to Change their warfarin regimen- see above in Anticoagulation Summary.  2. Follow up in 4 days  3. Pt has agreed to only be called if INR out of range. They have been instructed to call if any changes in medications, doses, concerns, etc. Patient expresses understanding and has no further questions at this time.    Lidia Stanton, [facility]

## 2021-01-18 ENCOUNTER — ANTICOAGULATION VISIT (OUTPATIENT)
Dept: PHARMACY | Facility: HOSPITAL | Age: 36
End: 2021-01-18

## 2021-01-18 DIAGNOSIS — Z79.01 ANTICOAGULANT LONG-TERM USE: ICD-10-CM

## 2021-01-18 DIAGNOSIS — I63.9 ACUTE CEREBROVASCULAR ACCIDENT (CVA) (HCC): ICD-10-CM

## 2021-01-18 DIAGNOSIS — Z95.2 S/P AVR: ICD-10-CM

## 2021-01-18 LAB — INR PPP: 3.5

## 2021-01-18 NOTE — PROGRESS NOTES
Anticoagulation Clinic Progress Note    Anticoagulation Summary  As of 1/18/2021    INR goal:  2.5-3.5   TTR:  29.0 % (2.1 y)   INR used for dosing:  3.50 (1/18/2021)   Warfarin maintenance plan:  5 mg every day   Weekly warfarin total:  35 mg   Plan last modified:  Joe De RPH (1/18/2021)   Next INR check:  1/21/2021   Priority:  High   Target end date:      Indications    Anticoagulant long-term use [Z79.01]  S/P AVR [Z95.2]  Acute cerebrovascular accident (CVA) (CMS/HCC) [I63.9]             Anticoagulation Episode Summary     INR check location:      Preferred lab:      Send INR reminders to:   [facility]    Comments:  [facility] **Call every time - REQUIRES PHARMACIST REVIEW**      Anticoagulation Care Providers     Provider Role Specialty Phone number    José Antonio Banks MD Referring Cardiology [phone]          Clinic Interview:  Patient Findings     Negatives:  Signs/symptoms of thrombosis, Signs/symptoms of bleeding,   Laboratory test error suspected, Change in health, Change in alcohol use,   Change in activity, Upcoming invasive procedure, Emergency department   visit, Upcoming dental procedure, Missed doses, Extra doses, Change in   medications, Change in diet/appetite, Hospital admission, Bruising, Other   complaints      Clinical Outcomes     Negatives:  Major bleeding event, Thromboembolic event,   Anticoagulation-related hospital admission, Anticoagulation-related ED   visit, Anticoagulation-related fatality        INR History:  Anticoagulation Monitoring 1/11/2021 1/14/2021 1/18/2021   INR 2.70 5.30 3.50   INR Date 1/11/2021 1/14/2021 1/18/2021   INR Goal 2.5-3.5 2.5-3.5 2.5-3.5   Trend Same Same Down   Last Week Total 31 mg 31 mg 33.5 mg   Next Week Total 36 mg 33.5 mg 35 mg   Sun - 5 mg -   Mon 6 mg (1/11) - 5 mg   Tue 5 mg - 5 mg   Wed 5 mg (1/13) - 5 mg   Thu - 2.5 mg (1/14) -   Fri - 5 mg -   Sat - 5 mg -   Visit Report - - -   Some recent data might be hidden        Plan:  1. INR is Therapeutic today- see above in Anticoagulation Summary.   Will instruct Galdino Dallas to Continue their warfarin regimen- see above in Anticoagulation Summary.  2. Follow up in 3 days  3. They have been instructed to call if any changes in medications, doses, concerns, etc. Patient expresses understanding and has no further questions at this time.    Joe De [facility]

## 2021-01-21 ENCOUNTER — ANTICOAGULATION VISIT (OUTPATIENT)
Dept: PHARMACY | Facility: HOSPITAL | Age: 36
End: 2021-01-21

## 2021-01-21 DIAGNOSIS — I63.9 ACUTE CEREBROVASCULAR ACCIDENT (CVA) (HCC): ICD-10-CM

## 2021-01-21 DIAGNOSIS — Z79.01 ANTICOAGULANT LONG-TERM USE: ICD-10-CM

## 2021-01-21 DIAGNOSIS — Z95.2 S/P AVR: ICD-10-CM

## 2021-01-21 LAB — INR PPP: 3.2

## 2021-01-21 RX ORDER — NITROGLYCERIN 0.4 MG/1
TABLET SUBLINGUAL
Qty: 50 TABLET | Refills: 1 | Status: SHIPPED | OUTPATIENT
Start: 2021-01-21 | End: 2021-03-15

## 2021-01-21 RX ORDER — RANOLAZINE 500 MG/1
TABLET, EXTENDED RELEASE ORAL
Qty: 60 TABLET | Refills: 5 | Status: SHIPPED | OUTPATIENT
Start: 2021-01-21

## 2021-01-21 NOTE — PROGRESS NOTES
Anticoagulation Clinic Progress Note    Anticoagulation Summary  As of 1/21/2021    INR goal:  2.5-3.5   TTR:  29.3 % (2.1 y)   INR used for dosing:  3.20 (1/21/2021)   Warfarin maintenance plan:  5 mg every day   Weekly warfarin total:  35 mg   No change documented:  Joe De RPH   Plan last modified:  Joe De RPH (1/18/2021)   Next INR check:  1/25/2021   Priority:  High   Target end date:      Indications    Anticoagulant long-term use [Z79.01]  S/P AVR [Z95.2]  Acute cerebrovascular accident (CVA) (CMS/HCC) [I63.9]             Anticoagulation Episode Summary     INR check location:      Preferred lab:      Send INR reminders to:   [facility]    Comments:  [employer] **Call every time - REQUIRES PHARMACIST REVIEW**      Anticoagulation Care Providers     Provider Role Specialty Phone number    José Antonio Banks MD Referring Cardiology [phone]          Clinic Interview:  Patient Findings     Positives:  Change in health    Negatives:  Signs/symptoms of thrombosis, Signs/symptoms of bleeding,   Laboratory test error suspected, Change in alcohol use, Change in   activity, Upcoming invasive procedure, Emergency department visit,   Upcoming dental procedure, Missed doses, Extra doses, Change in   medications, Change in diet/appetite, Hospital admission, Bruising, Other   complaints    Comments:  Reports a couple episodes of diarrhea in evening this week.       Clinical Outcomes     Negatives:  Major bleeding event, Thromboembolic event,   Anticoagulation-related hospital admission, Anticoagulation-related ED   visit, Anticoagulation-related fatality    Comments:  Reports a couple episodes of diarrhea in evening this week.         INR History:  Anticoagulation Monitoring 1/14/2021 1/18/2021 1/21/2021   INR 5.30 3.50 3.20   INR Date 1/14/2021 1/18/2021 1/21/2021   INR Goal 2.5-3.5 2.5-3.5 2.5-3.5   Trend Same Down Same   Last Week Total 31 mg 33.5 mg 32.5 mg   Next Week Total 33.5 mg  35 mg 35 mg   Sun 5 mg - 5 mg   Mon - 5 mg -   Tue - 5 mg -   Wed - 5 mg -   Thu 2.5 mg (1/14) - 5 mg   Fri 5 mg - 5 mg   Sat 5 mg - 5 mg   Visit Report - - -   Some recent data might be hidden       Plan:  1. INR is Therapeutic today- see above in Anticoagulation Summary.   Will instruct Galdino YRN Burt to Continue their warfarin regimen- see above in Anticoagulation Summary.  2. Follow up in 4 days  3. They have been instructed to call if any changes in medications, doses, concerns, etc. Patient expresses understanding and has no further questions at this time.    Joe De [facility]

## 2021-01-25 ENCOUNTER — ANTICOAGULATION VISIT (OUTPATIENT)
Dept: PHARMACY | Facility: HOSPITAL | Age: 36
End: 2021-01-25

## 2021-01-25 DIAGNOSIS — Z79.01 ANTICOAGULANT LONG-TERM USE: ICD-10-CM

## 2021-01-25 DIAGNOSIS — Z95.2 S/P AVR: ICD-10-CM

## 2021-01-25 DIAGNOSIS — I63.9 ACUTE CEREBROVASCULAR ACCIDENT (CVA) (HCC): ICD-10-CM

## 2021-01-25 LAB — INR PPP: 3.6

## 2021-01-25 NOTE — PROGRESS NOTES
Anticoagulation Clinic Progress Note    Anticoagulation Summary  As of 1/25/2021    INR goal:  2.5-3.5   TTR:  29.5 % (2.1 y)   INR used for dosing:  3.60 (1/25/2021)   Warfarin maintenance plan:  5 mg every day   Weekly warfarin total:  35 mg   Plan last modified:  Joe De [facility] (1/18/2021)   Next INR check:  1/28/2021   Priority:  High   Target end date:      Indications    Anticoagulant long-term use [Z79.01]  S/P AVR [Z95.2]  Acute cerebrovascular accident (CVA) (CMS/HCC) [I63.9]             Anticoagulation Episode Summary     INR check location:      Preferred lab:      [facility]    Comments:  [facility] **Call every time - REQUIRES PHARMACIST REVIEW**      Anticoagulation Care Providers     Provider Role Specialty Phone number    José Antonio Banks MD Referring Cardiology [phone]          Clinic Interview:      INR History:  Anticoagulation Monitoring 1/18/2021 1/21/2021 1/25/2021   INR 3.50 3.20 3.60   INR Date 1/18/2021 1/21/2021 1/25/2021   INR Goal 2.5-3.5 2.5-3.5 2.5-3.5   Trend Down Same Same   Last Week Total 33.5 mg 32.5 mg 35 mg   Next Week Total 35 mg 35 mg 35 mg   Sun - 5 mg -   Mon 5 mg - 5 mg   Tue 5 mg - 5 mg   Wed 5 mg - 5 mg   Thu - 5 mg -   Fri - 5 mg -   Sat - 5 mg -   Visit Report - - -   Some recent data might be hidden       Plan:  1. INR is Supratherapeutic today- see above in Anticoagulation Summary.   Will instruct Galdino Dallas to Continue their warfarin regimen- see above in Anticoagulation Summary.  2. Follow up in 4 days  3. Pt has agreed to only be called if INR out of range. They have been instructed to call if any changes in medications, doses, concerns, etc. Patient expresses understanding and has no further questions at this time.    Lidia Stanton, [facility]

## 2021-01-27 ENCOUNTER — ANTICOAGULATION VISIT (OUTPATIENT)
Dept: PHARMACY | Facility: HOSPITAL | Age: 36
End: 2021-01-27

## 2021-01-27 DIAGNOSIS — Z79.01 ANTICOAGULANT LONG-TERM USE: ICD-10-CM

## 2021-01-27 DIAGNOSIS — I63.9 ACUTE CEREBROVASCULAR ACCIDENT (CVA) (HCC): ICD-10-CM

## 2021-01-27 DIAGNOSIS — Z95.2 S/P AVR: ICD-10-CM

## 2021-01-27 LAB — INR PPP: 2.5

## 2021-01-27 RX ORDER — CARVEDILOL 6.25 MG/1
TABLET ORAL
Qty: 60 TABLET | Refills: 3 | Status: SHIPPED | OUTPATIENT
Start: 2021-01-27

## 2021-01-27 NOTE — TELEPHONE ENCOUNTER
Pt advised and stated he will call back to schedule when he has his calendar in front of him.    YESSI Norris

## 2021-01-28 NOTE — PROGRESS NOTES
Anticoagulation Clinic Progress Note    Anticoagulation Summary  As of 2021    INR goal:  2.5-3.5   TTR:  29.7 % (2.1 y)   INR used for dosin.50 (2021)   Warfarin maintenance plan:  5 mg every day   Weekly warfarin total:  35 mg   Plan last modified:  Joe De RPH (2021)   Next INR check:  2021   Priority:  High   Target end date:      Indications    Anticoagulant long-term use [Z79.01]  S/P AVR [Z95.2]  Acute cerebrovascular accident (CVA) (CMS/[facility]) [I63.9]             Anticoagulation Episode Summary     INR check location:      Preferred lab:      Send INR reminders to:   [facility]    Comments:  [facility] **Call every time - REQUIRES PHARMACIST REVIEW**      Anticoagulation Care Providers     Provider Role Specialty Phone number    José Antonio Banks MD Referring Cardiology [phone]          Clinic Interview:  Patient Findings     Positives:  Missed doses, Extra doses    Negatives:  Signs/symptoms of thrombosis, Signs/symptoms of bleeding,   Laboratory test error suspected, Change in health, Change in alcohol use,   Change in activity, Upcoming invasive procedure, Emergency department   visit, Upcoming dental procedure, Change in medications, Change in   diet/appetite, Hospital admission, Bruising, Other complaints    Comments:  Unsuccessful reaching until 21. Pt missed dose 21   and took extra 2.5 mg 21 after seeing that INR had decreased to 2.5.         Clinical Outcomes     Negatives:  Major bleeding event, Thromboembolic event,   Anticoagulation-related hospital admission, Anticoagulation-related ED   visit, Anticoagulation-related fatality    Comments:  Unsuccessful reaching until 21. Pt missed dose 21   and took extra 2.5 mg 21 after seeing that INR had decreased to 2.5.           INR History:  Anticoagulation Monitoring 2021   INR 3.20 3.60 2.50   INR Date 2021   INR Goal  2.5-3.5 2.5-3.5 2.5-3.5   Trend Same Same Same   Last Week Total 32.5 mg 35 mg 30 mg   Next Week Total 35 mg 35 mg 37.5 mg   Sun 5 mg - -   Mon - 5 mg -   Tue - 5 mg -   Wed - 5 mg 7.5 mg (1/27)   Thu 5 mg - 5 mg   Fri 5 mg - -   Sat 5 mg - -   Visit Report - - -   Some recent data might be hidden       Plan:  1. INR was Therapeutic 1/27/21 - see above in Anticoagulation Summary.   Will instruct Galdino Dallas to Continue their warfarin regimen (already took extra 2.5 mg on 1/27/21 after he saw that INR had decreased to 2.5) - see above in Anticoagulation Summary.  2. Follow up on Fri, 1/29/21  3. They have been instructed to call if any changes in medications, doses, concerns, etc. Patient expresses understanding and has no further questions at this time.    Joe De [facility]

## 2021-01-29 ENCOUNTER — ANTICOAGULATION VISIT (OUTPATIENT)
Dept: PHARMACY | Facility: HOSPITAL | Age: 36
End: 2021-01-29

## 2021-01-29 DIAGNOSIS — I63.9 ACUTE CEREBROVASCULAR ACCIDENT (CVA) (HCC): ICD-10-CM

## 2021-01-29 DIAGNOSIS — Z79.01 ANTICOAGULANT LONG-TERM USE: ICD-10-CM

## 2021-01-29 DIAGNOSIS — Z95.2 S/P AVR: ICD-10-CM

## 2021-01-29 LAB — INR PPP: 5

## 2021-01-29 NOTE — PROGRESS NOTES
Anticoagulation Clinic Progress Note    Anticoagulation Summary  As of 2021    INR goal:  2.5-3.5   TTR:  29.7 % (2.1 y)   INR used for dosin.00 (2021)   Warfarin maintenance plan:  5 mg every day   Weekly warfarin total:  35 mg   Plan last modified:  Joe De RPH (2021)   Next INR check:  2021   Priority:  High   Target end date:      Indications    Anticoagulant long-term use [Z79.01]  S/P AVR [Z95.2]  Acute cerebrovascular accident (CVA) (CMS/[facility]) [I63.9]             Anticoagulation Episode Summary     INR check location:      Preferred lab:      Send INR reminders to:   [facility]    Comments:  [employer] **Call every time - REQUIRES PHARMACIST REVIEW**      Anticoagulation Care Providers     Provider Role Specialty Phone number    José Antonio Banks MD Referring Cardiology [phone]          Clinic Interview:  Patient Findings     Positives:  Other complaints    Negatives:  Signs/symptoms of thrombosis, Signs/symptoms of bleeding,   Laboratory test error suspected, Change in health, Change in alcohol use,   Change in activity, Upcoming invasive procedure, Emergency department   visit, Upcoming dental procedure, Missed doses, Extra doses, Change in   medications, Change in diet/appetite, Hospital admission, Bruising    Comments:  Reports some diarrhea, but reports IBS from young age.      Clinical Outcomes     Negatives:  Major bleeding event, Thromboembolic event,   Anticoagulation-related hospital admission, Anticoagulation-related ED   visit, Anticoagulation-related fatality    Comments:  Reports some diarrhea, but reports IBS from young age.        INR History:  Anticoagulation Monitoring 2021   INR 3.60 2.50 5.00   INR Date 2021   INR Goal 2.5-3.5 2.5-3.5 2.5-3.5   Trend Same Same Same   Last Week Total 35 mg 30 mg 32.5 mg   Next Week Total 35 mg 37.5 mg 32.5 mg   Sun - - 5 mg   Mon 5 mg - -   Tue 5 mg - -      Wed 5 mg 7.5 mg (1/27) -   Thu - 5 mg -   Fri - - 2.5 mg (1/29)   Sat - - 5 mg   Visit Report - - -   Some recent data might be hidden       Plan:  1. INR is Supratherapeutic today- see above in Anticoagulation Summary. Mr. Dallas's INR typically responds very dramatically/quickly to small dose changes.   Will instruct Galdino Dallas to Change their warfarin regimen- see above in Anticoagulation Summary.  2. Follow up in 3 days  3. They have been instructed to call if any changes in medications, doses, concerns, etc. To seek immediate medical attention if s/sx of bleeding develop or fall occurs. Patient expresses understanding and has no further questions at this time.    Joe De [facility]

## 2021-02-01 ENCOUNTER — ANTICOAGULATION VISIT (OUTPATIENT)
Dept: PHARMACY | Facility: HOSPITAL | Age: 36
End: 2021-02-01

## 2021-02-01 DIAGNOSIS — Z79.01 ANTICOAGULANT LONG-TERM USE: ICD-10-CM

## 2021-02-01 DIAGNOSIS — I63.9 ACUTE CEREBROVASCULAR ACCIDENT (CVA) (HCC): ICD-10-CM

## 2021-02-01 DIAGNOSIS — Z95.2 S/P AVR: ICD-10-CM

## 2021-02-01 LAB — INR PPP: 1.9

## 2021-02-01 NOTE — PROGRESS NOTES
Anticoagulation Clinic Progress Note    Anticoagulation Summary  As of 2021    INR goal:  2.5-3.5   TTR:  29.7 % (2.1 y)   INR used for dosin.90 (2021)   Warfarin maintenance plan:  5 mg every day   Weekly warfarin total:  35 mg   Plan last modified:  Joe De RPH (2021)   Next INR check:  2/3/2021   Priority:  High   Target end date:      Indications    Anticoagulant long-term use [Z79.01]  S/P AVR [Z95.2]  Acute cerebrovascular accident (CVA) (CMS/[facility]) [I63.9]             Anticoagulation Episode Summary     INR check location:      Preferred lab:      Send INR reminders to:   YANDY POL[facility]    Comments:  [employer] **Call every time - REQUIRES PHARMACIST REVIEW**      Anticoagulation Care Providers     Provider Role Specialty Phone number    José Antonio Banks MD Referring Cardiology [phone]          Clinic Interview:  Patient Findings     Positives:  Other complaints    Negatives:  Signs/symptoms of thrombosis, Signs/symptoms of bleeding,   Laboratory test error suspected, Change in health, Change in alcohol use,   Change in activity, Upcoming invasive procedure, Emergency department   visit, Upcoming dental procedure, Missed doses, Extra doses, Change in   medications, Change in diet/appetite, Hospital admission, Bruising    Comments:  Pt reports he took last night's dose early this morning.       Clinical Outcomes     Negatives:  Major bleeding event, Thromboembolic event,   Anticoagulation-related hospital admission, Anticoagulation-related ED   visit, Anticoagulation-related fatality    Comments:  Pt reports he took last night's dose early this morning.         INR History:  Anticoagulation Monitoring 2021   INR 2.50 5.00 1.90   INR Date 2021   INR Goal 2.5-3.5 2.5-3.5 2.5-3.5   Trend Same Same Same   Last Week Total 30 mg 32.5 mg 30 mg   Next Week Total 37.5 mg 32.5 mg 37.5 mg   Sun - 5 mg -   Mon - - 7.5 mg ()   [place]  - - 5 mg   Wed 7.5 mg (1/27) - -   Thu 5 mg - -   Fri - 2.5 mg (1/29) -   Sat - 5 mg -   Visit Report - - -   Some recent data might be hidden       Plan:  1. INR is Subtherapeutic today- see above in Anticoagulation Summary.   Will instruct Galdino Dallas to Change their warfarin regimen- see above in Anticoagulation Summary.  2. Administer enoxaparin 80 mg q12h until INR approaches therapeutic range.   3. Follow up in 2 days  4. They have been instructed to call if any changes in medications, doses, concerns, etc. Patient expresses understanding and has no further questions at this time.    Joe De [facility]

## 2021-02-03 ENCOUNTER — ANTICOAGULATION VISIT (OUTPATIENT)
Dept: PHARMACY | Facility: HOSPITAL | Age: 36
End: 2021-02-03

## 2021-02-03 DIAGNOSIS — Z79.01 ANTICOAGULANT LONG-TERM USE: ICD-10-CM

## 2021-02-03 DIAGNOSIS — Z95.2 S/P AVR: ICD-10-CM

## 2021-02-03 DIAGNOSIS — I63.9 ACUTE CEREBROVASCULAR ACCIDENT (CVA) (HCC): ICD-10-CM

## 2021-02-03 LAB — INR PPP: 3.1

## 2021-02-03 NOTE — PROGRESS NOTES
Anticoagulation Clinic Progress Note    Anticoagulation Summary  As of 2/3/2021    INR goal:  2.5-3.5   TTR:  29.8 % (2.1 y)   INR used for dosing:  3.10 (2/3/2021)   Warfarin maintenance plan:  5 mg every day   Weekly warfarin total:  35 mg   No change documented:  Joe De RPH   Plan last modified:  Joe De RPH (1/18/2021)   Next INR check:  2/5/2021   Priority:  High   Target end date:      Indications    Anticoagulant long-term use [Z79.01]  S/P AVR [Z95.2]  Acute cerebrovascular accident (CVA) (CMS/HCC) [I63.9]             Anticoagulation Episode Summary     INR check location:      Preferred lab:      Send INR reminders to:   [facility]    Comments:  [employer] **Call every time - REQUIRES PHARMACIST REVIEW**      Anticoagulation Care Providers     Provider Role Specialty Phone number    José Antonio Banks MD Referring Cardiology [phone]          Clinic Interview:  Patient Findings     Negatives:  Signs/symptoms of thrombosis, Signs/symptoms of bleeding,   Laboratory test error suspected, Change in health, Change in alcohol use,   Change in activity, Upcoming invasive procedure, Emergency department   visit, Upcoming dental procedure, Missed doses, Extra doses, Change in   medications, Change in diet/appetite, Hospital admission, Bruising, Other   complaints      Clinical Outcomes     Negatives:  Major bleeding event, Thromboembolic event,   Anticoagulation-related hospital admission, Anticoagulation-related ED   visit, Anticoagulation-related fatality        INR History:  Anticoagulation Monitoring 1/29/2021 2/1/2021 2/3/2021   INR 5.00 1.90 3.10   INR Date 1/29/2021 2/1/2021 2/3/2021   INR Goal 2.5-3.5 2.5-3.5 2.5-3.5   Trend Same Same Same   Last Week Total 32.5 mg 30 mg 37.5 mg   Next Week Total 32.5 mg 37.5 mg 35 mg   Sun 5 mg - -   Mon - 7.5 mg (2/1) -   Tue - 5 mg -   Wed - - 5 mg   Thu - - 5 mg   Fri 2.5 mg (1/29) - -   Sat 5 mg - -   Visit Report - - -   Some recent  data might be hidden       Plan:  1. INR is Therapeutic today- see above in Anticoagulation Summary.   Will instruct Galdino Dallas to Continue their warfarin regimen- see above in Anticoagulation Summary.  2. Follow up in 2 days  3. They have been instructed to call if any changes in medications, doses, concerns, etc. Patient expresses understanding and has no further questions at this time.    Joe eD [facility]

## 2021-02-05 ENCOUNTER — ANTICOAGULATION VISIT (OUTPATIENT)
Dept: PHARMACY | Facility: HOSPITAL | Age: 36
End: 2021-02-05

## 2021-02-05 DIAGNOSIS — Z95.2 S/P AVR: ICD-10-CM

## 2021-02-05 DIAGNOSIS — I63.9 ACUTE CEREBROVASCULAR ACCIDENT (CVA) (HCC): ICD-10-CM

## 2021-02-05 DIAGNOSIS — Z79.01 ANTICOAGULANT LONG-TERM USE: ICD-10-CM

## 2021-02-05 LAB — INR PPP: 2

## 2021-02-05 NOTE — PROGRESS NOTES
Anticoagulation Clinic Progress Note    Anticoagulation Summary  As of 2021    INR goal:  2.5-3.5   TTR:  29.9 % (2.1 y)   INR used for dosin.00 (2021)   Warfarin maintenance plan:  5 mg every day   Weekly warfarin total:  35 mg   Plan last modified:  Joe De RP (2021)   Next INR check:  2021   Priority:  High   Target end date:      Indications    Anticoagulant long-term use [Z79.01]  S/P AVR [Z95.2]  Acute cerebrovascular accident (CVA) (CMS/HCC) [I63.9]             Anticoagulation Episode Summary     INR check location:      Preferred lab:      [facility]    Comments:  [facility] **Call every time - REQUIRES PHARMACIST REVIEW**      Anticoagulation Care Providers     Provider Role Specialty Phone number    José Antonio Banks MD Referring Cardiology [phone]          INR History:  Anticoagulation Monitoring 2021 2/3/2021 2021   INR 1.90 3.10 2.00   INR Date 2021 2/3/2021 2021   INR Goal 2.5-3.5 2.5-3.5 2.5-3.5   Trend Same Same Same   Last Week Total 30 mg 37.5 mg 35 mg   Next Week Total 37.5 mg 35 mg 37.5 mg   Sun - - 5 mg   Mon 7.5 mg () - -   Tue 5 mg - -   Wed - 5 mg -   Thu - 5 mg -   Fri - - 7.5 mg ()   Sat - - 5 mg   Visit Report - - -   Some recent data might be hidden       Plan:  1. INR is Subtherapeutic today- see above in Anticoagulation Summary.   Will instruct Galdino Dallas to boost warfarin to 7.5mg today, then continue their warfarin regimen- see above in Anticoagulation Summary.  He will use enoxaparin injections tonight and tomorrow am for subtherapeutic INR.  2. Follow up in 3 days  3. Spoke with pt today. They have been instructed to call if any changes in medications, doses, concerns, etc. Patient expresses understanding and has no further questions at this time.    Paulette Martin [facility]

## 2021-02-08 ENCOUNTER — ANTICOAGULATION VISIT (OUTPATIENT)
Dept: PHARMACY | Facility: HOSPITAL | Age: 36
End: 2021-02-08

## 2021-02-08 DIAGNOSIS — I63.9 ACUTE CEREBROVASCULAR ACCIDENT (CVA) (HCC): ICD-10-CM

## 2021-02-08 DIAGNOSIS — Z79.01 ANTICOAGULANT LONG-TERM USE: ICD-10-CM

## 2021-02-08 DIAGNOSIS — Z95.2 S/P AVR: ICD-10-CM

## 2021-02-08 LAB
INR PPP: 2.3 (ref 0.91–1.09)
PROTHROMBIN TIME: 27.1 SECONDS (ref 10–13.8)

## 2021-02-08 PROCEDURE — G0463 HOSPITAL OUTPT CLINIC VISIT: HCPCS

## 2021-02-08 PROCEDURE — 36416 COLLJ CAPILLARY BLOOD SPEC: CPT

## 2021-02-08 PROCEDURE — 85610 PROTHROMBIN TIME: CPT

## 2021-02-08 NOTE — PROGRESS NOTES
Anticoagulation Clinic Progress Note    Anticoagulation Summary  As of 2021    INR goal:  2.5-3.5   TTR:  29.7 % (2.1 y)   INR used for dosin.3 (2021)   Warfarin maintenance plan:  7.5 mg every Mon, Fri; 5 mg all other days   Weekly warfarin total:  40 mg   Plan last modified:  Reina Cruz, [facility] (2021)   Next INR check:  2/15/2021   Priority:  High   Target end date:      Indications    Anticoagulant long-term use [Z79.01]  S/P AVR [Z95.2]  Acute cerebrovascular accident (CVA) (CMS/HCC) [I63.9]             Anticoagulation Episode Summary     INR check location:      Preferred lab:      Send INR reminders to:  [facility]    Comments:  [employer] **Call every time - REQUIRES PHARMACIST REVIEW**      Anticoagulation Care Providers     Provider Role Specialty Phone number    José Antonio Banks MD Referring Cardiology [phone]          Clinic Interview:  Patient Findings     Negatives:  Signs/symptoms of thrombosis, Signs/symptoms of bleeding,   Laboratory test error suspected, Change in health, Change in alcohol use,   Change in activity, Upcoming invasive procedure, Emergency department   visit, Upcoming dental procedure, Missed doses, Extra doses, Change in   medications, Change in diet/appetite, Hospital admission, Bruising, Other   complaints      Clinical Outcomes     Negatives:  Major bleeding event, Thromboembolic event,   Anticoagulation-related hospital admission, Anticoagulation-related ED   visit, Anticoagulation-related fatality        INR History:  Anticoagulation Monitoring 2/3/2021 2021 2021   INR 3.10 2.00 2.3   INR Date 2/3/2021 2021 2021   INR Goal 2.5-3.5 2.5-3.5 2.5-3.5   Trend Same Same Up   Last Week Total 37.5 mg 35 mg 40 mg   Next Week Total 35 mg 37.5 mg 40 mg   Sun - 5 mg 5 mg   Mon - - 7.5 mg   Tue - - 5 mg   Wed 5 mg - 5 mg   Thu 5 mg - 5 mg   Fri - 7.5 mg () 7.5 mg   Sat - 5 mg 5 mg   Visit Report - - -   Some recent data might  be hidden       Plan:  1. INR is Subtherapeutic today- see above in Anticoagulation Summary.  Will instruct Galdino Dallas to boost today to 7.5mg, and increase his weekly dosage regimen to 7.5mg 2x/week - see above in Anticoagulation Summary.  2. Follow up in 1 week  3. Patient declines warfarin refills.  4. Verbal and written information provided. Patient expresses understanding and has no further questions at this time.    Reina Cruz [facility]

## 2021-02-15 ENCOUNTER — ANTICOAGULATION VISIT (OUTPATIENT)
Dept: PHARMACY | Facility: HOSPITAL | Age: 36
End: 2021-02-15

## 2021-02-15 DIAGNOSIS — Z95.2 S/P AVR: ICD-10-CM

## 2021-02-15 DIAGNOSIS — Z79.01 ANTICOAGULANT LONG-TERM USE: ICD-10-CM

## 2021-02-15 DIAGNOSIS — I63.9 ACUTE CEREBROVASCULAR ACCIDENT (CVA) (HCC): ICD-10-CM

## 2021-02-15 LAB
INR PPP: 6.7 (ref 0.91–1.09)
PROTHROMBIN TIME: 80.8 SECONDS (ref 10–13.8)

## 2021-02-15 PROCEDURE — 85610 PROTHROMBIN TIME: CPT

## 2021-02-15 PROCEDURE — G0463 HOSPITAL OUTPT CLINIC VISIT: HCPCS

## 2021-02-15 PROCEDURE — 36416 COLLJ CAPILLARY BLOOD SPEC: CPT

## 2021-02-15 NOTE — PROGRESS NOTES
Anticoagulation Clinic Progress Note    Anticoagulation Summary  As of 2/15/2021    INR goal:  2.5-3.5   TTR:  29.7 % (2.2 y)   INR used for dosin.7 (2/15/2021)   Warfarin maintenance plan:  6 mg every Fri; 5 mg all other days   Weekly warfarin total:  36 mg   Plan last modified:  Joe De [facility] (2/15/2021)   Next INR check:  2021   Priority:  High   Target end date:      Indications    Anticoagulant long-term use [Z79.01]  S/P AVR [Z95.2]  Acute cerebrovascular accident (CVA) (CMS/[facility]) [I63.9]             Anticoagulation Episode Summary     INR check location:      Preferred lab:      Send INR reminders to:   YANDY POLANCO [facility]    Comments:  [employer] **Call every time - REQUIRES PHARMACIST REVIEW**      Anticoagulation Care Providers     Provider Role Specialty Phone number    José Antonio Banks MD Referring Cardiology [phone]          Clinic Interview:  Patient Findings     Positives:  Change in medications, Other complaints    Negatives:  Signs/symptoms of thrombosis, Signs/symptoms of bleeding,   Laboratory test error suspected, Change in health, Change in alcohol use,   Change in activity, Upcoming invasive procedure, Emergency department   visit, Upcoming dental procedure, Missed doses, Extra doses, Change in   diet/appetite, Hospital admission, Bruising    Comments:  Increased stress last wk r/t work. Reports a little more APAP   use than usual recently.      Clinical Outcomes     Negatives:  Major bleeding event, Thromboembolic event,   Anticoagulation-related hospital admission, Anticoagulation-related ED   visit, Anticoagulation-related fatality    Comments:  Increased stress last wk r/t work. Reports a little more APAP   use than usual recently.        INR History:  Anticoagulation Monitoring 2021 2021 2/15/2021   INR 2.00 2.3 6.7   INR Date 2021 2021 2/15/2021   INR Goal 2.5-3.5 2.5-3.5 2.5-3.5   Trend Same Up Down   Last Week Total 35 mg 40 mg 40 mg   Next  Week Total 37.5 mg 40 mg 33.5 mg   Sun 5 mg 5 mg 5 mg   Mon - 7.5 mg 2.5 mg (2/15)   Tue - 5 mg 5 mg   Wed - 5 mg 5 mg   Thu - 5 mg 5 mg   Fri 7.5 mg (2/5) 7.5 mg 6 mg   Sat 5 mg 5 mg 5 mg   Visit Report - - -   Some recent data might be hidden       Plan:  1. INR is Supratherapeutic today (POC 6.7 / Venipuncture declined) - see above in Anticoagulation Summary. Held doses have occasionally resulted in very dramatic swings in INRs, leading to subtherapeutic values, so will only partial today's dose rather than holding dose entirely.  Will instruct Galdino Dallas to Change their warfarin regimen- see above in Anticoagulation Summary.  2. Follow up in 1 week (pt does not believe he can return to clinic any sooner.   3. They have been instructed to call if any changes in medications, doses, concerns, etc. Patient expresses understanding and has no further questions at this time.    Joe De [facility]

## 2021-02-22 ENCOUNTER — ANTICOAGULATION VISIT (OUTPATIENT)
Dept: PHARMACY | Facility: HOSPITAL | Age: 36
End: 2021-02-22

## 2021-02-22 DIAGNOSIS — I63.9 ACUTE CEREBROVASCULAR ACCIDENT (CVA) (HCC): ICD-10-CM

## 2021-02-22 DIAGNOSIS — Z95.2 S/P AVR: ICD-10-CM

## 2021-02-22 DIAGNOSIS — Z79.01 ANTICOAGULANT LONG-TERM USE: ICD-10-CM

## 2021-02-22 LAB
INR PPP: 4 (ref 0.91–1.09)
PROTHROMBIN TIME: 47.6 SECONDS (ref 10–13.8)

## 2021-02-22 PROCEDURE — G0463 HOSPITAL OUTPT CLINIC VISIT: HCPCS

## 2021-02-22 PROCEDURE — 85610 PROTHROMBIN TIME: CPT

## 2021-02-22 PROCEDURE — 36416 COLLJ CAPILLARY BLOOD SPEC: CPT

## 2021-02-22 NOTE — PROGRESS NOTES
Anticoagulation Clinic Progress Note    Anticoagulation Summary  As of 2021    INR goal:  2.5-3.5   TTR:  29.4 % (2.2 y)   INR used for dosin.0 (2021)   Warfarin maintenance plan:  5 mg every day   Weekly warfarin total:  35 mg   Plan last modified:  Paulette Martin RPH (2021)   Next INR check:  3/1/2021   Priority:  High   Target end date:      Indications    Anticoagulant long-term use [Z79.01]  S/P AVR [Z95.2]  Acute cerebrovascular accident (CVA) (CMS/HCC) [I63.9]             Anticoagulation Episode Summary     INR check location:      Preferred lab:      Send INR reminders to:   YANDY POLANCO CLINICAL POOL    Comments:  [employer] **Call every time - REQUIRES PHARMACIST REVIEW**      Anticoagulation Care Providers     Provider Role Specialty Phone number    José Antonio Banks MD Referring Cardiology [phone]          Clinic Interview:  Patient Findings     Negatives:  Signs/symptoms of thrombosis, Signs/symptoms of bleeding,   Laboratory test error suspected, Change in health, Change in alcohol use,   Change in activity, Upcoming invasive procedure, Emergency department   visit, Upcoming dental procedure, Missed doses, Extra doses, Change in   medications, Change in diet/appetite, Hospital admission, Bruising, Other   complaints      Clinical Outcomes     Negatives:  Major bleeding event, Thromboembolic event,   Anticoagulation-related hospital admission, Anticoagulation-related ED   visit, Anticoagulation-related fatality        INR History:  Anticoagulation Monitoring 2021 2/15/2021 2021   INR 2.3 6.7 4.0   INR Date 2021 2/15/2021 2021   INR Goal 2.5-3.5 2.5-3.5 2.5-3.5   Trend Up Down Down   Last Week Total 40 mg 40 mg 33.5 mg   Next Week Total 40 mg 33.5 mg 35 mg   Sun 5 mg 5 mg 5 mg   Mon 7.5 mg 2.5 mg (2/15) 5 mg   Tue 5 mg 5 mg 5 mg   Wed 5 mg 5 mg 5 mg   Thu 5 mg 5 mg 5 mg   Fri 7.5 mg 6 mg 5 mg   Sat 5 mg 5 mg 5 mg   Visit Report - - -   Some recent data might  be hidden       Plan:  1. INR is Supratherapeutic today- see above in Anticoagulation Summary.  Will instruct Galdino Dallas to slightly Change their warfarin regimen- see above in Anticoagulation Summary.  2. Follow up in 1 week  3. Patient declines warfarin refills.  4. Verbal and written information provided. Patient expresses understanding and has no further questions at this time.    Paulette Martin [facility]

## 2021-02-22 NOTE — PROGRESS NOTES
Anticoagulation Clinic Progress Note    Anticoagulation Summary  As of 2021    INR goal:  2.5-3.5   TTR:  29.4 % (2.2 y)   INR used for dosin.0 (2021)   Warfarin maintenance plan:  5 mg every day   Weekly warfarin total:  35 mg   Plan last modified:  Paulette Martin RPH (2021)   Next INR check:  3/1/2021   Priority:  High   Target end date:      Indications    Anticoagulant long-term use [Z79.01]  S/P AVR [Z95.2]  Acute cerebrovascular accident (CVA) (CMS/HCC) [I63.9]             Anticoagulation Episode Summary     INR check location:      Preferred lab:      Send INR reminders to:   YANDY POLANCO CLINICAL POOL    Comments:  [employer] **Call every time - REQUIRES PHARMACIST REVIEW**      Anticoagulation Care Providers     Provider Role Specialty Phone number    José Antonio Banks MD Referring Cardiology [phone]          Clinic Interview:  Patient Findings     Negatives:  Signs/symptoms of thrombosis, Signs/symptoms of bleeding,   Laboratory test error suspected, Change in health, Change in alcohol use,   Change in activity, Upcoming invasive procedure, Emergency department   visit, Upcoming dental procedure, Missed doses, Extra doses, Change in   medications, Change in diet/appetite, Hospital admission, Bruising, Other   complaints      Clinical Outcomes     Negatives:  Major bleeding event, Thromboembolic event,   Anticoagulation-related hospital admission, Anticoagulation-related ED   visit, Anticoagulation-related fatality        INR History:  Anticoagulation Monitoring 2021 2/15/2021 2021   INR 2.3 6.7 4.0   INR Date 2021 2/15/2021 2021   INR Goal 2.5-3.5 2.5-3.5 2.5-3.5   Trend Up Down Down   Last Week Total 40 mg 40 mg 33.5 mg   Next Week Total 40 mg 33.5 mg 35 mg   Sun 5 mg 5 mg 5 mg   Mon 7.5 mg 2.5 mg (2/15) 5 mg   Tue 5 mg 5 mg 5 mg   Wed 5 mg 5 mg 5 mg   Thu 5 mg 5 mg 5 mg   Fri 7.5 mg 6 mg 5 mg   Sat 5 mg 5 mg 5 mg   Visit Report - - -   Some recent data might  be hidden       Plan:  1. INR is {therapeutic/subtherapeutic/supratherapeutic:89709} today- see above in Anticoagulation Summary.  Will instruct Galdino Dallas to {CONTINUE/INCREASE:78186} their warfarin regimen- see above in Anticoagulation Summary.  2. Follow up in {WHEN; DAYS WEEKS MONTHS:7073210112}  3. Patient {DECLINES/DESIRES:} warfarin refills.  4. Verbal and written information provided. Patient expresses understanding and has no further questions at this time.    [facility] Progress Note    Anticoagulation Summary  As of 2021    INR goal:  2.5-3.5   TTR:  29.4 % (2.2 y)   INR used for dosin.0 (2021)   Warfarin maintenance plan:  5 mg every day   Weekly warfarin total:  35 mg   Plan last modified:  Paulette Martin RP (2021)   Next INR check:  3/1/2021   Priority:  High   Target end date:      Indications    Anticoagulant long-term use [Z79.01]  S/P AVR [Z95.2]  Acute cerebrovascular accident (CVA) (CMS/[facility]) [I63.9]             Anticoagulation Episode Summary     INR check location:      Preferred lab:      Send INR reminders to:   YANDY POLANCO [facility]    Comments:  [employer] **Call every time - REQUIRES PHARMACIST REVIEW**      Anticoagulation Care Providers     Provider Role Specialty Phone number    José Antonio Banks MD Referring Cardiology [phone]          Clinic Interview:  Patient Findings     Negatives:  Signs/symptoms of thrombosis, Signs/symptoms of bleeding,   Laboratory test error suspected, Change in health, Change in alcohol use,   Change in activity, Upcoming invasive procedure, Emergency department   visit, Upcoming dental procedure, Missed doses, Extra doses, Change in   medications, Change in diet/appetite, Hospital admission, Bruising, Other   complaints      Clinical Outcomes     Negatives:  Major bleeding event, Thromboembolic event,   Anticoagulation-related hospital admission, Anticoagulation-related ED   visit,  Anticoagulation-related fatality        INR History:  Anticoagulation Monitoring 2/8/2021 2/15/2021 2/22/2021   INR 2.3 6.7 4.0   INR Date 2/8/2021 2/15/2021 2/22/2021   INR Goal 2.5-3.5 2.5-3.5 2.5-3.5   Trend Up Down Down   Last Week Total 40 mg 40 mg 33.5 mg   Next Week Total 40 mg 33.5 mg 35 mg   Sun 5 mg 5 mg 5 mg   Mon 7.5 mg 2.5 mg (2/15) 5 mg   Tue 5 mg 5 mg 5 mg   Wed 5 mg 5 mg 5 mg   Thu 5 mg 5 mg 5 mg   Fri 7.5 mg 6 mg 5 mg   Sat 5 mg 5 mg 5 mg   Visit Report - - -   Some recent data might be hidden       Plan:  1. INR is Supratherapeutic today- see above in Anticoagulation Summary.  Will instruct Galdino Dallas to slightly Change their warfarin regimen- see above in Anticoagulation Summary.  2. Follow up in 1 week  3. Patient declines warfarin refills.  4. Verbal and written information provided. Patient expresses understanding and has no further questions at this time.    Paulette Martin [facility]

## 2021-03-01 ENCOUNTER — ANTICOAGULATION VISIT (OUTPATIENT)
Dept: PHARMACY | Facility: HOSPITAL | Age: 36
End: 2021-03-01

## 2021-03-01 DIAGNOSIS — Z79.01 ANTICOAGULANT LONG-TERM USE: ICD-10-CM

## 2021-03-01 DIAGNOSIS — I63.9 ACUTE CEREBROVASCULAR ACCIDENT (CVA) (HCC): ICD-10-CM

## 2021-03-01 DIAGNOSIS — Z95.2 S/P AVR: ICD-10-CM

## 2021-03-01 LAB
INR PPP: 5.2 (ref 0.91–1.09)
PROTHROMBIN TIME: 62.3 SECONDS (ref 10–13.8)

## 2021-03-01 PROCEDURE — 85610 PROTHROMBIN TIME: CPT

## 2021-03-01 PROCEDURE — G0463 HOSPITAL OUTPT CLINIC VISIT: HCPCS

## 2021-03-01 PROCEDURE — 36416 COLLJ CAPILLARY BLOOD SPEC: CPT

## 2021-03-01 NOTE — PROGRESS NOTES
Anticoagulation Clinic Progress Note    Anticoagulation Summary  As of 3/1/2021    INR goal:  2.5-3.5   TTR:  29.1 % (2.2 y)   INR used for dosin.2 (3/1/2021)   Warfarin maintenance plan:  5 mg every day   Weekly warfarin total:  35 mg   Plan last modified:  Paulette Martin RPH (2021)   Next INR check:  3/8/2021   Priority:  High   Target end date:      Indications    Anticoagulant long-term use [Z79.01]  S/P AVR [Z95.2]  Acute cerebrovascular accident (CVA) (CMS/[facility]) [I63.9]             Anticoagulation Episode Summary     INR check location:      Preferred lab:      Send INR reminders to:   YANDY POLANCO CLINICAL POOL    Comments:  [employer] **Call every time - REQUIRES PHARMACIST REVIEW**      Anticoagulation Care Providers     Provider Role Specialty Phone number    José Antonio Banks MD Referring Cardiology [phone]          Clinic Interview:  Patient Findings     Positives:  Extra doses, Other complaints    Negatives:  Signs/symptoms of thrombosis, Signs/symptoms of bleeding,   Laboratory test error suspected, Change in health, Change in alcohol use,   Change in activity, Upcoming invasive procedure, Emergency department   visit, Upcoming dental procedure, Missed doses, Change in medications,   Change in diet/appetite, Hospital admission, Bruising    Comments:  Still out of test strips for home testing. Reports high   likelihood that he accidentally took extra 5 mg on 21.      Clinical Outcomes     Negatives:  Major bleeding event, Thromboembolic event,   Anticoagulation-related hospital admission, Anticoagulation-related ED   visit, Anticoagulation-related fatality    Comments:  Still out of test strips for home testing. Reports high   likelihood that he accidentally took extra 5 mg on 21.        INR History:  Anticoagulation Monitoring 2/15/2021 2021 3/1/2021   INR 6.7 4.0 5.2   INR Date 2/15/2021 2021 3/1/2021   INR Goal 2.5-3.5 2.5-3.5 2.5-3.5   Trend Down Down Same   Last  Week Total 40 mg 33.5 mg 40 mg   Next Week Total 33.5 mg 35 mg 32.5 mg   Sun 5 mg 5 mg 5 mg   Mon 2.5 mg (2/15) 5 mg 2.5 mg (3/1)   Tue 5 mg 5 mg 5 mg   Wed 5 mg 5 mg 5 mg   Thu 5 mg 5 mg 5 mg   Fri 6 mg 5 mg 5 mg   Sat 5 mg 5 mg 5 mg   Visit Report - - -   Some recent data might be hidden       Plan:  1. INR is Supratherapeutic today- see above in Anticoagulation Summary.  Will instruct Galdino Dallas to Change their warfarin regimen- see above in Anticoagulation Summary.  2. Follow up in 1 week  3. Patient declines warfarin refills.  4. Verbal and written information provided. To seek immediate medical attention if s/sx of bleeding develop or fall occurs. Patient expresses understanding and has no further questions at this time.    Joe De [facility]

## 2021-03-08 LAB — INR PPP: 2

## 2021-03-09 ENCOUNTER — ANTICOAGULATION VISIT (OUTPATIENT)
Dept: PHARMACY | Facility: HOSPITAL | Age: 36
End: 2021-03-09

## 2021-03-09 DIAGNOSIS — Z79.01 ANTICOAGULANT LONG-TERM USE: ICD-10-CM

## 2021-03-09 DIAGNOSIS — Z95.2 S/P AVR: ICD-10-CM

## 2021-03-09 DIAGNOSIS — I63.9 ACUTE CEREBROVASCULAR ACCIDENT (CVA) (HCC): ICD-10-CM

## 2021-03-09 LAB — INR PPP: 2.1

## 2021-03-09 NOTE — PROGRESS NOTES
Anticoagulation Clinic Progress Note    Anticoagulation Summary  As of 3/9/2021    INR goal:  2.5-3.5   TTR:  29.1 % (2.2 y)   INR used for dosin.10 (3/9/2021)   Warfarin maintenance plan:  5 mg every day   Weekly warfarin total:  35 mg   No change documented:  Joe De RPH   Plan last modified:  Paulette Martin RPH (2021)   Next INR check:  3/11/2021   Priority:  High   Target end date:      Indications    Anticoagulant long-term use [Z79.01]  S/P AVR [Z95.2]  Acute cerebrovascular accident (CVA) (CMS/[facility]) [I63.9]             Anticoagulation Episode Summary     INR check location:      Preferred lab:      Send INR reminders to:   YANDY POLANCO CLINICAL POOL    Comments:  [employer] **Call every time - REQUIRES PHARMACIST REVIEW**      Anticoagulation Care Providers     Provider Role Specialty Phone number    José Antonio Banks MD Referring Cardiology [phone]          Clinic Interview:  Patient Findings     Positives:  Extra doses, Change in medications, Other complaints    Negatives:  Signs/symptoms of thrombosis, Signs/symptoms of bleeding,   Laboratory test error suspected, Change in health, Change in alcohol use,   Change in activity, Upcoming invasive procedure, Emergency department   visit, Upcoming dental procedure, Missed doses, Change in diet/appetite,   Hospital admission, Bruising    Comments:  Pt reports he took 7.5 mg on 3/8/21 after seeing low INR of   2.0; however, he reports he forgot to take it until the middle of the   night, so INR 2.1 was taken only several hours after his higher dose.   Reports famotidine prn for acid reflux.      Clinical Outcomes     Negatives:  Major bleeding event, Thromboembolic event,   Anticoagulation-related hospital admission, Anticoagulation-related ED   visit, Anticoagulation-related fatality    Comments:  Pt reports he took 7.5 mg on 3/8/21 after seeing low INR of   2.0; however, he reports he forgot to take it until the middle of the   night, so  INR 2.1 was taken only several hours after his higher dose.   Reports famotidine prn for acid reflux.        INR History:  Anticoagulation Monitoring 2/22/2021 3/1/2021 3/9/2021   INR 4.0 5.2 2.10   INR Date 2/22/2021 3/1/2021 3/9/2021   INR Goal 2.5-3.5 2.5-3.5 2.5-3.5   Trend Down Same Same   Last Week Total 33.5 mg 40 mg 37.5 mg   Next Week Total 35 mg 32.5 mg 35 mg   Sun 5 mg 5 mg -   Mon 5 mg 2.5 mg (3/1) -   Tue 5 mg 5 mg 5 mg   Wed 5 mg 5 mg 5 mg   Thu 5 mg 5 mg -   Fri 5 mg 5 mg -   Sat 5 mg 5 mg -   Visit Report - - -   Some recent data might be hidden       Plan:  1. INR is Subtherapeutic today- see above in Anticoagulation Summary.   Will instruct Galdino Dallas to Continue their warfarin regimen- see above in Anticoagulation Summary.  2. Follow up in 2 days  3. They have been instructed to call if any changes in medications, doses, concerns, etc. Patient expresses understanding and has no further questions at this time.    Joe De [facility]

## 2021-03-11 LAB — INR PPP: 4.1

## 2021-03-12 ENCOUNTER — ANTICOAGULATION VISIT (OUTPATIENT)
Dept: PHARMACY | Facility: HOSPITAL | Age: 36
End: 2021-03-12

## 2021-03-12 DIAGNOSIS — Z79.01 ANTICOAGULANT LONG-TERM USE: ICD-10-CM

## 2021-03-12 DIAGNOSIS — I63.9 ACUTE CEREBROVASCULAR ACCIDENT (CVA) (HCC): ICD-10-CM

## 2021-03-12 DIAGNOSIS — Z95.2 S/P AVR: ICD-10-CM

## 2021-03-12 NOTE — PROGRESS NOTES
Anticoagulation Clinic Progress Note    Anticoagulation Summary  As of 3/12/2021    INR goal:  2.5-3.5   TTR:  29.2 % (2.2 y)   INR used for dosin.10 (3/11/2021)   Warfarin maintenance plan:  5 mg every day   Weekly warfarin total:  35 mg   Plan last modified:  Paulette Martin RPH (2021)   Next INR check:  3/15/2021   Priority:  High   Target end date:      Indications    Anticoagulant long-term use [Z79.01]  S/P AVR [Z95.2]  Acute cerebrovascular accident (CVA) (CMS/[facility]) [I63.9]             Anticoagulation Episode Summary     INR check location:      Preferred lab:      Send INR reminders to:   YANDY POLANCO CLINICAL POOL    Comments:  [employer] **Call every time - REQUIRES PHARMACIST REVIEW**      Anticoagulation Care Providers     Provider Role Specialty Phone number    José Antonio Banks MD Referring Cardiology [phone]          Clinic Interview:  Patient Findings     Positives:  Change in health, Other complaints    Negatives:  Signs/symptoms of thrombosis, Signs/symptoms of bleeding,   Laboratory test error suspected, Change in alcohol use, Change in   activity, Upcoming invasive procedure, Emergency department visit,   Upcoming dental procedure, Missed doses, Extra doses, Change in   medications, Change in diet/appetite, Hospital admission, Bruising    Comments:  Reports worsening acid reflux; vomited one night as a result;   denies any changes in diet/appetite, however.      Clinical Outcomes     Negatives:  Major bleeding event, Thromboembolic event,   Anticoagulation-related hospital admission, Anticoagulation-related ED   visit, Anticoagulation-related fatality    Comments:  Reports worsening acid reflux; vomited one night as a result;   denies any changes in diet/appetite, however.        INR History:  Anticoagulation Monitoring 3/1/2021 3/9/2021 3/12/2021   INR 5.2 2.10 4.10   INR Date 3/1/2021 3/9/2021 3/11/2021   INR Goal 2.5-3.5 2.5-3.5 2.5-3.5   Trend Same Same Same   Last Week Total  40 mg 37.5 mg 37.5 mg   Next Week Total 32.5 mg 35 mg 34 mg   Sun 5 mg - 5 mg   Mon 2.5 mg (3/1) - -   Tue 5 mg 5 mg -   Wed 5 mg 5 mg -   Thu 5 mg - -   Fri 5 mg - 4 mg (3/12)   Sat 5 mg - 5 mg   Visit Report - - -   Some recent data might be hidden       Plan:  1. INR was Supratherapeutic yesterday- see above in Anticoagulation Summary.   Will instruct Galdino Dallas to Change their warfarin regimen- see above in Anticoagulation Summary.  2. Follow up in 3 days in clinic per pt request.   3. They have been instructed to call if any changes in medications, doses, concerns, etc. Patient expresses understanding and has no further questions at this time.    Joe De [facility]

## 2021-03-15 ENCOUNTER — ANTICOAGULATION VISIT (OUTPATIENT)
Dept: PHARMACY | Facility: HOSPITAL | Age: 36
End: 2021-03-15

## 2021-03-15 DIAGNOSIS — Z95.2 S/P AVR: ICD-10-CM

## 2021-03-15 DIAGNOSIS — I63.9 ACUTE CEREBROVASCULAR ACCIDENT (CVA) (HCC): ICD-10-CM

## 2021-03-15 DIAGNOSIS — Z79.01 ANTICOAGULANT LONG-TERM USE: ICD-10-CM

## 2021-03-15 LAB
INR PPP: 3.8 (ref 0.91–1.09)
PROTHROMBIN TIME: 45 SECONDS (ref 10–13.8)

## 2021-03-15 PROCEDURE — 85610 PROTHROMBIN TIME: CPT

## 2021-03-15 PROCEDURE — 36416 COLLJ CAPILLARY BLOOD SPEC: CPT

## 2021-03-15 PROCEDURE — G0463 HOSPITAL OUTPT CLINIC VISIT: HCPCS

## 2021-03-15 RX ORDER — NITROGLYCERIN 0.4 MG/1
TABLET SUBLINGUAL
Qty: 50 TABLET | Refills: 0 | Status: SHIPPED | OUTPATIENT
Start: 2021-03-15 | End: 2021-04-05

## 2021-03-15 NOTE — PROGRESS NOTES
Anticoagulation Clinic Progress Note    Anticoagulation Summary  As of 3/15/2021    INR goal:  2.5-3.5   TTR:  29.0 % (2.2 y)   INR used for dosing:  3.8 (3/15/2021)   Warfarin maintenance plan:  5 mg every day   Weekly warfarin total:  35 mg   Plan last modified:  Paulette Martin RPH (2/22/2021)   Next INR check:  3/18/2021   Priority:  High   Target end date:      Indications    Anticoagulant long-term use [Z79.01]  S/P AVR [Z95.2]  Acute cerebrovascular accident (CVA) (CMS/HCC) [I63.9]             Anticoagulation Episode Summary     INR check location:      Preferred lab:      Send INR reminders to:   YANDY POLANCO CLINICAL POOL    Comments:  [employer] **Call every time - REQUIRES PHARMACIST REVIEW**      Anticoagulation Care Providers     Provider Role Specialty Phone number    José Antonio Banks MD Referring Cardiology [phone]          Clinic Interview:  Patient Findings     Negatives:  Signs/symptoms of thrombosis, Signs/symptoms of bleeding,   Laboratory test error suspected, Change in health, Change in alcohol use,   Change in activity, Upcoming invasive procedure, Emergency department   visit, Upcoming dental procedure, Missed doses, Extra doses, Change in   medications, Change in diet/appetite, Hospital admission, Bruising, Other   complaints      Clinical Outcomes     Negatives:  Major bleeding event, Thromboembolic event,   Anticoagulation-related hospital admission, Anticoagulation-related ED   visit, Anticoagulation-related fatality        INR History:  Anticoagulation Monitoring 3/9/2021 3/12/2021 3/15/2021   INR 2.10 4.10 3.8   INR Date 3/9/2021 3/11/2021 3/15/2021   INR Goal 2.5-3.5 2.5-3.5 2.5-3.5   Trend Same Same Same   Last Week Total 37.5 mg 37.5 mg 36.5 mg   Next Week Total 35 mg 34 mg 34 mg   Sun - 5 mg -   Mon - - 4 mg (3/15)   Tue 5 mg - 5 mg   Wed 5 mg - 5 mg   Thu - - -   Fri - 4 mg (3/12) -   Sat - 5 mg -   Visit Report - - -   Some recent data might be hidden       Plan:  1. INR is  Supratherapeutic today- see above in Anticoagulation Summary.  Will instruct Galdino Jonesery to reduce to 4mg today only, then continue their warfarin regimen- see above in Anticoagulation Summary.  2. Follow up in 3 days  3. Patient declines warfarin refills.  4. Verbal and written information provided. Patient expresses understanding and has no further questions at this time.    Paulette Martin [facility]

## 2021-03-22 ENCOUNTER — ANTICOAGULATION VISIT (OUTPATIENT)
Dept: PHARMACY | Facility: HOSPITAL | Age: 36
End: 2021-03-22

## 2021-03-22 DIAGNOSIS — Z95.2 S/P AVR: ICD-10-CM

## 2021-03-22 DIAGNOSIS — Z79.01 ANTICOAGULANT LONG-TERM USE: ICD-10-CM

## 2021-03-22 DIAGNOSIS — I63.9 ACUTE CEREBROVASCULAR ACCIDENT (CVA) (HCC): ICD-10-CM

## 2021-03-22 LAB — INR PPP: 4.8

## 2021-03-22 NOTE — PROGRESS NOTES
Anticoagulation Clinic Progress Note    Anticoagulation Summary  As of 3/22/2021    INR goal:  2.5-3.5   TTR:  28.8 % (2.3 y)   INR used for dosin.80 (3/22/2021)   Warfarin maintenance plan:  4 mg every Mon, Fri; 5 mg all other days   Weekly warfarin total:  33 mg   Plan last modified:  Joe De RPH (3/22/2021)   Next INR check:  3/29/2021   Priority:  High   Target end date:      Indications    Anticoagulant long-term use [Z79.01]  S/P AVR [Z95.2]  Acute cerebrovascular accident (CVA) (CMS/HCC) [I63.9]             Anticoagulation Episode Summary     INR check location:      Preferred lab:      Send INR reminders to:   YANDY POLANCO CLINICAL POOL    Comments:  [employer] **Call every time - REQUIRES PHARMACIST REVIEW**      Anticoagulation Care Providers     Provider Role Specialty Phone number    José Antonio Banks MD Referring Cardiology [phone]          Clinic Interview:  Patient Findings     Negatives:  Signs/symptoms of thrombosis, Signs/symptoms of bleeding,   Laboratory test error suspected, Change in health, Change in alcohol use,   Change in activity, Upcoming invasive procedure, Emergency department   visit, Upcoming dental procedure, Missed doses, Extra doses, Change in   medications, Change in diet/appetite, Hospital admission, Bruising, Other   complaints      Clinical Outcomes     Negatives:  Major bleeding event, Thromboembolic event,   Anticoagulation-related hospital admission, Anticoagulation-related ED   visit, Anticoagulation-related fatality        INR History:  Anticoagulation Monitoring 3/12/2021 3/15/2021 3/22/2021   INR 4.10 3.8 4.80   INR Date 3/11/2021 3/15/2021 3/22/2021   INR Goal 2.5-3.5 2.5-3.5 2.5-3.5   Trend Same Same Down   Last Week Total 37.5 mg 36.5 mg 34 mg   Next Week Total 34 mg 34 mg 33 mg   Sun 5 mg - 5 mg   Mon - 4 mg (3/15) 4 mg   Tue - 5 mg 5 mg   Wed - 5 mg 5 mg   Thu - - 5 mg   Fri 4 mg (3/12) - 4 mg   Sat 5 mg - 5 mg   Visit Report - - -   Some recent  data might be hidden       Plan:  1. INR is Supratherapeutic today- see above in Anticoagulation Summary.   Will instruct Galdino Dallas to Change their warfarin regimen- see above in Anticoagulation Summary.  2. Follow up in 1 week  3. They have been instructed to call if any changes in medications, doses, concerns, etc. To seek immediate medical attention if s/sx of bleeding develop or fall occurs. Patient expresses understanding and has no further questions at this time.    Joe De [facility]

## 2021-04-05 RX ORDER — NITROGLYCERIN 0.4 MG/1
TABLET SUBLINGUAL
Qty: 50 TABLET | Refills: 0 | Status: SHIPPED | OUTPATIENT
Start: 2021-04-05

## 2021-04-06 ENCOUNTER — TELEPHONE (OUTPATIENT)
Dept: PHARMACY | Facility: HOSPITAL | Age: 36
End: 2021-04-06

## 2021-04-06 ENCOUNTER — ANTICOAGULATION VISIT (OUTPATIENT)
Dept: PHARMACY | Facility: HOSPITAL | Age: 36
End: 2021-04-06

## 2021-04-06 DIAGNOSIS — I63.9 ACUTE CEREBROVASCULAR ACCIDENT (CVA) (HCC): ICD-10-CM

## 2021-04-06 DIAGNOSIS — Z95.2 S/P AVR: ICD-10-CM

## 2021-04-06 DIAGNOSIS — Z79.01 ANTICOAGULANT LONG-TERM USE: ICD-10-CM

## 2021-04-06 NOTE — PROGRESS NOTES
This visit is for documentation purposes only: Mr. Dallas has established care with Dr. Sloan with [facility], and his warfarin management has been taken over by their office as of today. No longer following in the [facility]. It has been a pleasure being part of his care.

## 2021-04-16 ENCOUNTER — BULK ORDERING (OUTPATIENT)
Dept: CASE MANAGEMENT | Facility: OTHER | Age: 36
End: 2021-04-16

## 2021-04-16 DIAGNOSIS — Z23 IMMUNIZATION DUE: ICD-10-CM

## 2021-09-13 RX ORDER — WARFARIN SODIUM 4 MG/1
TABLET ORAL
Qty: 30 TABLET | Refills: 1 | Status: SHIPPED | OUTPATIENT
Start: 2021-09-13

## 2021-09-13 RX ORDER — WARFARIN SODIUM 5 MG/1
TABLET ORAL
Qty: 90 TABLET | Refills: 1 | Status: SHIPPED | OUTPATIENT
Start: 2021-09-13

## 2022-05-26 RX ORDER — WARFARIN SODIUM 5 MG/1
TABLET ORAL
Qty: 90 TABLET | Refills: 1 | OUTPATIENT
Start: 2022-05-26

## 2022-06-03 RX ORDER — WARFARIN SODIUM 5 MG/1
TABLET ORAL
Qty: 90 TABLET | Refills: 1 | OUTPATIENT
Start: 2022-06-03

## 2022-07-01 RX ORDER — WARFARIN SODIUM 5 MG/1
TABLET ORAL
Qty: 90 TABLET | Refills: 1 | OUTPATIENT
Start: 2022-07-01

## 2022-07-25 RX ORDER — WARFARIN SODIUM 5 MG/1
TABLET ORAL
Qty: 90 TABLET | Refills: 1 | OUTPATIENT
Start: 2022-07-25

## 2022-07-25 NOTE — TELEPHONE ENCOUNTER
It appears he transitioned to care to [facility].  They should be filling his warfarin.

## 2023-02-05 NOTE — PROGRESS NOTES
Anticoagulation Clinic Progress Note    Anticoagulation Summary  As of 2019    INR goal:   2.5-3.5   TTR:   24.3 % (9.1 mo)   INR used for dosin.00! (2019)   Warfarin maintenance plan:   7.5 mg every day   Weekly warfarin total:   52.5 mg   No change documented:   Joe De RPH   Plan last modified:   Joe De RPH (2019)   Next INR check:   2019   Priority:   High   Target end date:       Indications    S/P AVR [Z95.2]  Anticoagulant long-term use [Z79.01]             Anticoagulation Episode Summary     INR check location:       Preferred lab:       Send INR reminders to:   [facility]    Comments:         Anticoagulation Care Providers     Provider Role Specialty Phone number    José Antonio Banks MD Referring Cardiology [phone]            Clinic Interview:  Patient Findings     Positives:   Other complaints    Negatives:   Signs/symptoms of thrombosis, Signs/symptoms of bleeding,   Laboratory test error suspected, Change in health, Change in alcohol use,   Change in activity, Upcoming invasive procedure, Emergency department   visit, Upcoming dental procedure, Missed doses, Extra doses, Change in   medications, Change in diet/appetite, Hospital admission, Bruising    Comments:   HELD dose yesterday according to on-call cardiology guidance   (see  tele note). Spoke with pt today; he indicated he would stop by    [facility] lab by ~3pm today; however, not drawn as of 4:13 pm. Contacted   pt; he is checking in at [facility].      Clinical Outcomes     Negatives:   Major bleeding event, Thromboembolic event,   Anticoagulation-related hospital admission, Anticoagulation-related ED   visit, Anticoagulation-related fatality    Comments:   HELD dose yesterday according to on-call cardiology guidance   (see  tele note). Spoke with pt today; he indicated he would stop by    [facility] lab by ~3pm today; however, not drawn as of 4:13 pm. Contacted   pt; he is checking  in at [facility].        INR History:  Anticoagulation Monitoring 9/10/2019 9/12/2019 9/18/2019   INR 3.4 2.1 8.00   INR Date 9/10/2019 9/12/2019 9/17/2019   INR Goal 2.5-3.5 2.5-3.5 2.5-3.5   Trend Same Same Same   Last Week Total 60 mg 56.25 mg 52.5 mg   Next Week Total 52.5 mg 60 mg 52.5 mg   Sun - 7.5 mg -   Mon - 7.5 mg -   Tue 7.5 mg - -   Wed 7.5 mg - -   Thu - 11.25 mg (9/12) -   Fri - 11.25 mg (9/13) -   Sat - 7.5 mg -   Visit Report - - -   Some recent data might be hidden       Plan:  1. INR is Supratherapeutic today- see above in Anticoagulation Summary.   Will instruct [facility] to Change their warfarin regimen- see above in Anticoagulation Summary. He is to f/u with on-call Cardiology following venous INR this evening. Advised that if he has difficulty having INR drawn in lab that he should evaluate INR at home with home test - If INR >/=4.5 he should HOLD tonight's dose; if INR <4.5, he should take lower dose of 5 mg.   2. Follow up TBD based on tonight's INR result.   3. They have been instructed to call if any changes in medications, doses, concerns, etc. To seek immediate medical attention if s/sx of bleeding develop or a fall occurs. Patient expresses understanding and has no further questions at this time.    Joe De [facility]   Speaking Coherently

## 2023-12-21 NOTE — ED NOTES
12/21/2023 I called and left a message that I scheduled him in [place] that was the only clinic that had a opening next week Dec 28th and if he cannot make that work please call and reschedule    Patient reports pain that begins in his left buttock and shoots down his left leg.     Aliza Roy, RN  09/19/18 [id number]

## (undated) DEVICE — TUBING, SUCTION, 1/4" X 10', STRAIGHT: Brand: MEDLINE

## (undated) DEVICE — CATH DIAG IMPULSE FL3.5 5F 100CM

## (undated) DEVICE — Device: Brand: DEFENDO AIR/WATER/SUCTION AND BIOPSY VALVE

## (undated) DEVICE — THE TORRENT IRRIGATION SCOPE CONNECTOR IS USED WITH THE TORRENT IRRIGATION TUBING TO PROVIDE IRRIGATION FLUIDS SUCH AS STERILE WATER DURING GASTROINTESTINAL ENDOSCOPIC PROCEDURES WHEN USED IN CONJUNCTION WITH AN IRRIGATION PUMP (OR ELECTROSURGICAL UNIT).: Brand: TORRENT

## (undated) DEVICE — CANNULA,ADULT,SOFT-TOUCH,7'TUBE,UC: Brand: PENDING

## (undated) DEVICE — GLIDESHEATH BASIC HYDROPHILIC COATED INTRODUCER SHEATH: Brand: GLIDESHEATH

## (undated) DEVICE — CATH DIAG IMPULSE PIG 5F 100CM

## (undated) DEVICE — GW EMR FIX EXCHG J STD .035 3MM 260CM

## (undated) DEVICE — CATH DIAG IMPULSE FR5 5F 100CM

## (undated) DEVICE — GW PRESSUREWIRE AERIS W/ AGILE TP 175CM

## (undated) DEVICE — PK CATH CARD 40

## (undated) DEVICE — KT MANIFLD CARDIAC